# Patient Record
Sex: FEMALE | Race: WHITE | NOT HISPANIC OR LATINO | Employment: OTHER | URBAN - METROPOLITAN AREA
[De-identification: names, ages, dates, MRNs, and addresses within clinical notes are randomized per-mention and may not be internally consistent; named-entity substitution may affect disease eponyms.]

---

## 2019-03-15 ENCOUNTER — OFFICE VISIT (OUTPATIENT)
Dept: INTERNAL MEDICINE CLINIC | Facility: CLINIC | Age: 84
End: 2019-03-15
Payer: COMMERCIAL

## 2019-03-15 VITALS
OXYGEN SATURATION: 98 % | HEART RATE: 73 BPM | RESPIRATION RATE: 18 BRPM | HEIGHT: 66 IN | WEIGHT: 150.8 LBS | BODY MASS INDEX: 24.23 KG/M2 | DIASTOLIC BLOOD PRESSURE: 80 MMHG | SYSTOLIC BLOOD PRESSURE: 130 MMHG | TEMPERATURE: 98.1 F

## 2019-03-15 DIAGNOSIS — Z79.899 ENCOUNTER FOR LONG-TERM CURRENT USE OF MEDICATION: ICD-10-CM

## 2019-03-15 DIAGNOSIS — M81.0 AGE-RELATED OSTEOPOROSIS WITHOUT CURRENT PATHOLOGICAL FRACTURE: ICD-10-CM

## 2019-03-15 DIAGNOSIS — F33.41 RECURRENT MAJOR DEPRESSIVE DISORDER, IN PARTIAL REMISSION (HCC): ICD-10-CM

## 2019-03-15 DIAGNOSIS — C44.90 SKIN CANCER: ICD-10-CM

## 2019-03-15 DIAGNOSIS — Z95.2 S/P AVR (AORTIC VALVE REPLACEMENT): ICD-10-CM

## 2019-03-15 DIAGNOSIS — H61.91 SKIN LESION OF RIGHT EAR: ICD-10-CM

## 2019-03-15 DIAGNOSIS — G30.1 LATE ONSET ALZHEIMER'S DISEASE WITHOUT BEHAVIORAL DISTURBANCE (HCC): Primary | ICD-10-CM

## 2019-03-15 DIAGNOSIS — E78.49 OTHER HYPERLIPIDEMIA: ICD-10-CM

## 2019-03-15 DIAGNOSIS — F02.80 LATE ONSET ALZHEIMER'S DISEASE WITHOUT BEHAVIORAL DISTURBANCE (HCC): Primary | ICD-10-CM

## 2019-03-15 DIAGNOSIS — M15.9 PRIMARY OSTEOARTHRITIS INVOLVING MULTIPLE JOINTS: ICD-10-CM

## 2019-03-15 PROBLEM — I25.10 CORONARY ARTERY DISEASE INVOLVING NATIVE CORONARY ARTERY OF NATIVE HEART: Status: ACTIVE | Noted: 2019-03-15

## 2019-03-15 PROBLEM — M15.0 PRIMARY OSTEOARTHRITIS INVOLVING MULTIPLE JOINTS: Status: ACTIVE | Noted: 2019-03-15

## 2019-03-15 PROBLEM — J30.9 ALLERGIC RHINITIS: Status: ACTIVE | Noted: 2019-03-15

## 2019-03-15 PROBLEM — F32.A DEPRESSION: Status: ACTIVE | Noted: 2018-04-27

## 2019-03-15 PROCEDURE — 1160F RVW MEDS BY RX/DR IN RCRD: CPT | Performed by: INTERNAL MEDICINE

## 2019-03-15 PROCEDURE — 99204 OFFICE O/P NEW MOD 45 MIN: CPT | Performed by: INTERNAL MEDICINE

## 2019-03-15 PROCEDURE — 1036F TOBACCO NON-USER: CPT | Performed by: INTERNAL MEDICINE

## 2019-03-15 RX ORDER — ATORVASTATIN CALCIUM 40 MG/1
40 TABLET, FILM COATED ORAL DAILY
Qty: 90 TABLET | Refills: 1 | Status: SHIPPED | OUTPATIENT
Start: 2019-03-15 | End: 2019-07-25

## 2019-03-15 RX ORDER — ATORVASTATIN CALCIUM 40 MG/1
TABLET, FILM COATED ORAL
COMMUNITY
Start: 2017-09-18 | End: 2019-03-15 | Stop reason: SDUPTHER

## 2019-03-15 NOTE — PROGRESS NOTES
Assessment/Plan:    Late onset Alzheimer's disease without behavioral disturbance  Not interested in treatment since with side effects  She is living with her daughter  Depression  Mild, did not try citalopram     Skin cancer  Skin lesion on ear suspicious, history of skin cancer  Agrees to see dermatology  S/P AVR (aortic valve replacement)  On ASA and statin  Echo due in the summer  Refer to local cardiology  Other hyperlipidemia  Lipids due, on statin  Diagnoses and all orders for this visit:    Late onset Alzheimer's disease without behavioral disturbance    Recurrent major depressive disorder, in partial remission ([facility] [address] )    Skin lesion of right ear  -     Ambulatory referral to Dermatology; Future    Other hyperlipidemia  -     Ambulatory referral to Cardiology; Future  -     Comprehensive metabolic panel  -     Lipid panel  -     TSH, 3rd generation with Free T4 reflex  -     atorvastatin (LIPITOR) 40 mg tablet; Take 1 tablet (40 mg total) by mouth daily    S/P AVR (aortic valve replacement)  -     Ambulatory referral to Cardiology; Future  -     CBC and differential  -     Comprehensive metabolic panel    Skin cancer    Encounter for long-term current use of medication  -     Vitamin B12    Primary osteoarthritis involving multiple joints    Age-related osteoporosis without current pathological fracture  -     Vitamin D 25 hydroxy    Other orders  -     Discontinue: atorvastatin (LIPITOR) 40 mg tablet  -     aspirin 81 MG tablet; Take 81 mg by mouth daily  -     Multiple Vitamins-Minerals (MULTIVITAMIN ADULT PO); Take 1 tablet by mouth daily      Follow up in 4 months or as needed  Subjective:      Patient ID: Gaby El is a 80 y o  female  Ms Reji Up is here with her daughter  She recently moved to the area due to memory issues  She previously lived independently in [place]  She had worked part-time until 3 years ago      She had tried Aricept and Namenda but developed side effects  They are not interested in trying any medication at this time  She was also thought to be depressed, was prescribed citalopram which she never tried  Currently, she denies feeling depressed, mostly board  She walks twice a day, reads frequently  Daughter reports that she had valve replacement and had stents placed about 4 years ago in [facility]     She complains of a skin lesion on the food of her right earlobe  She noticed this about 3 weeks ago  Denies any bleeding or discharge  She has a history of skin cancers  She also complains of frequent postnasal drainage and clear nasal discharge  She has not used any over-the-counter medication  She complains of stiff hands sometimes  The following portions of the patient's history were reviewed and updated as appropriate: allergies, current medications, past family history, past medical history, past social history, past surgical history and problem list     Past Medical History:   Diagnosis Date    [facility]     Coronary artery disease     Dementia     Depression     Ovarian cancer ([facility] [address] ) 2004    s/p surgery   no chemo/RT     Past Surgical History:   Procedure Laterality Date    APPENDECTOMY      CATARACT EXTRACTION, BILATERAL      HYSTERECTOMY  2005    ovarian CA    KNEE SURGERY Right     TONSILECTOMY AND ADNOIDECTOMY       Family History   Problem Relation Age of Onset    Depression Mother     Anxiety disorder Mother     Alcohol abuse Mother     Substance Abuse Mother     Asthma Mother     Diabetes Father 61    Brain cancer Son     Mental illness Neg Hx      Social History     Socioeconomic History    Marital status:      Spouse name: Not on file    Number of children: Not on file    Years of education: Not on file    Highest education level: Not on file   Occupational History    Not on file   Social Needs    Financial resource strain: Not on file    Food insecurity:     Worry: Not on file Inability: Not on file    Transportation needs:     Medical: Not on file     Non-medical: Not on file   Tobacco Use    Smoking status: Current Every Day Smoker     Types: Cigarettes    Smokeless tobacco: Never Used    Tobacco comment: Up to 30 per day    Substance and Sexual Activity    Alcohol use: Never     Frequency: Never    Drug use: Never    Sexual activity: Not on file   Lifestyle    Physical activity:     Days per week: Not on file     Minutes per session: Not on file    Stress: Not on file   Relationships    Social connections:     Talks on phone: Not on file     Gets together: Not on file     Attends [employer] service: Not on file     Active member of club or organization: Not on file     Attends meetings of clubs or organizations: Not on file     Relationship status: Not on file    Intimate partner violence:     Fear of current or ex partner: Not on file     Emotionally abused: Not on file     Physically abused: Not on file     Forced sexual activity: Not on file   Other Topics Concern    Not on file   Social History Narrative    Drinks coffee/tea- 2 daily half decaf     From [place], [place]    Now lives with daughter    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, 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       Current Outpatient Medications:     aspirin 81 MG tablet, Take 81 mg by mouth daily, Disp: , Rfl:     atorvastatin (LIPITOR) 40 mg tablet, Take 1 tablet (40 mg total) by mouth daily, Disp: 90 tablet, Rfl: 1    Multiple Vitamins-Minerals (MULTIVITAMIN ADULT PO), Take 1 tablet by mouth daily, Disp: , Rfl:   Allergies   Allergen Reactions    Morphine          Review of Systems   Constitutional: Negative for appetite change and fatigue  HENT: Positive for postnasal drip and rhinorrhea  Negative for congestion and ear pain  Eyes: Negative for visual disturbance  Respiratory: Negative for cough and shortness of breath  Cardiovascular: Negative for chest pain and leg swelling     Gastrointestinal: Negative for abdominal pain, constipation, diarrhea and nausea  Genitourinary: Negative for dysuria, frequency and urgency  Musculoskeletal: Positive for arthralgias  Negative for myalgias  Skin: Positive for wound  Negative for rash  Neurological: Negative for dizziness, numbness and headaches  Hematological: Bruises/bleeds easily  Psychiatric/Behavioral: Negative for agitation, confusion, dysphoric mood, hallucinations and sleep disturbance  The patient is not nervous/anxious  Objective:      /80   Pulse 73   Temp 98 1 °F (36 7 °C)   Resp 18   Ht 5' 5 5" (1 664 m)   Wt 68 4 kg (150 lb 12 8 oz)   SpO2 98%   BMI 24 71 kg/m²          Physical Exam   Constitutional: Vital signs are normal  She appears well-developed and well-nourished  HENT:   Head: Normocephalic and atraumatic  Right Ear: Tympanic membrane and external ear normal    Left Ear: Tympanic membrane and external ear normal    Nose: Nose normal    Mouth/Throat: Uvula is midline, oropharynx is clear and moist and mucous membranes are normal    Eyes: Pupils are equal, round, and reactive to light  Conjunctivae are normal    Neck: Neck supple  No thyroid mass present  Cardiovascular: Normal rate, regular rhythm and normal heart sounds  Pulmonary/Chest: Effort normal and breath sounds normal  She has no wheezes  She has no rhonchi  Abdominal: Soft  Bowel sounds are normal  There is no tenderness  No hernia  Musculoskeletal: Normal range of motion  She exhibits no edema  Right hand: She exhibits deformity  Left hand: She exhibits deformity  No swelling, small joints of both hands   Lymphadenopathy:     She has no cervical adenopathy  Right: No supraclavicular adenopathy present  Left: No supraclavicular adenopathy present  Neurological: She is alert  No cranial nerve deficit  Skin: Skin is warm  Lesion noted  Psychiatric: She has a normal mood and affect   Her behavior is normal    Nursing note and vitals reviewed  Reviewed available records

## 2019-04-03 ENCOUNTER — TELEPHONE (OUTPATIENT)
Dept: CARDIOLOGY CLINIC | Facility: CLINIC | Age: 84
End: 2019-04-03

## 2019-05-01 ENCOUNTER — OFFICE VISIT (OUTPATIENT)
Dept: DERMATOLOGY | Facility: CLINIC | Age: 84
End: 2019-05-01
Payer: COMMERCIAL

## 2019-05-01 VITALS — TEMPERATURE: 98.7 F | WEIGHT: 150 LBS | BODY MASS INDEX: 24.11 KG/M2 | HEIGHT: 66 IN

## 2019-05-01 DIAGNOSIS — Z85.828 HISTORY OF SKIN CANCER: ICD-10-CM

## 2019-05-01 DIAGNOSIS — B35.3 TINEA PEDIS OF BOTH FEET: Primary | ICD-10-CM

## 2019-05-01 DIAGNOSIS — L57.0 ACTINIC KERATOSIS: ICD-10-CM

## 2019-05-01 DIAGNOSIS — L82.1 SEBORRHEIC KERATOSES: ICD-10-CM

## 2019-05-01 PROCEDURE — 17110 DESTRUCTION B9 LES UP TO 14: CPT | Performed by: DERMATOLOGY

## 2019-05-01 PROCEDURE — 99214 OFFICE O/P EST MOD 30 MIN: CPT | Performed by: DERMATOLOGY

## 2019-05-01 RX ORDER — KETOCONAZOLE 20 MG/G
CREAM TOPICAL
Qty: 60 G | Refills: 2 | Status: SHIPPED | OUTPATIENT
Start: 2019-05-01

## 2019-05-03 ENCOUNTER — CONSULT (OUTPATIENT)
Dept: CARDIOLOGY CLINIC | Facility: CLINIC | Age: 84
End: 2019-05-03
Payer: COMMERCIAL

## 2019-05-03 VITALS
DIASTOLIC BLOOD PRESSURE: 60 MMHG | HEIGHT: 66 IN | WEIGHT: 152.3 LBS | OXYGEN SATURATION: 96 % | BODY MASS INDEX: 24.48 KG/M2 | SYSTOLIC BLOOD PRESSURE: 122 MMHG | HEART RATE: 70 BPM

## 2019-05-03 DIAGNOSIS — Z95.2 S/P AVR (AORTIC VALVE REPLACEMENT): ICD-10-CM

## 2019-05-03 DIAGNOSIS — I35.0 NON-RHEUMATIC AORTIC STENOSIS: Primary | ICD-10-CM

## 2019-05-03 DIAGNOSIS — E78.49 OTHER HYPERLIPIDEMIA: ICD-10-CM

## 2019-05-03 DIAGNOSIS — G30.1 LATE ONSET ALZHEIMER'S DISEASE WITHOUT BEHAVIORAL DISTURBANCE (HCC): ICD-10-CM

## 2019-05-03 DIAGNOSIS — F02.80 LATE ONSET ALZHEIMER'S DISEASE WITHOUT BEHAVIORAL DISTURBANCE (HCC): ICD-10-CM

## 2019-05-03 PROCEDURE — 93000 ELECTROCARDIOGRAM COMPLETE: CPT | Performed by: INTERNAL MEDICINE

## 2019-05-03 PROCEDURE — 99204 OFFICE O/P NEW MOD 45 MIN: CPT | Performed by: INTERNAL MEDICINE

## 2019-05-13 ENCOUNTER — TELEPHONE (OUTPATIENT)
Dept: CARDIOLOGY CLINIC | Facility: CLINIC | Age: 84
End: 2019-05-13

## 2019-06-05 PROBLEM — K21.9 GERD (GASTROESOPHAGEAL REFLUX DISEASE): Status: ACTIVE | Noted: 2019-06-05

## 2019-06-05 PROBLEM — Z79.890 POSTMENOPAUSAL HRT (HORMONE REPLACEMENT THERAPY): Status: ACTIVE | Noted: 2019-06-05

## 2019-06-05 PROBLEM — K86.2 PANCREATIC CYST: Status: ACTIVE | Noted: 2019-06-05

## 2019-06-05 PROBLEM — R73.03 PREDIABETES: Status: ACTIVE | Noted: 2019-06-05

## 2019-06-05 PROBLEM — J44.9 COPD (CHRONIC OBSTRUCTIVE PULMONARY DISEASE) (HCC): Status: ACTIVE | Noted: 2019-06-05

## 2019-06-05 PROBLEM — E53.8 VITAMIN B12 DEFICIENCY: Status: ACTIVE | Noted: 2019-06-05

## 2019-06-05 PROBLEM — I10 ESSENTIAL HYPERTENSION: Status: ACTIVE | Noted: 2019-06-05

## 2019-06-20 ENCOUNTER — HOSPITAL ENCOUNTER (OUTPATIENT)
Dept: NON INVASIVE DIAGNOSTICS | Facility: CLINIC | Age: 84
Discharge: HOME | End: 2019-06-20
Payer: COMMERCIAL

## 2019-06-20 DIAGNOSIS — I35.0 NON-RHEUMATIC AORTIC STENOSIS: ICD-10-CM

## 2019-06-20 DIAGNOSIS — Z95.2 S/P AVR (AORTIC VALVE REPLACEMENT): ICD-10-CM

## 2019-06-20 PROCEDURE — 93306 TTE W/DOPPLER COMPLETE: CPT | Performed by: INTERNAL MEDICINE

## 2019-06-20 PROCEDURE — 93306 TTE W/DOPPLER COMPLETE: CPT

## 2019-06-26 ENCOUNTER — OFFICE VISIT (OUTPATIENT)
Dept: INTERNAL MEDICINE CLINIC | Facility: CLINIC | Age: 84
End: 2019-06-26
Payer: COMMERCIAL

## 2019-06-26 VITALS
BODY MASS INDEX: 23.75 KG/M2 | HEIGHT: 66 IN | TEMPERATURE: 98.9 F | HEART RATE: 71 BPM | RESPIRATION RATE: 18 BRPM | OXYGEN SATURATION: 98 % | DIASTOLIC BLOOD PRESSURE: 68 MMHG | WEIGHT: 147.8 LBS | SYSTOLIC BLOOD PRESSURE: 112 MMHG

## 2019-06-26 DIAGNOSIS — G30.1 LATE ONSET ALZHEIMER'S DISEASE WITHOUT BEHAVIORAL DISTURBANCE (HCC): ICD-10-CM

## 2019-06-26 DIAGNOSIS — F02.80 LATE ONSET ALZHEIMER'S DISEASE WITHOUT BEHAVIORAL DISTURBANCE (HCC): ICD-10-CM

## 2019-06-26 DIAGNOSIS — R10.84 GENERALIZED ABDOMINAL PAIN: Primary | ICD-10-CM

## 2019-06-26 DIAGNOSIS — Z95.2 S/P AVR (AORTIC VALVE REPLACEMENT): ICD-10-CM

## 2019-06-26 PROCEDURE — 1125F AMNT PAIN NOTED PAIN PRSNT: CPT | Performed by: INTERNAL MEDICINE

## 2019-06-26 PROCEDURE — 1036F TOBACCO NON-USER: CPT | Performed by: INTERNAL MEDICINE

## 2019-06-26 PROCEDURE — 1170F FXNL STATUS ASSESSED: CPT | Performed by: INTERNAL MEDICINE

## 2019-06-26 PROCEDURE — 99214 OFFICE O/P EST MOD 30 MIN: CPT | Performed by: INTERNAL MEDICINE

## 2019-06-26 PROCEDURE — 1160F RVW MEDS BY RX/DR IN RCRD: CPT | Performed by: INTERNAL MEDICINE

## 2019-06-26 RX ORDER — RANITIDINE 150 MG/1
150 TABLET ORAL 2 TIMES DAILY
Qty: 60 TABLET | Refills: 1 | Status: SHIPPED | OUTPATIENT
Start: 2019-06-26 | End: 2019-07-25

## 2019-06-26 RX ORDER — CHLORHEXIDINE GLUCONATE 0.12 MG/ML
RINSE ORAL
Refills: 0 | COMMUNITY
Start: 2019-05-18 | End: 2019-11-08 | Stop reason: ALTCHOICE

## 2019-07-18 LAB
25(OH)D3 SERPL-MCNC: 33 NG/ML (ref 30–100)
ALBUMIN SERPL-MCNC: 4 G/DL (ref 3.6–5.1)
ALBUMIN/GLOB SERPL: 2 (CALC) (ref 1–2.5)
ALP SERPL-CCNC: 79 U/L (ref 33–130)
ALT SERPL-CCNC: 14 U/L (ref 6–29)
AST SERPL-CCNC: 14 U/L (ref 10–35)
BASOPHILS # BLD AUTO: 40 CELLS/UL (ref 0–200)
BASOPHILS NFR BLD AUTO: 0.8 %
BILIRUB SERPL-MCNC: 0.7 MG/DL (ref 0.2–1.2)
BUN SERPL-MCNC: 16 MG/DL (ref 7–25)
BUN/CREAT SERPL: 15 (CALC) (ref 6–22)
CALCIUM SERPL-MCNC: 9.1 MG/DL (ref 8.6–10.4)
CHLORIDE SERPL-SCNC: 108 MMOL/L (ref 98–110)
CHOLEST SERPL-MCNC: 144 MG/DL
CHOLEST/HDLC SERPL: 2.9 (CALC)
CO2 SERPL-SCNC: 28 MMOL/L (ref 20–32)
CREAT SERPL-MCNC: 1.06 MG/DL (ref 0.6–0.88)
EOSINOPHIL # BLD AUTO: 110 CELLS/UL (ref 15–500)
EOSINOPHIL NFR BLD AUTO: 2.2 %
ERYTHROCYTE [DISTWIDTH] IN BLOOD BY AUTOMATED COUNT: 13.1 % (ref 11–15)
GLOBULIN SER CALC-MCNC: 2 G/DL (CALC) (ref 1.9–3.7)
GLUCOSE SERPL-MCNC: 94 MG/DL (ref 65–99)
HCT VFR BLD AUTO: 42.2 % (ref 35–45)
HDLC SERPL-MCNC: 50 MG/DL
HGB BLD-MCNC: 14.3 G/DL (ref 11.7–15.5)
LDLC SERPL CALC-MCNC: 77 MG/DL (CALC)
LYMPHOCYTES # BLD AUTO: 1725 CELLS/UL (ref 850–3900)
LYMPHOCYTES NFR BLD AUTO: 34.5 %
MCH RBC QN AUTO: 32.1 PG (ref 27–33)
MCHC RBC AUTO-ENTMCNC: 33.9 G/DL (ref 32–36)
MCV RBC AUTO: 94.8 FL (ref 80–100)
MONOCYTES # BLD AUTO: 330 CELLS/UL (ref 200–950)
MONOCYTES NFR BLD AUTO: 6.6 %
NEUTROPHILS # BLD AUTO: 2795 CELLS/UL (ref 1500–7800)
NEUTROPHILS NFR BLD AUTO: 55.9 %
NONHDLC SERPL-MCNC: 94 MG/DL (CALC)
PLATELET # BLD AUTO: 120 THOUSAND/UL (ref 140–400)
PMV BLD REES-ECKER: 9.8 FL (ref 7.5–12.5)
POTASSIUM SERPL-SCNC: 4.1 MMOL/L (ref 3.5–5.3)
PROT SERPL-MCNC: 6 G/DL (ref 6.1–8.1)
RBC # BLD AUTO: 4.45 MILLION/UL (ref 3.8–5.1)
SERVICE CMNT-IMP: ABNORMAL
SL AMB EGFR AFRICAN AMERICAN: 56 ML/MIN/1.73M2
SL AMB EGFR NON AFRICAN AMERICAN: 48 ML/MIN/1.73M2
SODIUM SERPL-SCNC: 143 MMOL/L (ref 135–146)
TRIGL SERPL-MCNC: 86 MG/DL
TSH SERPL-ACNC: 2.71 MIU/L (ref 0.4–4.5)
VIT B12 SERPL-MCNC: 508 PG/ML (ref 200–1100)
WBC # BLD AUTO: 5 THOUSAND/UL (ref 3.8–10.8)

## 2019-07-25 ENCOUNTER — OFFICE VISIT (OUTPATIENT)
Dept: INTERNAL MEDICINE CLINIC | Facility: CLINIC | Age: 84
End: 2019-07-25
Payer: COMMERCIAL

## 2019-07-25 VITALS
RESPIRATION RATE: 18 BRPM | BODY MASS INDEX: 23.98 KG/M2 | TEMPERATURE: 97.8 F | HEIGHT: 66 IN | HEART RATE: 69 BPM | OXYGEN SATURATION: 97 % | WEIGHT: 149.2 LBS | DIASTOLIC BLOOD PRESSURE: 76 MMHG | SYSTOLIC BLOOD PRESSURE: 130 MMHG

## 2019-07-25 DIAGNOSIS — F02.80 LATE ONSET ALZHEIMER'S DISEASE WITHOUT BEHAVIORAL DISTURBANCE (HCC): Primary | ICD-10-CM

## 2019-07-25 DIAGNOSIS — E78.49 OTHER HYPERLIPIDEMIA: ICD-10-CM

## 2019-07-25 DIAGNOSIS — D69.6 THROMBOCYTOPENIA (HCC): ICD-10-CM

## 2019-07-25 DIAGNOSIS — R10.84 GENERALIZED ABDOMINAL PAIN: ICD-10-CM

## 2019-07-25 DIAGNOSIS — Z00.00 HEALTH MAINTENANCE EXAMINATION: ICD-10-CM

## 2019-07-25 DIAGNOSIS — N18.2 CHRONIC KIDNEY DISEASE (CKD), STAGE II (MILD): ICD-10-CM

## 2019-07-25 DIAGNOSIS — G30.1 LATE ONSET ALZHEIMER'S DISEASE WITHOUT BEHAVIORAL DISTURBANCE (HCC): Primary | ICD-10-CM

## 2019-07-25 PROCEDURE — 99214 OFFICE O/P EST MOD 30 MIN: CPT | Performed by: INTERNAL MEDICINE

## 2019-07-25 PROCEDURE — 1125F AMNT PAIN NOTED PAIN PRSNT: CPT | Performed by: INTERNAL MEDICINE

## 2019-07-25 PROCEDURE — 1170F FXNL STATUS ASSESSED: CPT | Performed by: INTERNAL MEDICINE

## 2019-07-25 PROCEDURE — G0438 PPPS, INITIAL VISIT: HCPCS | Performed by: INTERNAL MEDICINE

## 2019-07-25 RX ORDER — ATORVASTATIN CALCIUM 40 MG/1
20 TABLET, FILM COATED ORAL DAILY
Qty: 90 TABLET | Refills: 1
Start: 2019-07-25 | End: 2020-03-02 | Stop reason: SDUPTHER

## 2019-07-25 RX ORDER — RANITIDINE 150 MG/1
150 TABLET ORAL DAILY
Qty: 60 TABLET | Refills: 1
Start: 2019-07-25 | End: 2019-08-19 | Stop reason: SDUPTHER

## 2019-07-25 NOTE — PROGRESS NOTES
Assessment/Plan:    Late onset Alzheimer's disease without behavioral disturbance  She will join adult day care  S/P AVR (aortic valve replacement)  On daily ASA  Platelet count a bit low, will monitor  Other hyperlipidemia  LDL 77, total 144  Decrease atorvastatin to 20 mg, repeat lipids in a few months  GERD (gastroesophageal reflux disease)  May decrease ranitidine to once a day  Vitamin B12 deficiency  Continue daily MVI  Age-related osteoporosis without current pathological fracture  On daily MVI only  Chronic kidney disease (CKD), stage II (mild)  Maintain adequate fluid intake  Prediabetes  Fasting BS normal        Diagnoses and all orders for this visit:    Late onset Alzheimer's disease without behavioral disturbance    Other hyperlipidemia  -     atorvastatin (LIPITOR) 40 mg tablet; Take 0 5 tablets (20 mg total) by mouth daily    Generalized abdominal pain  Comments:  Diff Dx: diverticulitis, colitis, dyspepsia, GERD, constipation, cancer recurrence  Start ranitidine bid  Instructed to start liquid diet if with symptoms  Orders:  -     ranitidine (ZANTAC) 150 mg tablet; Take 1 tablet (150 mg total) by mouth daily    Chronic kidney disease (CKD), stage II (mild)    Thrombocytopenia ([facility] [address] )    Health maintenance examination  Comments:  No further screening  Follow up as scheduled or as needed  Subjective:      Patient ID: Tony Jay is a 80 y o  female  Ms Alvarez Noel is here with her daughter  She reports abdominal pain have subsided  She would develop symptoms depending on food intake, such as spicy foods  She moves her bowels regularly  Denies any nausea or vomiting  She has been taking ranitidine twice a day  She and her daughter decided not to do CT scan since symptoms have been improving  Daughter reports that she will be starting adult   She walks at least once a day around her home  She denies any falls, stumbles frequently    She noticed that if she misses her daily walks, her legs feel weaker  She does not watch a lot of television, reads frequently  She reports her older brother recently passed away, age 80  The following portions of the patient's history were reviewed and updated as appropriate: allergies, current medications, past medical history, past social history and problem list     Review of Systems   Constitutional: Negative for activity change, appetite change and fatigue  Eyes: Negative for visual disturbance  Respiratory: Negative for cough  Cardiovascular: Negative for chest pain  Gastrointestinal: Negative for abdominal distention, abdominal pain, constipation, diarrhea, nausea and vomiting  Genitourinary: Negative for dysuria  Musculoskeletal: Negative for arthralgias and gait problem  Psychiatric/Behavioral: Positive for confusion  Negative for agitation and sleep disturbance  Objective:      /76   Pulse 69   Temp 97 8 °F (36 6 °C) (Oral)   Resp 18   Ht 5' 6" (1 676 m)   Wt 67 7 kg (149 lb 3 2 oz)   SpO2 97%   BMI 24 08 kg/m²          Physical Exam   Constitutional: She appears well-developed and well-nourished  HENT:   Head: Normocephalic and atraumatic  Eyes: Pupils are equal, round, and reactive to light  Cardiovascular: Normal rate, regular rhythm and normal heart sounds  Pulmonary/Chest: Effort normal and breath sounds normal  She has no wheezes  She has no rales  Abdominal: Soft  Bowel sounds are normal    Musculoskeletal: She exhibits no edema  Neurological: She is alert  Skin: Skin is warm  No rash noted  Psychiatric: She has a normal mood and affect  Nursing note and vitals reviewed  Lab results reviewed with patient

## 2019-07-25 NOTE — PROGRESS NOTES
Assessment and Plan:     Problem List Items Addressed This Visit        Nervous and Auditory    Late onset Alzheimer's disease without behavioral disturbance - Primary     She will join adult day care  Genitourinary    Chronic kidney disease (CKD), stage II (mild)     Maintain adequate fluid intake  Other    Other hyperlipidemia     LDL 77, total 144  Decrease atorvastatin to 20 mg, repeat lipids in a few months  Relevant Medications    atorvastatin (LIPITOR) 40 mg tablet    Thrombocytopenia (HCC)      Other Visit Diagnoses     Generalized abdominal pain        Diff Dx: diverticulitis, colitis, dyspepsia, GERD, constipation, cancer recurrence  Start ranitidine bid  Instructed to start liquid diet if with symptoms        Relevant Medications    ranitidine (ZANTAC) 150 mg tablet         History of Present Illness:     Patient presents for Medicare Annual Wellness visit    Patient Care Team:  Edita Webster MD as PCP - General (Internal Medicine)     Problem List:     Patient Active Problem List   Diagnosis    Depression    Late onset Alzheimer's disease without behavioral disturbance    S/P AVR (aortic valve replacement)    Primary osteoarthritis involving multiple joints    Other hyperlipidemia    Allergic rhinitis    Age-related osteoporosis without current pathological fracture    Skin cancer    Coronary artery disease involving native coronary artery of native heart    Essential hypertension    GERD (gastroesophageal reflux disease)    Postmenopausal HRT (hormone replacement therapy)    Vitamin B12 deficiency    COPD (chronic obstructive pulmonary disease) (HCC)    Prediabetes    Pancreatic cyst    Chronic kidney disease (CKD), stage II (mild)    Thrombocytopenia (HCC)      Past Medical and Surgical History:     Past Medical History:   Diagnosis Date    Cancer ([facility] [address] )     Coronary artery disease     Dementia     Depression     Hyperlipidemia     Migraines  Ovarian cancer ([facility] [address] ) 2004    s/p surgery  no chemo/RT    Phlebitis     R leg     Past Surgical History:   Procedure Laterality Date    APPENDECTOMY      CATARACT EXTRACTION, BILATERAL      HYSTERECTOMY  2005    ovarian CA    KNEE SURGERY Right     TONSILECTOMY AND ADNOIDECTOMY        Family History:     Family History   Problem Relation Age of Onset    Depression Mother     Anxiety disorder Mother     Alcohol abuse Mother     Substance Abuse Mother     Asthma Mother     Diabetes Father 61    Brain cancer Son     Heart attack Sister     Coronary artery disease Sister     Mental illness Neg Hx       Social History:     Social History     Tobacco Use   Smoking Status Former Smoker    Packs/day: 0 25    Types: Cigarettes   Smokeless Tobacco Never Used   Tobacco Comment    until age 27     Social History     Substance and Sexual Activity   Alcohol Use Yes    Frequency: Never     Social History     Substance and Sexual Activity   Drug Use Never      Medications and Allergies:     Current Outpatient Medications   Medication Sig Dispense Refill    aspirin 81 MG tablet Take 81 mg by mouth daily      atorvastatin (LIPITOR) 40 mg tablet Take 0 5 tablets (20 mg total) by mouth daily 90 tablet 1    chlorhexidine (PERIDEX) 0 12 % solution RINSE 2 TIMES A DAY  0    ketoconazole (NIZORAL) 2 % cream Apply topically to both feet in their entirety (tops, bottoms and between toes) 3 times a day for 4 weeks straight  60 g 2    Multiple Vitamins-Minerals (MULTIVITAMIN ADULT PO) Take 1 tablet by mouth daily      ranitidine (ZANTAC) 150 mg tablet Take 1 tablet (150 mg total) by mouth daily 60 tablet 1     No current facility-administered medications for this visit        Allergies   Allergen Reactions    Codeine Hives    Morphine Other (See Comments)     Redness in face, swelling    Propoxyphene       Immunizations:     Immunization History   Administered Date(s) Administered     Influenza (IM) Preservative Free 10/17/2017    Hep B, Adolescent or Pediatric 10/15/2001    Hepatitis B 10/15/2001    INFLUENZA 10/04/2013, 11/03/2014, 09/27/2015, 10/13/2016, 10/17/2017, 10/01/2018    Influenza, high dose seasonal 0 5 mL 10/01/2018    Pneumococcal Polysaccharide PPV23 11/22/2016    Tdap 11/16/2010      Medicare Screening Tests and Risk Assessments:     Cecile Huber is here for her Initial Wellness visit  Health Risk Assessment:  Patient rates overall health as very good  Patient feels that their physical health rating is Same  Eyesight was rated as Same  Hearing was rated as Slightly worse  Patient feels that their emotional and mental health rating is Slightly better  Pain experienced by patient in the last 7 days has been Some  Patient's pain rating has been 3/10  Emotional/Mental Health:  Patient has been feeling nervous/anxious  PHQ-9 Depression Screening:    Frequency of the following problems over the past two weeks:      1  Little interest or pleasure in doing things: 0 - not at all      2  Feeling down, depressed, or hopeless: 1 - several days  PHQ-2 Score: 1          Broken Bones/Falls: Fall Risk Assessment:    In the past year, patient has experienced: No history of falling in past year          Bladder/Bowel:  Patient has not leaked urine accidently in the last six months  Patient reports no loss of bowel control  Immunizations:  Patient has had a flu vaccination within the last year  Patient has received a pneumonia shot  Patient has not received a shingles shot  Home Safety:  Patient does not have trouble with stairs inside or outside of their home  Patient currently reports that there are no safety hazards present in home, working smoke alarms, no working carbon monoxide detectors        Preventative Screenings:   Breast cancer screening performed, colon cancer screen completed, cholesterol screen completed, glaucoma eye exam completed,     Medications:  Patient is currently taking over-the-counter supplements  Patient is able to manage medications  Lifestyle Choices:  Patient reports no tobacco use  Patient has smoked or used tobacco in the past   Patient has stopped her tobacco use  Patient reports no alcohol use  Patient drives a vehicle  Patient wears seat belt  Activities of Daily Living:  Can get out of bed by his or her self, able to dress self, unable to make own meals, able to do own shopping, able to bathe self, unable to do laundry/housekeeping, unable to manage own money and other related tasks    Previous Hospitalizations:  No hospitalization or ED visit in past 12 months        Advanced Directives:  Patient has decided on a power of   Patient has spoken to designated power of   Patient has completed advanced directive  Preventative Screening/Counseling:      Cardiovascular:      General: Screening Current          Diabetes:      General: Screening Current          Colorectal Cancer:      General: Screening Not Indicated          Breast Cancer:      General: Patient Declines          Cervical Cancer:      General: Screening Not Indicated          Osteoporosis:      General: Patient Declines      Counseling: Regular Weightbearing Exercise          AAA:      General: Screening Not Indicated          Glaucoma:      General: Screening Current          HIV:      General: Screening Not Indicated          Hepatitis C:      General: Screening Not Indicated        Advanced Directives:   Patient has living will for healthcare, has durable POA for healthcare, End of life assessment reviewed with patient  Immunizations:      Influenza: Influenza UTD This Year      Pneumococcal: Lifetime Vaccine Completed      Other Preventative Counseling (Non-Medicare):   Increase physical activity and Nutrition Counseling      Referrals:  Referral(s) to: Cardiologist and Dermatology

## 2019-07-29 ENCOUNTER — TELEPHONE (OUTPATIENT)
Dept: INTERNAL MEDICINE CLINIC | Facility: CLINIC | Age: 84
End: 2019-07-29

## 2019-07-29 NOTE — TELEPHONE ENCOUNTER
Pt  Has appt 's for TB test and ppd Read this week  Daughter wants to know if she can have ear was removed on one of those days  The Audiologist said she has wax in her rt  ear

## 2019-07-30 ENCOUNTER — CLINICAL SUPPORT (OUTPATIENT)
Dept: INTERNAL MEDICINE CLINIC | Facility: CLINIC | Age: 84
End: 2019-07-30
Payer: COMMERCIAL

## 2019-07-30 DIAGNOSIS — Z11.1 VISIT FOR TB SKIN TEST: Primary | ICD-10-CM

## 2019-07-30 PROCEDURE — 69210 REMOVE IMPACTED EAR WAX UNI: CPT

## 2019-07-30 PROCEDURE — 86580 TB INTRADERMAL TEST: CPT

## 2019-08-01 ENCOUNTER — CLINICAL SUPPORT (OUTPATIENT)
Dept: INTERNAL MEDICINE CLINIC | Facility: CLINIC | Age: 84
End: 2019-08-01

## 2019-08-01 DIAGNOSIS — Z11.1 ENCOUNTER FOR PPD SKIN TEST READING: Primary | ICD-10-CM

## 2019-08-01 LAB
INDURATION: 0 MM
TB SKIN TEST: NEGATIVE

## 2019-08-19 ENCOUNTER — OFFICE VISIT (OUTPATIENT)
Dept: DERMATOLOGY | Facility: CLINIC | Age: 84
End: 2019-08-19
Payer: COMMERCIAL

## 2019-08-19 VITALS — BODY MASS INDEX: 22.91 KG/M2 | HEIGHT: 67 IN | WEIGHT: 146 LBS | TEMPERATURE: 97.5 F

## 2019-08-19 DIAGNOSIS — R10.84 GENERALIZED ABDOMINAL PAIN: ICD-10-CM

## 2019-08-19 DIAGNOSIS — L82.1 SEBORRHEIC KERATOSIS: ICD-10-CM

## 2019-08-19 DIAGNOSIS — L57.0 KERATOSIS, ACTINIC: Primary | ICD-10-CM

## 2019-08-19 PROCEDURE — 99213 OFFICE O/P EST LOW 20 MIN: CPT | Performed by: DERMATOLOGY

## 2019-08-19 RX ORDER — RANITIDINE 150 MG/1
TABLET ORAL
Qty: 60 TABLET | Refills: 5 | Status: SHIPPED | OUTPATIENT
Start: 2019-08-19 | End: 2020-05-26

## 2019-08-19 NOTE — PROGRESS NOTES
[address] [facility] Note     Patient Name: Alia Wyatt  Encounter Date: 08/19/2019    Today's Chief Concerns:  Lam Steele Concern #1:  F/U AK's x 4    Concern #2:  Skin lesion left thigh       Past Medical History:  Have you ever had or currently have any of the following medical conditions or treatments? · HIV/AIDS: No  · Hepatitis B: No  · Hepatitis C: No   · Diabetes: No  · Tuberculosis: No  · Biologic Therapy/Chemotherapy: No  · Organ or Bone Marrow Transplantation: No  · Radiation Treatment: No  · Cancer (If Yes, which types)- No      Have you ever had any of the following skin conditions? · Melanoma? (If Yes, please provide more detail)- YES, Unsure  · Basal Cell Carcinoma: No  · Squamous Cell Carcinoma: No  · Sebaceous Cell Carcinoma: No  · Merkel Cell Carcinoma: No  · Angiosarcoma: No  · Blistering Sunburns: No  · Eczema: No  · Psoriasis: No    Social History:    What is your current Smoking Status? Never smoker     What is/was your primary occupation? Retired    What are your hobbies/past-times? n/a    Family history:  Do any of your "first degree relatives" (parent, brother, sister, or child) have any of the following conditions? · Melanoma? (If Yes, which relatives?) No  · Eczema: No  · Asthma: No  · Hay Fever/Seasonal Allergies: No  · Psoriasis: No  · Arthritis: YES  · Thyroid Problems: YES  · Lupus/Connective Tissue Disease: No  · Diabetes: No  · Stroke: No  · Blood Clots: No  · IBD/Crohn's/Ulcerative Colitis: No  · Vitiligo: No  · Scarring/Keloids: No  · Severe Acne: No  · Pancreatic Cancer: No  · Other known Skin Condition? If Yes, what condition and which relatives?   YES, sister unknown     Current Medications:    Current Outpatient Medications:     aspirin 81 MG tablet, Take 81 mg by mouth daily, Disp: , Rfl:     atorvastatin (LIPITOR) 40 mg tablet, Take 0 5 tablets (20 mg total) by mouth daily, Disp: 90 tablet, Rfl: 1    ketoconazole (NIZORAL) 2 % cream, Apply topically to both feet in their entirety (tops, bottoms and between toes) 3 times a day for 4 weeks straight , Disp: 60 g, Rfl: 2    Multiple Vitamins-Minerals (MULTIVITAMIN ADULT PO), Take 1 tablet by mouth daily, Disp: , Rfl:     ranitidine (ZANTAC) 150 mg tablet, TAKE 1 TABLET BY MOUTH TWICE A DAY, Disp: 60 tablet, Rfl: 5    chlorhexidine (PERIDEX) 0 12 % solution, RINSE 2 TIMES A DAY, Disp: , Rfl: 0    Specific Alerts:    Are you pregnant or planning to become pregnant? N/A    Are you currently or planning to be nursing or breast feeding? N/A    Allergies   Allergen Reactions    Codeine Hives    Morphine Other (See Comments)     Redness in face, swelling    Propoxyphene        May we call your Preferred Phone number to discuss your specific medical information? YES    May we leave a detailed message that includes your specific medical information? YES    Have you traveled outside of the [facility] in the past 3 months? No    Do you currently have a pacemaker or defibrillator? No    Do you have any artificial heart valves, joints, plates, screws, rods, stents, pins, etc? YES   - If Yes, were any placed within the last 2 years? Do you require any medications prior to a surgical procedure? YES   - If Yes, for which procedure? dentist   - If Yes, what medications to you require? unknown    Are you taking any medications that cause you to bleed more easily ("blood thinners") YES    Have you ever experienced a rapid heartbeat with epinephrine? No    Have you ever been treated with "gold" (gold sodium thiomalate) therapy? No    [facility] can help with wrinkles, "laugh lines," facial volume loss, "double chin," "love handles," age spots, and more  Are you interested in learning today about some of the skin enhancement procedures that we offer? (If Yes, please provide more detail) No    Review of Systems:  Have you recently had or currently have any of the following?     · Fever or chills: No  · Night Sweats: No  · Headaches: No  · Weight Gain: {No  · Weight Loss: No  · Blurry Vision: No  · Nausea: No  · Vomiting: No  · Diarrhea: No  · Blood in Stool: No  · Abdominal Pain: No  · Itchy Skin: YES  · Painful Joints: No  · Swollen Joints: No  · Muscle Pain: No  · Irregular Mole: No  · Sun Burn: No  · Dry Skin: No  · Skin Color Changes: No  · Scar or Keloid: No  · Cold Sores/Fever Blisters: No  · Bacterial Infections/MRSA: No  · Anxiety: No  · Depression: No  · Suicidal or Homicidal Thoughts: No      PHYSICAL EXAM:      Was a chaperone (Derm Clinical Assistant) present for the entirety of the Physical Exam? YES    Did the Dermatology Team specifically ask and  the patient on the importance of a Full Skin Exam to be sure that nothing is missed clinically?  YES    Did the patient request or accept a Full Skin Exam?  No    Did the patient specifically refuse to have the areas "under-the-bra" examined by the Dermatologist? No    Did the patient specifically refuse to have the areas "under-the-underwear" examined by the Dermatologist? No      CONSTITUTIONAL:   Vitals:    08/19/19 1443   Temp: 97 5 °F (36 4 °C)   TempSrc: Tympanic   Weight: 66 2 kg (146 lb)   Height: 5' 7 2" (1 707 m)       PSYCH: Normal mood and affect  EYES: Normal conjunctiva  ENT: Normal lips and oral mucosa  CARDIOVASCULAR: No edema  RESPIRATORY: Normal respirations  HEME/LYMPH/IMMUNO:  No regional lymphadenopathy except as noted below in ASSESSMENT AND PLAN BY DIAGNOSIS    FULL ORGAN SYSTEM SKIN EXAM (SKIN)  Hair, Scalp, Ears, Face Normal except as noted below in Assessment   Neck, Cervical Chain Nodes Normal except as noted below in Assessment   Right Arm/Hand/Fingers Normal except as noted below in Assessment   Left Arm/Hand/Fingers Normal except as noted below in Assessment   Chest/Breasts/Axillae    Abdomen, Umbilicus    Back/Spine    Groin/Genitalia/Buttocks    Right Leg, Foot, Toes Normal except as noted below in Assessment   Left Leg, Foot, Toes Normal except as noted below in Assessment        ASSESSMENT AND PLAN BY DIAGNOSIS:    1  ACTINIC KERATOSIS    Physical Exam:   Anatomic Location Affected:  Face, right arm and left ear   Morphological Description:  Scaly piank papules  (resolved)    Additional History of Present Condition:  S/P LN2 x 4  Resolved     Assessment and Plan:  Based on a thorough discussion of this condition and the management approach to it (including a comprehensive discussion of the known risks, side effects and potential benefits of treatment), the patient (family) agrees to implement the following specific plan:     No treatment needed  Resolved     Actinic keratoses are very common on sites repeatedly exposed to the sun, especially the backs of the hands and the face, most often affecting the ears, nose, cheeks, upper lip, vermilion of the lower lip, temples, forehead and balding scalp  In severely chronically sun-damaged individuals, they may also be found on the upper trunk, upper and lower limbs, and dorsum of feet  We discussed the theoretical premalignant (pre-cancerous) nature and etiology of these growths  We discussed the prevailing notion that actinic keratoses are a reflection of abnormal skin cell development due to DNA damage by short wavelength UVB  They are more likely to appear if the immune function is poor, due to aging, recent sun exposure, predisposing disease or certain drugs  We discussed that the main concern is that actinic keratoses may predispose to squamous cell carcinoma  It is rare for a solitary actinic keratosis to evolve to squamous cell carcinoma (SCC), but the risk of SCC occurring at some stage in a patient with more than 10 actinic keratoses is thought to be about 10 to 15%  A tender, thickened, ulcerated or enlarging actinic keratosis is suspicious of SCC  Actinic keratoses may be prevented by strict sun protection   If already present, keratoses may improve with a very high sun protection factor (50+) broad-spectrum sunscreen applied at least daily to affected areas, year-round  We recommend that UPF-rated clothing and hats and sunglasses be worn whenever possible and that a sunscreen-moisturizer combination product such as Neutrogena Daily Defense be applied at least three times a day  We performed a thorough discussion of treatment options and specific risk/benefits/alternatives including but not limited to medical field treatment with medications such as the following:                    - Lesions have resolved; No treatment needed     2  SEBORRHEIC KERATOSIS; NON-INFLAMED    Physical Exam:   Anatomic Location Affected:  Trunk including left thigh   Morphological Description:  Flat and raised, waxy, smooth to warty textured, yellow to brownish-grey to dark brown to blackish, discrete, "stuck-on" appearing papules  Additional History of Present Condition:  Patient reports new bumps on the skin  Denies itch, burn, pain, bleeding or ulceration  Present constantly; nothing seems to make it worse or better  No prior treatment  Seborrheic Keratosis  A seborrheic keratosis is a harmless warty spot that appears during adult life as a common sign of skin aging  Seborrheic keratoses can arise on any area of skin, covered or uncovered, with the usual exception of the palms and soles  They do not arise from mucous membranes  Seborrheic keratoses can have highly variable appearance  Seborrheic keratoses are extremely common  It has been estimated that over 90% of adults over the age of 61 years have one or more of them  They occur in males and females of all races, typically beginning to erupt in the 35s or 45s  They are uncommon under the age of 21 years  The precise cause of seborrhoeic keratoses is not known  Seborrhoeic keratoses are considered degenerative in nature  As time goes by, seborrheic keratoses tend to become more numerous   Some people inherit a tendency to develop a very large number of them; some people may have hundreds of them  The name "seborrheic keratosis" is misleading, because these lesions are not limited to a seborrhoeic distribution (scalp, mid-face, chest, upper back), nor are they formed from sebaceous glands, nor are they associated with sebum -- which is greasy  Seborrheic keratosis may also be called "SK," "Seb K," "basal cell papilloma," "senile wart," or "barnacle "      Researchers have noted:   Eruptive seborrhoeic keratoses can follow sunburn or dermatitis   Skin friction may be the reason they appear in body folds   Viral cause (e g , human papillomavirus) seems unlikely   Stable and clonal mutations or activation of FRFR3, PIK3CA, SHAJI, AKT1 and EGFR genes are found in seborrhoeic keratoses   Seborrhoeic keratosis can arise from solar lentigo   FRFR3 mutations also arise in solar lentigines  These mutations are associated with increased age and location on the head and neck, suggesting a role of ultraviolet radiation in these lesions   Seborrheic keratoses do not harbour tumour suppressor gene mutations   Epidermal growth factor receptor inhibitors, which are used to treat some cancers, often result in an increase in verrucal (warty) keratoses  There is no easy way to remove multiple lesions on a single occasion  Unless a specific lesion is "inflamed" and is causing pain or stinging/burning or is bleeding, most insurance companies do not authorize treatment      Scribe Attestation    I,:   Francheska Carmichael MA am acting as a scribe while in the presence of the attending physician :        I,:   Juan Ferris MD personally performed the services described in this documentation    as scribed in my presence :

## 2019-09-27 ENCOUNTER — OFFICE VISIT (OUTPATIENT)
Dept: INTERNAL MEDICINE CLINIC | Facility: CLINIC | Age: 84
End: 2019-09-27
Payer: COMMERCIAL

## 2019-09-27 VITALS
RESPIRATION RATE: 18 BRPM | TEMPERATURE: 98.6 F | OXYGEN SATURATION: 96 % | HEART RATE: 64 BPM | BODY MASS INDEX: 23.98 KG/M2 | DIASTOLIC BLOOD PRESSURE: 74 MMHG | HEIGHT: 66 IN | WEIGHT: 149.2 LBS | SYSTOLIC BLOOD PRESSURE: 110 MMHG

## 2019-09-27 DIAGNOSIS — Z23 NEED FOR INFLUENZA VACCINATION: ICD-10-CM

## 2019-09-27 DIAGNOSIS — G30.1 LATE ONSET ALZHEIMER'S DISEASE WITHOUT BEHAVIORAL DISTURBANCE (HCC): Primary | ICD-10-CM

## 2019-09-27 DIAGNOSIS — N18.2 CHRONIC KIDNEY DISEASE (CKD), STAGE II (MILD): ICD-10-CM

## 2019-09-27 DIAGNOSIS — K21.9 GASTROESOPHAGEAL REFLUX DISEASE, ESOPHAGITIS PRESENCE NOT SPECIFIED: ICD-10-CM

## 2019-09-27 DIAGNOSIS — F02.80 LATE ONSET ALZHEIMER'S DISEASE WITHOUT BEHAVIORAL DISTURBANCE (HCC): Primary | ICD-10-CM

## 2019-09-27 DIAGNOSIS — Z95.2 S/P AVR (AORTIC VALVE REPLACEMENT): ICD-10-CM

## 2019-09-27 DIAGNOSIS — F33.41 RECURRENT MAJOR DEPRESSIVE DISORDER, IN PARTIAL REMISSION (HCC): ICD-10-CM

## 2019-09-27 DIAGNOSIS — E78.49 OTHER HYPERLIPIDEMIA: ICD-10-CM

## 2019-09-27 DIAGNOSIS — Z71.9 HEALTH COUNSELING: ICD-10-CM

## 2019-09-27 PROBLEM — Z11.1 VISIT FOR TB SKIN TEST: Status: RESOLVED | Noted: 2019-07-30 | Resolved: 2019-09-27

## 2019-09-27 PROCEDURE — G0008 ADMIN INFLUENZA VIRUS VAC: HCPCS | Performed by: INTERNAL MEDICINE

## 2019-09-27 PROCEDURE — 99214 OFFICE O/P EST MOD 30 MIN: CPT | Performed by: INTERNAL MEDICINE

## 2019-09-27 PROCEDURE — 90662 IIV NO PRSV INCREASED AG IM: CPT | Performed by: INTERNAL MEDICINE

## 2019-09-27 NOTE — PROGRESS NOTES
Assessment/Plan:    Late onset Alzheimer's disease without behavioral disturbance  Stable, now going to adult day care 2 days a week, [employer] activities 2 days a week  Depression  Improved, since going to   Other hyperlipidemia  On statin, repeat lipids next visit  GERD (gastroesophageal reflux disease)  Stable, takes ranitidine in AM     COPD (chronic obstructive pulmonary disease) ([facility])  No symptoms  Chronic kidney disease (CKD), stage II (mild)  No NSAIDs  S/P AVR (aortic valve replacement)  Monitor platelets  On daily ASA  Diagnoses and all orders for this visit:    Late onset Alzheimer's disease without behavioral disturbance  -     CBC and differential; Future    S/P AVR (aortic valve replacement)    Recurrent major depressive disorder, in partial remission ([facility] [address] )    Need for influenza vaccination  -     influenza vaccine, [id number], high-dose, PF 0 5 mL (FLUZONE HIGH-DOSE)    Other hyperlipidemia  -     Comprehensive metabolic panel; Future  -     Lipid panel; Future    Gastroesophageal reflux disease, esophagitis presence not specified    Chronic kidney disease (CKD), stage II (mild)  -     Vitamin D 25 hydroxy; Future      Follow up in 6 months or as needed  Subjective:      Patient ID: Su Kerns is a 80 y o  female  Ms Sg Amado is here with her daughter  She has been feeling well, has no complaints  She started going to adult  twice a week  She enjoys it very much, enjoys the socialization and exposure to other people  She continues to walk at least twice a day  She continues to read at home as well  Daughter reports that she is also going to [employer] for daytime activities  She denies any abdominal pain, takes ranitidine every morning  She moves her bowels regularly  She has not fallen or trip      The following portions of the patient's history were reviewed and updated as appropriate: allergies, current medications, past medical history, past social history and problem list     Review of Systems   Constitutional: Negative for appetite change and fatigue  HENT: Negative for congestion, ear pain and postnasal drip  Eyes: Negative for visual disturbance  Respiratory: Negative for cough and shortness of breath  Cardiovascular: Negative for chest pain and leg swelling  Gastrointestinal: Negative for abdominal pain, constipation and diarrhea  Genitourinary: Negative for dysuria and frequency  Musculoskeletal: Negative for arthralgias  Skin: Negative for rash and wound  Neurological: Negative for dizziness and headaches  Psychiatric/Behavioral: Negative for confusion, decreased concentration and dysphoric mood  The patient is not nervous/anxious  Objective:      /74   Pulse 64   Temp 98 6 °F (37 °C) (Oral)   Resp 18   Ht 5' 6" (1 676 m)   Wt 67 7 kg (149 lb 3 2 oz)   SpO2 96%   BMI 24 08 kg/m²          Physical Exam   Constitutional: She appears well-developed and well-nourished  HENT:   Head: Normocephalic and atraumatic  Nose: Nose normal    Eyes: Pupils are equal, round, and reactive to light  Conjunctivae are normal    Neck: Neck supple  Cardiovascular: Normal rate, regular rhythm and normal heart sounds  Pulmonary/Chest: Effort normal and breath sounds normal  She has no wheezes  She has no rales  Abdominal: Soft  Bowel sounds are normal    Musculoskeletal: She exhibits no edema  Neurological: She is alert  Skin: Skin is warm  No rash noted  Psychiatric: She has a normal mood and affect  Her behavior is normal    Nursing note and vitals reviewed  Lab results reviewed with patient

## 2019-11-08 ENCOUNTER — OFFICE VISIT (OUTPATIENT)
Dept: CARDIOLOGY CLINIC | Facility: CLINIC | Age: 84
End: 2019-11-08
Payer: COMMERCIAL

## 2019-11-08 VITALS
BODY MASS INDEX: 23.98 KG/M2 | OXYGEN SATURATION: 98 % | WEIGHT: 149.2 LBS | HEART RATE: 80 BPM | SYSTOLIC BLOOD PRESSURE: 124 MMHG | DIASTOLIC BLOOD PRESSURE: 62 MMHG | HEIGHT: 66 IN

## 2019-11-08 DIAGNOSIS — I25.10 CORONARY ARTERY DISEASE INVOLVING NATIVE CORONARY ARTERY OF NATIVE HEART WITHOUT ANGINA PECTORIS: ICD-10-CM

## 2019-11-08 DIAGNOSIS — I35.0 NON-RHEUMATIC AORTIC STENOSIS: Primary | ICD-10-CM

## 2019-11-08 DIAGNOSIS — Z95.2 S/P AVR (AORTIC VALVE REPLACEMENT): ICD-10-CM

## 2019-11-08 DIAGNOSIS — I10 ESSENTIAL HYPERTENSION: ICD-10-CM

## 2019-11-08 PROCEDURE — 99214 OFFICE O/P EST MOD 30 MIN: CPT | Performed by: INTERNAL MEDICINE

## 2019-11-08 NOTE — PROGRESS NOTES
Cardiology Consultation     Ben Freeze  [id number]  1935  [facility] [place]  [facility] [place] [address]      1  Non-rheumatic aortic stenosis  Echo complete with contrast if indicated   2  Coronary artery disease involving native coronary artery of native heart without angina pectoris     3  Essential hypertension     4  S/P AVR (aortic valve replacement)         Discussion/Summary:    1  Aortic stenosis: s/p AVR  Bioprosthetic valve  Gradients across the valve are slightly elevated, but she is asymptomatic  Continue monitoring  Repeat echo prior to next visit in 6 months  2  CAD: Prior PCI  Currently without any angina  Continue her current medication regimen  3  Essential hypertension:  Blood pressure currently controlled, diet management not on any medications  None indicated currently  History of Present Illness:  She has a history of coronary artery disease with reports of prior stenting  Also has history of aortic stenosis and underwent a bioprosthetic aortic valve replacement in 2016  These were done [facility]  The patient lives with her daughter now  She is not very active overall, but denies any significant limitations  When she had severe aortic stenosis and underwent a valve replacement, she was having symptoms of shortness of breath on exertion  They have tried to use different medications for her dementia before  She had side effects with these  Also has depression, but did not tolerate medication for this either  Interval History:  Returns for regular follow-up  Overall, she is doing quite well  She is living with her daughter  Continues to have some memory issues with her dementia, but is walking around her neighborhood a few blocks  Denies any significant dyspnea  No PND orthopnea  No leg edema  She denies any chest pain    Tends to repeat that her daughter and her son and lost cooking is quite good, and she enjoys eating  However, she admits to being upset about having to sell her house  She is attending adult  2-3 days per week  Patient Active Problem List   Diagnosis    Depression    Late onset Alzheimer's disease without behavioral disturbance ([place] [address] )    S/P AVR (aortic valve replacement)    Primary osteoarthritis involving multiple joints    Other hyperlipidemia    Allergic rhinitis    Age-related osteoporosis without current pathological fracture    Skin cancer    Coronary artery disease involving native coronary artery of native heart    Essential hypertension    GERD (gastroesophageal reflux disease)    Postmenopausal HRT (hormone replacement therapy)    Vitamin B12 deficiency    COPD (chronic obstructive pulmonary disease) (HCC)    Prediabetes    Pancreatic cyst    Chronic kidney disease (CKD), stage II (mild)    Thrombocytopenia (HCC)     Past Medical History:   Diagnosis Date    Cancer ([place] [address] )     Coronary artery disease     Dementia ([place] [address] )     Depression     Hyperlipidemia     Migraines     Ovarian cancer ([place] [address] ) 2004    s/p surgery   no chemo/RT    Phlebitis     R leg     Social History     Tobacco Use    Smoking status: Former Smoker     Packs/day: 0 25     Types: Cigarettes    Smokeless tobacco: Never Used    Tobacco comment: until age 27   Substance Use Topics    Alcohol use: Yes     Frequency: Never    Drug use: Never      Family History   Problem Relation Age of Onset    Depression Mother     Anxiety disorder Mother     Alcohol abuse Mother     Substance Abuse Mother     Asthma Mother     Diabetes Father 61    Brain cancer Son     Heart attack Sister     Coronary artery disease Sister     Mental illness Neg Hx      Past Surgical History:   Procedure Laterality Date    APPENDECTOMY      CATARACT EXTRACTION, BILATERAL      HYSTERECTOMY  2005    ovarian CA    KNEE SURGERY Right     TONSILECTOMY AND ADNOIDECTOMY         Current Outpatient Medications:     aspirin 81 MG tablet, Take 81 mg by mouth daily, Disp: , Rfl:     atorvastatin (LIPITOR) 40 mg tablet, Take 0 5 tablets (20 mg total) by mouth daily, Disp: 90 tablet, Rfl: 1    Multiple Vitamins-Minerals (MULTIVITAMIN ADULT PO), Take 1 tablet by mouth daily, Disp: , Rfl:     ranitidine (ZANTAC) 150 mg tablet, TAKE 1 TABLET BY MOUTH TWICE A DAY, Disp: 60 tablet, Rfl: 5    ketoconazole (NIZORAL) 2 % cream, Apply topically to both feet in their entirety (tops, bottoms and between toes) 3 times a day for 4 weeks straight  (Patient not taking: Reported on 11/8/2019), Disp: 60 g, Rfl: 2  Allergies   Allergen Reactions    Codeine Hives    Morphine Other (See Comments)     Redness in face, swelling    Propoxyphene        Vitals:    11/08/19 1444   BP: 124/62   BP Location: Left arm   Patient Position: Sitting   Cuff Size: Adult   Pulse: 80   SpO2: 98%   Weight: 67 7 kg (149 lb 3 2 oz)   Height: 5' 6" (1 676 m)     Vitals:    11/08/19 1444   Weight: 67 7 kg (149 lb 3 2 oz)      Height: 5' 6" (167 6 cm)   Body mass index is 24 08 kg/m²  Physical Exam:  GEN: Lauren Casey appears well, alert and oriented x 3, pleasant and cooperative   HEENT: pupils equal, round, and reactive to light; extraocular muscles intact  NECK: supple, no carotid bruits   HEART: regular, 3/6 MARQUISE  LUNGS: clear to auscultation bilaterally; no wheezes, rales, or rhonchi   ABDOMEN: normal bowel sounds, soft, no tenderness, no distention  EXTREMITIES: peripheral pulses normal; no clubbing, cyanosis, or edema  NEURO: no focal findings   SKIN: normal without suspicious lesions on exposed skin    ROS:  Positive for memory loss, dementia, depression  Except as noted in HPI, is otherwise reviewed in detail and a 12 point review of systems is negative      Labs:  Lab Results   Component Value Date    K 4 1 07/17/2019     07/17/2019    CREATININE 1 06 (H) 07/17/2019    BUN 16 07/17/2019    CO2 28 07/17/2019    ALT 14 07/17/2019    AST 14 07/17/2019    HGBA1C 5 3 11/15/2011    WBC 5 0 07/17/2019    HGB 14 3 07/17/2019    HCT 42 2 07/17/2019     (L) 07/17/2019       No results found for: CHOL  Lab Results   Component Value Date    HDL 50 (L) 07/17/2019     No results found for: [facility]  Lab Results   Component Value Date    TRIG 86 07/17/2019

## 2019-12-19 ENCOUNTER — OFFICE VISIT (OUTPATIENT)
Dept: INTERNAL MEDICINE CLINIC | Facility: CLINIC | Age: 84
End: 2019-12-19
Payer: COMMERCIAL

## 2019-12-19 VITALS
WEIGHT: 146.8 LBS | HEART RATE: 88 BPM | SYSTOLIC BLOOD PRESSURE: 110 MMHG | BODY MASS INDEX: 23.59 KG/M2 | OXYGEN SATURATION: 98 % | HEIGHT: 66 IN | TEMPERATURE: 98 F | RESPIRATION RATE: 18 BRPM | DIASTOLIC BLOOD PRESSURE: 70 MMHG

## 2019-12-19 DIAGNOSIS — J06.9 ACUTE UPPER RESPIRATORY INFECTION: Primary | ICD-10-CM

## 2019-12-19 PROCEDURE — 99213 OFFICE O/P EST LOW 20 MIN: CPT | Performed by: NURSE PRACTITIONER

## 2019-12-19 PROCEDURE — 1036F TOBACCO NON-USER: CPT | Performed by: NURSE PRACTITIONER

## 2019-12-19 PROCEDURE — 1160F RVW MEDS BY RX/DR IN RCRD: CPT | Performed by: NURSE PRACTITIONER

## 2019-12-19 RX ORDER — AZITHROMYCIN 250 MG/1
TABLET, FILM COATED ORAL
Qty: 6 TABLET | Refills: 0 | Status: SHIPPED | OUTPATIENT
Start: 2019-12-19 | End: 2019-12-23

## 2019-12-19 RX ORDER — BENZONATATE 100 MG/1
100 CAPSULE ORAL 3 TIMES DAILY PRN
Qty: 20 CAPSULE | Refills: 0 | Status: SHIPPED | OUTPATIENT
Start: 2019-12-19 | End: 2020-06-29 | Stop reason: ALTCHOICE

## 2019-12-19 NOTE — PROGRESS NOTES
Assessment/Plan:    Increase water intake  Take antibiotics as prescribed with food  Tessalon perles as needed  Call if not improving over the next 3-5 days  Diagnoses and all orders for this visit:    Acute upper respiratory infection  -     azithromycin (ZITHROMAX) 250 mg tablet; Take 2 tablets daily on day 1 then 1 tablet daily for the next 4 days  -     benzonatate (TESSALON PERLES) 100 mg capsule; Take 1 capsule (100 mg total) by mouth 3 (three) times a day as needed for cough          Subjective:      Patient ID: Isabela Velasquez is a 80 y o  female  Here today with cough   Valerie Muller is here with her daughter today   Daughter reports symptoms started about 10 days ago   +PND, off and on sore throat, mild sob with exertion, moist non productive cough  Denies fevers or body aches   She has been eating and drinking ok   Tried sudafed without relief  Her daughter feels like cough is getting worse             The following portions of the patient's history were reviewed and updated as appropriate: allergies, current medications, past family history, past medical history, past social history, past surgical history and problem list     Review of Systems   Constitutional: Positive for fatigue  Negative for activity change, appetite change, chills and fever  HENT: Positive for postnasal drip, rhinorrhea and sore throat  Negative for congestion, ear pain and sinus pressure  Respiratory: Positive for cough and shortness of breath  Negative for chest tightness and wheezing  Gastrointestinal: Negative for abdominal pain, diarrhea, nausea and vomiting  Genitourinary: Negative for difficulty urinating  Musculoskeletal: Negative for myalgias  Neurological: Positive for weakness  Negative for dizziness, light-headedness and headaches           Objective:      /70   Pulse 88   Temp 98 °F (36 7 °C)   Resp 18   Ht 5' 6" (1 676 m)   Wt 66 6 kg (146 lb 12 8 oz)   SpO2 98%   BMI 23 69 kg/m² Physical Exam   Constitutional: She appears well-developed and well-nourished  HENT:   Head: Normocephalic and atraumatic  Right Ear: Tympanic membrane and ear canal normal    Left Ear: Tympanic membrane and ear canal normal    Mouth/Throat: Mucous membranes are dry  Posterior oropharyngeal erythema present  No oropharyngeal exudate or posterior oropharyngeal edema  Eyes: Pupils are equal, round, and reactive to light  Cardiovascular: Normal rate and normal heart sounds  Pulmonary/Chest: Effort normal and breath sounds normal  No respiratory distress  She has no wheezes  She has no rales  Lymphadenopathy:     She has no cervical adenopathy  Neurological: She is alert  Skin: Skin is warm and dry  Psychiatric: She has a normal mood and affect  Her behavior is normal    Vitals reviewed

## 2020-03-02 DIAGNOSIS — E78.49 OTHER HYPERLIPIDEMIA: ICD-10-CM

## 2020-03-02 RX ORDER — ATORVASTATIN CALCIUM 40 MG/1
20 TABLET, FILM COATED ORAL DAILY
Qty: 90 TABLET | Refills: 1
Start: 2020-03-02 | End: 2020-03-05 | Stop reason: SDUPTHER

## 2020-03-05 DIAGNOSIS — E78.49 OTHER HYPERLIPIDEMIA: ICD-10-CM

## 2020-03-05 RX ORDER — ATORVASTATIN CALCIUM 20 MG/1
20 TABLET, FILM COATED ORAL DAILY
Qty: 90 TABLET | Refills: 1 | Status: SHIPPED | OUTPATIENT
Start: 2020-03-05 | End: 2020-08-28

## 2020-05-07 ENCOUNTER — TELEPHONE (OUTPATIENT)
Dept: CARDIOLOGY CLINIC | Facility: CLINIC | Age: 85
End: 2020-05-07

## 2020-05-26 DIAGNOSIS — R10.84 GENERALIZED ABDOMINAL PAIN: ICD-10-CM

## 2020-05-26 RX ORDER — RANITIDINE 150 MG/1
TABLET ORAL
Qty: 180 TABLET | Refills: 0 | Status: SHIPPED | OUTPATIENT
Start: 2020-05-26 | End: 2020-09-25 | Stop reason: SDUPTHER

## 2020-06-03 ENCOUNTER — OFFICE VISIT (OUTPATIENT)
Dept: DERMATOLOGY | Facility: CLINIC | Age: 85
End: 2020-06-03
Payer: COMMERCIAL

## 2020-06-03 VITALS — WEIGHT: 150 LBS | HEIGHT: 65 IN | TEMPERATURE: 96.7 F | BODY MASS INDEX: 24.99 KG/M2

## 2020-06-03 DIAGNOSIS — L72.0 MILIA: ICD-10-CM

## 2020-06-03 DIAGNOSIS — D48.5 NEOPLASM OF UNCERTAIN BEHAVIOR OF SKIN: ICD-10-CM

## 2020-06-03 DIAGNOSIS — L82.1 SEBORRHEIC KERATOSIS: Primary | ICD-10-CM

## 2020-06-03 PROCEDURE — 99214 OFFICE O/P EST MOD 30 MIN: CPT | Performed by: DERMATOLOGY

## 2020-06-03 PROCEDURE — 1160F RVW MEDS BY RX/DR IN RCRD: CPT | Performed by: DERMATOLOGY

## 2020-06-03 PROCEDURE — 11102 TANGNTL BX SKIN SINGLE LES: CPT | Performed by: DERMATOLOGY

## 2020-06-03 PROCEDURE — 88305 TISSUE EXAM BY PATHOLOGIST: CPT | Performed by: STUDENT IN AN ORGANIZED HEALTH CARE EDUCATION/TRAINING PROGRAM

## 2020-06-03 PROCEDURE — 4040F PNEUMOC VAC/ADMIN/RCVD: CPT | Performed by: DERMATOLOGY

## 2020-06-11 ENCOUNTER — TELEPHONE (OUTPATIENT)
Dept: DERMATOLOGY | Facility: CLINIC | Age: 85
End: 2020-06-11

## 2020-06-11 NOTE — TELEPHONE ENCOUNTER
Spoke with daughter  Reviewed results and options, including watching, re-biopsy, fluorouracil, EDC, excision, and MOHs  She plans to speak with her mother and her PCP Dr Tyrone Bañuelos  Currently leaning towards conservative therapy  Risks and benefits discussed  She plans to call us back when she decides

## 2020-06-11 NOTE — TELEPHONE ENCOUNTER
Dr Geraldine Bryant can you please call patient daughter with path report, to scheduled MOHS     Thank You

## 2020-06-12 ENCOUNTER — TELEPHONE (OUTPATIENT)
Dept: INTERNAL MEDICINE CLINIC | Facility: CLINIC | Age: 85
End: 2020-06-12

## 2020-06-15 NOTE — TELEPHONE ENCOUNTER
Patients daughter called stating that they have decided in not doing any further procedures as of now  However if it returns they may considers Mohs' in the future

## 2020-06-16 ENCOUNTER — HOSPITAL ENCOUNTER (OUTPATIENT)
Dept: NON INVASIVE DIAGNOSTICS | Facility: CLINIC | Age: 85
Discharge: HOME | End: 2020-06-16
Payer: COMMERCIAL

## 2020-06-16 ENCOUNTER — TELEPHONE (OUTPATIENT)
Dept: DERMATOLOGY | Facility: CLINIC | Age: 85
End: 2020-06-16

## 2020-06-16 DIAGNOSIS — I35.0 NON-RHEUMATIC AORTIC STENOSIS: ICD-10-CM

## 2020-06-16 PROCEDURE — 93306 TTE W/DOPPLER COMPLETE: CPT

## 2020-06-16 PROCEDURE — 93306 TTE W/DOPPLER COMPLETE: CPT | Performed by: INTERNAL MEDICINE

## 2020-06-18 ENCOUNTER — TELEPHONE (OUTPATIENT)
Dept: CARDIOLOGY CLINIC | Facility: CLINIC | Age: 85
End: 2020-06-18

## 2020-06-29 ENCOUNTER — TRANSCRIBE ORDERS (OUTPATIENT)
Dept: LAB | Facility: CLINIC | Age: 85
End: 2020-06-29

## 2020-06-29 ENCOUNTER — APPOINTMENT (OUTPATIENT)
Dept: LAB | Facility: CLINIC | Age: 85
End: 2020-06-29
Payer: COMMERCIAL

## 2020-06-29 ENCOUNTER — OFFICE VISIT (OUTPATIENT)
Dept: INTERNAL MEDICINE CLINIC | Facility: CLINIC | Age: 85
End: 2020-06-29
Payer: COMMERCIAL

## 2020-06-29 VITALS
SYSTOLIC BLOOD PRESSURE: 112 MMHG | HEIGHT: 65 IN | HEART RATE: 80 BPM | OXYGEN SATURATION: 98 % | WEIGHT: 149 LBS | RESPIRATION RATE: 18 BRPM | BODY MASS INDEX: 24.83 KG/M2 | DIASTOLIC BLOOD PRESSURE: 80 MMHG | TEMPERATURE: 97.6 F

## 2020-06-29 DIAGNOSIS — I10 ESSENTIAL HYPERTENSION: ICD-10-CM

## 2020-06-29 DIAGNOSIS — N18.2 CHRONIC KIDNEY DISEASE (CKD), STAGE II (MILD): Primary | ICD-10-CM

## 2020-06-29 DIAGNOSIS — M81.0 AGE-RELATED OSTEOPOROSIS WITHOUT CURRENT PATHOLOGICAL FRACTURE: ICD-10-CM

## 2020-06-29 DIAGNOSIS — F02.80 LATE ONSET ALZHEIMER'S DISEASE WITHOUT BEHAVIORAL DISTURBANCE (HCC): ICD-10-CM

## 2020-06-29 DIAGNOSIS — Z23 NEED FOR PNEUMOCOCCAL VACCINATION: ICD-10-CM

## 2020-06-29 DIAGNOSIS — E78.49 OTHER HYPERLIPIDEMIA: ICD-10-CM

## 2020-06-29 DIAGNOSIS — E53.8 VITAMIN B12 DEFICIENCY: ICD-10-CM

## 2020-06-29 DIAGNOSIS — Z95.2 S/P AVR (AORTIC VALVE REPLACEMENT): ICD-10-CM

## 2020-06-29 DIAGNOSIS — G30.1 LATE ONSET ALZHEIMER'S DISEASE WITHOUT BEHAVIORAL DISTURBANCE (HCC): ICD-10-CM

## 2020-06-29 PROBLEM — R73.03 PREDIABETES: Status: RESOLVED | Noted: 2019-06-05 | Resolved: 2020-06-29

## 2020-06-29 PROBLEM — Z97.4 WEARS HEARING AID: Status: ACTIVE | Noted: 2020-06-29

## 2020-06-29 LAB
ALBUMIN SERPL BCP-MCNC: 3.9 G/DL (ref 3.5–5)
ALP SERPL-CCNC: 79 U/L (ref 46–116)
ALT SERPL W P-5'-P-CCNC: 17 U/L (ref 12–78)
ANION GAP SERPL CALCULATED.3IONS-SCNC: 6 MMOL/L (ref 4–13)
AST SERPL W P-5'-P-CCNC: 17 U/L (ref 5–45)
BASOPHILS # BLD AUTO: 0.05 THOUSANDS/ΜL (ref 0–0.1)
BASOPHILS NFR BLD AUTO: 1 % (ref 0–1)
BILIRUB SERPL-MCNC: 0.68 MG/DL (ref 0.2–1)
BUN SERPL-MCNC: 19 MG/DL (ref 5–25)
CALCIUM SERPL-MCNC: 8.9 MG/DL (ref 8.3–10.1)
CHLORIDE SERPL-SCNC: 104 MMOL/L (ref 100–108)
CO2 SERPL-SCNC: 31 MMOL/L (ref 21–32)
CREAT SERPL-MCNC: 1.28 MG/DL (ref 0.6–1.3)
EOSINOPHIL # BLD AUTO: 0.14 THOUSAND/ΜL (ref 0–0.61)
EOSINOPHIL NFR BLD AUTO: 2 % (ref 0–6)
ERYTHROCYTE [DISTWIDTH] IN BLOOD BY AUTOMATED COUNT: 13.2 % (ref 11.6–15.1)
GFR SERPL CREATININE-BSD FRML MDRD: 38 ML/MIN/1.73SQ M
GLUCOSE P FAST SERPL-MCNC: 99 MG/DL (ref 65–99)
HCT VFR BLD AUTO: 41.5 % (ref 34.8–46.1)
HGB BLD-MCNC: 13.9 G/DL (ref 11.5–15.4)
IMM GRANULOCYTES # BLD AUTO: 0.02 THOUSAND/UL (ref 0–0.2)
IMM GRANULOCYTES NFR BLD AUTO: 0 % (ref 0–2)
LYMPHOCYTES # BLD AUTO: 2.03 THOUSANDS/ΜL (ref 0.6–4.47)
LYMPHOCYTES NFR BLD AUTO: 31 % (ref 14–44)
MCH RBC QN AUTO: 32.3 PG (ref 26.8–34.3)
MCHC RBC AUTO-ENTMCNC: 33.5 G/DL (ref 31.4–37.4)
MCV RBC AUTO: 97 FL (ref 82–98)
MONOCYTES # BLD AUTO: 0.54 THOUSAND/ΜL (ref 0.17–1.22)
MONOCYTES NFR BLD AUTO: 8 % (ref 4–12)
NEUTROPHILS # BLD AUTO: 3.81 THOUSANDS/ΜL (ref 1.85–7.62)
NEUTS SEG NFR BLD AUTO: 58 % (ref 43–75)
NRBC BLD AUTO-RTO: 0 /100 WBCS
PLATELET # BLD AUTO: 145 THOUSANDS/UL (ref 149–390)
PMV BLD AUTO: 10 FL (ref 8.9–12.7)
POTASSIUM SERPL-SCNC: 4.4 MMOL/L (ref 3.5–5.3)
PROT SERPL-MCNC: 6.7 G/DL (ref 6.4–8.2)
RBC # BLD AUTO: 4.3 MILLION/UL (ref 3.81–5.12)
SODIUM SERPL-SCNC: 141 MMOL/L (ref 136–145)
TSH SERPL DL<=0.05 MIU/L-ACNC: 2.05 UIU/ML (ref 0.36–3.74)
WBC # BLD AUTO: 6.59 THOUSAND/UL (ref 4.31–10.16)

## 2020-06-29 PROCEDURE — 1036F TOBACCO NON-USER: CPT | Performed by: INTERNAL MEDICINE

## 2020-06-29 PROCEDURE — 3074F SYST BP LT 130 MM HG: CPT | Performed by: INTERNAL MEDICINE

## 2020-06-29 PROCEDURE — 3008F BODY MASS INDEX DOCD: CPT | Performed by: INTERNAL MEDICINE

## 2020-06-29 PROCEDURE — 80053 COMPREHEN METABOLIC PANEL: CPT | Performed by: INTERNAL MEDICINE

## 2020-06-29 PROCEDURE — 84443 ASSAY THYROID STIM HORMONE: CPT | Performed by: INTERNAL MEDICINE

## 2020-06-29 PROCEDURE — 82306 VITAMIN D 25 HYDROXY: CPT | Performed by: INTERNAL MEDICINE

## 2020-06-29 PROCEDURE — 99214 OFFICE O/P EST MOD 30 MIN: CPT | Performed by: INTERNAL MEDICINE

## 2020-06-29 PROCEDURE — 4040F PNEUMOC VAC/ADMIN/RCVD: CPT | Performed by: INTERNAL MEDICINE

## 2020-06-29 PROCEDURE — 82607 VITAMIN B-12: CPT | Performed by: INTERNAL MEDICINE

## 2020-06-29 PROCEDURE — 36415 COLL VENOUS BLD VENIPUNCTURE: CPT | Performed by: INTERNAL MEDICINE

## 2020-06-29 PROCEDURE — G0009 ADMIN PNEUMOCOCCAL VACCINE: HCPCS | Performed by: INTERNAL MEDICINE

## 2020-06-29 PROCEDURE — 3079F DIAST BP 80-89 MM HG: CPT | Performed by: INTERNAL MEDICINE

## 2020-06-29 PROCEDURE — 1160F RVW MEDS BY RX/DR IN RCRD: CPT | Performed by: INTERNAL MEDICINE

## 2020-06-29 PROCEDURE — 90670 PCV13 VACCINE IM: CPT | Performed by: INTERNAL MEDICINE

## 2020-06-29 PROCEDURE — 85025 COMPLETE CBC W/AUTO DIFF WBC: CPT | Performed by: INTERNAL MEDICINE

## 2020-06-29 RX ORDER — CHLORHEXIDINE GLUCONATE 0.12 MG/ML
RINSE ORAL
COMMUNITY
Start: 2020-04-29

## 2020-06-30 ENCOUNTER — APPOINTMENT (OUTPATIENT)
Dept: LAB | Facility: CLINIC | Age: 85
End: 2020-06-30
Payer: COMMERCIAL

## 2020-06-30 LAB
25(OH)D3 SERPL-MCNC: 34.3 NG/ML (ref 30–100)
BACTERIA UR QL AUTO: ABNORMAL /HPF
BILIRUB UR QL STRIP: NEGATIVE
CLARITY UR: CLEAR
COLOR UR: YELLOW
GLUCOSE UR STRIP-MCNC: NEGATIVE MG/DL
HGB UR QL STRIP.AUTO: NEGATIVE
KETONES UR STRIP-MCNC: NEGATIVE MG/DL
LEUKOCYTE ESTERASE UR QL STRIP: ABNORMAL
NITRITE UR QL STRIP: NEGATIVE
NON-SQ EPI CELLS URNS QL MICRO: ABNORMAL /HPF
PH UR STRIP.AUTO: 6.5 [PH]
PROT UR STRIP-MCNC: NEGATIVE MG/DL
RBC #/AREA URNS AUTO: ABNORMAL /HPF
SP GR UR STRIP.AUTO: 1.01 (ref 1–1.03)
UROBILINOGEN UR QL STRIP.AUTO: 0.2 E.U./DL
VIT B12 SERPL-MCNC: 798 PG/ML (ref 100–900)
WBC #/AREA URNS AUTO: ABNORMAL /HPF

## 2020-06-30 PROCEDURE — 81001 URINALYSIS AUTO W/SCOPE: CPT | Performed by: INTERNAL MEDICINE

## 2020-08-28 DIAGNOSIS — E78.49 OTHER HYPERLIPIDEMIA: ICD-10-CM

## 2020-08-28 RX ORDER — ATORVASTATIN CALCIUM 20 MG/1
TABLET, FILM COATED ORAL
Qty: 90 TABLET | Refills: 1 | Status: SHIPPED | OUTPATIENT
Start: 2020-08-28 | End: 2021-02-26

## 2020-09-25 DIAGNOSIS — R10.84 GENERALIZED ABDOMINAL PAIN: ICD-10-CM

## 2020-09-25 RX ORDER — RANITIDINE 150 MG/1
150 TABLET ORAL 2 TIMES DAILY
Qty: 180 TABLET | Refills: 0 | Status: SHIPPED | OUTPATIENT
Start: 2020-09-25 | End: 2020-12-21 | Stop reason: ALTCHOICE

## 2020-12-21 ENCOUNTER — TELEMEDICINE (OUTPATIENT)
Dept: INTERNAL MEDICINE CLINIC | Facility: CLINIC | Age: 85
End: 2020-12-21
Payer: COMMERCIAL

## 2020-12-21 DIAGNOSIS — E53.8 VITAMIN B12 DEFICIENCY: ICD-10-CM

## 2020-12-21 DIAGNOSIS — G30.1 LATE ONSET ALZHEIMER'S DISEASE WITHOUT BEHAVIORAL DISTURBANCE (HCC): ICD-10-CM
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
## 2020-12-21 DIAGNOSIS — M79.672 LEFT FOOT PAIN: Primary | ICD-10-CM

## 2020-12-21 DIAGNOSIS — Z95.2 S/P AVR (AORTIC VALVE REPLACEMENT): ICD-10-CM

## 2020-12-21 DIAGNOSIS — M81.0 AGE-RELATED OSTEOPOROSIS WITHOUT CURRENT PATHOLOGICAL FRACTURE: ICD-10-CM

## 2020-12-21 DIAGNOSIS — I25.10 CORONARY ARTERY DISEASE INVOLVING NATIVE CORONARY ARTERY OF NATIVE HEART WITHOUT ANGINA PECTORIS: ICD-10-CM

## 2020-12-21 DIAGNOSIS — F02.80 LATE ONSET ALZHEIMER'S DISEASE WITHOUT BEHAVIORAL DISTURBANCE (HCC): ICD-10-CM

## 2020-12-21 DIAGNOSIS — K21.9 GASTROESOPHAGEAL REFLUX DISEASE, UNSPECIFIED WHETHER ESOPHAGITIS PRESENT: ICD-10-CM

## 2020-12-21 DIAGNOSIS — E78.49 OTHER HYPERLIPIDEMIA: ICD-10-CM

## 2020-12-21 DIAGNOSIS — N18.2 CHRONIC KIDNEY DISEASE (CKD), STAGE II (MILD): ICD-10-CM

## 2020-12-21 PROCEDURE — 99442 PR PHYS/QHP TELEPHONE EVALUATION 11-20 MIN: CPT | Performed by: INTERNAL MEDICINE

## 2020-12-28 ENCOUNTER — TELEPHONE (OUTPATIENT)
Dept: INTERNAL MEDICINE CLINIC | Facility: CLINIC | Age: 85
End: 2020-12-28

## 2020-12-28 ENCOUNTER — HOSPITAL ENCOUNTER (OUTPATIENT)
Dept: RADIOLOGY | Facility: HOSPITAL | Age: 85
Discharge: HOME | End: 2020-12-28
Payer: COMMERCIAL
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
## 2020-12-28 DIAGNOSIS — M25.572 ACUTE LEFT ANKLE PAIN: ICD-10-CM
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
## 2020-12-28 PROCEDURE — 73630 X-RAY EXAM OF FOOT: CPT

## 2020-12-28 PROCEDURE — 73610 X-RAY EXAM OF ANKLE: CPT

## 2020-12-31 ENCOUNTER — TELEPHONE (OUTPATIENT)
Dept: INTERNAL MEDICINE CLINIC | Facility: CLINIC | Age: 85
End: 2020-12-31

## 2020-12-31 DIAGNOSIS — S92.902A CLOSED FRACTURE OF LEFT FOOT, INITIAL ENCOUNTER: Primary | ICD-10-CM

## 2021-01-02 ENCOUNTER — OFFICE VISIT (OUTPATIENT)
Dept: OBGYN CLINIC | Facility: HOSPITAL | Age: 86
End: 2021-01-02
Payer: COMMERCIAL

## 2021-01-02 VITALS
DIASTOLIC BLOOD PRESSURE: 81 MMHG | BODY MASS INDEX: 25.16 KG/M2 | HEART RATE: 80 BPM | HEIGHT: 65 IN | WEIGHT: 151 LBS | SYSTOLIC BLOOD PRESSURE: 122 MMHG

## 2021-01-02 DIAGNOSIS — S92.002A CLOSED NONDISPLACED FRACTURE OF LEFT CALCANEUS, UNSPECIFIED PORTION OF CALCANEUS, INITIAL ENCOUNTER: Primary | ICD-10-CM

## 2021-01-02 DIAGNOSIS — S92.902A CLOSED FRACTURE OF LEFT FOOT, INITIAL ENCOUNTER: ICD-10-CM

## 2021-01-02 DIAGNOSIS — M79.672 PAIN IN LEFT FOOT: ICD-10-CM

## 2021-01-02 DIAGNOSIS — R29.6 MULTIPLE FALLS: ICD-10-CM

## 2021-01-02 DIAGNOSIS — S92.345A CLOSED NONDISPLACED FRACTURE OF FOURTH METATARSAL BONE OF LEFT FOOT, INITIAL ENCOUNTER: ICD-10-CM

## 2021-01-02 PROCEDURE — 99203 OFFICE O/P NEW LOW 30 MIN: CPT | Performed by: PHYSICIAN ASSISTANT

## 2021-01-02 NOTE — PROGRESS NOTES
Assessment/Plan   Diagnoses and all orders for this visit:    Closed nondisplaced fracture of left calcaneus, unspecified portion of calcaneus, initial encounter  - CT lower extremity wo contrast left; Future  - CAM boot fitted and dispensed  - Ice, elevate  - Follow up with Dr Gayatri Currie after CT    Pain in left foot    Multiple falls    Closed nondisplaced fracture of fourth metatarsal bone of left foot, initial encounter          Subjective   Patient ID: Fly Cross is a 80 y o  female  Vitals:    01/02/21 0857   BP: 122/81   Pulse: [de-identified]     84yo female comes in for an evaluation of her left foot and ankle  She had two episodes where her foot was alseep and then she fell when she tried to get up and walk  This happened on Halloween and on the day after Thanksgiving  She denies midfoot pain  She continues with heel pain  She called her PCP on 12-28 and an xray demonstrated a 4th MT fracture and a calcaneus fracture  She has been walking on it for a month  The pain is dull in character, mild in severity, pain does not radiate and is not associated with current numbness  The following portions of the patient's history were reviewed and updated as appropriate: allergies, current medications, past family history, past medical history, past social history, past surgical history and problem list The following portions of the patient's history were reviewed and updated as appropriate: allergies, current medications, past family history, past medical history, past social history, past surgical history and problem list     Review of Systems  Ortho Exam  Past Medical History:   Diagnosis Date    Basal cell carcinoma     Back     [facility]     Coronary artery disease     [facility]     Depression     Ear problems     HL (hearing loss)     Hyperlipidemia     Migraines     [facility] ) 2004    s/p surgery   no chemo/RT    Phlebitis     R leg     Past Surgical History:   Procedure Laterality Date    ADENOIDECTOMY      APPENDECTOMY      CATARACT EXTRACTION, BILATERAL      HYSTERECTOMY  2005    ovarian CA    KNEE SURGERY Right     SKIN BIOPSY      TONSILECTOMY AND ADNOIDECTOMY      TONSILLECTOMY       Family History   Problem Relation Age of Onset    Depression Mother     Anxiety disorder Mother     Alcohol abuse Mother     Substance Abuse Mother     Asthma Mother     Diabetes Father 61    Brain cancer Son     Heart attack Sister     Coronary artery disease Sister     Mental illness Neg Hx      Social History     Occupational History    Not on file   Tobacco Use    Smoking status: Former Smoker     Packs/day: 0 25     Types: Cigarettes    Smokeless tobacco: Never Used    Tobacco comment: until age 27   Substance and Sexual Activity    Alcohol use: Not Currently    Drug use: Never    Sexual activity: Not on file       Review of Systems   Constitutional: Negative  HENT: Negative  Eyes: Negative  Respiratory: Negative  Cardiovascular: Negative  Gastrointestinal: Negative  Endocrine: Negative  Genitourinary: Negative  Musculoskeletal: As below      Allergic/Immunologic: Negative  Neurological: Negative  Hematological: Negative  Psychiatric/Behavioral: Negative  Objective   Physical Exam        I have personally reviewed pertinent films in PACS and my interpretation is transverse fracture of the base of the 4th metatarsal  Possible fx of the base of the 3rd MT  Fracture of the calcaneus         · Constitutional: Awake, Alert, Oriented  · Eyes: EOMI  · Psych: Mood and affect appropriate  · Heart: regular rate and rhythm  · Lungs: No audible wheezing  · Abdomen: soft  · Lymph: no lymphedema             left ankle, foot:  - Appearance   No swelling, discoloration, deformity, or ecchymosis  - Palpation   + calcaneus tenderness  Non-tender ankle, midfoot, forefoot, and toes    Non-tender over the base of the 3rd & 4th MT   - ROM   not examined due to fracture status  - Motor   normal 5/5 in all planes  - Denies current numbness to light touch distally

## 2021-01-02 NOTE — LETTER
January 4, 2021     Martha Andrade MD  [address]  [id number] [place] [address]    Patient: Yu Raya   YOB: 1935   Date of Visit: 1/2/2021       Dear Dr Pari Rutherford: Thank you for referring Annamaria Martin to me for evaluation  Below are my notes for this consultation  If you have questions, please do not hesitate to call me  I look forward to following your patient along with you  Sincerely,        Adriana Soria PA-C        CC: No Recipients  Jessie Zuniga  1/2/2021  9:21 AM  Signed  Assessment/Plan   Diagnoses and all orders for this visit:    Closed nondisplaced fracture of left calcaneus, unspecified portion of calcaneus, initial encounter  - CT lower extremity wo contrast left; Future  - CAM boot fitted and dispensed  - Ice, elevate  - Follow up with Dr Pooja Zhang after CT    Pain in left foot    Multiple falls    Closed nondisplaced fracture of fourth metatarsal bone of left foot, initial encounter          Subjective   Patient ID: Yu Raya is a 80 y o  female  Vitals:    01/02/21 0857   BP: 122/81   Pulse: [de-identified]     86yo female comes in for an evaluation of her left foot and ankle  She had two episodes where her foot was alseep and then she fell when she tried to get up and walk  This happened on Halloween and on the day after Thanksgiving  She denies midfoot pain  She continues with heel pain  She called her PCP on 12-28 and an xray demonstrated a 4th MT fracture and a calcaneus fracture  She has been walking on it for a month  The pain is dull in character, mild in severity, pain does not radiate and is not associated with current numbness          The following portions of the patient's history were reviewed and updated as appropriate: allergies, current medications, past family history, past medical history, past social history, past surgical history and problem list The following portions of the patient's history were reviewed and updated as appropriate: allergies, current medications, past family history, past medical history, past social history, past surgical history and problem list     Review of Systems  Ortho Exam  Past Medical History:   Diagnosis Date    Basal cell carcinoma     Back     [facility] )     Coronary artery disease     Dementia ([facility] )     Depression     Ear problems     HL (hearing loss)     Hyperlipidemia     Migraines     Ovarian cancer ([facility] ) 2004    s/p surgery  no chemo/RT    Phlebitis     R leg     Past Surgical History:   Procedure Laterality Date    ADENOIDECTOMY      APPENDECTOMY      CATARACT EXTRACTION, BILATERAL      HYSTERECTOMY  2005    ovarian CA    KNEE SURGERY Right     SKIN BIOPSY      TONSILECTOMY AND ADNOIDECTOMY      TONSILLECTOMY       Family History   Problem Relation Age of Onset    Depression Mother     Anxiety disorder Mother     Alcohol abuse Mother     Substance Abuse Mother     Asthma Mother     Diabetes Father 61    Brain cancer Son     Heart attack Sister     Coronary artery disease Sister     Mental illness Neg Hx      Social History     Occupational History    Not on file   Tobacco Use    Smoking status: Former Smoker     Packs/day: 0 25     Types: Cigarettes    Smokeless tobacco: Never Used    Tobacco comment: until age 27   Substance and Sexual Activity    Alcohol use: Not Currently    Drug use: Never    Sexual activity: Not on file       Review of Systems   Constitutional: Negative  HENT: Negative  Eyes: Negative  Respiratory: Negative  Cardiovascular: Negative  Gastrointestinal: Negative  Endocrine: Negative  Genitourinary: Negative  Musculoskeletal: As below      Allergic/Immunologic: Negative  Neurological: Negative  Hematological: Negative  Psychiatric/Behavioral: Negative            Objective   Physical Exam        I have personally reviewed pertinent films in PACS and my interpretation is transverse fracture of the base of the 4th metatarsal  Possible fx of the base of the 3rd MT  Fracture of the calcaneus         · Constitutional: Awake, Alert, Oriented  · Eyes: EOMI  · Psych: Mood and affect appropriate  · Heart: regular rate and rhythm  · Lungs: No audible wheezing  · Abdomen: soft  · Lymph: no lymphedema             left ankle, foot:  - Appearance   No swelling, discoloration, deformity, or ecchymosis  - Palpation   + calcaneus tenderness  Non-tender ankle, midfoot, forefoot, and toes    Non-tender over the base of the 3rd & 4th MT   - ROM   not examined due to fracture status  - Motor   normal 5/5 in all planes  - Denies current numbness to light touch distally

## 2021-01-07 ENCOUNTER — HOSPITAL ENCOUNTER (OUTPATIENT)
Dept: CT IMAGING | Facility: HOSPITAL | Age: 86
Discharge: HOME | End: 2021-01-07
Payer: COMMERCIAL

## 2021-01-07 DIAGNOSIS — S92.002A CLOSED NONDISPLACED FRACTURE OF LEFT CALCANEUS, UNSPECIFIED PORTION OF CALCANEUS, INITIAL ENCOUNTER: ICD-10-CM

## 2021-01-07 PROCEDURE — 73700 CT LOWER EXTREMITY W/O DYE: CPT

## 2021-01-14 ENCOUNTER — OFFICE VISIT (OUTPATIENT)
Dept: OBGYN CLINIC | Facility: CLINIC | Age: 86
End: 2021-01-14
Payer: COMMERCIAL

## 2021-01-14 VITALS
BODY MASS INDEX: 25.16 KG/M2 | HEIGHT: 65 IN | WEIGHT: 151 LBS | DIASTOLIC BLOOD PRESSURE: 70 MMHG | SYSTOLIC BLOOD PRESSURE: 123 MMHG

## 2021-01-14 DIAGNOSIS — S92.302D MULTIPLE CLOSED FRACTURES OF METATARSAL BONE OF LEFT FOOT WITH ROUTINE HEALING, SUBSEQUENT ENCOUNTER: ICD-10-CM

## 2021-01-14 DIAGNOSIS — S92.002A CLOSED NONDISPLACED FRACTURE OF LEFT CALCANEUS, UNSPECIFIED PORTION OF CALCANEUS, INITIAL ENCOUNTER: Primary | ICD-10-CM

## 2021-01-14 PROCEDURE — 1036F TOBACCO NON-USER: CPT | Performed by: ORTHOPAEDIC SURGERY

## 2021-01-14 PROCEDURE — 1160F RVW MEDS BY RX/DR IN RCRD: CPT | Performed by: ORTHOPAEDIC SURGERY

## 2021-01-14 PROCEDURE — 3078F DIAST BP <80 MM HG: CPT | Performed by: ORTHOPAEDIC SURGERY

## 2021-01-14 PROCEDURE — 3074F SYST BP LT 130 MM HG: CPT | Performed by: ORTHOPAEDIC SURGERY

## 2021-01-14 PROCEDURE — 99213 OFFICE O/P EST LOW 20 MIN: CPT | Performed by: ORTHOPAEDIC SURGERY

## 2021-01-14 NOTE — PATIENT INSTRUCTIONS
You have fractures of your calcaneus and 3rd and 4th metatarsals of the right foot  You may continue weight bearing as tolerated in the boot    Please continue taking your aspirin daily and we recommend that you supplement your diet with calcium and vitamin D to assist with bone  healing

## 2021-01-14 NOTE — PROGRESS NOTES
IRINA Roman  Attending, Orthopaedic Surgery  [facility] [address] [facility] [address] [facility]      ORTHOPAEDIC FOOT AND ANKLE CLINIC VISIT     Assessment:     Encounter Diagnoses   Name Primary?  Closed nondisplaced fracture of left calcaneus, unspecified portion of calcaneus, initial encounter Yes    Multiple closed fractures of metatarsal bone of left foot with routine healing, subsequent encounter             Plan:   · The patient verbalized understanding of exam findings and treatment plan  We engaged in the shared decision-making process and treatment options were discussed at length with the patient  Surgical and conservative management discussed today along with risks and benefits  · Patient has a left tongue type calcaneus fracture and left 3rd and 4th metatarsal fractures  This injuries are now 2 5 months old and healing on Xray and CT  She has been weightbearing as tolerated on them since the injury  · She may continue to be weight bearing as tolerated in a walking cam boot for 2 more weeks and then proceed to a supportive shoe  · Recommend vitamin supplementation with calcium and vitamin D for bone health  · Counseling for fall prevention given  · Recommend to continue aspirin  · Return to clinic in 6 weeks with weight bearing x-rays of the foot 3 views  Return in about 6 weeks (around 2/25/2021)  History of Present Illness:   Chief Complaint: right foot pain    Rocky Escalante is a 80 y o  female who is being seen for left foot pain  The patient has a history of 2 mechanical falls at [address] and 11/28 after which she noted worsening left heel and midfoot pain with minimal radiation and initially described as sharp and severe  She has been walking on her foot and presented to the clinic of [facility] PA-C on 1/2 where she was found to have a tongue type calcaneus fracture and 3rd and 4th metatarsal base fractures   She was provided a cam walking boot at that time and a CT was ordered with follow up in our clinic for further evaluation  Patient denies numbness, tingling or radicular pain  Denies history of neuropathy  Patient does not smoke, does not have diabetes and does not take blood thinners  Patient denies family history of anesthesia complications and has not had any complications with anesthesia  The patient states that she has been ambulating with a walker/cane     Pain/symptom timing:  Worse during the day when active  Pain/symptom context:  Worse with activites and work  Pain/symptom modifying factors:  Rest makes better, activities make worse  Pain/symptom associated signs/symptoms: none    Prior treatment   · NSAIDsYes   · Injections No   · Bracing/Orthotics Yes    · Physical Therapy No     Orthopedic Surgical History:   None    Past Medical, Surgical and Social History:  Past Medical History:  has a past medical history of Basal cell carcinoma, [facility] ), Coronary artery disease, Dementia ([facility] ), Depression, Ear problems, HL (hearing loss), Hyperlipidemia, Migraines, Ovarian cancer ([facility] ) (2004), and Phlebitis  Problem List: does not have any pertinent problems on file  Past Surgical History:  has a past surgical history that includes TONSILECTOMY AND ADNOIDECTOMY; Appendectomy; Hysterectomy (2005); Knee surgery (Right); Cataract extraction, bilateral; ADENOIDECTOMY; Tonsillectomy; and Skin biopsy  Family History: family history includes Alcohol abuse in her mother; Anxiety disorder in her mother; Asthma in her mother; Brain cancer in her son; Coronary artery disease in her sister; Depression in her mother; Diabetes (age of onset: 61) in her father; Heart attack in her sister; Substance Abuse in her mother  Social History:  reports that she has quit smoking  Her smoking use included cigarettes  She smoked 0 25 packs per day  She has never used smokeless tobacco  She reports previous alcohol use  She reports that she does not use drugs    Current Medications: has a current medication list which includes the following prescription(s): aspirin, atorvastatin, chlorhexidine, ketoconazole, and multiple vitamins-minerals  Allergies: is allergic to codeine; morphine; other; and propoxyphene  Review of Systems:  General- denies fever/chills  HEENT- denies hearing loss or sore throat  Eyes- denies eye pain or visual disturbances, denies red eyes  Respiratory- denies cough or SOB  Cardio- denies chest pain or palpitations  GI- denies abdominal pain  Endocrine- denies urinary frequency  Urinary- denies pain with urination  Musculoskeletal- Negative except noted above  Skin- denies rashes or wounds  Neurological- denies dizziness or headache  Psychiatric- denies anxiety or difficulty concentrating    Physical Exam:   /70   Ht 5' 5" (1 651 m)   Wt 68 5 kg (151 lb)   BMI 25 13 kg/m²   General/Constitutional: No apparent distress: well-nourished and well developed  Eyes: normal ocular motion  Cardio: RRR, Normal S1S2, No m/r/g  Lymphatic: No appreciable lymphadenopathy  Respiratory: Non-labored breathing, CTA b/l no w/c/r  Vascular: No edema, swelling or tenderness, except as noted in detailed exam   Integumentary: No impressive skin lesions present, except as noted in detailed exam   Neuro: No ataxia or tremors noted  Psych: Normal mood and affect, oriented to person, place and time  Appropriate affect  Musculoskeletal: Normal, except as noted in detailed exam and in HPI      Examination    Left    Gait Normal   Musculoskeletal Tender to palpation over posterior tuberosity of the calcaneus and metatarsal 3 and 4 base    Skin Normal without breakdown over the heel    Nails Normal    Range of Motion  Normal tibiotalar and subtalar motion    Stability Stable    Muscle Strength 5/5 tibialis anterior  5/5 gastrocnemius-soleus  5/5 posterior tibialis  5/5 peroneal/eversion strength  5/5 EHL  5/5 FHL    Neurologic Normal    Sensation Intact to light touch throughout sural, saphenous, superficial peroneal, deep peroneal and medial/lateral plantar nerve distributions  Hankamer-Anjel 5 07 filament (10g) testing deferred  Cardiovascular Brisk capillary refill < 2 seconds,intact DP and PT pulses    Special Tests None      Imaging Studies:   X-rays and CT of the foot were reviewed and interpreted independently that demonstrate a minimally displaced tongue type calcaneus fracture and base of metatarsal 3 and 4 with evidence of bony healing  Reviewed by me personally  Dossie Chars Lachman, MD  Foot & Ankle Surgery   Department of [address] [place] [address]      I personally performed the service  Dossie Chars Lachman, MD

## 2021-01-18 ENCOUNTER — IMMUNIZATIONS (OUTPATIENT)
Dept: FAMILY MEDICINE CLINIC | Facility: HOSPITAL | Age: 86
End: 2021-01-18

## 2021-01-18 DIAGNOSIS — Z23 ENCOUNTER FOR IMMUNIZATION: Primary | ICD-10-CM

## 2021-01-18 PROCEDURE — 0001A SARS-COV-2 / COVID-19 MRNA VACCINE (PFIZER-BIONTECH) 30 MCG: CPT

## 2021-01-18 PROCEDURE — 91300 SARS-COV-2 / COVID-19 MRNA VACCINE (PFIZER-BIONTECH) 30 MCG: CPT

## 2021-02-09 ENCOUNTER — IMMUNIZATIONS (OUTPATIENT)
Dept: FAMILY MEDICINE CLINIC | Facility: HOSPITAL | Age: 86
End: 2021-02-09

## 2021-02-09 DIAGNOSIS — Z23 ENCOUNTER FOR IMMUNIZATION: Primary | ICD-10-CM

## 2021-02-09 PROCEDURE — 0002A SARS-COV-2 / COVID-19 MRNA VACCINE (PFIZER-BIONTECH) 30 MCG: CPT

## 2021-02-09 PROCEDURE — 91300 SARS-COV-2 / COVID-19 MRNA VACCINE (PFIZER-BIONTECH) 30 MCG: CPT

## 2021-02-23 ENCOUNTER — APPOINTMENT (OUTPATIENT)
Dept: RADIOLOGY | Facility: AMBULARY SURGERY CENTER | Age: 86
End: 2021-02-23
Attending: ORTHOPAEDIC SURGERY
Payer: COMMERCIAL

## 2021-02-23 ENCOUNTER — OFFICE VISIT (OUTPATIENT)
Dept: OBGYN CLINIC | Facility: CLINIC | Age: 86
End: 2021-02-23
Payer: COMMERCIAL

## 2021-02-23 DIAGNOSIS — S92.002A CLOSED NONDISPLACED FRACTURE OF LEFT CALCANEUS, UNSPECIFIED PORTION OF CALCANEUS, INITIAL ENCOUNTER: ICD-10-CM

## 2021-02-23 DIAGNOSIS — S92.002A CLOSED NONDISPLACED FRACTURE OF LEFT CALCANEUS, UNSPECIFIED PORTION OF CALCANEUS, INITIAL ENCOUNTER: Primary | ICD-10-CM

## 2021-02-23 PROCEDURE — 99213 OFFICE O/P EST LOW 20 MIN: CPT | Performed by: ORTHOPAEDIC SURGERY

## 2021-02-23 PROCEDURE — 73630 X-RAY EXAM OF FOOT: CPT

## 2021-02-23 NOTE — PROGRESS NOTES
IRINA Phillip  Attending, Orthopaedic Surgery  [facility] [address] [facility]      ORTHOPAEDIC FOOT AND ANKLE CLINIC VISIT     Assessment:     Encounter Diagnosis   Name Primary?  Closed nondisplaced fracture of left calcaneus, unspecified portion of calcaneus, initial encounter Yes            Plan:   · The patient verbalized understanding of exam findings and treatment plan  We engaged in the shared decision-making process and treatment options were discussed at length with the patient  Surgical and conservative management discussed today along with risks and benefits  · Yelena Lazo has healed her avulsion type calcaneus tuberosity fracture and 3rd and 4th fractures  She is now 4 months from her initial injury and is progress well without any complications  · Continue compression stocking as needed for swelling  · OTC medications as needed for pain control  See back PRN      History of Present Illness:   Chief Complaint: Left foot follow up  Talya Day is a 80 y o  female who is being seen in follow-up for left tongue type calcaneus fracture and left 3rd and 4th metatarsal fractures  When we last saw she we recommended continue weightbearing as tolerated in the cam boot for 2  Additional weeks and then proceed to a supportive shoe, which the patient has successfully transitioned  Continued vitamin d and calcium supplementation  Pain has has improved  Residual pain is localized at calcaneus and proximal foot with minimal radiating and described as sharp and severe        Pain/symptom timing:  Worse during the day when active  Pain/symptom context:  Worse with activites and work  Pain/symptom modifying factors:  Rest makes better, activities make worse  Pain/symptom associated signs/symptoms: none    Prior treatment   · NSAIDsNo   · Injections No   · Bracing/Orthotics Yes    · Physical Therapy Yes     Orthopedic Surgical History:   See Below    Past Medical, Surgical and Social History:  Past Medical History:  has a past medical history of Basal cell carcinoma, Cancer ([facility] [address] ), Coronary artery disease, Dementia ([facility] [address] ), Depression, Ear problems, HL (hearing loss), Hyperlipidemia, Migraines, Ovarian cancer ([facility] [address] ) (2004), and Phlebitis  Problem List: does not have any pertinent problems on file  Past Surgical History:  has a past surgical history that includes TONSILECTOMY AND ADNOIDECTOMY; Appendectomy; Hysterectomy (2005); Knee surgery (Right); Cataract extraction, bilateral; ADENOIDECTOMY; Tonsillectomy; and Skin biopsy  Family History: family history includes Alcohol abuse in her mother; Anxiety disorder in her mother; Asthma in her mother; Brain cancer in her son; Coronary artery disease in her sister; Depression in her mother; Diabetes (age of onset: 61) in her father; Heart attack in her sister; Substance Abuse in her mother  Social History:  reports that she has quit smoking  Her smoking use included cigarettes  She smoked 0 25 packs per day  She has never used smokeless tobacco  She reports previous alcohol use  She reports that she does not use drugs  Current Medications: has a current medication list which includes the following prescription(s): aspirin, atorvastatin, chlorhexidine, ketoconazole, and multiple vitamin  Allergies: is allergic to codeine; morphine; other; and propoxyphene  Review of Systems:  General- denies fever/chills  HEENT- denies hearing loss or sore throat  Eyes- denies eye pain or visual disturbances, denies red eyes  Respiratory- denies cough or SOB  Cardio- denies chest pain or palpitations  GI- denies abdominal pain  Endocrine- denies urinary frequency  Urinary- denies pain with urination  Musculoskeletal- Negative except noted above  Skin- denies rashes or wounds  Neurological- denies dizziness or headache  Psychiatric- denies anxiety or difficulty concentrating    Physical Exam:   There were no vitals taken for this visit    General/Constitutional: No apparent distress: well-nourished and well developed  Eyes: normal ocular motion  Lymphatic: No appreciable lymphadenopathy  Respiratory: Non-labored breathing  Vascular: No edema, swelling or tenderness, except as noted in detailed exam   Integumentary: No impressive skin lesions present, except as noted in detailed exam   Neuro: No ataxia or tremors noted  Psych: Normal mood and affect, oriented to person, place and time  Appropriate affect  Musculoskeletal: Normal, except as noted in detailed exam and in HPI  Examination    Left    Gait Normal   Musculoskeletal Tender to palpation at calcaneus and proximal foot    Skin Normal      Nails Normal    Range of Motion  20 degrees dorsiflexion, 45 degrees plantarflexion  Subtalar motion: Normal    Stability Stable    Muscle Strength 5/5 tibialis anterior  5/5 gastrocnemius-soleus  5/5 posterior tibialis  5/5 peroneal/eversion strength  5/5 EHL  5/5 FHL    Neurologic Normal    Sensation  Intact to light touch throughout sural, saphenous, superficial peroneal, deep peroneal and medial/lateral plantar nerve distributions  [place]-Anjel 5 07 filament (10g) testing  deferred  Cardiovascular Brisk capillary refill < 2 seconds,intact DP and PT pulses    Special Tests None      Imaging Studies:     3 views of the Left foot were obtained, reviewed and interpreted independently which demonstrate stable and healed calcaneus and 3rd and 4th metatarsal fractures without evidence of displacement  Reviewed by me personally  Rudell Fortune Lachman, MD  Foot & Ankle Surgery   Department [address]      I personally performed the service  Rudell Fortune Lachman, MD    Scribe Attestation    I,:  Jeff Kingsley MA am acting as a scribe while in the presence of the attending physician :       I,:  Mahogany Browne MD personally performed the services described in this documentation    as scribed in my presence :

## 2021-02-26 DIAGNOSIS — E78.49 OTHER HYPERLIPIDEMIA: ICD-10-CM

## 2021-02-26 RX ORDER — ATORVASTATIN CALCIUM 20 MG/1
TABLET, FILM COATED ORAL
Qty: 90 TABLET | Refills: 1 | Status: SHIPPED | OUTPATIENT
Start: 2021-02-26 | End: 2021-08-19

## 2021-03-04 ENCOUNTER — TRANSCRIBE ORDERS (OUTPATIENT)
Dept: LAB | Facility: CLINIC | Age: 86
End: 2021-03-04

## 2021-03-04 ENCOUNTER — LAB (OUTPATIENT)
Dept: LAB | Facility: CLINIC | Age: 86
End: 2021-03-04
Payer: COMMERCIAL

## 2021-03-04 DIAGNOSIS — N18.2 CHRONIC KIDNEY DISEASE (CKD), STAGE II (MILD): ICD-10-CM

## 2021-03-04 DIAGNOSIS — E78.49 OTHER HYPERLIPIDEMIA: ICD-10-CM

## 2021-03-04 DIAGNOSIS — E53.8 VITAMIN B12 DEFICIENCY: ICD-10-CM

## 2021-03-04 LAB
25(OH)D3 SERPL-MCNC: 28.1 NG/ML (ref 30–100)
ALBUMIN SERPL BCP-MCNC: 3.8 G/DL (ref 3.5–5)
ALP SERPL-CCNC: 94 U/L (ref 46–116)
ALT SERPL W P-5'-P-CCNC: 28 U/L (ref 12–78)
ANION GAP SERPL CALCULATED.3IONS-SCNC: 7 MMOL/L (ref 4–13)
AST SERPL W P-5'-P-CCNC: 16 U/L (ref 5–45)
BASOPHILS # BLD AUTO: 0.06 THOUSANDS/ΜL (ref 0–0.1)
BASOPHILS NFR BLD AUTO: 1 % (ref 0–1)
BILIRUB SERPL-MCNC: 0.64 MG/DL (ref 0.2–1)
BUN SERPL-MCNC: 20 MG/DL (ref 5–25)
CALCIUM SERPL-MCNC: 8.6 MG/DL (ref 8.3–10.1)
CHLORIDE SERPL-SCNC: 107 MMOL/L (ref 100–108)
CHOLEST SERPL-MCNC: 164 MG/DL (ref 50–200)
CO2 SERPL-SCNC: 29 MMOL/L (ref 21–32)
CREAT SERPL-MCNC: 1.08 MG/DL (ref 0.6–1.3)
EOSINOPHIL # BLD AUTO: 0.23 THOUSAND/ΜL (ref 0–0.61)
EOSINOPHIL NFR BLD AUTO: 4 % (ref 0–6)
ERYTHROCYTE [DISTWIDTH] IN BLOOD BY AUTOMATED COUNT: 13.3 % (ref 11.6–15.1)
GFR SERPL CREATININE-BSD FRML MDRD: 47 ML/MIN/1.73SQ M
GLUCOSE P FAST SERPL-MCNC: 102 MG/DL (ref 65–99)
HCT VFR BLD AUTO: 42.6 % (ref 34.8–46.1)
HDLC SERPL-MCNC: 68 MG/DL
HGB BLD-MCNC: 14.3 G/DL (ref 11.5–15.4)
IMM GRANULOCYTES # BLD AUTO: 0.01 THOUSAND/UL (ref 0–0.2)
IMM GRANULOCYTES NFR BLD AUTO: 0 % (ref 0–2)
LDLC SERPL CALC-MCNC: 81 MG/DL (ref 0–100)
LYMPHOCYTES # BLD AUTO: 1.86 THOUSANDS/ΜL (ref 0.6–4.47)
LYMPHOCYTES NFR BLD AUTO: 31 % (ref 14–44)
MCH RBC QN AUTO: 32.1 PG (ref 26.8–34.3)
MCHC RBC AUTO-ENTMCNC: 33.6 G/DL (ref 31.4–37.4)
MCV RBC AUTO: 96 FL (ref 82–98)
MONOCYTES # BLD AUTO: 0.4 THOUSAND/ΜL (ref 0.17–1.22)
MONOCYTES NFR BLD AUTO: 7 % (ref 4–12)
NEUTROPHILS # BLD AUTO: 3.4 THOUSANDS/ΜL (ref 1.85–7.62)
NEUTS SEG NFR BLD AUTO: 57 % (ref 43–75)
NONHDLC SERPL-MCNC: 96 MG/DL
NRBC BLD AUTO-RTO: 0 /100 WBCS
PLATELET # BLD AUTO: 148 THOUSANDS/UL (ref 149–390)
PMV BLD AUTO: 9.1 FL (ref 8.9–12.7)
POTASSIUM SERPL-SCNC: 4.5 MMOL/L (ref 3.5–5.3)
PROT SERPL-MCNC: 6.2 G/DL (ref 6.4–8.2)
RBC # BLD AUTO: 4.45 MILLION/UL (ref 3.81–5.12)
SODIUM SERPL-SCNC: 143 MMOL/L (ref 136–145)
TRIGL SERPL-MCNC: 73 MG/DL
TSH SERPL DL<=0.05 MIU/L-ACNC: 2.34 UIU/ML (ref 0.36–3.74)
VIT B12 SERPL-MCNC: 739 PG/ML (ref 100–900)
WBC # BLD AUTO: 5.96 THOUSAND/UL (ref 4.31–10.16)

## 2021-03-04 PROCEDURE — 85025 COMPLETE CBC W/AUTO DIFF WBC: CPT

## 2021-03-04 PROCEDURE — 82607 VITAMIN B-12: CPT

## 2021-03-04 PROCEDURE — 80061 LIPID PANEL: CPT

## 2021-03-04 PROCEDURE — 84443 ASSAY THYROID STIM HORMONE: CPT

## 2021-03-04 PROCEDURE — 36415 COLL VENOUS BLD VENIPUNCTURE: CPT

## 2021-03-04 PROCEDURE — 80053 COMPREHEN METABOLIC PANEL: CPT

## 2021-03-04 PROCEDURE — 82306 VITAMIN D 25 HYDROXY: CPT

## 2021-03-17 ENCOUNTER — RA CDI HCC (OUTPATIENT)
Dept: OTHER | Facility: HOSPITAL | Age: 86
End: 2021-03-17

## 2021-03-17 ENCOUNTER — OFFICE VISIT (OUTPATIENT)
Dept: DERMATOLOGY | Facility: CLINIC | Age: 86
End: 2021-03-17
Payer: COMMERCIAL

## 2021-03-17 VITALS — TEMPERATURE: 97.5 F | BODY MASS INDEX: 25.76 KG/M2 | HEIGHT: 65 IN | WEIGHT: 154.6 LBS

## 2021-03-17 DIAGNOSIS — L82.0 SEBORRHEIC KERATOSES, INFLAMED: Primary | ICD-10-CM

## 2021-03-17 PROCEDURE — 17110 DESTRUCTION B9 LES UP TO 14: CPT | Performed by: DERMATOLOGY

## 2021-03-17 NOTE — PROGRESS NOTES
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

## 2021-03-17 NOTE — PATIENT INSTRUCTIONS
SEBORRHEIC KERATOSIS; NON-INFLAMED    Assessment and Plan:  Based on a thorough discussion of this condition and the management approach to it (including a comprehensive discussion of the known risks, side effects and potential benefits of treatment), the patient (family) agrees to implement the following specific plan:     Recommends cryotherapy to areas in office, obtained signed consent    Apply Vaseline to areas treated when crusted     Seborrheic Keratosis  A seborrheic keratosis is a harmless warty spot that appears during adult life as a common sign of skin aging  Seborrheic keratoses can arise on any area of skin, covered or uncovered, with the usual exception of the palms and soles  They do not arise from mucous membranes  Seborrheic keratoses can have highly variable appearance  Seborrheic keratoses are extremely common  It has been estimated that over 90% of adults over the age of 61 years have one or more of them  They occur in males and females of all races, typically beginning to erupt in the 35s or 45s  They are uncommon under the age of 21 years  The precise cause of seborrhoeic keratoses is not known  Seborrhoeic keratoses are considered degenerative in nature  As time goes by, seborrheic keratoses tend to become more numerous  Some people inherit a tendency to develop a very large number of them; some people may have hundreds of them  The name "seborrheic keratosis" is misleading, because these lesions are not limited to a seborrhoeic distribution (scalp, mid-face, chest, upper back), nor are they formed from sebaceous glands, nor are they associated with sebum -- which is greasy    Seborrheic keratosis may also be called "SK," "Seb K," "basal cell papilloma," "senile wart," or "barnacle "      Researchers have noted:   Eruptive seborrhoeic keratoses can follow sunburn or dermatitis   Skin friction may be the reason they appear in body folds   Viral cause (e g , human papillomavirus) seems unlikely   Stable and clonal mutations or activation of FRFR3, PIK3CA, SHAJI, AKT1 and EGFR genes are found in seborrhoeic keratoses   Seborrhoeic keratosis can arise from solar lentigo   FRFR3 mutations also arise in solar lentigines  These mutations are associated with increased age and location on the head and neck, suggesting a role of ultraviolet radiation in these lesions   Seborrheic keratoses do not harbour tumour suppressor gene mutations   Epidermal growth factor receptor inhibitors, which are used to treat some cancers, often result in an increase in verrucal (warty) keratoses  There is no easy way to remove multiple lesions on a single occasion  Unless a specific lesion is "inflamed" and is causing pain or stinging/burning or is bleeding, most insurance companies do not authorize treatment

## 2021-03-17 NOTE — PROGRESS NOTES
[facility] Follow Up Note    Patient Name: Galileo Abraham  Encounter Date: 03/17/2021    Today's Chief Concerns:  Chiquis Mandujanocelestino Concern #1:  Seborrheic keratosis right check and left leg    Current Medications:    Current Outpatient Medications:     aspirin 81 MG tablet, Take 81 mg by mouth daily, Disp: , Rfl:     atorvastatin (LIPITOR) 20 mg tablet, TAKE 1 TABLET BY MOUTH EVERY DAY, Disp: 90 tablet, Rfl: 1    chlorhexidine (PERIDEX) 0 12 % solution, RINSE 2 TIMES A DAY, Disp: , Rfl:     ketoconazole (NIZORAL) 2 % cream, Apply topically to both feet in their entirety (tops, bottoms and between toes) 3 times a day for 4 weeks straight , Disp: 60 g, Rfl: 2    Multiple Vitamins-Minerals (MULTIVITAMIN ADULT PO), Take 1 tablet by mouth daily, Disp: , Rfl:     CONSTITUTIONAL:   Vitals:    03/17/21 1528   Temp: 97 5 °F (36 4 °C)   TempSrc: Temporal   Weight: 70 1 kg (154 lb 9 6 oz)   Height: 5' 5" (1 651 m)           Specific Alerts:    Have you been seen by a [facility] Dermatologist in the last 3 years? YES    Are you pregnant or planning to become pregnant? No    Are you currently or planning to be nursing or breast feeding? No    Allergies   Allergen Reactions    Codeine Hives    Morphine Other (See Comments)     Redness in face, swelling    Other      Seasonal    Propoxyphene        May we call your Preferred Phone number to discuss your specific medical information? YES    May we leave a detailed message that includes your specific medical information? YES    Have you traveled outside of the [facility] in the past 3 months? No    Do you currently have a pacemaker or defibrillator? No    Do you have any artificial heart valves, joints, plates, screws, rods, stents, pins, etc? YES   - If Yes, were any placed within the last 2 years? Valve replacement 2016     Do you require any medications prior to a surgical procedure?  No   - If Yes, for which procedure? n/a   - If Yes, what medications to you require? n/a    Are you taking any medications that cause you to bleed more easily ("blood thinners") YES  Baby aspirin  Have you ever experienced a rapid heartbeat with epinephrine? No    Have you ever been treated with "gold" (gold sodium thiomalate) therapy? No    [facility] can help with wrinkles, "laugh lines," facial volume loss, "double chin," "love handles," age spots, and more  Are you interested in learning today about some of the skin enhancement procedures that we offer? (If Yes, please provide more detail) No    Review of Systems:  Have you recently had or currently have any of the following? · Fever or chills: No  · Night Sweats: YES  · Headaches: No  · Weight Gain: No  · Weight Loss: No  · Blurry Vision: No  · Nausea: No  · Vomiting: No  · Diarrhea: No  · Blood in Stool: No  · Abdominal Pain: No  · Itchy Skin: No  · Painful Joints: YES  · Swollen Joints: YES  · Muscle Pain: No  · Irregular Mole: YES  · Sun Burn: No  · Dry Skin: No  · Skin Color Changes: No  · Scar or Keloid: No  · Cold Sores/Fever Blisters: No  · Bacterial Infections/MRSA: No  · Anxiety: No  · Depression: No  · Suicidal or Homicidal Thoughts: No      PSYCH: Normal mood and affect  EYES: Normal conjunctiva  ENT: Normal lips and oral mucosa  CARDIOVASCULAR: No edema  RESPIRATORY: Normal respirations  HEME/LYMPH/IMMUNO:  No regional lymphadenopathy except as noted below in ASSESSMENT AND PLAN BY DIAGNOSIS       Face Normal except as noted below in Assessment                                   Left Leg Normal except as noted below in Assessment        SEBORRHEIC KERATOSIS; INFLAMED    Physical Exam:   Anatomic Location Affected: Inflamed right cheek left thigh   Morphological Description:  Flat and raised, waxy, smooth to warty textured, yellow to brownish-grey to dark brown to blackish, discrete, "stuck-on" appearing papules     Pertinent Positives:   Pertinent Negatives:    Assessment and Plan:  Based on a thorough discussion of this condition and the management approach to it (including a comprehensive discussion of the known risks, side effects and potential benefits of treatment), the patient (family) agrees to implement the following specific plan:     Recommends cryotherapy to areas in office, obtained signed consent    Apply Vaseline to areas treated when crusted    Follow up as needed     Seborrheic Keratosis  A seborrheic keratosis is a harmless warty spot that appears during adult life as a common sign of skin aging  Seborrheic keratoses can arise on any area of skin, covered or uncovered, with the usual exception of the palms and soles  They do not arise from mucous membranes  Seborrheic keratoses can have highly variable appearance  Seborrheic keratoses are extremely common  It has been estimated that over 90% of adults over the age of 61 years have one or more of them  They occur in males and females of all races, typically beginning to erupt in the 35s or 45s  They are uncommon under the age of 21 years  The precise cause of seborrhoeic keratoses is not known  Seborrhoeic keratoses are considered degenerative in nature  As time goes by, seborrheic keratoses tend to become more numerous  Some people inherit a tendency to develop a very large number of them; some people may have hundreds of them  The name "seborrheic keratosis" is misleading, because these lesions are not limited to a seborrhoeic distribution (scalp, mid-face, chest, upper back), nor are they formed from sebaceous glands, nor are they associated with sebum -- which is greasy    Seborrheic keratosis may also be called "SK," "Seb K," "basal cell papilloma," "senile wart," or "barnacle "      Researchers have noted:   Eruptive seborrhoeic keratoses can follow sunburn or dermatitis   Skin friction may be the reason they appear in body folds   Viral cause (e g , human papillomavirus) seems unlikely   Stable and clonal mutations or activation of FRFR3, PIK3CA, SHAJI, AKT1 and EGFR genes are found in seborrhoeic keratoses   Seborrhoeic keratosis can arise from solar lentigo   FRFR3 mutations also arise in solar lentigines  These mutations are associated with increased age and location on the head and neck, suggesting a role of ultraviolet radiation in these lesions   Seborrheic keratoses do not harbour tumour suppressor gene mutations   Epidermal growth factor receptor inhibitors, which are used to treat some cancers, often result in an increase in verrucal (warty) keratoses  There is no easy way to remove multiple lesions on a single occasion  Unless a specific lesion is "inflamed" and is causing pain or stinging/burning or is bleeding, most insurance companies do not authorize treatment  PROCEDURE:  DESTRUCTION OF BENIGN LESIONS  After a thorough discussion of treatment options and risk/benefits/alternatives (including but not limited to local pain, scarring, dyspigmentation, blistering, and possible superinfection), verbal and written consent were obtained and the aforementioned lesions were treated on with cryotherapy using liquid nitrogen x 2 cycle for 5-10 seconds   TOTAL NUMBER of 2 lesions were treated today on the ANATOMIC LOCATION: right cheek and left thigh  The patient tolerated the procedure well, and after-care instructions were provided      Scribe Attestation    I,:  David Ch am acting as a scribe while in the presence of the attending physician :       I,:  Yuan Sin MD personally performed the services described in this documentation    as scribed in my presence :

## 2021-03-23 ENCOUNTER — OFFICE VISIT (OUTPATIENT)
Dept: INTERNAL MEDICINE CLINIC | Facility: CLINIC | Age: 86
End: 2021-03-23
Payer: COMMERCIAL

## 2021-03-23 VITALS
DIASTOLIC BLOOD PRESSURE: 72 MMHG | HEIGHT: 65 IN | HEART RATE: 72 BPM | BODY MASS INDEX: 25.16 KG/M2 | SYSTOLIC BLOOD PRESSURE: 114 MMHG | TEMPERATURE: 97.1 F | WEIGHT: 151 LBS | OXYGEN SATURATION: 97 %

## 2021-03-23 DIAGNOSIS — G30.1 LATE ONSET ALZHEIMER'S DISEASE WITHOUT BEHAVIORAL DISTURBANCE (HCC): ICD-10-CM

## 2021-03-23 DIAGNOSIS — E78.49 OTHER HYPERLIPIDEMIA: ICD-10-CM

## 2021-03-23 DIAGNOSIS — D69.6 THROMBOCYTOPENIA (HCC): ICD-10-CM

## 2021-03-23 DIAGNOSIS — S92.902S CLOSED FRACTURE OF LEFT FOOT, SEQUELA: ICD-10-CM

## 2021-03-23 DIAGNOSIS — Z00.00 HEALTH MAINTENANCE EXAMINATION: ICD-10-CM

## 2021-03-23 DIAGNOSIS — N18.2 CHRONIC KIDNEY DISEASE (CKD), STAGE II (MILD): Primary | ICD-10-CM

## 2021-03-23 DIAGNOSIS — C44.90 SKIN CANCER: ICD-10-CM

## 2021-03-23 DIAGNOSIS — F02.80 LATE ONSET ALZHEIMER'S DISEASE WITHOUT BEHAVIORAL DISTURBANCE (HCC): ICD-10-CM

## 2021-03-23 DIAGNOSIS — J30.1 SEASONAL ALLERGIC RHINITIS DUE TO POLLEN: ICD-10-CM

## 2021-03-23 PROBLEM — R26.81 GAIT INSTABILITY: Status: ACTIVE | Noted: 2021-03-23

## 2021-03-23 PROBLEM — E55.9 VITAMIN D DEFICIENCY: Status: ACTIVE | Noted: 2021-03-23

## 2021-03-23 PROCEDURE — 1160F RVW MEDS BY RX/DR IN RCRD: CPT | Performed by: INTERNAL MEDICINE

## 2021-03-23 PROCEDURE — 1036F TOBACCO NON-USER: CPT | Performed by: INTERNAL MEDICINE

## 2021-03-23 PROCEDURE — 1125F AMNT PAIN NOTED PAIN PRSNT: CPT | Performed by: INTERNAL MEDICINE

## 2021-03-23 PROCEDURE — 1170F FXNL STATUS ASSESSED: CPT | Performed by: INTERNAL MEDICINE

## 2021-03-23 PROCEDURE — 99214 OFFICE O/P EST MOD 30 MIN: CPT | Performed by: INTERNAL MEDICINE

## 2021-03-23 PROCEDURE — G0439 PPPS, SUBSEQ VISIT: HCPCS | Performed by: INTERNAL MEDICINE

## 2021-03-23 PROCEDURE — 3725F SCREEN DEPRESSION PERFORMED: CPT | Performed by: INTERNAL MEDICINE

## 2021-03-23 NOTE — ASSESSMENT & PLAN NOTE
Lab Results   Component Value Date    EGFR 47 03/04/2021    EGFR 38 06/29/2020    CREATININE 1 08 03/04/2021    CREATININE 1 28 06/29/2020    CREATININE 1 06 (H) 07/17/2019     Stable  No NSAIDs

## 2021-03-23 NOTE — PROGRESS NOTES
Assessment and Plan:     Problem List Items Addressed This Visit        Respiratory    Allergic rhinitis     Stable, does not take any medication or use nasal sprays  Nervous and Auditory    Late onset Alzheimer's disease without behavioral disturbance (HCC)     No change  Has resumed adult day care 3 days a week  Musculoskeletal and Integument    Closed fracture of left foot     Resolved  Doing well, no pain  Skin cancer     Followed by dermatology  Genitourinary    Chronic kidney disease (CKD), stage II (mild) - Primary     Lab Results   Component Value Date    EGFR 47 03/04/2021    EGFR 38 06/29/2020    CREATININE 1 08 03/04/2021    CREATININE 1 28 06/29/2020    CREATININE 1 06 (H) 07/17/2019     Stable  No NSAIDs  Other    Other hyperlipidemia     Lipids at goal, on statin  Thrombocytopenia (HCC)     Stable  Other Visit Diagnoses     Health maintenance examination        Received COVID booster  Preventive health issues were discussed with patient, and age appropriate screening tests were ordered as noted in patient's After Visit Summary  Personalized health advice and appropriate referrals for health education or preventive services given if needed, as noted in patient's After Visit Summary       History of Present Illness:     Patient presents for Medicare Annual Wellness visit    Patient Care Team:  Michael Dos Santos MD as PCP - General (Internal Medicine)     Problem List:     Patient Active Problem List   Diagnosis    Depression    Late onset Alzheimer's disease without behavioral disturbance ([facility] [address] )    S/P AVR (aortic valve replacement)    Primary osteoarthritis involving multiple joints    Other hyperlipidemia    Allergic rhinitis    Age-related osteoporosis without current pathological fracture    Skin cancer    Coronary artery disease involving native coronary artery of native heart    Essential hypertension    GERD (gastroesophageal reflux disease)    Postmenopausal HRT (hormone replacement therapy)    Vitamin B12 deficiency    COPD (chronic obstructive pulmonary disease) (HCC)    Pancreatic cyst    Chronic kidney disease (CKD), stage II (mild)    Thrombocytopenia (HCC)    Wears hearing aid    Closed fracture of left foot    Gait instability    Vitamin D deficiency      Past Medical and Surgical History:     Past Medical History:   Diagnosis Date    Basal cell carcinoma     Back     Cancer [facility]     Coronary artery disease     Dementia ([facility] )     Depression     Ear problems     HL (hearing loss)     Hyperlipidemia     Migraines     Ovarian cancer ([facility] ) 2004    s/p surgery   no chemo/RT    Phlebitis     R leg     Past Surgical History:   Procedure Laterality Date    ADENOIDECTOMY      APPENDECTOMY      CATARACT EXTRACTION, BILATERAL      HYSTERECTOMY  2005    ovarian CA    KNEE SURGERY Right     SKIN BIOPSY      TONSILECTOMY AND ADNOIDECTOMY      TONSILLECTOMY        Family History:     Family History   Problem Relation Age of Onset    Depression Mother     Anxiety disorder Mother     Alcohol abuse Mother     Substance Abuse Mother     Asthma Mother     Diabetes Father 61    Brain cancer Son     Heart attack Sister     Coronary artery disease Sister     Mental illness Neg Hx       Social History:     E-Cigarette/Vaping    E-Cigarette Use Never User      E-Cigarette/Vaping Substances    Nicotine No     THC No     CBD No     Flavoring No     Other No     Unknown No      Social History     Socioeconomic History    Marital status:      Spouse name: None    Number of children: None    Years of education: None    Highest education level: None   Occupational History    None   Social Needs    Financial resource strain: None    Food insecurity     Worry: None     Inability: None    Transportation needs     Medical: None     Non-medical: None   Tobacco Use    Smoking status: Former Smoker     Packs/day: 0 25     Types: Cigarettes    Smokeless tobacco: Never Used    Tobacco comment: until age 27   Substance and Sexual Activity    Alcohol use: Not Currently     Frequency: Never     Drinks per session: 1 or 2     Binge frequency: Never    Drug use: Never    Sexual activity: Not Currently   Lifestyle    Physical activity     Days per week: None     Minutes per session: None    Stress: None   Relationships    Social connections     Talks on phone: None     Gets together: None     Attends Jew service: None     Active member of club or organization: None     Attends meetings of clubs or organizations: None     Relationship status: None    Intimate partner violence     Fear of current or ex partner: None     Emotionally abused: None     Physically abused: None     Forced sexual activity: None   Other Topics Concern    None   Social History Narrative    Drinks coffee/tea- 2 daily half decaf     From [place], [place]    Now lives with daughter    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, 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      Medications and Allergies:     Current Outpatient Medications   Medication Sig Dispense Refill    aspirin 81 MG tablet Take 81 mg by mouth daily      atorvastatin (LIPITOR) 20 mg tablet TAKE 1 TABLET BY MOUTH EVERY DAY 90 tablet 1    Multiple Vitamins-Minerals (MULTIVITAMIN ADULT PO) Take 1 tablet by mouth daily      chlorhexidine (PERIDEX) 0 12 % solution RINSE 2 TIMES A DAY      ketoconazole (NIZORAL) 2 % cream Apply topically to both feet in their entirety (tops, bottoms and between toes) 3 times a day for 4 weeks straight  (Patient not taking: Reported on 3/17/2021) 60 g 2     No current facility-administered medications for this visit        Allergies   Allergen Reactions    Codeine Hives    Morphine Other (See Comments)     Redness in face, swelling    Other      Seasonal    Propoxyphene       Immunizations:     Immunization History Administered Date(s) Administered    Hep B, Adolescent or Pediatric 10/15/2001    Hepatitis B 10/15/2001    INFLUENZA 10/04/2013, 11/03/2014, 09/27/2015, 10/13/2016, 10/17/2017, 10/01/2018    Influenza, high dose seasonal 0 7 mL 10/01/2018, 09/27/2019, 10/19/2020    Influenza, seasonal, injectable, preservative free 10/17/2017    Pneumococcal Conjugate 13-Valent 06/29/2020    Pneumococcal Polysaccharide PPV23 11/22/2016    SARS-CoV-2 / COVID-19 mRNA IM (Pfizer-BioNTech) 01/18/2021, 02/09/2021    Tdap 11/16/2010    Tuberculin Skin Test-PPD Intradermal 07/30/2019      Health Maintenance: There are no preventive care reminders to display for this patient  There are no preventive care reminders to display for this patient  Medicare Health Risk Assessment:     /72   Pulse 72   Temp (!) 97 1 °F (36 2 °C)   Ht 5' 5" (1 651 m)   Wt 68 5 kg (151 lb)   SpO2 97%   BMI 25 13 kg/m²      Angel Forte is here for her Subsequent Wellness visit  Last Medicare Wellness visit information reviewed, patient interviewed and updates made to the record today  Health Risk Assessment:   Patient rates overall health as good  Patient feels that their physical health rating is same  Patient is satisfied with their life  Eyesight was rated as same  Hearing was rated as same  Patient feels that their emotional and mental health rating is same  Patients states they are never, rarely angry  Patient states they are never, rarely unusually tired/fatigued  Pain experienced in the last 7 days has been none  Patient states that she has experienced no weight loss or gain in last 6 months  Depression Screening:   PHQ-2 Score: 2  PHQ-9 Score: 2      Fall Risk Screening:    In the past year, patient has experienced: history of falling in past year    Number of falls: 2 or more  Injured during fall?: Yes    Feels unsteady when standing or walking?: No    Worried about falling?: No      Urinary Incontinence Screening:   Patient has not leaked urine accidently in the last six months  Home Safety:  Patient has trouble with stairs inside or outside of their home  Patient has working smoke alarms and has working carbon monoxide detector  Home safety hazards include: none  Nutrition:   Current diet is Regular  Medications:   Patient is currently taking over-the-counter supplements  OTC medications include: see medication list  Patient is able to manage medications  Activities of Daily Living (ADLs)/Instrumental Activities of Daily Living (IADLs):   Walk and transfer into and out of bed and chair?: Yes  Dress and groom yourself?: Yes    Bathe or shower yourself?: Yes    Feed yourself? Yes  Do your laundry/housekeeping?: No  Manage your money, pay your bills and track your expenses?: No  Make your own meals?: No    Do your own shopping?: No    Previous Hospitalizations:   Any hospitalizations or ED visits within the last 12 months?: No      Advance Care Planning:   Living will: Yes    Durable POA for healthcare:  Yes    Advanced directive: Yes    End of Life Decisions reviewed with patient: No      Cognitive Screening:   Provider or family/friend/caregiver concerned regarding cognition?: Yes    Cognition Comments: Known dementia    PREVENTIVE SCREENINGS      Cardiovascular Screening:    General: History Lipid Disorder and Screening Current      Diabetes Screening:     General: Screening Current      Colorectal Cancer Screening:     General: Screening Not Indicated      Breast Cancer Screening:     General: Patient Declines      Cervical Cancer Screening:    General: Screening Not Indicated      Osteoporosis Screening:    General: History Osteoporosis and Patient Declines      Abdominal Aortic Aneurysm (AAA) Screening:        General: Screening Not Indicated      Lung Cancer Screening:     General: Screening Not Indicated      Hepatitis C Screening:    General: Screening Not Indicated    Screening, Brief Intervention, and Referral to Treatment (SBIRT)    Screening  Typical number of drinks in a day: 0  Typical number of drinks in a week: 0  Interpretation: Low risk drinking behavior  AUDIT-C Screenin) How often did you have a drink containing alcohol in the past year? never  2) How many drinks did you have on a typical day when you were drinking in the past year? never  3) How often did you have 6 or more drinks on one occasion in the past year? never    AUDIT-C Score: 0  Interpretation: Score 0-2 (female): Negative screen for alcohol misuse    Single Item Drug Screening:  How often have you used an illegal drug (including marijuana) or a prescription medication for non-medical reasons in the past year? never    Single Item Drug Screen Score: 0  Interpretation: Negative screen for possible drug use disorder    Other Counseling Topics:   Calcium and vitamin D intake and regular weightbearing exercise         Rajan Chavez MD

## 2021-03-23 NOTE — PROGRESS NOTES
Assessment/Plan:    Closed fracture of left foot  Resolved  Doing well, no pain  Gait instability  Agrees to start home PT  Chronic kidney disease (CKD), stage II (mild)  Lab Results   Component Value Date    EGFR 47 03/04/2021    EGFR 38 06/29/2020    CREATININE 1 08 03/04/2021    CREATININE 1 28 06/29/2020    CREATININE 1 06 (H) 07/17/2019     Stable  No NSAIDs  Late onset Alzheimer's disease without behavioral disturbance  No change  Has resumed adult day care 3 days a week  Other hyperlipidemia  Lipids at goal, on statin  Vitamin B12 deficiency  Continue daily B12  Vitamin D deficiency  Takes MVI daily  Allergic rhinitis  Stable, does not take any medication or use nasal sprays  Skin cancer  Followed by dermatology  Thrombocytopenia (HCC)  Stable  GERD (gastroesophageal reflux disease)  Minimal symptoms  Coronary artery disease involving native coronary artery of native heart  No symptoms  On ASA, statin  Age-related osteoporosis without current pathological fracture  On daily MVI only  Diagnoses and all orders for this visit:    Chronic kidney disease (CKD), stage II (mild)    Closed fracture of left foot, sequela    Seasonal allergic rhinitis due to pollen    Late onset [facility]'s disease without behavioral disturbance ([facility])    Skin cancer    Other hyperlipidemia    Thrombocytopenia ([facility] [address] )    Health maintenance examination  Comments:  Received COVID booster  Follow up in 6 months or as needed  Subjective:      Patient ID: Evan Bermudez is a 80 y o  female here with her daughter, for a follow up  She reports left foot fracture has healed completely  She has had no issues  She does notice some weakness when she tries to get up from chair  No falls since she hurt her foot  She denies any headache or dizziness  No chest pain or shortness of breath  She went to Dermatology recently, had 2 skin lesions frozen    She reports the one on her cheek had scabbed over but fell off  She had another 1 on her left thigh, it did not fall off  Denies any bleeding or discharge  She denies any reflux symptoms, no abdominal pain or constipation  Good appetite  She is back at her adult  3 days a week  The following portions of the patient's history were reviewed and updated as appropriate: allergies, current medications, past medical history, past social history and problem list     Review of Systems   Constitutional: Positive for activity change  Negative for appetite change and fatigue  HENT: Positive for hearing loss  Negative for congestion, ear pain and postnasal drip  Eyes: Negative for visual disturbance  Respiratory: Negative for cough and shortness of breath  Cardiovascular: Negative for chest pain and leg swelling  Gastrointestinal: Negative for abdominal pain, constipation and diarrhea  Genitourinary: Negative for dysuria and frequency  Musculoskeletal: Positive for gait problem  Negative for arthralgias, joint swelling and myalgias  Skin: Positive for wound  Negative for rash  Neurological: Negative for dizziness and headaches  Psychiatric/Behavioral: Negative for agitation and sleep disturbance  The patient is not nervous/anxious  Objective:      /72   Pulse 72   Temp (!) 97 1 °F (36 2 °C)   Ht 5' 5" (1 651 m)   Wt 68 5 kg (151 lb)   SpO2 97%   BMI 25 13 kg/m²          Physical Exam  Vitals signs and nursing note reviewed  Constitutional:       General: She is not in acute distress  Appearance: She is well-developed  HENT:      Head: Normocephalic and atraumatic  Right Ear: Tympanic membrane, ear canal and external ear normal       Left Ear: Tympanic membrane, ear canal and external ear normal    Eyes:      Pupils: Pupils are equal, round, and reactive to light  Cardiovascular:      Rate and Rhythm: Normal rate and regular rhythm  Heart sounds: Normal heart sounds  Pulmonary:      Effort: Pulmonary effort is normal       Breath sounds: Normal breath sounds  No wheezing  Abdominal:      General: Bowel sounds are normal       Palpations: Abdomen is soft  Musculoskeletal:         General: No swelling  Right lower leg: No edema  Left lower leg: No edema  Skin:     General: Skin is warm  Findings: No rash  Neurological:      Mental Status: She is alert  Psychiatric:         Mood and Affect: Mood normal          Behavior: Behavior normal            Labs & imaging results reviewed with patient

## 2021-04-05 ENCOUNTER — TELEPHONE (OUTPATIENT)
Dept: INTERNAL MEDICINE CLINIC | Facility: CLINIC | Age: 86
End: 2021-04-05

## 2021-04-05 NOTE — TELEPHONE ENCOUNTER
Max Armstrong received script for outpt  therapy  They have been calling her to get ins  Info for out of pocket expenses and she hasn't returned their phone calls, so they are not admitting her for services

## 2021-04-12 NOTE — TELEPHONE ENCOUNTER
Daughter calling because she received our letter  She states they decided not to do PT because unhappy about the way [facility] was going to do things

## 2021-04-13 NOTE — TELEPHONE ENCOUNTER
Would you like me to send a referral for [facility] home [facility]? See if they can accommodate her schedule?

## 2021-04-13 NOTE — TELEPHONE ENCOUNTER
Daughter called  She said [facility] wouldn't give her a price  They told her she would have to pay 30-40%  Daughter said they will hold off for now and discuss it when she sees Dr Nikhil Bee again

## 2021-04-30 ENCOUNTER — TELEPHONE (OUTPATIENT)
Dept: INTERNAL MEDICINE CLINIC | Facility: CLINIC | Age: 86
End: 2021-04-30

## 2021-04-30 DIAGNOSIS — K21.9 GASTROESOPHAGEAL REFLUX DISEASE, UNSPECIFIED WHETHER ESOPHAGITIS PRESENT: Primary | ICD-10-CM

## 2021-04-30 RX ORDER — FAMOTIDINE 20 MG/1
20 TABLET, FILM COATED ORAL 2 TIMES DAILY
Qty: 60 TABLET | Refills: 0 | Status: SHIPPED | OUTPATIENT
Start: 2021-04-30 | End: 2021-05-25

## 2021-04-30 NOTE — TELEPHONE ENCOUNTER
Can start pepcid twice daily (prescription sent in)  Eat small meals, avoid tomato, onions, citrus, caffeine, spicy foods, and chocolate  Avoid eating within 2-3 hours of bedtime  If not improving, would recommend office visit

## 2021-05-25 DIAGNOSIS — K21.9 GASTROESOPHAGEAL REFLUX DISEASE, UNSPECIFIED WHETHER ESOPHAGITIS PRESENT: ICD-10-CM

## 2021-05-25 RX ORDER — FAMOTIDINE 20 MG/1
TABLET, FILM COATED ORAL
Qty: 60 TABLET | Refills: 0 | Status: SHIPPED | OUTPATIENT
Start: 2021-05-25 | End: 2021-06-23

## 2021-06-16 ENCOUNTER — RA CDI HCC (OUTPATIENT)
Dept: OTHER | Facility: HOSPITAL | Age: 86
End: 2021-06-16

## 2021-06-16 NOTE — PROGRESS NOTES
[place] [id number]  coding opportunities             Chart reviewed, (number of) suggestions sent to provider: 1                  Patients insurance company: [employer] (Medicare Advantage only)        DX: [id number] [phone] Chronic kidney disease, stage 3a     Provider never responded to [place] [id number]  coding request, and DX: [id number] [phone] was used instead of [id number] [phone]

## 2021-06-22 ENCOUNTER — APPOINTMENT (OUTPATIENT)
Dept: LAB | Facility: CLINIC | Age: 86
End: 2021-06-22
Payer: COMMERCIAL

## 2021-06-22 ENCOUNTER — OFFICE VISIT (OUTPATIENT)
Dept: INTERNAL MEDICINE CLINIC | Facility: CLINIC | Age: 86
End: 2021-06-22
Payer: COMMERCIAL

## 2021-06-22 VITALS
TEMPERATURE: 97.6 F | OXYGEN SATURATION: 98 % | HEIGHT: 65 IN | DIASTOLIC BLOOD PRESSURE: 62 MMHG | BODY MASS INDEX: 25.33 KG/M2 | HEART RATE: 62 BPM | WEIGHT: 152 LBS | SYSTOLIC BLOOD PRESSURE: 94 MMHG

## 2021-06-22 DIAGNOSIS — D69.6 THROMBOCYTOPENIA (HCC): ICD-10-CM

## 2021-06-22 DIAGNOSIS — N18.2 CHRONIC KIDNEY DISEASE (CKD), STAGE II (MILD): Primary | ICD-10-CM

## 2021-06-22 DIAGNOSIS — M15.9 PRIMARY OSTEOARTHRITIS INVOLVING MULTIPLE JOINTS: ICD-10-CM

## 2021-06-22 DIAGNOSIS — Z11.1 PPD SCREENING TEST: ICD-10-CM

## 2021-06-22 DIAGNOSIS — K21.9 GASTROESOPHAGEAL REFLUX DISEASE, UNSPECIFIED WHETHER ESOPHAGITIS PRESENT: ICD-10-CM

## 2021-06-22 LAB
ALBUMIN SERPL BCP-MCNC: 4 G/DL (ref 3.5–5)
ALP SERPL-CCNC: 91 U/L (ref 46–116)
ALT SERPL W P-5'-P-CCNC: 20 U/L (ref 12–78)
ANION GAP SERPL CALCULATED.3IONS-SCNC: 8 MMOL/L (ref 4–13)
AST SERPL W P-5'-P-CCNC: 14 U/L (ref 5–45)
BASOPHILS # BLD AUTO: 0.04 THOUSANDS/ΜL (ref 0–0.1)
BASOPHILS NFR BLD AUTO: 1 % (ref 0–1)
BILIRUB SERPL-MCNC: 0.67 MG/DL (ref 0.2–1)
BUN SERPL-MCNC: 21 MG/DL (ref 5–25)
CALCIUM SERPL-MCNC: 9.4 MG/DL (ref 8.3–10.1)
CHLORIDE SERPL-SCNC: 107 MMOL/L (ref 100–108)
CO2 SERPL-SCNC: 30 MMOL/L (ref 21–32)
CREAT SERPL-MCNC: 1.17 MG/DL (ref 0.6–1.3)
EOSINOPHIL # BLD AUTO: 0.16 THOUSAND/ΜL (ref 0–0.61)
EOSINOPHIL NFR BLD AUTO: 2 % (ref 0–6)
ERYTHROCYTE [DISTWIDTH] IN BLOOD BY AUTOMATED COUNT: 13.1 % (ref 11.6–15.1)
GFR SERPL CREATININE-BSD FRML MDRD: 42 ML/MIN/1.73SQ M
GLUCOSE SERPL-MCNC: 127 MG/DL (ref 65–140)
HCT VFR BLD AUTO: 41.5 % (ref 34.8–46.1)
HGB BLD-MCNC: 14.2 G/DL (ref 11.5–15.4)
IMM GRANULOCYTES # BLD AUTO: 0.03 THOUSAND/UL (ref 0–0.2)
IMM GRANULOCYTES NFR BLD AUTO: 0 % (ref 0–2)
LYMPHOCYTES # BLD AUTO: 2.25 THOUSANDS/ΜL (ref 0.6–4.47)
LYMPHOCYTES NFR BLD AUTO: 30 % (ref 14–44)
MCH RBC QN AUTO: 32.9 PG (ref 26.8–34.3)
MCHC RBC AUTO-ENTMCNC: 34.2 G/DL (ref 31.4–37.4)
MCV RBC AUTO: 96 FL (ref 82–98)
MONOCYTES # BLD AUTO: 0.53 THOUSAND/ΜL (ref 0.17–1.22)
MONOCYTES NFR BLD AUTO: 7 % (ref 4–12)
NEUTROPHILS # BLD AUTO: 4.43 THOUSANDS/ΜL (ref 1.85–7.62)
NEUTS SEG NFR BLD AUTO: 60 % (ref 43–75)
NRBC BLD AUTO-RTO: 0 /100 WBCS
PLATELET # BLD AUTO: 141 THOUSANDS/UL (ref 149–390)
PMV BLD AUTO: 9.7 FL (ref 8.9–12.7)
POTASSIUM SERPL-SCNC: 4.1 MMOL/L (ref 3.5–5.3)
PROT SERPL-MCNC: 6.8 G/DL (ref 6.4–8.2)
RBC # BLD AUTO: 4.32 MILLION/UL (ref 3.81–5.12)
SODIUM SERPL-SCNC: 145 MMOL/L (ref 136–145)
WBC # BLD AUTO: 7.44 THOUSAND/UL (ref 4.31–10.16)

## 2021-06-22 PROCEDURE — 1036F TOBACCO NON-USER: CPT | Performed by: INTERNAL MEDICINE

## 2021-06-22 PROCEDURE — 86480 TB TEST CELL IMMUN MEASURE: CPT

## 2021-06-22 PROCEDURE — 3288F FALL RISK ASSESSMENT DOCD: CPT | Performed by: INTERNAL MEDICINE

## 2021-06-22 PROCEDURE — 80053 COMPREHEN METABOLIC PANEL: CPT | Performed by: INTERNAL MEDICINE

## 2021-06-22 PROCEDURE — 1100F PTFALLS ASSESS-DOCD GE2>/YR: CPT | Performed by: INTERNAL MEDICINE

## 2021-06-22 PROCEDURE — 85025 COMPLETE CBC W/AUTO DIFF WBC: CPT | Performed by: INTERNAL MEDICINE

## 2021-06-22 PROCEDURE — 1160F RVW MEDS BY RX/DR IN RCRD: CPT | Performed by: INTERNAL MEDICINE

## 2021-06-22 PROCEDURE — 36415 COLL VENOUS BLD VENIPUNCTURE: CPT | Performed by: INTERNAL MEDICINE

## 2021-06-22 PROCEDURE — 99214 OFFICE O/P EST MOD 30 MIN: CPT | Performed by: INTERNAL MEDICINE

## 2021-06-22 NOTE — ASSESSMENT & PLAN NOTE
Lab Results   Component Value Date    EGFR 47 03/04/2021    EGFR 38 06/29/2020    CREATININE 1 08 03/04/2021    CREATININE 1 28 06/29/2020    CREATININE 1 06 (H) 07/17/2019     Stable

## 2021-06-22 NOTE — PROGRESS NOTES
Assessment/Plan:    Late onset Alzheimer's disease without behavioral disturbance ([facility] [address] )  Stable, goes to adult day care MWF  GERD (gastroesophageal reflux disease)  Stable, taking famotidine every morning  Primary osteoarthritis involving multiple joints  More frequent symptoms recently  Recommend to take Tylenol every AM     Other hyperlipidemia  On statin  Gait instability  No recent falls  Did not start home PT since cost not determined  COPD (chronic obstructive pulmonary disease) ([facility])  No symptoms  Skin cancer  Follow up with dermatology  Essential hypertension  BP slightly low today, keep hydrated  Age-related osteoporosis without current pathological fracture  Recent fracture, declined medication  Walks daily, on daily MVI  Chronic kidney disease (CKD), stage II (mild)  Lab Results   Component Value Date    EGFR 47 03/04/2021    EGFR 38 06/29/2020    CREATININE 1 08 03/04/2021    CREATININE 1 28 06/29/2020    CREATININE 1 06 (H) 07/17/2019     Stable  Thrombocytopenia (HCC)  Stable  S/P AVR (aortic valve replacement)  On daily ASA  Stable echo 6/20  Diagnoses and all orders for this visit:    Chronic kidney disease (CKD), stage II (mild)  -     Comprehensive metabolic panel    Gastroesophageal reflux disease, unspecified whether esophagitis present    Thrombocytopenia (HCC)  -     CBC and differential    Primary osteoarthritis involving multiple joints    PPD screening test  -     Quantiferon TB Gold Plus; Future      Forms completed  Follow up in 6 months or as needed  Subjective:      Patient ID: Katarina Gallagher is a 80 y o  female  She feels well, no new complaints  She goes to adult  3 days a week  She enjoys does very much with the socialization and activities  No recent falls  She complains of more frequent bilateral knee pain  She does not take her apply medication for this  She denies any chest pain or shortness of breath      she has had no issues with reflux, no cough or abdominal pain  She does take Pepcid every morning  Family had wanted to start home physical therapy  This was not scheduled since unable to determine cost of each session  She walks daily with her daughter, feels her balance has improved  The following portions of the patient's history were reviewed and updated as appropriate: allergies, current medications, past medical history, past social history and problem list     Review of Systems   Constitutional: Negative for activity change  HENT: Positive for hearing loss  Respiratory: Negative for cough and shortness of breath  Cardiovascular: Negative for chest pain and palpitations  Gastrointestinal: Negative for abdominal pain and constipation  Genitourinary: Negative for dysuria  Musculoskeletal: Positive for arthralgias  Negative for gait problem  Neurological: Negative for dizziness and headaches  Psychiatric/Behavioral: Positive for confusion  Negative for agitation and behavioral problems  Objective:      BP 94/62   Pulse 62   Temp 97 6 °F (36 4 °C)   Ht 5' 5" (1 651 m)   Wt 68 9 kg (152 lb)   SpO2 98%   BMI 25 29 kg/m²          Physical Exam  Vitals and nursing note reviewed  Constitutional:       General: She is not in acute distress  Appearance: She is well-developed  HENT:      Head: Normocephalic and atraumatic  Eyes:      Pupils: Pupils are equal, round, and reactive to light  Cardiovascular:      Rate and Rhythm: Normal rate and regular rhythm  Heart sounds: Normal heart sounds  Pulmonary:      Effort: Pulmonary effort is normal       Breath sounds: Normal breath sounds  No wheezing  Abdominal:      General: Bowel sounds are normal       Palpations: Abdomen is soft  Musculoskeletal:         General: No swelling  Right knee: No swelling or deformity  No tenderness  Left knee: No swelling or deformity  No tenderness     Skin:     General: Skin is warm  Findings: No rash  Neurological:      General: No focal deficit present  Mental Status: She is alert  Psychiatric:         Mood and Affect: Mood normal          Behavior: Behavior normal            Lab results reviewed with patient

## 2021-06-25 LAB
GAMMA INTERFERON BACKGROUND BLD IA-ACNC: 0.04 IU/ML
M TB IFN-G BLD-IMP: NEGATIVE
M TB IFN-G CD4+ BCKGRND COR BLD-ACNC: -0.01 IU/ML
M TB IFN-G CD4+ BCKGRND COR BLD-ACNC: 0.01 IU/ML
MITOGEN IGNF BCKGRD COR BLD-ACNC: >10 IU/ML

## 2021-06-25 NOTE — RESULT ENCOUNTER NOTE
Lab results: TB test normal  We will fax the form  Platelets and kidney function stable    The rest of your labs were normal

## 2021-08-18 ENCOUNTER — OFFICE VISIT (OUTPATIENT)
Dept: DERMATOLOGY | Facility: CLINIC | Age: 86
End: 2021-08-18
Payer: COMMERCIAL

## 2021-08-18 VITALS — HEIGHT: 65 IN | TEMPERATURE: 96.9 F | WEIGHT: 153 LBS | BODY MASS INDEX: 25.49 KG/M2

## 2021-08-18 DIAGNOSIS — D48.5 NEOPLASM OF UNCERTAIN BEHAVIOR OF SKIN: ICD-10-CM

## 2021-08-18 DIAGNOSIS — Z85.89 HISTORY OF SQUAMOUS CELL CARCINOMA: ICD-10-CM

## 2021-08-18 DIAGNOSIS — L57.0 ACTINIC KERATOSIS: ICD-10-CM

## 2021-08-18 DIAGNOSIS — D18.01 CHERRY ANGIOMA: ICD-10-CM

## 2021-08-18 DIAGNOSIS — L81.4 LENTIGO: ICD-10-CM

## 2021-08-18 DIAGNOSIS — D22.9 MULTIPLE MELANOCYTIC NEVI: Primary | ICD-10-CM

## 2021-08-18 DIAGNOSIS — L82.1 SEBORRHEIC KERATOSIS: ICD-10-CM

## 2021-08-18 DIAGNOSIS — L85.3 XEROSIS OF SKIN: ICD-10-CM

## 2021-08-18 PROCEDURE — 88305 TISSUE EXAM BY PATHOLOGIST: CPT | Performed by: PATHOLOGY

## 2021-08-18 PROCEDURE — 17000 DESTRUCT PREMALG LESION: CPT | Performed by: DERMATOLOGY

## 2021-08-18 PROCEDURE — 1036F TOBACCO NON-USER: CPT | Performed by: DERMATOLOGY

## 2021-08-18 PROCEDURE — 11102 TANGNTL BX SKIN SINGLE LES: CPT | Performed by: DERMATOLOGY

## 2021-08-18 PROCEDURE — 99214 OFFICE O/P EST MOD 30 MIN: CPT | Performed by: DERMATOLOGY

## 2021-08-18 PROCEDURE — 11103 TANGNTL BX SKIN EA SEP/ADDL: CPT | Performed by: DERMATOLOGY

## 2021-08-18 PROCEDURE — 1160F RVW MEDS BY RX/DR IN RCRD: CPT | Performed by: DERMATOLOGY

## 2021-08-18 NOTE — PROGRESS NOTES
[facility] Follow Up Note    Patient Name: Amandeep Gillis  Encounter Date: 8/18/2021    Today's Chief Concerns:  Alvarez Damian Concern #1:  F/U Seborrheic keratosis   Concern #2:    Alvarez Damian Concern #3:      Current Medications:    Current Outpatient Medications:     aspirin 81 MG tablet, Take 81 mg by mouth daily, Disp: , Rfl:     atorvastatin (LIPITOR) 20 mg tablet, TAKE 1 TABLET BY MOUTH EVERY DAY, Disp: 90 tablet, Rfl: 1    chlorhexidine (PERIDEX) 0 12 % solution, RINSE 2 TIMES A DAY, Disp: , Rfl:     famotidine (PEPCID) 20 mg tablet, Take 1 tablet (20 mg total) by mouth daily, Disp: 90 tablet, Rfl: 1    ketoconazole (NIZORAL) 2 % cream, Apply topically to both feet in their entirety (tops, bottoms and between toes) 3 times a day for 4 weeks straight  (Patient not taking: Reported on 3/17/2021), Disp: 60 g, Rfl: 2    Multiple Vitamins-Minerals (MULTIVITAMIN ADULT PO), Take 1 tablet by mouth daily, Disp: , Rfl:     CONSTITUTIONAL:   There were no vitals filed for this visit  Specific Alerts:    Have you been seen by a [facility] Dermatologist in the last 3 years? YES    Are you pregnant or planning to become pregnant? No    Are you currently or planning to be nursing or breast feeding? No    Allergies   Allergen Reactions    Codeine Hives    Morphine Other (See Comments)     Redness in face, swelling    Other      Seasonal    Propoxyphene        May we call your Preferred Phone number to discuss your specific medical information? YES    May we leave a detailed message that includes your specific medical information? YES    Have you traveled outside of the [facility] in the past 3 months? No    Do you currently have a pacemaker or defibrillator? No    Do you have any artificial heart valves, joints, plates, screws, rods, stents, pins, etc? YES   - If Yes, were any placed within the last 2 years? NO, 2016  Do you require any medications prior to a surgical procedure?  No   - If Yes, for which procedure? - If Yes, what medications to you require? Are you taking any medications that cause you to bleed more easily ("blood thinners") YES    Have you ever experienced a rapid heartbeat with epinephrine? No    Have you ever been treated with "gold" (gold sodium thiomalate) therapy? No    [facility] can help with wrinkles, "laugh lines," facial volume loss, "double chin," "love handles," age spots, and more  Are you interested in learning today about some of the skin enhancement procedures that we offer? (If Yes, please provide more detail) No    Review of Systems:  Have you recently had or currently have any of the following?     · Fever or chills: No  · Night Sweats: No  · Headaches: No  · Weight Gain: No  · Weight Loss: No  · Blurry Vision: No  · Nausea: No  · Vomiting: No  · Diarrhea: No  · Blood in Stool: No  · Abdominal Pain: No  · Itchy Skin: No  · Painful Joints: YES  · Swollen Joints: YES  · Muscle Pain: No  · Irregular Mole: No  · Sun Burn: No  · Dry Skin: YES  · Skin Color Changes: No  · Scar or Keloid: No  · Cold Sores/Fever Blisters: No  · Bacterial Infections/MRSA: No  · Anxiety: No  · Depression: No  · Suicidal or Homicidal Thoughts: No      PSYCH: Normal mood and affect  EYES: Normal conjunctiva  ENT: Normal lips and oral mucosa  CARDIOVASCULAR: No edema  RESPIRATORY: Normal respirations  HEME/LYMPH/IMMUNO:  No regional lymphadenopathy except as noted below in ASSESSMENT AND PLAN BY DIAGNOSIS    FULL ORGAN SYSTEM SKIN EXAM (SKIN)   Hair, Scalp, Ears, Face Normal except as noted below in Assessment   Neck, Cervical Chain Nodes Normal except as noted below in Assessment   Right Arm/Hand/Fingers Normal except as noted below in Assessment   Left Arm/Hand/Fingers Normal except as noted below in Assessment   Chest/Breasts/Axillae Viewed areas Normal except as noted below in Assessment   Abdomen, Umbilicus Normal except as noted below in Assessment   Back/Spine Normal except as noted below in Assessment   Groin/Genitalia/Buttocks Viewed areas Normal except as noted below in Assessment   Right Leg, Foot, Toes Normal except as noted below in Assessment   Left Leg, Foot, Toes Normal except as noted below in Assessment       MELANOCYTIC NEVI ("Moles")    Physical Exam:   Anatomic Location Affected:   Mostly on sun-exposed areas of the trunk and extremities   Morphological Description:  Scattered, 1-4mm round to ovoid, symmetrical-appearing, even bordered, skin colored to dark brown macules/papules, mostly in sun-exposed areas   Pertinent Positives:   Pertinent Negatives: Additional History of Present Condition:      Assessment and Plan:  Based on a thorough discussion of this condition and the management approach to it (including a comprehensive discussion of the known risks, side effects and potential benefits of treatment), the patient (family) agrees to implement the following specific plan:   When outside we recommend using a wide brim hat, sunglasses, long sleeve and pants, sunscreen with SPF 09+ with reapplication every 2 hours, or SPF specific clothing    Benign, reassured   Annual skin check     Melanocytic Nevi  Melanocytic nevi ("moles") are tan or brown, raised or flat areas of the skin which have an increased number of melanocytes  Melanocytes are the cells in our body which make pigment and account for skin color  Some moles are present at birth (I e , "congenital nevi"), while others come up later in life (i e , "acquired nevi")  The sun can stimulate the body to make more moles  Sunburns are not the only thing that triggers more moles  Chronic sun exposure can do it too  Clinically distinguishing a healthy mole from melanoma may be difficult, even for experienced dermatologists  The "ABCDE's" of moles have been suggested as a means of helping to alert a person to a suspicious mole and the possible increased risk of melanoma    The suggestions for raising alert are as follows:    Asymmetry: Healthy moles tend to be symmetric, while melanomas are often asymmetric  Asymmetry means if you draw a line through the mole, the two halves do not match in color, size, shape, or surface texture  Asymmetry can be a result of rapid enlargement of a mole, the development of a raised area on a previously flat lesion, scaling, ulceration, bleeding or scabbing within the mole  Any mole that starts to demonstrate "asymmetry" should be examined promptly by a board certified dermatologist      Border: Healthy moles tend to have discrete, even borders  The border of a melanoma often blends into the normal skin and does not sharply delineate the mole from normal skin  Any mole that starts to demonstrate "uneven borders" should be examined promptly by a board certified dermatologist      Color: Healthy moles tend to be one color throughout  Melanomas tend to be made up of different colors ranging from dark black, blue, white, or red  Any mole that demonstrates a color change should be examined promptly by a board certified dermatologist      Diameter: Healthy moles tend to be smaller than 0 6 cm in size; an exception are "congenital nevi" that can be larger  Melanomas tend to grow and can often be greater than 0 6 cm (1/4 of an inch, or the size of a pencil eraser)  This is only a guideline, and many normal moles may be larger than 0 6 cm without being unhealthy  Any mole that starts to change in size (small to bigger or bigger to smaller) should be examined promptly by a board certified dermatologist      Evolving: Healthy moles tend to "stay the same "  Melanomas may often show signs of change or evolution such as a change in size, shape, color, or elevation    Any mole that starts to itch, bleed, crust, burn, hurt, or ulcerate or demonstrate a change or evolution should be examined promptly by a board certified dermatologist         LENTIGO    Physical Exam:   Anatomic Location Affected:  Trunk, extremities   Morphological Description:  Light brown macules   Pertinent Positives:   Pertinent Negatives: Additional History of Present Condition:      Assessment and Plan:  Based on a thorough discussion of this condition and the management approach to it (including a comprehensive discussion of the known risks, side effects and potential benefits of treatment), the patient (family) agrees to implement the following specific plan:   When outside we recommend using a wide brim hat, sunglasses, long sleeve and pants, sunscreen with SPF 35+ with reapplication every 2 hours, or SPF specific clothing       What is a lentigo? A lentigo is a pigmented flat or slightly raised lesion with a clearly defined edge  Unlike an ephelis (freckle), it does not fade in the winter months  There are several kinds of lentigo  The name lentigo originally referred to its appearance resembling a small lentil  The plural of lentigo is lentigines, although lentigos is also in common use  Who gets lentigines? Lentigines can affect males and females of all ages and races  Solar lentigines are especially prevalent in fair skinned adults  Lentigines associated with syndromes are present at birth or arise during childhood  What causes lentigines? Common forms of lentigo are due to exposure to ultraviolet radiation:   Sun damage including sunburn    Indoor tanning    Phototherapy, especially photochemotherapy (PUVA)    Ionizing radiation, eg radiation therapy, can also cause lentigines  Several familial syndromes associated with widespread lentigines originate from mutations in Rommel-MAP kinase, mTOR signaling and PTEN pathways  What is the treatment for lentigines? Most lentigines are left alone  Attempts to lighten them may not be successful   The following approaches are used:   SPF 50+ broad-spectrum sunscreen    Hydroquinone bleaching cream    Alpha hydroxy acids    Vitamin C    Retinoids  Azelaic acid    Chemical peels  Individual lesions can be permanently removed using:   Cryotherapy    Intense pulsed light    Pigment lasers    How can lentigines be prevented? Lentigines associated with exposure ultraviolet radiation can be prevented by very careful sun protection  Clothing is more successful at preventing new lentigines than are sunscreens  What is the outlook for lentigines? Lentigines usually persist  They may increase in number with age and sun exposure  Some in sun-protected sites may fade and disappear  DOUGLAS ANGIOMAS    Physical Exam:   Anatomic Location Affected:  trunk   Morphological Description:  Scattered cherry red, 1-4 mm papules   Pertinent Positives:   Pertinent Negatives: Additional History of Present Condition:      Assessment and Plan:  Based on a thorough discussion of this condition and the management approach to it (including a comprehensive discussion of the known risks, side effects and potential benefits of treatment), the patient (family) agrees to implement the following specific plan:   Monitor for changes   Benign, reassured       Assessment and Plan:    Cherry angioma, also known as [employer] spots, are benign vascular skin lesions  A "cherry angioma" is a firm red, blue or purple papule, 0 1-1 cm in diameter  When thrombosed, they can appear black in colour until evaluated with a dermatoscope when the red or purple colour is more easily seen  Cherry angioma may develop on any part of the body but most often appear on the scalp, face, lips and trunk  An angioma is due to proliferating endothelial cells; these are the cells that line the inside of a blood vessel  Angiomas can arise in early life or later in life; the most common type of angioma is a cherry angioma  Cherry angiomas are very common in males and females of any age or race  They are more noticeable in white skin than in skin of colour   They markedly increase in number from about the age of 36  There may be a family history of similar lesions  Eruptive cherry angiomas have been rarely reported to be associated with internal malignancy  The cause of angiomas is unknown  Genetic analysis of cherry angiomas has shown that they frequently carry specific somatic missense mutations in the GNAQ and GNA11 (Q209H) genes, which are involved in other vascular and melanocytic proliferations  SEBORRHEIC KERATOSIS; NON-INFLAMED    Physical Exam:   Anatomic Location Affected:  trunk   Morphological Description:  Flat and raised, waxy, smooth to warty textured, yellow to brownish-grey to dark brown to blackish, discrete, "stuck-on" appearing papules   Pertinent Positives:   Pertinent Negatives: Additional History of Present Condition:      Assessment and Plan:  Based on a thorough discussion of this condition and the management approach to it (including a comprehensive discussion of the known risks, side effects and potential benefits of treatment), the patient (family) agrees to implement the following specific plan:   Monitor for changes   Benign, reassured       Seborrheic Keratosis  A seborrheic keratosis is a harmless warty spot that appears during adult life as a common sign of skin aging  Seborrheic keratoses can arise on any area of skin, covered or uncovered, with the usual exception of the palms and soles  They do not arise from mucous membranes  Seborrheic keratoses can have highly variable appearance  Seborrheic keratoses are extremely common  It has been estimated that over 90% of adults over the age of 61 years have one or more of them  They occur in males and females of all races, typically beginning to erupt in the 35s or 45s  They are uncommon under the age of 21 years  The precise cause of seborrhoeic keratoses is not known  Seborrhoeic keratoses are considered degenerative in nature   As time goes by, seborrheic keratoses tend to become more numerous  Some people inherit a tendency to develop a very large number of them; some people may have hundreds of them  There is no easy way to remove multiple lesions on a single occasion  Unless a specific lesion is "inflamed" and is causing pain or stinging/burning or is bleeding, most insurance companies do not authorize treatment  XEROSIS ("DRY SKIN")    Physical Exam:   Anatomic Location Affected:  diffuse   Morphological Description:  xerosis   Pertinent Positives:   Pertinent Negatives: Additional History of Present Condition:      Assessment and Plan:  Based on a thorough discussion of this condition and the management approach to it (including a comprehensive discussion of the known risks, side effects and potential benefits of treatment), the patient (family) agrees to implement the following specific plan:   Use moisturizer like Eucerin,Cerave or Aveeno Cream 3 times a day for the dry skin               Dry skin refers to skin that feels dry to touch  Dry skin has a dull surface with a rough, scaly quality  The skin is less pliable and cracked  When dryness is severe, the skin may become inflamed and fissured  Although any body site can be dry, dry skin tends to affect the shins more than any other site  Dry skin is lacking moisture in the outer horny cell layer (stratum corneum) and this results in cracks in the skin surface  Dry skin is also called xerosis, xeroderma or asteatosis (lack of fat)  It can affect males and females of all ages  There is some racial variability in water and lipid content of the skin   Dry skin that starts in early childhood may be one of about 20 types of ichthyosis (fish-scale skin)  There is often a family history of dry skin   Dry skin is commonly seen in people with atopic dermatitis   Nearly everyone > 60 years has dry skin  Dry skin that begins later may be seen in people with certain diseases and conditions     Postmenopausal to monitor for worsening symptoms  How can SCC be prevented? The most important way to prevent SCC is to avoid sunburn  This is especially important in childhood and early life  Fair skinned individuals and those with a personal or family history of BCC should protect their skin from sun exposure daily, year-round and lifelong   Stay indoors or under the shade in the middle of the day    Wear covering clothing    Apply high protection factor SPF50+ broad-spectrum sunscreens generously to exposed skin if outdoors    Avoid indoor tanning (sun beds, solaria)      What is the outlook for SCC? Most SCC are cured by treatment  Cure is most likely if treatment is undertaken when the lesion is small  A small percent of SCC's can spread to lymph  nodes and long term monitoring is indicated  They are also at increased risk of other skin cancers, especially melanoma  Regular self-skin examinations and long-term annual skin checks by an experienced health professional are recommended  ACTINIC KERATOSIS    Physical Exam:   Anatomic Location Affected:  Bridge of nose   Morphological Description:  Scaly pink papules    Additional History of Present Condition:      Assessment and Plan:  Based on a thorough discussion of this condition and the management approach to it (including a comprehensive discussion of the known risks, side effects and potential benefits of treatment), the patient (family) agrees to implement the following specific plan:     Cryotherapy done in the office today, consent signed  Actinic keratoses are very common on sites repeatedly exposed to the sun, especially the backs of the hands and the face, most often affecting the ears, nose, cheeks, upper lip, vermilion of the lower lip, temples, forehead and balding scalp  In severely chronically sun-damaged individuals, they may also be found on the upper trunk, upper and lower limbs, and dorsum of feet      We discussed the theoretical premalignant (pre-cancerous) nature and etiology of these growths  We discussed the prevailing notion that actinic keratoses are a reflection of abnormal skin cell development due to DNA damage by short wavelength UVB  They are more likely to appear if the immune function is poor, due to aging, recent sun exposure, predisposing disease or certain drugs  We discussed that the main concern is that actinic keratoses may predispose to squamous cell carcinoma  It is rare for a solitary actinic keratosis to evolve to squamous cell carcinoma (SCC), but the risk of SCC occurring at some stage in a patient with more than 10 actinic keratoses is thought to be about 10 to 15%  A tender, thickened, ulcerated or enlarging actinic keratosis is suspicious of SCC  Actinic keratoses may be prevented by strict sun protection  If already present, keratoses may improve with a very high sun protection factor (50+) broad-spectrum sunscreen applied at least daily to affected areas, year-round  We recommend that UPF-rated clothing and hats and sunglasses be worn whenever possible and that a sunscreen-moisturizer combination product such as Neutrogena Daily Defense be applied at least three times a day  We performed a thorough discussion of treatment options and specific risk/benefits/alternatives including but not limited to medical field treatment with medications such as the following:     Topical field area medications such as 5-fluorouracil or Aldara (specifically, the trouble with long-term compliance, blistering and local skin reaction versus the convenience of at-home therapy and that field therapy gets what is not yet seen)   Cryotherapy (specifically, local pain, scarring, dyspigmentation, blistering, possible superinfection, and treats only what we see versus directed treatment today)       Photodynamic therapy (specifically, local pain, scarring, dyspigmentation, blistering, possible superinfection, need to schedule for a later date, and time spent in the office versus field therapy that gets what is not yet seen)  PROCEDURE:  DESTRUCTION OF PRE-MALIGNANT LESIONS  After a thorough discussion of treatment options and risk/benefits/alternatives (including but not limited to local pain, scarring, dyspigmentation, blistering, and possible superinfection), verbal and written consent were obtained and the aforementioned lesions were treated on with cryotherapy using liquid nitrogen x 1 cycle for 5-10 seconds   TOTAL NUMBER of 1 pre-malignant lesions were treated today on the ANATOMIC LOCATION: bridge of nose  The patient tolerated the procedure well, and after-care instructions were provided  NEOPLASM OF UNCERTAIN BEHAVIOR OF SKIN    Physical Exam:   (Anatomic Location); (Size and Morphological Description); (Differential Diagnosis):  o A; Right forearm; skin; shave biopsy; 80year old female with 0 7 X 1 cm pink macule; Diff DX: BCC, evaluate margins  o B; Right thigh; skin; shave biopsy, 80year old female with 0 9 X 0 5 cm pink papule; Diff Dx: BCC, evaluate margins   Pertinent Positives:   Pertinent Negatives: Additional History of Present Condition:      Assessment and Plan:   I have discussed with the patient that a sample of skin via a "skin biopsy would be potentially helpful to further make a specific diagnosis under the microscope   Based on a thorough discussion of this condition and the management approach to it (including a comprehensive discussion of the known risks, side effects and potential benefits of treatment), the patient (family) agrees to implement the following specific plan:    o Procedure:  Skin Biopsy    After a thorough discussion of treatment options and risk/benefits/alternatives (including but not limited to local pain, scarring, dyspigmentation, blistering, possible superinfection, and inability to confirm a diagnosis via histopathology), verbal and written consent were obtained and portion of the rash was biopsied for tissue sample  See below for consent that was obtained from patient and subsequent Procedure Note  NEOPLASM OF UNCERTAIN BEHAVIOR OF SKIN    Physical Exam:   (Anatomic Location); (Size and Morphological Description); (Differential Diagnosis):  o A; Right forearm; skin; shave biopsy; 80year old female with 0 7 X 1 cm pink macule; Diff DX: BCC, evaluate margins  o B; Right thigh; skin; shave biopsy, 80year old female with 0 9 X 0 5 cm pink papule; Diff Dx: BCC, evaluate margins   Pertinent Positives:   Pertinent Negatives: Additional History of Present Condition:      Assessment and Plan:   I have discussed with the patient that a sample of skin via a "skin biopsy would be potentially helpful to further make a specific diagnosis under the microscope   Based on a thorough discussion of this condition and the management approach to it (including a comprehensive discussion of the known risks, side effects and potential benefits of treatment), the patient (family) agrees to implement the following specific plan:  o A; Right forearm; skin; shave biopsy; 80year old female with 0 7 X 1 cm pink macule; Diff DX: BCC, evaluate margins  o B; Right thigh; skin; shave biopsy, 80year old female with 0 9 X 0 5 cm pink papule; Diff Dx: BCC, evaluate margins    o    Post-op diagnosis: Same      Local anesthesia: 1% xylocaine with epi       Topical anesthesia: None     Hemostasis: Aluminum chloride       After obtaining informed consent  at which time there was a discussion about the purpose of biopsy  and low risks of infection and bleeding  The area was prepped and draped in the usual fashion  Anesthesia was obtained with 1% lidocaine with epinephrine  A shave biopsy to an appropriate sampling depth was obtained with a sterile blade (such as a 15-blade or DermaBlade)  The resulting wound was covered with surgical ointment and bandaged appropriately  The patient tolerated the procedure well without complications and was without signs of functional compromise  Specimen has been sent for review by Dermatopathology  Standard post-procedure care has been explained and has been included in written form within the patient's copy of Informed Consent  INFORMED CONSENT DISCUSSION AND POST-OPERATIVE INSTRUCTIONS FOR PATIENT    I   RATIONALE FOR PROCEDURE  I understand that a skin biopsy allows the Dermatologist to test a lesion or rash under the microscope to obtain a diagnosis  It usually involves numbing the area with numbing medication and removing a small piece of skin; sometimes the area will be closed with sutures  In this specific procedure, sutures are not usually needed  If any sutures are placed, then they are usually need to be removed in 2 weeks or less  I understand that my Dermatologist recommends that a skin "shave" biopsy be performed today  A local anesthetic, similar to the kind that a dentist uses when filling a cavity, will be injected with a very small needle into the skin area to be sampled  The injected skin and tissue underneath "will go to sleep and become numb so no pain should be felt afterwards  An instrument shaped like a tiny "razor blade" (shave biopsy instrument) will be used to cut a small piece of tissue and skin from the area so that a sample of tissue can be taken and examined more closely under the microscope  A slight amount of bleeding will occur, but it will be stopped with direct pressure and a pressure bandage and any other appropriate methods  I understands that a scar will form where the wound was created  Surgical ointment will be applied to help protect the wound  Sutures are not usually needed      II   RISKS AND POTENTIAL COMPLICATIONS   I understand the risks and potential complications of a skin biopsy include but are not limited to the following:   Bleeding   Infection   Pain   Scar/keloid   Skin discoloration   Incomplete Removal   Recurrence   Nerve Damage/Numbness/Loss of Function   Allergic Reaction to Anesthesia   Biopsies are diagnostic procedures and based on findings additional treatment or evaluation may be required   Loss or destruction of specimen resulting in no additional findings    My Dermatologist has explained to me the nature of the condition, the nature of the procedure, and the benefits to be reasonably expected compared with alternative approaches  My Dermatologist has discussed the likelihood of major risks or complications of this procedure including the specific risks listed above, such as bleeding, infection, and scarring/keloid  I understand that a scar is expected after this procedure  I understand that my physician cannot predict if the scar will form a "keloid," which extends beyond the borders of the wound that is created  A keloid is a thick, painful, and bumpy scar  A keloid can be difficult to treat, as it does not always respond well to therapy, which includes injecting cortisone directly into the keloid every few weeks  While this usually reduces the pain and size of the scar, it does not eliminate it  I understand that photographs may be taken before and after the procedure  These will be maintained as part of the medical providers confidential records and may not be made available to me  I further authorize the medical provider to use the photographs for teaching purposes or to illustrate scientific papers, books, or lectures if in his/her judgment, medical research, education, or science may benefit from its use  I have had an opportunity to fully inquire about the risks and benefits of this procedure and its alternatives  I have been given ample time and opportunity to ask questions and to seek a second opinion if I wished to do so    I acknowledge that there have specifically been no guarantees as to the cosmetic results from the procedure  I am aware that with any procedure there is always the possibility of an unexpected complication  III  POST-PROCEDURAL CARE (WHAT YOU WILL NEED TO DO "AFTER THE BIOPSY" TO OPTIMIZE HEALING)     Keep the area clean and dry  Try NOT to remove the bandage or get it wet for the first 24 hours   Gently clean the area and apply surgical ointment (such as Vaseline petrolatum ointment, which is available "over the counter" and not a prescription) to the biopsy site for up to 2 weeks straight  This acts to protect the wound from the outside world   Sutures are not usually placed in this procedure  If any sutures were placed, return for suture removal as instructed (generally 1 week for the face, 2 weeks for the body)   Take Acetaminophen (Tylenol) for discomfort, if no contraindications  Ibuprofen or aspirin could make bleeding worse   Call our office immediately for signs of infection: fever, chills, increased redness, warmth, tenderness, discomfort/pain, or pus or foul smell coming from the wound  WHAT TO DO IF THERE IS ANY BLEEDING? If a small amount of bleeding is noticed, place a clean cloth over the area and apply firm pressure for ten minutes  Check the wound after 10 minutes of direct pressure  If bleeding persists, try one more time for an additional 10 minutes of direct pressure on the area  If the bleeding becomes heavier or does not stop after the second attempt, or if you have any other questions about this procedure, then please call your [facility] Dermatologist by calling [phone] ([phone])  I hereby acknowledge that I have reviewed and verified the site with my Dermatologist and have requested and authorized my Dermatologist to proceed with the procedure          Scribe Attestation    I,:  Brigid Marlow am acting as a scribe while in the presence of the attending physician :       I,:  Margy Amor MD personally performed the services described in this documentation    as scribed in my presence :

## 2021-08-18 NOTE — PATIENT INSTRUCTIONS
MELANOCYTIC NEVI ("Moles")    Assessment and Plan:  Based on a thorough discussion of this condition and the management approach to it (including a comprehensive discussion of the known risks, side effects and potential benefits of treatment), the patient (family) agrees to implement the following specific plan:   When outside we recommend using a wide brim hat, sunglasses, long sleeve and pants, sunscreen with SPF 38+ with reapplication every 2 hours, or SPF specific clothing    Benign, reassured   Annual skin check     Melanocytic Nevi  Melanocytic nevi ("moles") are tan or brown, raised or flat areas of the skin which have an increased number of melanocytes  Melanocytes are the cells in our body which make pigment and account for skin color  Some moles are present at birth (I e , "congenital nevi"), while others come up later in life (i e , "acquired nevi")  The sun can stimulate the body to make more moles  Sunburns are not the only thing that triggers more moles  Chronic sun exposure can do it too  Clinically distinguishing a healthy mole from melanoma may be difficult, even for experienced dermatologists  The "ABCDE's" of moles have been suggested as a means of helping to alert a person to a suspicious mole and the possible increased risk of melanoma  The suggestions for raising alert are as follows:    Asymmetry: Healthy moles tend to be symmetric, while melanomas are often asymmetric  Asymmetry means if you draw a line through the mole, the two halves do not match in color, size, shape, or surface texture  Asymmetry can be a result of rapid enlargement of a mole, the development of a raised area on a previously flat lesion, scaling, ulceration, bleeding or scabbing within the mole  Any mole that starts to demonstrate "asymmetry" should be examined promptly by a board certified dermatologist      Border: Healthy moles tend to have discrete, even borders    The border of a melanoma often blends into the normal skin and does not sharply delineate the mole from normal skin  Any mole that starts to demonstrate "uneven borders" should be examined promptly by a board certified dermatologist      Color: Healthy moles tend to be one color throughout  Melanomas tend to be made up of different colors ranging from dark black, blue, white, or red  Any mole that demonstrates a color change should be examined promptly by a board certified dermatologist      Diameter: Healthy moles tend to be smaller than 0 6 cm in size; an exception are "congenital nevi" that can be larger  Melanomas tend to grow and can often be greater than 0 6 cm (1/4 of an inch, or the size of a pencil eraser)  This is only a guideline, and many normal moles may be larger than 0 6 cm without being unhealthy  Any mole that starts to change in size (small to bigger or bigger to smaller) should be examined promptly by a board certified dermatologist      Evolving: Healthy moles tend to "stay the same "  Melanomas may often show signs of change or evolution such as a change in size, shape, color, or elevation  Any mole that starts to itch, bleed, crust, burn, hurt, or ulcerate or demonstrate a change or evolution should be examined promptly by a board certified dermatologist         Jacinta Manner and Plan:  Based on a thorough discussion of this condition and the management approach to it (including a comprehensive discussion of the known risks, side effects and potential benefits of treatment), the patient (family) agrees to implement the following specific plan:   When outside we recommend using a wide brim hat, sunglasses, long sleeve and pants, sunscreen with SPF 74+ with reapplication every 2 hours, or SPF specific clothing       What is a lentigo? A lentigo is a pigmented flat or slightly raised lesion with a clearly defined edge  Unlike an ephelis (freckle), it does not fade in the winter months   There are several kinds of lentigo  The name lentigo originally referred to its appearance resembling a small lentil  The plural of lentigo is lentigines, although lentigos is also in common use  Who gets lentigines? Lentigines can affect males and females of all ages and races  Solar lentigines are especially prevalent in fair skinned adults  Lentigines associated with syndromes are present at birth or arise during childhood  What causes lentigines? Common forms of lentigo are due to exposure to ultraviolet radiation:   Sun damage including sunburn    Indoor tanning    Phototherapy, especially photochemotherapy (PUVA)    Ionizing radiation, eg radiation therapy, can also cause lentigines  Several familial syndromes associated with widespread lentigines originate from mutations in Rommel-MAP kinase, mTOR signaling and PTEN pathways  What is the treatment for lentigines? Most lentigines are left alone  Attempts to lighten them may not be successful  The following approaches are used:   SPF 50+ broad-spectrum sunscreen    Hydroquinone bleaching cream    Alpha hydroxy acids    Vitamin C    Retinoids    Azelaic acid    Chemical peels  Individual lesions can be permanently removed using:   Cryotherapy    Intense pulsed light    Pigment lasers    How can lentigines be prevented? Lentigines associated with exposure ultraviolet radiation can be prevented by very careful sun protection  Clothing is more successful at preventing new lentigines than are sunscreens  What is the outlook for lentigines? Lentigines usually persist  They may increase in number with age and sun exposure  Some in sun-protected sites may fade and disappear      DOUGLAS ANGIOMAS  Assessment and Plan:  Based on a thorough discussion of this condition and the management approach to it (including a comprehensive discussion of the known risks, side effects and potential benefits of treatment), the patient (family) agrees to implement the following specific plan:   Monitor for changes   Benign, reassured       Assessment and Plan:    Cherry angioma, also known as [employer] spots, are benign vascular skin lesions  A "cherry angioma" is a firm red, blue or purple papule, 0 1-1 cm in diameter  When thrombosed, they can appear black in colour until evaluated with a dermatoscope when the red or purple colour is more easily seen  Cherry angioma may develop on any part of the body but most often appear on the scalp, face, lips and trunk  An angioma is due to proliferating endothelial cells; these are the cells that line the inside of a blood vessel  Angiomas can arise in early life or later in life; the most common type of angioma is a cherry angioma  Cherry angiomas are very common in males and females of any age or race  They are more noticeable in white skin than in skin of colour  They markedly increase in number from about the age of 36  There may be a family history of similar lesions  Eruptive cherry angiomas have been rarely reported to be associated with internal malignancy  The cause of angiomas is unknown  Genetic analysis of cherry angiomas has shown that they frequently carry specific somatic missense mutations in the GNAQ and GNA11 (Q209H) genes, which are involved in other vascular and melanocytic proliferations  SEBORRHEIC KERATOSIS; NON-INFLAMED  Assessment and Plan:  Based on a thorough discussion of this condition and the management approach to it (including a comprehensive discussion of the known risks, side effects and potential benefits of treatment), the patient (family) agrees to implement the following specific plan:   Monitor for changes   Benign, reassured       Seborrheic Keratosis  A seborrheic keratosis is a harmless warty spot that appears during adult life as a common sign of skin aging  Seborrheic keratoses can arise on any area of skin, covered or uncovered, with the usual exception of the palms and soles  They do not arise from mucous membranes  Seborrheic keratoses can have highly variable appearance  Seborrheic keratoses are extremely common  It has been estimated that over 90% of adults over the age of 61 years have one or more of them  They occur in males and females of all races, typically beginning to erupt in the 35s or 45s  They are uncommon under the age of 21 years  The precise cause of seborrhoeic keratoses is not known  Seborrhoeic keratoses are considered degenerative in nature  As time goes by, seborrheic keratoses tend to become more numerous  Some people inherit a tendency to develop a very large number of them; some people may have hundreds of them  There is no easy way to remove multiple lesions on a single occasion  Unless a specific lesion is "inflamed" and is causing pain or stinging/burning or is bleeding, most insurance companies do not authorize treatment  XEROSIS ("DRY SKIN")    Assessment and Plan:  Based on a thorough discussion of this condition and the management approach to it (including a comprehensive discussion of the known risks, side effects and potential benefits of treatment), the patient (family) agrees to implement the following specific plan:   Use moisturizer like Eucerin,Cerave or Aveeno Cream 3 times a day for the dry skin               Dry skin refers to skin that feels dry to touch  Dry skin has a dull surface with a rough, scaly quality  The skin is less pliable and cracked  When dryness is severe, the skin may become inflamed and fissured  Although any body site can be dry, dry skin tends to affect the shins more than any other site  Dry skin is lacking moisture in the outer horny cell layer (stratum corneum) and this results in cracks in the skin surface  Dry skin is also called xerosis, xeroderma or asteatosis (lack of fat)  It can affect males and females of all ages   There is some racial variability in water and lipid content of the skin    Dry skin that starts in early childhood may be one of about 20 types of ichthyosis (fish-scale skin)  There is often a family history of dry skin   Dry skin is commonly seen in people with atopic dermatitis   Nearly everyone > 60 years has dry skin  Dry skin that begins later may be seen in people with certain diseases and conditions   Postmenopausal women   Hypothyroidism   Chronic renal disease    Malnutrition and weight loss    Subclinical dermatitis    Treatment with certain drugs such as oral retinoids, diuretics and epidermal growth factor receptor inhibitors      What is the treatment for dry skin? The mainstay of treatment of dry skin and ichthyosis is moisturisers/emollients  They should be applied liberally and often enough to:   Reduce itch    Improve the barrier function    Prevent entry of irritants, bacteria    Reduce transepidermal water loss  How can dry skin be prevented? Eliminate aggravating factors:   Reduce the frequency of bathing   A humidifier in winter and air conditioner in summer    Compare having a short shower with a prolonged soak in a bath   Use lukewarm, not hot, water   Replace standard soap with a substitute such as a synthetic detergent cleanser, water-miscible emollient, bath oil, anti-pruritic tar oil, colloidal oatmeal etc     Apply an emollient liberally and often, particularly shortly after bathing, and when itchy  The drier the skin, the thicker this should be, especially on the hands  What is the outlook for dry skin? A tendency to dry skin may persist life-long, or it may improve once contributing factors are controlled      HISTORY OF SQUAMOUS CELL CARCINOMA     Assessment and Plan:  Based on a thorough discussion of this condition and the management approach to it (including a comprehensive discussion of the known risks, side effects and potential benefits of treatment), the patient (family) agrees to implement the following specific plan:   Continue to monitor for worsening symptoms  How can SCC be prevented? The most important way to prevent SCC is to avoid sunburn  This is especially important in childhood and early life  Fair skinned individuals and those with a personal or family history of BCC should protect their skin from sun exposure daily, year-round and lifelong   Stay indoors or under the shade in the middle of the day    Wear covering clothing    Apply high protection factor SPF50+ broad-spectrum sunscreens generously to exposed skin if outdoors    Avoid indoor tanning (sun beds, solaria)      What is the outlook for SCC? Most SCC are cured by treatment  Cure is most likely if treatment is undertaken when the lesion is small  A small percent of SCC's can spread to lymph  nodes and long term monitoring is indicated  They are also at increased risk of other skin cancers, especially melanoma  Regular self-skin examinations and long-term annual skin checks by an experienced health professional are recommended  ACTINIC KERATOSIS    Assessment and Plan:  Based on a thorough discussion of this condition and the management approach to it (including a comprehensive discussion of the known risks, side effects and potential benefits of treatment), the patient (family) agrees to implement the following specific plan:     Cryotherapy done in the office today, consent signed  Actinic keratoses are very common on sites repeatedly exposed to the sun, especially the backs of the hands and the face, most often affecting the ears, nose, cheeks, upper lip, vermilion of the lower lip, temples, forehead and balding scalp  In severely chronically sun-damaged individuals, they may also be found on the upper trunk, upper and lower limbs, and dorsum of feet  We discussed the theoretical premalignant (pre-cancerous) nature and etiology of these growths    We discussed the prevailing notion that actinic keratoses are a reflection of abnormal skin cell development due to DNA damage by short wavelength UVB  They are more likely to appear if the immune function is poor, due to aging, recent sun exposure, predisposing disease or certain drugs  We discussed that the main concern is that actinic keratoses may predispose to squamous cell carcinoma  It is rare for a solitary actinic keratosis to evolve to squamous cell carcinoma (SCC), but the risk of SCC occurring at some stage in a patient with more than 10 actinic keratoses is thought to be about 10 to 15%  A tender, thickened, ulcerated or enlarging actinic keratosis is suspicious of SCC  Actinic keratoses may be prevented by strict sun protection  If already present, keratoses may improve with a very high sun protection factor (50+) broad-spectrum sunscreen applied at least daily to affected areas, year-round  We recommend that UPF-rated clothing and hats and sunglasses be worn whenever possible and that a sunscreen-moisturizer combination product such as Neutrogena Daily Defense be applied at least three times a day  We performed a thorough discussion of treatment options and specific risk/benefits/alternatives including but not limited to medical field treatment with medications such as the following:     Topical field area medications such as 5-fluorouracil or Aldara (specifically, the trouble with long-term compliance, blistering and local skin reaction versus the convenience of at-home therapy and that field therapy gets what is not yet seen)   Cryotherapy (specifically, local pain, scarring, dyspigmentation, blistering, possible superinfection, and treats only what we see versus directed treatment today)       Photodynamic therapy (specifically, local pain, scarring, dyspigmentation, blistering, possible superinfection, need to schedule for a later date, and time spent in the office versus field therapy that gets what is not yet seen)  PROCEDURE:  DESTRUCTION OF PRE-MALIGNANT LESIONS  After a thorough discussion of treatment options and risk/benefits/alternatives (including but not limited to local pain, scarring, dyspigmentation, blistering, and possible superinfection), verbal and written consent were obtained and the aforementioned lesions were treated on with cryotherapy using liquid nitrogen x 1 cycle for 5-10 seconds  The patient tolerated the procedure well, and after-care instructions were provided  NEOPLASM OF UNCERTAIN BEHAVIOR OF SKIN    Additional History of Present Condition:      Assessment and Plan:   I have discussed with the patient that a sample of skin via a "skin biopsy would be potentially helpful to further make a specific diagnosis under the microscope   Based on a thorough discussion of this condition and the management approach to it (including a comprehensive discussion of the known risks, side effects and potential benefits of treatment), the patient (family) agrees to implement the following specific plan:    o Procedure:  Skin Biopsy  After a thorough discussion of treatment options and risk/benefits/alternatives (including but not limited to local pain, scarring, dyspigmentation, blistering, possible superinfection, and inability to confirm a diagnosis via histopathology), verbal and written consent were obtained and portion of the rash was biopsied for tissue sample  See below for consent that was obtained from patient and subsequent Procedure Note    PROCEDURE SHAVE BIOPSY NOTE:     Performing Physician: Carla Orourke    Anatomic Location; Clinical Description with size (cm); Pre-Op Diagnosis:   MELANOCYTIC NEVI ("Moles")    Physical Exam:   Anatomic Location Affected:   Mostly on sun-exposed areas of the trunk and extremities   Morphological Description:  Scattered, 1-4mm round to ovoid, symmetrical-appearing, even bordered, skin colored to dark brown macules/papules, mostly in sun-exposed areas   Pertinent Positives:   Pertinent Negatives: Additional History of Present Condition:      Assessment and Plan:  Based on a thorough discussion of this condition and the management approach to it (including a comprehensive discussion of the known risks, side effects and potential benefits of treatment), the patient (family) agrees to implement the following specific plan:   When outside we recommend using a wide brim hat, sunglasses, long sleeve and pants, sunscreen with SPF 85+ with reapplication every 2 hours, or SPF specific clothing    Benign, reassured   Annual skin check     Melanocytic Nevi  Melanocytic nevi ("moles") are tan or brown, raised or flat areas of the skin which have an increased number of melanocytes  Melanocytes are the cells in our body which make pigment and account for skin color  Some moles are present at birth (I e , "congenital nevi"), while others come up later in life (i e , "acquired nevi")  The sun can stimulate the body to make more moles  Sunburns are not the only thing that triggers more moles  Chronic sun exposure can do it too  Clinically distinguishing a healthy mole from melanoma may be difficult, even for experienced dermatologists  The "ABCDE's" of moles have been suggested as a means of helping to alert a person to a suspicious mole and the possible increased risk of melanoma  The suggestions for raising alert are as follows:    Asymmetry: Healthy moles tend to be symmetric, while melanomas are often asymmetric  Asymmetry means if you draw a line through the mole, the two halves do not match in color, size, shape, or surface texture  Asymmetry can be a result of rapid enlargement of a mole, the development of a raised area on a previously flat lesion, scaling, ulceration, bleeding or scabbing within the mole    Any mole that starts to demonstrate "asymmetry" should be examined promptly by a board certified dermatologist      Border: Healthy moles tend to have discrete, even borders  The border of a melanoma often blends into the normal skin and does not sharply delineate the mole from normal skin  Any mole that starts to demonstrate "uneven borders" should be examined promptly by a board certified dermatologist      Color: Healthy moles tend to be one color throughout  Melanomas tend to be made up of different colors ranging from dark black, blue, white, or red  Any mole that demonstrates a color change should be examined promptly by a board certified dermatologist      Diameter: Healthy moles tend to be smaller than 0 6 cm in size; an exception are "congenital nevi" that can be larger  Melanomas tend to grow and can often be greater than 0 6 cm (1/4 of an inch, or the size of a pencil eraser)  This is only a guideline, and many normal moles may be larger than 0 6 cm without being unhealthy  Any mole that starts to change in size (small to bigger or bigger to smaller) should be examined promptly by a board certified dermatologist      Evolving: Healthy moles tend to "stay the same "  Melanomas may often show signs of change or evolution such as a change in size, shape, color, or elevation  Any mole that starts to itch, bleed, crust, burn, hurt, or ulcerate or demonstrate a change or evolution should be examined promptly by a board certified dermatologist         LENTIGO    Physical Exam:   Anatomic Location Affected:  Trunk, extremities   Morphological Description:  Light brown macules   Pertinent Positives:   Pertinent Negatives:     Additional History of Present Condition:      Assessment and Plan:  Based on a thorough discussion of this condition and the management approach to it (including a comprehensive discussion of the known risks, side effects and potential benefits of treatment), the patient (family) agrees to implement the following specific plan:   When outside we recommend using a wide brim hat, sunglasses, long sleeve and pants, sunscreen with SPF 54+ with reapplication every 2 hours, or SPF specific clothing       What is a lentigo? A lentigo is a pigmented flat or slightly raised lesion with a clearly defined edge  Unlike an ephelis (freckle), it does not fade in the winter months  There are several kinds of lentigo  The name lentigo originally referred to its appearance resembling a small lentil  The plural of lentigo is lentigines, although lentigos is also in common use  Who gets lentigines? Lentigines can affect males and females of all ages and races  Solar lentigines are especially prevalent in fair skinned adults  Lentigines associated with syndromes are present at birth or arise during childhood  What causes lentigines? Common forms of lentigo are due to exposure to ultraviolet radiation:   Sun damage including sunburn    Indoor tanning    Phototherapy, especially photochemotherapy (PUVA)    Ionizing radiation, eg radiation therapy, can also cause lentigines  Several familial syndromes associated with widespread lentigines originate from mutations in Rommel-MAP kinase, mTOR signaling and PTEN pathways  What is the treatment for lentigines? Most lentigines are left alone  Attempts to lighten them may not be successful  The following approaches are used:   SPF 50+ broad-spectrum sunscreen    Hydroquinone bleaching cream    Alpha hydroxy acids    Vitamin C    Retinoids    Azelaic acid    Chemical peels  Individual lesions can be permanently removed using:   Cryotherapy    Intense pulsed light    Pigment lasers    How can lentigines be prevented? Lentigines associated with exposure ultraviolet radiation can be prevented by very careful sun protection  Clothing is more successful at preventing new lentigines than are sunscreens  What is the outlook for lentigines? Lentigines usually persist  They may increase in number with age and sun exposure   Some in sun-protected sites may fade and disappear  DOUGLAS ANGIOMAS    Physical Exam:   Anatomic Location Affected:  trunk   Morphological Description:  Scattered cherry red, 1-4 mm papules   Pertinent Positives:   Pertinent Negatives: Additional History of Present Condition:      Assessment and Plan:  Based on a thorough discussion of this condition and the management approach to it (including a comprehensive discussion of the known risks, side effects and potential benefits of treatment), the patient (family) agrees to implement the following specific plan:   Monitor for changes   Benign, reassured       Assessment and Plan:    Cherry angioma, also known as [employer] spots, are benign vascular skin lesions  A "cherry angioma" is a firm red, blue or purple papule, 0 1-1 cm in diameter  When thrombosed, they can appear black in colour until evaluated with a dermatoscope when the red or purple colour is more easily seen  Cherry angioma may develop on any part of the body but most often appear on the scalp, face, lips and trunk  An angioma is due to proliferating endothelial cells; these are the cells that line the inside of a blood vessel  Angiomas can arise in early life or later in life; the most common type of angioma is a cherry angioma  Cherry angiomas are very common in males and females of any age or race  They are more noticeable in white skin than in skin of colour  They markedly increase in number from about the age of 36  There may be a family history of similar lesions  Eruptive cherry angiomas have been rarely reported to be associated with internal malignancy  The cause of angiomas is unknown  Genetic analysis of cherry angiomas has shown that they frequently carry specific somatic missense mutations in the GNAQ and GNA11 (Q209H) genes, which are involved in other vascular and melanocytic proliferations        SEBORRHEIC KERATOSIS; NON-INFLAMED    Physical Exam:   Anatomic Location Affected:  trunk   Morphological Description:  Flat and raised, waxy, smooth to warty textured, yellow to brownish-grey to dark brown to blackish, discrete, "stuck-on" appearing papules   Pertinent Positives:   Pertinent Negatives: Additional History of Present Condition:      Assessment and Plan:  Based on a thorough discussion of this condition and the management approach to it (including a comprehensive discussion of the known risks, side effects and potential benefits of treatment), the patient (family) agrees to implement the following specific plan:   Monitor for changes   Benign, reassured       Seborrheic Keratosis  A seborrheic keratosis is a harmless warty spot that appears during adult life as a common sign of skin aging  Seborrheic keratoses can arise on any area of skin, covered or uncovered, with the usual exception of the palms and soles  They do not arise from mucous membranes  Seborrheic keratoses can have highly variable appearance  Seborrheic keratoses are extremely common  It has been estimated that over 90% of adults over the age of 61 years have one or more of them  They occur in males and females of all races, typically beginning to erupt in the 35s or 45s  They are uncommon under the age of 21 years  The precise cause of seborrhoeic keratoses is not known  Seborrhoeic keratoses are considered degenerative in nature  As time goes by, seborrheic keratoses tend to become more numerous  Some people inherit a tendency to develop a very large number of them; some people may have hundreds of them  There is no easy way to remove multiple lesions on a single occasion  Unless a specific lesion is "inflamed" and is causing pain or stinging/burning or is bleeding, most insurance companies do not authorize treatment  XEROSIS ("DRY SKIN")    Physical Exam:   Anatomic Location Affected:  diffuse   Morphological Description:  xerosis   Pertinent Positives:   Pertinent Negatives:     Additional History of Present Condition:      Assessment and Plan:  Based on a thorough discussion of this condition and the management approach to it (including a comprehensive discussion of the known risks, side effects and potential benefits of treatment), the patient (family) agrees to implement the following specific plan:   Use moisturizer like Eucerin,Cerave or Aveeno Cream 3 times a day for the dry skin               Dry skin refers to skin that feels dry to touch  Dry skin has a dull surface with a rough, scaly quality  The skin is less pliable and cracked  When dryness is severe, the skin may become inflamed and fissured  Although any body site can be dry, dry skin tends to affect the shins more than any other site  Dry skin is lacking moisture in the outer horny cell layer (stratum corneum) and this results in cracks in the skin surface  Dry skin is also called xerosis, xeroderma or asteatosis (lack of fat)  It can affect males and females of all ages  There is some racial variability in water and lipid content of the skin   Dry skin that starts in early childhood may be one of about 20 types of ichthyosis (fish-scale skin)  There is often a family history of dry skin   Dry skin is commonly seen in people with atopic dermatitis   Nearly everyone > 60 years has dry skin  Dry skin that begins later may be seen in people with certain diseases and conditions   Postmenopausal women   Hypothyroidism   Chronic renal disease    Malnutrition and weight loss    Subclinical dermatitis    Treatment with certain drugs such as oral retinoids, diuretics and epidermal growth factor receptor inhibitors      What is the treatment for dry skin? The mainstay of treatment of dry skin and ichthyosis is moisturisers/emollients  They should be applied liberally and often enough to:   Reduce itch    Improve the barrier function    Prevent entry of irritants, bacteria    Reduce transepidermal water loss        How can dry skin be prevented? Eliminate aggravating factors:   Reduce the frequency of bathing   A humidifier in winter and air conditioner in summer    Compare having a short shower with a prolonged soak in a bath   Use lukewarm, not hot, water   Replace standard soap with a substitute such as a synthetic detergent cleanser, water-miscible emollient, bath oil, anti-pruritic tar oil, colloidal oatmeal etc     Apply an emollient liberally and often, particularly shortly after bathing, and when itchy  The drier the skin, the thicker this should be, especially on the hands  What is the outlook for dry skin? A tendency to dry skin may persist life-long, or it may improve once contributing factors are controlled  HISTORY OF SQUAMOUS CELL CARCINOMA     Physical Exam:   Anatomic Location Affected:  Right ear crease   Morphological Description of Scar:  Continue to monitor, hasn't changed since last picture    Suspected Recurrence: no   Regional adenopathy: no    Additional History of Present Condition:  Pathology form biopsy showed SCC  Patient and daughter decided to just monitor  Assessment and Plan:  Based on a thorough discussion of this condition and the management approach to it (including a comprehensive discussion of the known risks, side effects and potential benefits of treatment), the patient (family) agrees to implement the following specific plan:   Continue to monitor for worsening symptoms  How can SCC be prevented? The most important way to prevent SCC is to avoid sunburn  This is especially important in childhood and early life  Fair skinned individuals and those with a personal or family history of BCC should protect their skin from sun exposure daily, year-round and lifelong     Stay indoors or under the shade in the middle of the day    Wear covering clothing    Apply high protection factor SPF50+ broad-spectrum sunscreens generously to exposed skin if outdoors    Avoid indoor tanning (sun beds, solaria)      What is the outlook for SCC? Most SCC are cured by treatment  Cure is most likely if treatment is undertaken when the lesion is small  A small percent of SCC's can spread to lymph  nodes and long term monitoring is indicated  They are also at increased risk of other skin cancers, especially melanoma  Regular self-skin examinations and long-term annual skin checks by an experienced health professional are recommended  ACTINIC KERATOSIS    Physical Exam:   Anatomic Location Affected:  Bridge of nose   Morphological Description:  Scaly pink papules    Additional History of Present Condition:      Assessment and Plan:  Based on a thorough discussion of this condition and the management approach to it (including a comprehensive discussion of the known risks, side effects and potential benefits of treatment), the patient (family) agrees to implement the following specific plan:     Cryotherapy done in the office today, consent signed  Actinic keratoses are very common on sites repeatedly exposed to the sun, especially the backs of the hands and the face, most often affecting the ears, nose, cheeks, upper lip, vermilion of the lower lip, temples, forehead and balding scalp  In severely chronically sun-damaged individuals, they may also be found on the upper trunk, upper and lower limbs, and dorsum of feet  We discussed the theoretical premalignant (pre-cancerous) nature and etiology of these growths  We discussed the prevailing notion that actinic keratoses are a reflection of abnormal skin cell development due to DNA damage by short wavelength UVB  They are more likely to appear if the immune function is poor, due to aging, recent sun exposure, predisposing disease or certain drugs  We discussed that the main concern is that actinic keratoses may predispose to squamous cell carcinoma   It is rare for a solitary actinic keratosis to evolve to squamous cell carcinoma (SCC), but the risk of SCC occurring at some stage in a patient with more than 10 actinic keratoses is thought to be about 10 to 15%  A tender, thickened, ulcerated or enlarging actinic keratosis is suspicious of SCC  Actinic keratoses may be prevented by strict sun protection  If already present, keratoses may improve with a very high sun protection factor (50+) broad-spectrum sunscreen applied at least daily to affected areas, year-round  We recommend that UPF-rated clothing and hats and sunglasses be worn whenever possible and that a sunscreen-moisturizer combination product such as Neutrogena Daily Defense be applied at least three times a day  We performed a thorough discussion of treatment options and specific risk/benefits/alternatives including but not limited to medical field treatment with medications such as the following:     Topical field area medications such as 5-fluorouracil or Aldara (specifically, the trouble with long-term compliance, blistering and local skin reaction versus the convenience of at-home therapy and that field therapy gets what is not yet seen)   Cryotherapy (specifically, local pain, scarring, dyspigmentation, blistering, possible superinfection, and treats only what we see versus directed treatment today)   Photodynamic therapy (specifically, local pain, scarring, dyspigmentation, blistering, possible superinfection, need to schedule for a later date, and time spent in the office versus field therapy that gets what is not yet seen)  PROCEDURE:  DESTRUCTION OF PRE-MALIGNANT LESIONS  After a thorough discussion of treatment options and risk/benefits/alternatives (including but not limited to local pain, scarring, dyspigmentation, blistering, and possible superinfection), verbal and written consent were obtained and the aforementioned lesions were treated on with cryotherapy using liquid nitrogen x 1 cycle for 5-10 seconds       TOTAL NUMBER of 1 pre-malignant lesions were treated today on the ANATOMIC LOCATION: bridge of nose  The patient tolerated the procedure well, and after-care instructions were provided  NEOPLASM OF UNCERTAIN BEHAVIOR OF SKIN    Physical Exam:   (Anatomic Location); (Size and Morphological Description); (Differential Diagnosis):  o A; Right forearm; skin; shave biopsy; 80year old female with 0 7 X 1 cm pink macule; Diff DX: BCC, evaluate margins  o B; Right thigh; skin; shave biopsy, 80year old female with 0 9 X 0 5 cm pink papule; Diff Dx: BCC, evaluate margins   Pertinent Positives:   Pertinent Negatives: Additional History of Present Condition:      After obtaining informed consent  at which time there was a discussion about the purpose of biopsy  and low risks of infection and bleeding  The area was prepped and draped in the usual fashion  Anesthesia was obtained with 1% lidocaine with epinephrine  A shave biopsy to an appropriate sampling depth was obtained with a sterile blade (such as a 15-blade or DermaBlade)  The resulting wound was covered with surgical ointment and bandaged appropriately  The patient tolerated the procedure well without complications and was without signs of functional compromise  Specimen has been sent for review by Dermatopathology  Standard post-procedure care has been explained and has been included in written form within the patient's copy of Informed Consent  INFORMED CONSENT DISCUSSION AND POST-OPERATIVE INSTRUCTIONS FOR PATIENT    I   RATIONALE FOR PROCEDURE  I understand that a skin biopsy allows the Dermatologist to test a lesion or rash under the microscope to obtain a diagnosis  It usually involves numbing the area with numbing medication and removing a small piece of skin; sometimes the area will be closed with sutures  In this specific procedure, sutures are not usually needed  If any sutures are placed, then they are usually need to be removed in 2 weeks or less      I understand that my Dermatologist recommends that a skin "shave" biopsy be performed today  A local anesthetic, similar to the kind that a dentist uses when filling a cavity, will be injected with a very small needle into the skin area to be sampled  The injected skin and tissue underneath "will go to sleep and become numb so no pain should be felt afterwards  An instrument shaped like a tiny "razor blade" (shave biopsy instrument) will be used to cut a small piece of tissue and skin from the area so that a sample of tissue can be taken and examined more closely under the microscope  A slight amount of bleeding will occur, but it will be stopped with direct pressure and a pressure bandage and any other appropriate methods  I understands that a scar will form where the wound was created  Surgical ointment will be applied to help protect the wound  Sutures are not usually needed  II   RISKS AND POTENTIAL COMPLICATIONS   I understand the risks and potential complications of a skin biopsy include but are not limited to the following:   Bleeding   Infection   Pain   Scar/keloid   Skin discoloration   Incomplete Removal   Recurrence   Nerve Damage/Numbness/Loss of Function   Allergic Reaction to Anesthesia   Biopsies are diagnostic procedures and based on findings additional treatment or evaluation may be required   Loss or destruction of specimen resulting in no additional findings    My Dermatologist has explained to me the nature of the condition, the nature of the procedure, and the benefits to be reasonably expected compared with alternative approaches  My Dermatologist has discussed the likelihood of major risks or complications of this procedure including the specific risks listed above, such as bleeding, infection, and scarring/keloid  I understand that a scar is expected after this procedure    I understand that my physician cannot predict if the scar will form a "keloid," which extends beyond the borders of the wound that is created  A keloid is a thick, painful, and bumpy scar  A keloid can be difficult to treat, as it does not always respond well to therapy, which includes injecting cortisone directly into the keloid every few weeks  While this usually reduces the pain and size of the scar, it does not eliminate it  I understand that photographs may be taken before and after the procedure  These will be maintained as part of the medical providers confidential records and may not be made available to me  I further authorize the medical provider to use the photographs for teaching purposes or to illustrate scientific papers, books, or lectures if in his/her judgment, medical research, education, or science may benefit from its use  I have had an opportunity to fully inquire about the risks and benefits of this procedure and its alternatives  I have been given ample time and opportunity to ask questions and to seek a second opinion if I wished to do so  I acknowledge that there have specifically been no guarantees as to the cosmetic results from the procedure  I am aware that with any procedure there is always the possibility of an unexpected complication  III  POST-PROCEDURAL CARE (WHAT YOU WILL NEED TO DO "AFTER THE BIOPSY" TO OPTIMIZE HEALING)     Keep the area clean and dry  Try NOT to remove the bandage or get it wet for the first 24 hours   Gently clean the area and apply surgical ointment (such as Vaseline petrolatum ointment, which is available "over the counter" and not a prescription) to the biopsy site for up to 2 weeks straight  This acts to protect the wound from the outside world   Sutures are not usually placed in this procedure  If any sutures were placed, return for suture removal as instructed (generally 1 week for the face, 2 weeks for the body)   Take Acetaminophen (Tylenol) for discomfort, if no contraindications    Ibuprofen or aspirin could make bleeding worse   Call our office immediately for signs of infection: fever, chills, increased redness, warmth, tenderness, discomfort/pain, or pus or foul smell coming from the wound  WHAT TO DO IF THERE IS ANY BLEEDING? If a small amount of bleeding is noticed, place a clean cloth over the area and apply firm pressure for ten minutes  Check the wound after 10 minutes of direct pressure  If bleeding persists, try one more time for an additional 10 minutes of direct pressure on the area  If the bleeding becomes heavier or does not stop after the second attempt, or if you have any other questions about this procedure, then please call your [facility] by calling [phone] (SKIN)  I hereby acknowledge that I have reviewed and verified the site with my Dermatologist and have requested and authorized my Dermatologist to proceed with the procedure

## 2021-08-19 DIAGNOSIS — E78.49 OTHER HYPERLIPIDEMIA: ICD-10-CM

## 2021-08-19 RX ORDER — ATORVASTATIN CALCIUM 20 MG/1
TABLET, FILM COATED ORAL
Qty: 90 TABLET | Refills: 1 | Status: SHIPPED | OUTPATIENT
Start: 2021-08-19 | End: 2022-02-28

## 2021-08-30 NOTE — RESULT ENCOUNTER NOTE
DERMATOPATHOLOGY RESULT NOTE    Results reviewed by ordering physician  Called patient's daughter Vineet Bejarano to personally discuss results  No answer, left voicemail with result        Instructions for Clinical Derm Team:   (remember to route Result Note to appropriate staff):    None    Result & Plan by Specimen:    Specimen A: malignant  Plan: to be determined      Specimen B: malignant  Plan: monitor and margins clear

## 2021-10-19 ENCOUNTER — IMMUNIZATIONS (OUTPATIENT)
Dept: FAMILY MEDICINE CLINIC | Facility: HOSPITAL | Age: 86
End: 2021-10-19

## 2021-10-19 DIAGNOSIS — Z23 ENCOUNTER FOR IMMUNIZATION: Primary | ICD-10-CM

## 2021-10-19 PROCEDURE — 0001A SARS-COV-2 / COVID-19 MRNA VACCINE (PFIZER-BIONTECH) 30 MCG: CPT

## 2021-10-19 PROCEDURE — 91300 SARS-COV-2 / COVID-19 MRNA VACCINE (PFIZER-BIONTECH) 30 MCG: CPT

## 2021-12-06 ENCOUNTER — RA CDI HCC (OUTPATIENT)
Dept: OTHER | Facility: HOSPITAL | Age: 86
End: 2021-12-06

## 2021-12-21 ENCOUNTER — OFFICE VISIT (OUTPATIENT)
Dept: INTERNAL MEDICINE CLINIC | Facility: CLINIC | Age: 86
End: 2021-12-21
Payer: COMMERCIAL

## 2021-12-21 VITALS
DIASTOLIC BLOOD PRESSURE: 80 MMHG | HEIGHT: 65 IN | SYSTOLIC BLOOD PRESSURE: 138 MMHG | WEIGHT: 157.8 LBS | TEMPERATURE: 97.9 F | OXYGEN SATURATION: 98 % | HEART RATE: 65 BPM | BODY MASS INDEX: 26.29 KG/M2

## 2021-12-21 DIAGNOSIS — I25.10 CORONARY ARTERY DISEASE INVOLVING NATIVE CORONARY ARTERY OF NATIVE HEART WITHOUT ANGINA PECTORIS: ICD-10-CM

## 2021-12-21 DIAGNOSIS — Z95.2 S/P AVR (AORTIC VALVE REPLACEMENT): ICD-10-CM

## 2021-12-21 DIAGNOSIS — M81.0 AGE-RELATED OSTEOPOROSIS WITHOUT CURRENT PATHOLOGICAL FRACTURE: ICD-10-CM

## 2021-12-21 DIAGNOSIS — Z71.9 HEALTH COUNSELING: ICD-10-CM

## 2021-12-21 DIAGNOSIS — D69.6 THROMBOCYTOPENIA (HCC): ICD-10-CM

## 2021-12-21 DIAGNOSIS — E53.8 VITAMIN B12 DEFICIENCY: ICD-10-CM

## 2021-12-21 DIAGNOSIS — C44.90 SKIN CANCER: ICD-10-CM

## 2021-12-21 DIAGNOSIS — E78.49 OTHER HYPERLIPIDEMIA: ICD-10-CM

## 2021-12-21 DIAGNOSIS — K64.8 OTHER HEMORRHOIDS: Primary | ICD-10-CM

## 2021-12-21 DIAGNOSIS — E55.9 VITAMIN D DEFICIENCY: ICD-10-CM

## 2021-12-21 DIAGNOSIS — F02.80 LATE ONSET ALZHEIMER'S DISEASE WITHOUT BEHAVIORAL DISTURBANCE (HCC): ICD-10-CM

## 2021-12-21 DIAGNOSIS — G30.1 LATE ONSET ALZHEIMER'S DISEASE WITHOUT BEHAVIORAL DISTURBANCE (HCC): ICD-10-CM

## 2021-12-21 DIAGNOSIS — S00.83XA FACIAL BRUISING, INITIAL ENCOUNTER: ICD-10-CM

## 2021-12-21 PROCEDURE — 1036F TOBACCO NON-USER: CPT | Performed by: INTERNAL MEDICINE

## 2021-12-21 PROCEDURE — 3288F FALL RISK ASSESSMENT DOCD: CPT | Performed by: INTERNAL MEDICINE

## 2021-12-21 PROCEDURE — 1160F RVW MEDS BY RX/DR IN RCRD: CPT | Performed by: INTERNAL MEDICINE

## 2021-12-21 PROCEDURE — 1101F PT FALLS ASSESS-DOCD LE1/YR: CPT | Performed by: INTERNAL MEDICINE

## 2021-12-21 PROCEDURE — 99214 OFFICE O/P EST MOD 30 MIN: CPT | Performed by: INTERNAL MEDICINE

## 2022-02-02 DIAGNOSIS — K21.9 GASTROESOPHAGEAL REFLUX DISEASE, UNSPECIFIED WHETHER ESOPHAGITIS PRESENT: ICD-10-CM

## 2022-02-02 RX ORDER — FAMOTIDINE 20 MG/1
TABLET, FILM COATED ORAL
Qty: 90 TABLET | Refills: 1 | Status: SHIPPED | OUTPATIENT
Start: 2022-02-02

## 2022-02-28 DIAGNOSIS — E78.49 OTHER HYPERLIPIDEMIA: ICD-10-CM

## 2022-02-28 RX ORDER — ATORVASTATIN CALCIUM 20 MG/1
TABLET, FILM COATED ORAL
Qty: 90 TABLET | Refills: 1 | Status: SHIPPED | OUTPATIENT
Start: 2022-02-28

## 2022-05-04 ENCOUNTER — TELEPHONE (OUTPATIENT)
Dept: INTERNAL MEDICINE CLINIC | Facility: CLINIC | Age: 87
End: 2022-05-04

## 2022-05-04 DIAGNOSIS — I25.10 CORONARY ARTERY DISEASE INVOLVING NATIVE CORONARY ARTERY OF NATIVE HEART WITHOUT ANGINA PECTORIS: Primary | ICD-10-CM

## 2022-05-04 DIAGNOSIS — E53.8 VITAMIN B12 DEFICIENCY: ICD-10-CM

## 2022-05-04 DIAGNOSIS — I10 ESSENTIAL HYPERTENSION: ICD-10-CM

## 2022-05-04 DIAGNOSIS — Z00.00 HEALTH MAINTENANCE EXAMINATION: ICD-10-CM

## 2022-05-04 DIAGNOSIS — E55.9 VITAMIN D DEFICIENCY: ICD-10-CM

## 2022-05-04 DIAGNOSIS — D69.6 THROMBOCYTOPENIA (HCC): ICD-10-CM

## 2022-05-04 DIAGNOSIS — E78.49 OTHER HYPERLIPIDEMIA: ICD-10-CM

## 2022-05-04 NOTE — TELEPHONE ENCOUNTER
[facility] in [address] needs pt  To have a TB test done  Said pt had this done last year also  They want to test her for it

## 2022-05-04 NOTE — TELEPHONE ENCOUNTER
She has blood work due   We can do that earlier and I can add the TB blood test instead of the skin test   Ok to add?

## 2022-06-01 ENCOUNTER — APPOINTMENT (OUTPATIENT)
Dept: LAB | Facility: CLINIC | Age: 87
End: 2022-06-01
Payer: COMMERCIAL

## 2022-06-01 ENCOUNTER — TELEPHONE (OUTPATIENT)
Dept: INTERNAL MEDICINE CLINIC | Facility: CLINIC | Age: 87
End: 2022-06-01

## 2022-06-01 ENCOUNTER — HOSPITAL ENCOUNTER (OUTPATIENT)
Dept: RADIOLOGY | Facility: HOSPITAL | Age: 87
Discharge: HOME | End: 2022-06-01
Payer: COMMERCIAL

## 2022-06-01 ENCOUNTER — OFFICE VISIT (OUTPATIENT)
Dept: INTERNAL MEDICINE CLINIC | Facility: CLINIC | Age: 87
End: 2022-06-01
Payer: COMMERCIAL

## 2022-06-01 VITALS
TEMPERATURE: 96.6 F | OXYGEN SATURATION: 98 % | WEIGHT: 151 LBS | HEART RATE: 109 BPM | SYSTOLIC BLOOD PRESSURE: 100 MMHG | DIASTOLIC BLOOD PRESSURE: 72 MMHG | HEIGHT: 65 IN | BODY MASS INDEX: 25.16 KG/M2

## 2022-06-01 DIAGNOSIS — M54.50 CHRONIC BILATERAL LOW BACK PAIN WITHOUT SCIATICA: ICD-10-CM

## 2022-06-01 DIAGNOSIS — G89.29 CHRONIC BILATERAL LOW BACK PAIN WITHOUT SCIATICA: ICD-10-CM

## 2022-06-01 DIAGNOSIS — G30.1 LATE ONSET ALZHEIMER'S DISEASE WITHOUT BEHAVIORAL DISTURBANCE (HCC): ICD-10-CM

## 2022-06-01 DIAGNOSIS — F02.80 LATE ONSET ALZHEIMER'S DISEASE WITHOUT BEHAVIORAL DISTURBANCE (HCC): ICD-10-CM

## 2022-06-01 DIAGNOSIS — G89.29 CHRONIC BILATERAL THORACIC BACK PAIN: ICD-10-CM

## 2022-06-01 DIAGNOSIS — I10 ESSENTIAL HYPERTENSION: ICD-10-CM

## 2022-06-01 DIAGNOSIS — R09.81 NASAL CONGESTION: ICD-10-CM

## 2022-06-01 DIAGNOSIS — Z00.00 HEALTH MAINTENANCE EXAMINATION: ICD-10-CM

## 2022-06-01 DIAGNOSIS — R39.9 URINARY SYMPTOM OR SIGN: Primary | ICD-10-CM

## 2022-06-01 DIAGNOSIS — R42 DIZZINESS: Primary | ICD-10-CM

## 2022-06-01 DIAGNOSIS — M54.6 CHRONIC BILATERAL THORACIC BACK PAIN: ICD-10-CM

## 2022-06-01 DIAGNOSIS — R20.0 NUMBNESS OF LEFT HAND: Primary | ICD-10-CM

## 2022-06-01 DIAGNOSIS — R53.83 OTHER FATIGUE: ICD-10-CM

## 2022-06-01 LAB
BACTERIA UR QL AUTO: ABNORMAL /HPF
BILIRUB UR QL STRIP: NEGATIVE
CLARITY UR: CLEAR
COLOR UR: YELLOW
GLUCOSE UR STRIP-MCNC: NEGATIVE MG/DL
HGB UR QL STRIP.AUTO: NEGATIVE
KETONES UR STRIP-MCNC: ABNORMAL MG/DL
LEUKOCYTE ESTERASE UR QL STRIP: ABNORMAL
MUCOUS THREADS UR QL AUTO: ABNORMAL
NITRITE UR QL STRIP: NEGATIVE
NON-SQ EPI CELLS URNS QL MICRO: ABNORMAL /HPF
PH UR STRIP.AUTO: 5.5 [PH]
PROT UR STRIP-MCNC: ABNORMAL MG/DL
RBC #/AREA URNS AUTO: ABNORMAL /HPF
SARS-COV-2 AG UPPER RESP QL IA: NEGATIVE
SL AMB  POCT GLUCOSE, UA: ABNORMAL
SL AMB LEUKOCYTE ESTERASE,UA: 70
SL AMB POCT BILIRUBIN,UA: 1
SL AMB POCT BLOOD,UA: ABNORMAL
SL AMB POCT CLARITY,UA: CLEAR
SL AMB POCT COLOR,UA: YELLOW
SL AMB POCT KETONES,UA: 5
SL AMB POCT NITRITE,UA: ABNORMAL
SL AMB POCT PH,UA: 6
SL AMB POCT SPECIFIC GRAVITY,UA: 1.03
SL AMB POCT URINE PROTEIN: 30
SL AMB POCT UROBILINOGEN: ABNORMAL
SP GR UR STRIP.AUTO: >=1.03 (ref 1–1.03)
UROBILINOGEN UR QL STRIP.AUTO: 0.2 E.U./DL
VALID CONTROL: NORMAL
WBC #/AREA URNS AUTO: ABNORMAL /HPF

## 2022-06-01 PROCEDURE — 81001 URINALYSIS AUTO W/SCOPE: CPT | Performed by: INTERNAL MEDICINE

## 2022-06-01 PROCEDURE — 72072 X-RAY EXAM THORAC SPINE 3VWS: CPT

## 2022-06-01 PROCEDURE — 72110 X-RAY EXAM L-2 SPINE 4/>VWS: CPT

## 2022-06-01 PROCEDURE — G0439 PPPS, SUBSEQ VISIT: HCPCS | Performed by: INTERNAL MEDICINE

## 2022-06-01 PROCEDURE — 1170F FXNL STATUS ASSESSED: CPT | Performed by: INTERNAL MEDICINE

## 2022-06-01 PROCEDURE — 87811 SARS-COV-2 COVID19 W/OPTIC: CPT | Performed by: INTERNAL MEDICINE

## 2022-06-01 PROCEDURE — 81002 URINALYSIS NONAUTO W/O SCOPE: CPT | Performed by: INTERNAL MEDICINE

## 2022-06-01 PROCEDURE — 1125F AMNT PAIN NOTED PAIN PRSNT: CPT | Performed by: INTERNAL MEDICINE

## 2022-06-01 PROCEDURE — 99214 OFFICE O/P EST MOD 30 MIN: CPT | Performed by: INTERNAL MEDICINE

## 2022-06-01 RX ORDER — CEPHALEXIN 500 MG/1
500 CAPSULE ORAL EVERY 12 HOURS SCHEDULED
Qty: 10 CAPSULE | Refills: 0 | Status: SHIPPED | OUTPATIENT
Start: 2022-06-01 | End: 2022-06-06

## 2022-06-01 NOTE — TELEPHONE ENCOUNTER
Recommend to be evaluated first to determine need for MRI  She has an appt scheduled later this month  Any chest pain or shortness of breath? Is she drinking enough fluids? She can hold off exercising for the next 1-2 days until she feels better

## 2022-06-01 NOTE — TELEPHONE ENCOUNTER
Pt  told daughter that since last Friday she has had numbness in her lt  Arm and lt  Leg and has intermittent dizziness  She is walking with the walker but that is also difficult  She did go to exercise the day before and they did spine twists and she thinks maybe she overdid it  No other symptoms  Pt  did see a chiropractor yesterday and he did recommend an MRI-he didn't do any manipulation on her  He wants her to have an MRI first  Daughter wants to know if your recommend an MRI or Xray or ov

## 2022-06-01 NOTE — TELEPHONE ENCOUNTER
Debbie Harper aware, states pt is suffering, drinking fluids, no chest pain, wants to be seen today cant wait until 6/21  Scheduled

## 2022-06-01 NOTE — PROGRESS NOTES
Assessment/Plan:    No problem-specific Assessment & Plan notes found for this encounter  {Assess/PlanSmartLinks:79031}      Subjective:      Patient ID: Chrisandra Duane is a 80 y o  female      HPI    {Common ambulatory SmartLinks:25966}    Review of Systems      Objective:      /72 (Patient Position: Standing)   Pulse (!) 109   Temp (!) 96 6 °F (35 9 °C)   Ht 5' 5" (1 651 m)   Wt 68 5 kg (151 lb)   SpO2 98%   BMI 25 13 kg/m²          Physical Exam

## 2022-06-01 NOTE — PROGRESS NOTES
Assessment and Plan:     Problem List Items Addressed This Visit        Cardiovascular and Mediastinum    Essential hypertension     Not on medication, low BP today  Nervous and Auditory    Late onset Alzheimer's disease without behavioral disturbance (HCC)     Minimal confusion  Other Visit Diagnoses     Dizziness    -  Primary    (+) orthostatics  Increase daily fluid intake  Use walker at all times  Relevant Orders    POCT Rapid Covid Ag (Completed)    POCT urine dip (Completed)    UA (URINE) with reflex to Scope    Chronic bilateral low back pain without sciatica        Sees chiropractor regularly  Check x-rays today  May take Tyelnol prn, limit NSAID use  Relevant Orders    XR spine lumbar minimum 4 views non injury    Chronic bilateral thoracic back pain        Relevant Orders    XR spine thoracic 3 vw    Nasal congestion        Rapid COVID negative  Relevant Orders    POCT Rapid Covid Ag (Completed)    Other fatigue        Repeat U/A, may have UTI  Daughter declined antibiotics  Relevant Orders    POCT urine dip (Completed)    UA (URINE) with reflex to Scope    Health maintenance examination        Updated  Preventive health issues were discussed with patient, and age appropriate screening tests were ordered as noted in patient's After Visit Summary  Personalized health advice and appropriate referrals for health education or preventive services given if needed, as noted in patient's After Visit Summary       History of Present Illness:     Patient presents for Medicare Annual Wellness visit    Patient Care Team:  Renay El MD as PCP - General (Internal Medicine)     Problem List:     Patient Active Problem List   Diagnosis    Depression    [facility]    S/P AVR (aortic valve replacement)    Primary osteoarthritis involving multiple joints    Other hyperlipidemia    Allergic rhinitis    Age-related osteoporosis without current pathological fracture    Skin cancer    Coronary artery disease involving native coronary artery of native heart    Essential hypertension    GERD (gastroesophageal reflux disease)    Postmenopausal HRT (hormone replacement therapy)    Vitamin B12 deficiency    COPD (chronic obstructive pulmonary disease) (HCC)    Pancreatic cyst    Chronic kidney disease (CKD), stage II (mild)    Thrombocytopenia (HCC)    Wears hearing aid    Closed fracture of left foot    Gait instability    Vitamin D deficiency    Other hemorrhoids      Past Medical and Surgical History:     Past Medical History:   Diagnosis Date    Actinic keratosis     Basal cell carcinoma     Back     Cancer ([facility] )     Coronary artery disease     Dementia ([facility] )     Depression     Ear problems     HL (hearing loss)     Hyperlipidemia     Migraines     Ovarian cancer ([facility] ) 2004    s/p surgery   no chemo/RT    Phlebitis     R leg     Past Surgical History:   Procedure Laterality Date    ADENOIDECTOMY      APPENDECTOMY      CATARACT EXTRACTION, BILATERAL      HYSTERECTOMY  2005    ovarian CA    KNEE SURGERY Right     SKIN BIOPSY      TONSILECTOMY AND ADNOIDECTOMY      TONSILLECTOMY        Family History:     Family History   Problem Relation Age of Onset    Depression Mother     Anxiety disorder Mother     Alcohol abuse Mother     Substance Abuse Mother     Asthma Mother     Diabetes Father 61    Brain cancer Son     Heart attack Sister     Coronary artery disease Sister     Mental illness Neg Hx       Social History:     Social History     Socioeconomic History    Marital status:      Spouse name: None    Number of children: None    Years of education: None    Highest education level: None   Occupational History    None   Tobacco Use    Smoking status: Former Smoker     Packs/day: 0 25     Types: Cigarettes    Smokeless tobacco: Never Used    Tobacco comment: until age 27   Vaping Use    Vaping Use: Never used   Substance and Sexual Activity    Alcohol use: Not Currently    Drug use: Never    Sexual activity: Not Currently   Other Topics Concern    None   Social History Narrative    Drinks coffee/tea- 2 daily half decaf     From [place], [place]    Now lives with daughter    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, 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     Social Determinants of Health     Financial Resource Strain: Not on file   Food Insecurity: Not on file   Transportation Needs: Not on file   Physical Activity: Not on file   Stress: Not on file   Social Connections: Not on file   Intimate Partner Violence: Not on file   Housing Stability: Not on file      Medications and Allergies:     Current Outpatient Medications   Medication Sig Dispense Refill    aspirin 81 MG tablet Take 81 mg by mouth daily      atorvastatin (LIPITOR) 20 mg tablet TAKE 1 TABLET BY MOUTH EVERY DAY 90 tablet 1    chlorhexidine (PERIDEX) 0 12 % solution RINSE 2 TIMES A DAY      famotidine (PEPCID) 20 mg tablet TAKE 1 TABLET BY MOUTH EVERY DAY 90 tablet 1    ketoconazole (NIZORAL) 2 % cream Apply topically to both feet in their entirety (tops, bottoms and between toes) 3 times a day for 4 weeks straight  (Patient not taking: Reported on 3/17/2021) 60 g 2    Multiple Vitamins-Minerals (MULTIVITAMIN ADULT PO) Take 1 tablet by mouth daily       No current facility-administered medications for this visit       Allergies   Allergen Reactions    Codeine Hives    Morphine Other (See Comments)     Redness in face, swelling    Other      Seasonal    Propoxyphene       Immunizations:     Immunization History   Administered Date(s) Administered    COVID-19 PFIZER VACCINE 0 3 ML IM 01/18/2021, 02/09/2021, 10/19/2021    Hep B, Adolescent or Pediatric 10/15/2001    Hepatitis B 10/15/2001    INFLUENZA 10/04/2013, 11/03/2014, 09/27/2015, 10/13/2016, 10/17/2017, 10/01/2018    Influenza, high dose seasonal 0 7 mL 10/01/2018, 09/27/2019, 10/19/2020    Influenza, seasonal, injectable, preservative free 10/17/2017    Pneumococcal Conjugate 13-Valent 06/29/2020    Pneumococcal Polysaccharide PPV23 11/22/2016    Tdap 11/16/2010    Tuberculin Skin Test-PPD Intradermal 07/30/2019      Health Maintenance: There are no preventive care reminders to display for this patient  Topic Date Due    DTaP,Tdap,and Td Vaccines (2 - Td or Tdap) 11/16/2020    COVID-19 Vaccine (4 - Booster for Pfizer series) 02/19/2022    Influenza Vaccine (Season Ended) 09/01/2022      Medicare Health Risk Assessment:     /72 (Patient Position: Standing)   Pulse (!) 109   Temp (!) 96 6 °F (35 9 °C)   Ht 5' 5" (1 651 m)   Wt 68 5 kg (151 lb)   SpO2 98%   BMI 25 13 kg/m²      Jeanie Brantley is here for her Subsequent Wellness visit  Last Medicare Wellness visit information reviewed, patient interviewed and updates made to the record today  Health Risk Assessment:   Patient rates overall health as fair  Patient feels that their physical health rating is same  Patient is satisfied with their life  Eyesight was rated as same  Hearing was rated as slightly worse  Patient feels that their emotional and mental health rating is same  Patients states they are never, rarely angry  Patient states they are never, rarely unusually tired/fatigued  Pain experienced in the last 7 days has been none  Patient states that she has experienced no weight loss or gain in last 6 months  Fall Risk Screening: In the past year, patient has experienced: no history of falling in past year      Urinary Incontinence Screening:   Patient has leaked urine accidently in the last six months  Home Safety:  Patient has trouble with stairs inside or outside of their home  Patient has working smoke alarms Home safety hazards include: none  Nutrition:   Current diet is Regular  Medications:   Patient is currently taking over-the-counter supplements   OTC medications include: see medication list  Patient is able to manage medications  Activities of Daily Living (ADLs)/Instrumental Activities of Daily Living (IADLs):   Walk and transfer into and out of bed and chair?: Yes  Dress and groom yourself?: Yes    Bathe or shower yourself?: Yes    Feed yourself? Yes  Do your laundry/housekeeping?: Yes  Manage your money, pay your bills and track your expenses?: Yes  Make your own meals?: Yes    Do your own shopping?: Yes    Previous Hospitalizations:   Any hospitalizations or ED visits within the last 12 months?: No      Advance Care Planning:   Living will: Yes    Durable POA for healthcare: Yes    Advanced directive: Yes      PREVENTIVE SCREENINGS      Cardiovascular Screening:    General: Screening Not Indicated and History Lipid Disorder      Diabetes Screening:     General: Screening Current      Colorectal Cancer Screening:     General: Screening Not Indicated      Cervical Cancer Screening:    General: Screening Not Indicated      Osteoporosis Screening:    General: Screening Not Indicated and History Osteoporosis      Lung Cancer Screening:     General: Screening Not Indicated    Screening, Brief Intervention, and Referral to Treatment (SBIRT)    Screening      Single Item Drug Screening:  How often have you used an illegal drug (including marijuana) or a prescription medication for non-medical reasons in the past year? never    Single Item Drug Screen Score: 0  Interpretation: Negative screen for possible drug use disorder    Assessment/Plan:    Late onset Alzheimer's disease without behavioral disturbance (HCC)  Minimal confusion  Essential hypertension  Not on medication, low BP today  Diagnoses and all orders for this visit:    Dizziness  Comments:  (+) orthostatics  Increase daily fluid intake  Use walker at all times    Orders:  -     POCT Rapid Covid Ag  -     POCT urine dip  -     UA (URINE) with reflex to Scope    Chronic bilateral low back pain without sciatica  Comments:  Sees chiropractor regularly  Check x-rays today  May take Tyelnol prn, limit NSAID use  Orders:  -     XR spine lumbar minimum 4 views non injury; Future    Chronic bilateral thoracic back pain  -     XR spine thoracic 3 vw; Future    Nasal congestion  Comments:  Rapid COVID negative  Orders:  -     POCT Rapid Covid Ag    Other fatigue  Comments:  Repeat U/A, may have UTI  Daughter declined antibiotics  Orders:  -     POCT urine dip  -     UA (URINE) with reflex to Scope    Health maintenance examination  Comments:  Updated  Late onset Alzheimer's disease without behavioral disturbance ([facility] )    Essential hypertension    Follow up as scheduled or as needed  Subjective:      Patient ID: Yu Raya is a 80 y o  female  About 5 days ago, she woke up in the morning and did not feel well  She has been feeling weak and dizzy  She also reports increased mid and low back pain  She was doing some twisting exercises the day before at the center, thinks that had caused it  She feels dizzy when she stands up, feels unsteady on her feet  She is using the walker right now  Daughter reports she had a headaches, she gave her Advil to take earlier today  She has not been drinking too much fluids the last few days, decreased appetite  She went to the chiropractor yesterday and suggested an MRI  The following portions of the patient's history were reviewed and updated as appropriate: allergies, current medications, past medical history, past social history and problem list     Review of Systems   Constitutional: Positive for activity change, appetite change and fatigue  HENT: Positive for congestion, postnasal drip and rhinorrhea  Respiratory: Negative for cough and shortness of breath  Cardiovascular: Negative for chest pain and palpitations  Genitourinary: Negative for difficulty urinating and dysuria  Musculoskeletal: Positive for gait problem     Neurological: Positive for dizziness, light-headedness and headaches  Psychiatric/Behavioral: Negative for confusion  Objective:      /72 (Patient Position: Standing)   Pulse (!) 109   Temp (!) 96 6 °F (35 9 °C)   Ht 5' 5" (1 651 m)   Wt 68 5 kg (151 lb)   SpO2 98%   BMI 25 13 kg/m²          Physical Exam  Constitutional:       General: She is not in acute distress  Appearance: Normal appearance  She is not ill-appearing  HENT:      Head: Normocephalic and atraumatic  Mouth/Throat:      Mouth: Mucous membranes are dry  Cardiovascular:      Rate and Rhythm: Normal rate and regular rhythm  Pulmonary:      Effort: Pulmonary effort is normal  No respiratory distress  Breath sounds: Normal breath sounds  Musculoskeletal:      Thoracic back: No tenderness or bony tenderness  Lumbar back: No tenderness or bony tenderness  Neurological:      General: No focal deficit present  Mental Status: She is alert     Psychiatric:         Mood and Affect: Mood normal          Behavior: Behavior normal          Grady Lama MD

## 2022-06-01 NOTE — ASSESSMENT & PLAN NOTE
Benny Harp is a 67 year old male here for  Chief Complaint   Patient presents with   • Cancer     Denies latex allergy or sensitivity.    Medication verified and med list updated.  PCP and Pharmacy verified.    Social History     Tobacco Use   Smoking Status Never Smoker   Smokeless Tobacco Current User   • Types: Chew     Advance Directives Filed: Yes    ECOG:   ECOG [09/24/20 1529]   ECOG Performance Status 1       Height: No.  Ht Readings from Last 1 Encounters:   05/28/20 6' 3\" (1.905 m)     Weight:Yes, shoes on.  Wt Readings from Last 3 Encounters:   09/24/20 (!) 139.7 kg   06/24/20 131.4 kg   05/28/20 130.1 kg       BMI: Body mass index is 38.5 kg/m².    REVIEW OF SYSTEMS  GENERAL:  Patient denies headache, fevers, chills, night sweats, excessive fatigue, change in appetite, weight loss, dizziness  ALLERGIC/IMMUNOLOGIC: Verified allergies: Yes  EYES:  Patient denies significant visual difficulties, double vision, blurred vision  ENT/MOUTH: Patient denies problems with hearing, sore throat, sinus drainage, mouth sores  ENDOCRINE:  Patient denies diabetes, thyroid disease, hormone replacement, hot flashes  HEMATOLOGIC/LYMPHATIC: Patient denies bleeding, tender lymph nodes, swollen lymph nodes, but complains of: easy bruising  RESPIRATORY:  Patient denies lung pain with breathing, cough, coughing up blood, shortness of breath  CARDIOVASCULAR:  Patient denies anginal chest pain, palpitations, shortness of breath when lying flat, peripheral edema  GASTROINTESTINAL: Patient denies abdominal pain , nausea, vomiting, diarrhea, GI bleeding, constipation, change in bowel habits, heartburn, sensation of feeling full, difficulty swallowing  : Patient denies blood in the urine, burning with urination, frequency, urgency, hesitancy, incontinence  MUSCULOSKELETAL:  Patient denies joint pain, bone pain, joint swelling, redness, decreased range of motion  SKIN:  Patient denies chronic rashes, inflammation,  Minimal confusion  ulcerations, skin changes, itching  NEUROLOGIC:  Patient denies loss of balance, areas of focal weakness, abnormal gait, sensory problems, numbness, tingling  PSYCHIATRIC: Patient denies insomnia, depression, anxiety

## 2022-06-02 ENCOUNTER — HOSPITAL ENCOUNTER (OUTPATIENT)
Dept: RADIOLOGY | Facility: HOSPITAL | Age: 87
Discharge: HOME | End: 2022-06-02
Payer: COMMERCIAL

## 2022-06-02 ENCOUNTER — TELEPHONE (OUTPATIENT)
Dept: INTERNAL MEDICINE CLINIC | Facility: CLINIC | Age: 87
End: 2022-06-02

## 2022-06-02 DIAGNOSIS — R20.0 NUMBNESS OF LEFT HAND: ICD-10-CM

## 2022-06-02 PROCEDURE — 72050 X-RAY EXAM NECK SPINE 4/5VWS: CPT

## 2022-06-02 NOTE — TELEPHONE ENCOUNTER
You can do the blood that I had ordered from last month  Is she drinking enough fluids? I do not think dramamine will help, need to push fluids and small frequent meals

## 2022-06-02 NOTE — TELEPHONE ENCOUNTER
Patient's daughter called  Had lumbar and thoracic spine x-rays done, but states now she needs an order for cervical x-ray

## 2022-06-02 NOTE — TELEPHONE ENCOUNTER
Daughter states pt has left arm numbness and it may be connected to her neck, pt will send a picture of the script the chiropractor gave her

## 2022-06-02 NOTE — TELEPHONE ENCOUNTER
Perry Connelly aware, states she is worried, pt is very dizzy but still taking abx  Requesting dramamine, pt has not been on this medication before

## 2022-06-03 ENCOUNTER — APPOINTMENT (OUTPATIENT)
Dept: LAB | Facility: CLINIC | Age: 87
End: 2022-06-03
Payer: COMMERCIAL

## 2022-06-03 DIAGNOSIS — E55.9 VITAMIN D DEFICIENCY: ICD-10-CM

## 2022-06-03 DIAGNOSIS — E78.49 OTHER HYPERLIPIDEMIA: ICD-10-CM

## 2022-06-03 DIAGNOSIS — D69.6 THROMBOPENIA (HCC): ICD-10-CM

## 2022-06-03 DIAGNOSIS — E53.8 VITAMIN B 12 DEFICIENCY: ICD-10-CM

## 2022-06-03 DIAGNOSIS — I10 ESSENTIAL HYPERTENSION, MALIGNANT: ICD-10-CM

## 2022-06-03 DIAGNOSIS — Z00.00 HEALTH MAINTENANCE EXAMINATION: ICD-10-CM

## 2022-06-03 LAB
25(OH)D3 SERPL-MCNC: 33.7 NG/ML (ref 30–100)
ALBUMIN SERPL BCP-MCNC: 4.3 G/DL (ref 3.5–5)
ALP SERPL-CCNC: 77 U/L (ref 34–104)
ALT SERPL W P-5'-P-CCNC: 13 U/L (ref 7–52)
ANION GAP SERPL CALCULATED.3IONS-SCNC: 6 MMOL/L (ref 4–13)
AST SERPL W P-5'-P-CCNC: 16 U/L (ref 13–39)
BASOPHILS # BLD AUTO: 0.05 THOUSANDS/ΜL (ref 0–0.1)
BASOPHILS NFR BLD AUTO: 1 % (ref 0–1)
BILIRUB SERPL-MCNC: 0.98 MG/DL (ref 0.2–1)
BUN SERPL-MCNC: 17 MG/DL (ref 5–25)
CALCIUM SERPL-MCNC: 9.1 MG/DL (ref 8.4–10.2)
CHLORIDE SERPL-SCNC: 104 MMOL/L (ref 96–108)
CHOLEST SERPL-MCNC: 141 MG/DL
CO2 SERPL-SCNC: 29 MMOL/L (ref 21–32)
CREAT SERPL-MCNC: 1.07 MG/DL (ref 0.6–1.3)
EOSINOPHIL # BLD AUTO: 0.15 THOUSAND/ΜL (ref 0–0.61)
EOSINOPHIL NFR BLD AUTO: 2 % (ref 0–6)
ERYTHROCYTE [DISTWIDTH] IN BLOOD BY AUTOMATED COUNT: 13.4 % (ref 11.6–15.1)
GFR SERPL CREATININE-BSD FRML MDRD: 46 ML/MIN/1.73SQ M
GLUCOSE P FAST SERPL-MCNC: 108 MG/DL (ref 65–99)
HCT VFR BLD AUTO: 45 % (ref 34.8–46.1)
HDLC SERPL-MCNC: 55 MG/DL
HGB BLD-MCNC: 15.5 G/DL (ref 11.5–15.4)
IMM GRANULOCYTES # BLD AUTO: 0.02 THOUSAND/UL (ref 0–0.2)
IMM GRANULOCYTES NFR BLD AUTO: 0 % (ref 0–2)
LDLC SERPL CALC-MCNC: 64 MG/DL (ref 0–100)
LYMPHOCYTES # BLD AUTO: 1.84 THOUSANDS/ΜL (ref 0.6–4.47)
LYMPHOCYTES NFR BLD AUTO: 23 % (ref 14–44)
MCH RBC QN AUTO: 32.6 PG (ref 26.8–34.3)
MCHC RBC AUTO-ENTMCNC: 34.4 G/DL (ref 31.4–37.4)
MCV RBC AUTO: 95 FL (ref 82–98)
MONOCYTES # BLD AUTO: 0.58 THOUSAND/ΜL (ref 0.17–1.22)
MONOCYTES NFR BLD AUTO: 7 % (ref 4–12)
NEUTROPHILS # BLD AUTO: 5.31 THOUSANDS/ΜL (ref 1.85–7.62)
NEUTS SEG NFR BLD AUTO: 67 % (ref 43–75)
NONHDLC SERPL-MCNC: 86 MG/DL
NRBC BLD AUTO-RTO: 0 /100 WBCS
PLATELET # BLD AUTO: 147 THOUSANDS/UL (ref 149–390)
PMV BLD AUTO: 9.2 FL (ref 8.9–12.7)
POTASSIUM SERPL-SCNC: 4 MMOL/L (ref 3.5–5.3)
PROT SERPL-MCNC: 6.7 G/DL (ref 6.4–8.4)
RBC # BLD AUTO: 4.76 MILLION/UL (ref 3.81–5.12)
SODIUM SERPL-SCNC: 139 MMOL/L (ref 135–147)
TRIGL SERPL-MCNC: 111 MG/DL
TSH SERPL DL<=0.05 MIU/L-ACNC: 3.05 UIU/ML (ref 0.45–4.5)
VIT B12 SERPL-MCNC: 687 PG/ML (ref 100–900)
WBC # BLD AUTO: 7.95 THOUSAND/UL (ref 4.31–10.16)

## 2022-06-03 PROCEDURE — 86480 TB TEST CELL IMMUN MEASURE: CPT

## 2022-06-03 PROCEDURE — 80053 COMPREHEN METABOLIC PANEL: CPT

## 2022-06-03 PROCEDURE — 80061 LIPID PANEL: CPT

## 2022-06-03 PROCEDURE — 36415 COLL VENOUS BLD VENIPUNCTURE: CPT

## 2022-06-03 PROCEDURE — 82306 VITAMIN D 25 HYDROXY: CPT

## 2022-06-03 PROCEDURE — 82607 VITAMIN B-12: CPT

## 2022-06-03 PROCEDURE — 85025 COMPLETE CBC W/AUTO DIFF WBC: CPT

## 2022-06-03 PROCEDURE — 84443 ASSAY THYROID STIM HORMONE: CPT

## 2022-06-06 ENCOUNTER — TELEPHONE (OUTPATIENT)
Dept: INTERNAL MEDICINE CLINIC | Facility: CLINIC | Age: 87
End: 2022-06-06

## 2022-06-06 LAB
GAMMA INTERFERON BACKGROUND BLD IA-ACNC: 0.02 IU/ML
M TB IFN-G BLD-IMP: NEGATIVE
M TB IFN-G CD4+ BCKGRND COR BLD-ACNC: 0 IU/ML
M TB IFN-G CD4+ BCKGRND COR BLD-ACNC: 0 IU/ML
MITOGEN IGNF BCKGRD COR BLD-ACNC: 6.72 IU/ML

## 2022-06-06 NOTE — TELEPHONE ENCOUNTER
May speak to daughter  TB test normal     Kidney function stable, platelets stable  The rest of her labs were normal     How is she doing? Is she drinking enough fluids daily?

## 2022-06-07 NOTE — TELEPHONE ENCOUNTER
Daughter called  Adv'd of results  Daughter said she is pushing fluids  Pt  Is back at the [facility] and she told them to push fluids  They have a meeting with chiropractor on Thursday

## 2022-06-09 NOTE — TELEPHONE ENCOUNTER
Daughter aware, states pt is getting MRI today at [address], will call to give us to give results

## 2022-06-09 NOTE — TELEPHONE ENCOUNTER
X-ray results:    Neck x-ray showed stenosis in C5 to C7, areas where she is having the arm / hand symptoms  Back showed arthritis, worse in the low back area (L5)  Recommend to try physical therapy for the neck and back pain

## 2022-06-14 ENCOUNTER — RA CDI HCC (OUTPATIENT)
Dept: OTHER | Facility: HOSPITAL | Age: 87
End: 2022-06-14

## 2022-06-14 NOTE — PROGRESS NOTES
[facility]  coding opportunities       Chart reviewed, no opportunity found:   [address]        Patients Insurance     Medicare Insurance: [facility] [employer]

## 2022-06-21 ENCOUNTER — OFFICE VISIT (OUTPATIENT)
Dept: INTERNAL MEDICINE CLINIC | Facility: CLINIC | Age: 87
End: 2022-06-21
Payer: COMMERCIAL

## 2022-06-21 VITALS
OXYGEN SATURATION: 97 % | DIASTOLIC BLOOD PRESSURE: 70 MMHG | HEART RATE: 78 BPM | HEIGHT: 65 IN | WEIGHT: 156.9 LBS | SYSTOLIC BLOOD PRESSURE: 118 MMHG | TEMPERATURE: 96.5 F | BODY MASS INDEX: 26.14 KG/M2

## 2022-06-21 DIAGNOSIS — M54.12 CERVICAL RADICULOPATHY: ICD-10-CM

## 2022-06-21 DIAGNOSIS — E78.49 OTHER HYPERLIPIDEMIA: ICD-10-CM

## 2022-06-21 DIAGNOSIS — M51.36 LUMBAR DEGENERATIVE DISC DISEASE: ICD-10-CM

## 2022-06-21 DIAGNOSIS — G30.1 LATE ONSET ALZHEIMER'S DISEASE WITHOUT BEHAVIORAL DISTURBANCE (HCC): ICD-10-CM

## 2022-06-21 DIAGNOSIS — F02.80 LATE ONSET ALZHEIMER'S DISEASE WITHOUT BEHAVIORAL DISTURBANCE (HCC): ICD-10-CM

## 2022-06-21 DIAGNOSIS — N18.31 STAGE 3A CHRONIC KIDNEY DISEASE (HCC): ICD-10-CM

## 2022-06-21 DIAGNOSIS — R42 DIZZINESS: Primary | ICD-10-CM

## 2022-06-21 DIAGNOSIS — Z95.2 S/P AVR (AORTIC VALVE REPLACEMENT): ICD-10-CM

## 2022-06-21 DIAGNOSIS — K21.9 GASTROESOPHAGEAL REFLUX DISEASE, UNSPECIFIED WHETHER ESOPHAGITIS PRESENT: ICD-10-CM

## 2022-06-21 PROBLEM — M51.369 LUMBAR DEGENERATIVE DISC DISEASE: Status: ACTIVE | Noted: 2022-06-21

## 2022-06-21 PROCEDURE — 1036F TOBACCO NON-USER: CPT | Performed by: INTERNAL MEDICINE

## 2022-06-21 PROCEDURE — 3288F FALL RISK ASSESSMENT DOCD: CPT | Performed by: INTERNAL MEDICINE

## 2022-06-21 PROCEDURE — 99214 OFFICE O/P EST MOD 30 MIN: CPT | Performed by: INTERNAL MEDICINE

## 2022-06-21 PROCEDURE — 1101F PT FALLS ASSESS-DOCD LE1/YR: CPT | Performed by: INTERNAL MEDICINE

## 2022-06-21 PROCEDURE — 1160F RVW MEDS BY RX/DR IN RCRD: CPT | Performed by: INTERNAL MEDICINE

## 2022-06-21 NOTE — PROGRESS NOTES
Assessment/Plan:    Late onset Alzheimer's disease without behavioral disturbance (HCC)  Intermittent confusion  Other hyperlipidemia  Lipids at goal, on statin  S/P AVR (aortic valve replacement)  On daily ASA  Stage 3a chronic kidney disease [facility])  Lab Results   Component Value Date    EGFR 46 06/03/2022    EGFR 42 06/22/2021    EGFR 47 03/04/2021    CREATININE 1 07 06/03/2022    CREATININE 1 17 06/22/2021    CREATININE 1 08 03/04/2021     Stable  Thrombocytopenia (HCC)  Stable  GERD (gastroesophageal reflux disease)  On daily famotidine  Skin cancer  Declined to see dermatology again  Cervical radiculopathy  Start physical therapy  Lumbar degenerative disc disease  Did not continue with chiropractor, recommend physical therapy  Gait instability  Using cane  As above  Diagnoses and all orders for this visit:    Dizziness  Comments:  Improved  Stage 3a chronic kidney disease ([facility] )    Late onset Alzheimer's disease without behavioral disturbance ([facility] )    Lumbar degenerative disc disease  -     Ambulatory Referral to Physical Therapy; Future    Cervical radiculopathy  -     Ambulatory Referral to Physical Therapy; Future    Gastroesophageal reflux disease, unspecified whether esophagitis present    S/P AVR (aortic valve replacement)    Other hyperlipidemia    Follow up in 5 months or as needed  Subjective:      Patient ID: Bj Coe is a 80 y o  female here for a follow up, accompanied by her daughter  She is feeling better, now less dizzy  She was seen by the chiropractor, did have the MRI of her neck and back  She was told she has discs protruding  Therapy was too expensive  They have opted to increase daily walks, she is now using a cane  Daughter is interested in physical therapy, never did it before due to unknown cost     She has a good appetite, drinking about 3 to 4 glasses of water a day  She thinks it might be too much      Daughter feels she is more weak with less energy since experiencing more back pain the past few weeks  Her memory has also worsened  She is asking if the neck issues are causing the dizziness  The following portions of the patient's history were reviewed and updated as appropriate: allergies, current medications, past medical history, past social history and problem list     Review of Systems   Constitutional: Negative for appetite change and fatigue  HENT: Negative for congestion, ear pain and postnasal drip  Eyes: Negative for visual disturbance  Respiratory: Negative for cough and shortness of breath  Cardiovascular: Negative for chest pain and leg swelling  Gastrointestinal: Negative for abdominal pain, constipation and diarrhea  Genitourinary: Negative for dysuria and frequency  Musculoskeletal: Positive for arthralgias, back pain, neck pain and neck stiffness  Negative for myalgias  Skin: Negative for rash and wound  Neurological: Negative for dizziness, light-headedness, numbness and headaches  Psychiatric/Behavioral: Positive for confusion  The patient is not nervous/anxious  Objective:      /70   Pulse 78   Temp (!) 96 5 °F (35 8 °C)   Ht 5' 5" (1 651 m)   Wt 71 2 kg (156 lb 14 4 oz)   SpO2 97%   BMI 26 11 kg/m²          Physical Exam  Vitals and nursing note reviewed  Constitutional:       General: She is not in acute distress  Appearance: Normal appearance  She is well-developed  HENT:      Head: Normocephalic and atraumatic  Mouth/Throat:      Mouth: Mucous membranes are moist    Eyes:      Pupils: Pupils are equal, round, and reactive to light  Cardiovascular:      Rate and Rhythm: Normal rate and regular rhythm  Heart sounds: Normal heart sounds  Pulmonary:      Effort: Pulmonary effort is normal  No respiratory distress  Breath sounds: Normal breath sounds  No wheezing  Abdominal:      Palpations: Abdomen is soft     Musculoskeletal:         General: No swelling  Right lower leg: No edema  Left lower leg: No edema  Comments: Using cane   Skin:     General: Skin is warm  Findings: No rash  Neurological:      General: No focal deficit present  Mental Status: She is alert  Psychiatric:         Mood and Affect: Mood and affect normal  Mood is not anxious or depressed  Lab results reviewed with patient

## 2022-06-21 NOTE — ASSESSMENT & PLAN NOTE
Lab Results   Component Value Date    EGFR 46 06/03/2022    EGFR 42 06/22/2021    EGFR 47 03/04/2021    CREATININE 1 07 06/03/2022    CREATININE 1 17 06/22/2021    CREATININE 1 08 03/04/2021     Stable

## 2022-06-24 ENCOUNTER — TELEPHONE (OUTPATIENT)
Dept: INTERNAL MEDICINE CLINIC | Facility: CLINIC | Age: 87
End: 2022-06-24

## 2022-06-24 DIAGNOSIS — R26.81 GAIT INSTABILITY: ICD-10-CM

## 2022-06-24 DIAGNOSIS — I25.10 CORONARY ARTERY DISEASE INVOLVING NATIVE CORONARY ARTERY OF NATIVE HEART WITHOUT ANGINA PECTORIS: Primary | ICD-10-CM

## 2022-06-24 DIAGNOSIS — R42 DIZZINESS: ICD-10-CM

## 2022-06-24 NOTE — TELEPHONE ENCOUNTER
MRI /MRA of brain and neck ordered  You can call to schedule  We will need to recheck labs to monitor kidney function since it has contrast     Make sure she has enough fluids daily, using walker all the time

## 2022-06-24 NOTE — TELEPHONE ENCOUNTER
Pt  feeling dizziness and nausea  she is very pale and tired  Feels much worse today than she did yesterday  Daughter is concerned it is related to her artery and possible blockage

## 2022-07-07 ENCOUNTER — TELEPHONE (OUTPATIENT)
Dept: INTERNAL MEDICINE CLINIC | Facility: CLINIC | Age: 87
End: 2022-07-07

## 2022-07-11 NOTE — TELEPHONE ENCOUNTER
Spoke w/ Robbin Escobar  Pt still having back pain  They are taking walks  Still has dizziness but its better  Had a headache the other day  Daughter thinks its spine related  Has a third MRI scheduled for tomorrow

## 2022-07-14 NOTE — TELEPHONE ENCOUNTER
Spoke w/ daughter and adv'd  She said she already has the referral for PT from Dr Bryan Ford and will start that

## 2022-07-14 NOTE — TELEPHONE ENCOUNTER
MRI/MRA of the neck did not show significant stenosis  Maintain adequate fluid intake daily  If you would like to start formal physical therapy for her neck and back, let me know

## 2022-07-20 ENCOUNTER — EVALUATION (OUTPATIENT)
Dept: PHYSICAL THERAPY | Facility: REHABILITATION | Age: 87
End: 2022-07-20
Payer: COMMERCIAL

## 2022-07-20 DIAGNOSIS — M54.12 CERVICAL RADICULOPATHY: ICD-10-CM

## 2022-07-20 DIAGNOSIS — M51.36 LUMBAR DEGENERATIVE DISC DISEASE: Primary | ICD-10-CM

## 2022-07-20 DIAGNOSIS — R26.89 BALANCE PROBLEM: ICD-10-CM

## 2022-07-20 PROCEDURE — 97530 THERAPEUTIC ACTIVITIES: CPT | Performed by: PHYSICAL THERAPIST

## 2022-07-20 PROCEDURE — 97162 PT EVAL MOD COMPLEX 30 MIN: CPT | Performed by: PHYSICAL THERAPIST

## 2022-07-20 NOTE — PROGRESS NOTES
PT Evaluation     Today's date: 2022  Patient name: Anahy Diaz  : 1935  MRN: [id number]  Referring provider: Karan Lee MD  Dx:   Encounter Diagnosis     ICD-10-CM    1  Lumbar degenerative disc disease  M51 36    2  Cervical radiculopathy  M54 12    3  Balance problem  R26 89        Start Time: 1841  Stop Time: 1748  Total time in clinic (min): 58 minutes    Assessment  Assessment details: Anahy Diaz is a 80y o  year old female who presents to [facility] with Lumbar degenerative disc disease  (primary encounter diagnosis)  Cervical radiculopathy  Patient presents with limitations in cervical and lumbar range of motion, BUE/BLE strength deficits, abnormal gait and impaired balance  Freya Nguyen presents with the impairments as listed above and would benefit from Physical Therapy to address these impairments, improved quality of life and to maximize function  Impairments: abnormal gait, abnormal muscle firing, abnormal muscle tone, abnormal or restricted ROM, activity intolerance, impaired balance, impaired physical strength, lacks appropriate home exercise program, pain with function, safety issue, poor posture  and poor body mechanics  Barriers to therapy: FALLS RISK    Goals  Short-Term Goals: 2-4 weeks  1  Patient will report no falls or loss of balance  2  Patient will report 50% improvement in low back pain  Long-Term Goals: 4-6 weeks  1  Patient will be able to walk at least 15 minutes without loss of balance or rest breaks for improved endurance/strength  2  Patient will improve BUE/BLE strength by at least 1-2 grades  3  Patient independent with HEP at time of discharge  Plan  Plan details: Thank you for opportunity to participate in the care of Anahy Diaz      Patient would benefit from: skilled physical therapy and PT eval  Planned therapy interventions: abdominal trunk stabilization, balance, gait training, home exercise program, transfer training, therapeutic exercise, therapeutic activities, strengthening, stretching, postural training, patient education and neuromuscular re-education  Frequency: 2x week  Duration in visits: 16  Plan of Care beginning date: 2022  Treatment plan discussed with: patient        Subjective Evaluation    History of Present Illness  Mechanism of injury: Patient is a 80 y o  presenting to physical therapy with complaints of back pain and neck pain  Patient is a very poor historian  She states "I'm here to find out if I have a fracture and what the doctor wants me to do " She reports she fell 2 weeks ago, however, following clarification from her daughter that is not true  N/T/Radicular pain: No but according to daughter can get numbness into the shoulders  Bowel/Bladder changes: None  Imaging: x-rays in  of neck and back - reveals degenerative disc disease  Previous Injury: thrown from horses in the past     Home set-up and functional level:  Steps to enter home: Yes into front door - will be with daughter and hold onto handrail  First floor set up: Yes  Adequate lighting, throw rugs: Yes/No  Previous falls:  According to daughter no  Assistive Device: Cane, Shower Chair   Assist at home: daughter Rema Marin - living with daughter currently  Current level of function: 3 block walk with daughter with rest breaks  Goes to [employer] 3x/week   Pain  Current pain ratin  At best pain ratin  At worst pain ratin  Location: middle lumbar spine  Quality: dull ache and discomfort (tender/sore)  Aggravating factors: sitting    Social Support  Lives with: adult children    Employment status: not working  Treatments  Previous treatment: chiropractic  Current treatment: physical therapy  Patient Goals  Patient goals for therapy: decreased pain, increased motion, improved balance, increased strength, return to sport/leisure activities and independence with ADLs/IADLs  Patient goal: keep moving, walk more, balance         Objective Strength/Myotome Testing     Left Shoulder     Planes of Motion   Flexion: 4-   Abduction: 3+     Right Shoulder     Planes of Motion   Flexion: 4-   Abduction: 3+     Left Elbow   Flexion: 4-  Extension: 4+    Right Elbow   Flexion: 4  Extension: 4+    Left Hip   Planes of Motion   Flexion: 3+    Right Hip   Planes of Motion   Flexion: 3+    Left Knee   Flexion: 4  Extension: 4    Right Knee   Flexion: 4  Extension: 4    Left Ankle/Foot   Dorsiflexion: 4-    Right Ankle/Foot   Dorsiflexion: 4+  Neuro Exam:     Functional outcomes   Functional outcome comment: 30"STS: 8 repetitions - no BUE use - poor eccentric control - SOB SPO2 99% HR 98 bpm   TUG: nv    Cervical ROM:   Extension 40*  Flexion 45*  SB 20*  Rotation 50% with thoracic rotation compensation     Lumbar ROM: BUE support   Flexion 90*  Extension 50%  SB WFL - pain to right     Tenderness to right SIJ     Ambulating with SPC in RUE with slowed speed and mild unsteadiness, some posterior lean with static standing                        Precautions: FALLS RISK, DEMENTIA, 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), history of AVR, history of ovarian cancer, COPD    Daily Treatment Diary  * Indicates part of HEP      7/20          Manual                                                       Neuro Re-Ed           Balance testing nv:  TUG  FGA  Romberg                                                                  Ther Ex           Bike nv          PPT nv          Lumbar flexion ball rolls nv          SKTC nv          Seated piriformis stretch           Bridging           Clamshells           Standing hip 3 way nv          Cervical AROM           Scapular retractions nv          Chin tucks nv                                Ther Activity           Step ups           Lateral step ups           Sit to stands           Leg press                                                       Gait Training                                             Modalities MHP                      * = on hep

## 2022-07-27 ENCOUNTER — OFFICE VISIT (OUTPATIENT)
Dept: PHYSICAL THERAPY | Facility: REHABILITATION | Age: 87
End: 2022-07-27
Payer: COMMERCIAL

## 2022-07-27 DIAGNOSIS — M51.36 LUMBAR DEGENERATIVE DISC DISEASE: Primary | ICD-10-CM

## 2022-07-27 DIAGNOSIS — M54.12 CERVICAL RADICULOPATHY: ICD-10-CM

## 2022-07-27 DIAGNOSIS — R26.89 BALANCE PROBLEM: ICD-10-CM

## 2022-07-27 PROCEDURE — 97110 THERAPEUTIC EXERCISES: CPT | Performed by: PHYSICAL THERAPIST

## 2022-07-27 PROCEDURE — 97530 THERAPEUTIC ACTIVITIES: CPT | Performed by: PHYSICAL THERAPIST

## 2022-07-27 NOTE — PROGRESS NOTES
Daily Note     Today's date: 2022  Patient name: Spencer Gordon  : 1935  MRN: [id number]  Referring provider: Sae Proctor MD  Dx:   Encounter Diagnosis     ICD-10-CM    1  Lumbar degenerative disc disease  M51 36    2  Cervical radiculopathy  M54 12    3  Balance problem  R26 89        Start Time: 1533  Stop Time: 1615  Total time in clinic (min): 42 minutes    Subjective: Patient reports her low back is sore  Objective: See treatment diary below      Assessment: Tolerated treatment well  Initiated plan of care this visit  Patient requires cues throughout session to stay on task and perform TE with proper form  Most difficulty with maintaining form for chin tucks  During all exercises patient does report relief of low back pain  Provided patient with updated HEP and explained HEP to daughter as well for improved carryover at home secondary to cognitive deficits  Patient demonstrated fatigue post treatment, exhibited good technique with therapeutic exercises and would benefit from continued PT  Plan: Continue per plan of care             Precautions: FALLS RISK, DEMENTIA, 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), history of AVR, history of ovarian cancer, COPD    Daily Treatment Diary  * Indicates part of HEP               Manual                                                       Neuro Re-Ed           Balance testing nv:  TUG  FGA  Romberg                                                                  Ther Ex           Bike nv 5'         PPT nv 10x5"         Lumbar flexion ball rolls nv 8x5"          SKTC nv 5x10" b/l         Seated piriformis stretch           Bridging           Clamshells           Standing hip 3 way nv nv         Cervical AROM           Scapular retractions nv 2x10         Chin tucks nv 10 cues                               Ther Activity           Step ups           Lateral step ups           Sit to stands           Leg press           Patient education  Done                                          Gait Training                                             Modalities           MHP                      * = on hep

## 2022-07-28 ENCOUNTER — APPOINTMENT (OUTPATIENT)
Dept: PHYSICAL THERAPY | Facility: REHABILITATION | Age: 87
End: 2022-07-28
Payer: COMMERCIAL

## 2022-08-03 ENCOUNTER — OFFICE VISIT (OUTPATIENT)
Dept: PHYSICAL THERAPY | Facility: REHABILITATION | Age: 87
End: 2022-08-03
Payer: COMMERCIAL

## 2022-08-03 DIAGNOSIS — R26.89 BALANCE PROBLEM: ICD-10-CM

## 2022-08-03 DIAGNOSIS — M54.12 CERVICAL RADICULOPATHY: ICD-10-CM

## 2022-08-03 DIAGNOSIS — M51.36 LUMBAR DEGENERATIVE DISC DISEASE: Primary | ICD-10-CM

## 2022-08-03 PROCEDURE — 97110 THERAPEUTIC EXERCISES: CPT | Performed by: PHYSICAL THERAPIST

## 2022-08-03 PROCEDURE — 97530 THERAPEUTIC ACTIVITIES: CPT | Performed by: PHYSICAL THERAPIST

## 2022-08-03 NOTE — PROGRESS NOTES
Daily Note     Today's date: 8/3/2022  Patient name: Janiya Carrillo  : 1935  MRN: [id number]  Referring provider: Pratima Greco MD  Dx:   Encounter Diagnosis     ICD-10-CM    1  Lumbar degenerative disc disease  M51 36    2  Cervical radiculopathy  M54 12    3  Balance problem  R26 89        Start Time: 1535  Stop Time: 1615  Total time in clinic (min): 40 minutes    Subjective: Patient states "I'm [de-identified]years old  I need to keep moving and walking to heal my back "      Objective: See treatment diary below      Assessment: Tolerated treatment well  Patient requires verbal, tactile and visual cues throughout session with therapist demonstrating and performing all exercises at the same time  Patient reports most relief with SKTC and PPT  Initiated standing hip abduction and extension with good tolerance but increased unsteadines noted during R stance phase  Patient reports relief of low back pain post-treatment  Patient demonstrated fatigue post treatment and would benefit from continued PT  Plan: Continue per plan of care             Precautions: FALLS RISK, DEMENTIA, 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), history of AVR, history of ovarian cancer, COPD    Daily Treatment Diary  * Indicates part of HEP      7/20 7/27 8/3        Manual                                                       Neuro Re-Ed           Balance testing nv:  TUG  FGA  Romberg                                                                  Ther Ex           Bike nv 5' 5'         PPT nv 10x5" 10x5"         Lumbar flexion ball rolls nv 8x5"  2x5x5"        SKTC nv 5x10" b/l 5x10" b/l        Seated piriformis stretch           Bridging           Clamshells           Standing hip extension   2x10        Standing hip abduction   2x10        Standing hip 3 way nv nv         Cervical AROM           Scapular retractions nv 2x10 2x10 (poor form 2nd set)        Chin tucks nv 10 cues                               Ther Activity Step ups           Lateral step ups           Sit to stands           Leg press           Patient education  Done Done                                         Gait Training                                             Modalities           MHP                      * = on hep

## 2022-08-04 ENCOUNTER — APPOINTMENT (OUTPATIENT)
Dept: PHYSICAL THERAPY | Facility: REHABILITATION | Age: 87
End: 2022-08-04
Payer: COMMERCIAL

## 2022-08-10 ENCOUNTER — OFFICE VISIT (OUTPATIENT)
Dept: PHYSICAL THERAPY | Facility: REHABILITATION | Age: 87
End: 2022-08-10
Payer: COMMERCIAL

## 2022-08-10 DIAGNOSIS — M51.36 LUMBAR DEGENERATIVE DISC DISEASE: Primary | ICD-10-CM

## 2022-08-10 DIAGNOSIS — R26.89 BALANCE PROBLEM: ICD-10-CM

## 2022-08-10 DIAGNOSIS — M54.12 CERVICAL RADICULOPATHY: ICD-10-CM

## 2022-08-10 PROCEDURE — 97110 THERAPEUTIC EXERCISES: CPT | Performed by: PHYSICAL THERAPIST

## 2022-08-10 NOTE — PROGRESS NOTES
Daily Note     Today's date: 8/10/2022  Patient name: Hortensia Olvera  : 1935  MRN: [id number]  Referring provider: Maria De Jesus Toro MD  Dx:   Encounter Diagnosis     ICD-10-CM    1  Lumbar degenerative disc disease  M51 36    2  Cervical radiculopathy  M54 12    3  Balance problem  R26 89        Start Time: 1533  Stop Time: 1619  Total time in clinic (min): 46 minutes    Subjective: Patient reports the back feels a little better  Objective: See treatment diary below      Assessment: Tolerated treatment well  Mild improvement in carryover of form between sessions  Requires visual demonstration with completion of all TE and to stay on task  Provided patient and her daughter with updated HEP to include standing hip strengthening with emphasis on holding onto stable surface for balance purposes  Patient demonstrated fatigue post treatment, exhibited good technique with therapeutic exercises and would benefit from continued PT  Plan: Continue per plan of care             Precautions: FALLS RISK, DEMENTIA, 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), history of AVR, history of ovarian cancer, COPD    Daily Treatment Diary  * Indicates part of HEP      7/20 7/27 8/3 8/10       Manual                                                       Neuro Re-Ed           Balance testing nv:  TUG  FGA  Romberg                                                                  Ther Ex           Bike nv 5' 5'  5'        PPT nv 10x5" 10x5"  10x5"       Lumbar flexion ball rolls nv 8x5"  2x5x5" 2x6x5"       SKTC nv 5x10" b/l 5x10" b/l 7x10" b/l       Seated piriformis stretch           Bridging           Clamshells           Standing hip extension   2x10 2x10       Standing hip abduction   2x10 2x10       Standing hip 3 way nv nv         Cervical AROM           Scapular retractions nv 2x10 2x10 (poor form 2nd set) 2x10       Chin tucks nv 10 cues                               Ther Activity           Step ups Lateral step ups           Sit to stands    nv       Leg press           Patient education  Done Done                                         Gait Training                                             Modalities           MHP                      * = on hep

## 2022-08-11 ENCOUNTER — APPOINTMENT (OUTPATIENT)
Dept: PHYSICAL THERAPY | Facility: REHABILITATION | Age: 87
End: 2022-08-11
Payer: COMMERCIAL

## 2022-08-17 ENCOUNTER — OFFICE VISIT (OUTPATIENT)
Dept: PHYSICAL THERAPY | Facility: REHABILITATION | Age: 87
End: 2022-08-17
Payer: COMMERCIAL

## 2022-08-17 DIAGNOSIS — M51.36 LUMBAR DEGENERATIVE DISC DISEASE: Primary | ICD-10-CM

## 2022-08-17 DIAGNOSIS — M54.12 CERVICAL RADICULOPATHY: ICD-10-CM

## 2022-08-17 DIAGNOSIS — R26.89 BALANCE PROBLEM: ICD-10-CM

## 2022-08-17 PROCEDURE — 97530 THERAPEUTIC ACTIVITIES: CPT | Performed by: PHYSICAL THERAPIST

## 2022-08-17 PROCEDURE — 97110 THERAPEUTIC EXERCISES: CPT | Performed by: PHYSICAL THERAPIST

## 2022-08-17 NOTE — PROGRESS NOTES
Daily Note     Today's date: 2022  Patient name: Nathanael James  : 1935  MRN: [id number]  Referring provider: Nu Villagran MD  Dx:   Encounter Diagnosis     ICD-10-CM    1  Lumbar degenerative disc disease  M51 36    2  Cervical radiculopathy  M54 12    3  Balance problem  R26 89        Start Time: 1531  Stop Time: 1615  Total time in clinic (min): 44 minutes    Subjective: Patient's daughter reports they have stopped doing posterior pelvic tilts at home due to back pain, dizziness and nausea  Patient has been compliant to rest of HEP  Objective: See treatment diary below      Assessment: Tolerated treatment well  Patient complains of neck pain in pattern of UT, initiated UT stretch with assistance/overpressure from PT with good tolerance  Held PPT secondary to reports of increased pain at home  Initiated leg press for additional BLE strengthening with good tolerance and patient reports the legs feel "looser " Patient demonstrated fatigue post treatment, exhibited good technique with therapeutic exercises and would benefit from continued PT  Plan: Continue per plan of care             Precautions: FALLS RISK, DEMENTIA, 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), history of AVR, history of ovarian cancer, COPD    Daily Treatment Diary  * Indicates part of HEP      7/20 7/27 8/3 8/10 8/17      Manual                                                       Neuro Re-Ed           Balance testing nv:  TUG  FGA  Romberg                                                                  Ther Ex           Bike nv 5' 5'  5'  5'      PPT nv 10x5" 10x5"  10x5" hold      Lumbar flexion ball rolls nv 8x5"  2x5x5" 2x6x5" 2x6x5"      SKTC nv 5x10" b/l 5x10" b/l 7x10" b/l 5x10" b/l      Seated piriformis stretch           Bridging           Clamshells           Standing hip extension   2x10 2x10       Standing hip abduction   2x10 2x10       Standing hip 3 way nv nv         Cervical AROM           Scapular retractions nv 2x10 2x10 (poor form 2nd set) 2x10 2x10      Chin tucks nv 10 cues         UT stretch     5x10" overpressure from PT                 Ther Activity           Step ups           Lateral step ups           Sit to stands    nv       Leg press     55# 2x10      Patient education  Done Done  Done                                       Gait Training                                             Modalities           MHP                      * = on hep

## 2022-08-18 ENCOUNTER — APPOINTMENT (OUTPATIENT)
Dept: PHYSICAL THERAPY | Facility: REHABILITATION | Age: 87
End: 2022-08-18
Payer: COMMERCIAL

## 2022-08-24 ENCOUNTER — OFFICE VISIT (OUTPATIENT)
Dept: PHYSICAL THERAPY | Facility: REHABILITATION | Age: 87
End: 2022-08-24
Payer: COMMERCIAL

## 2022-08-24 DIAGNOSIS — M54.12 CERVICAL RADICULOPATHY: ICD-10-CM

## 2022-08-24 DIAGNOSIS — M51.36 LUMBAR DEGENERATIVE DISC DISEASE: Primary | ICD-10-CM

## 2022-08-24 DIAGNOSIS — R26.89 BALANCE PROBLEM: ICD-10-CM

## 2022-08-24 PROCEDURE — 97530 THERAPEUTIC ACTIVITIES: CPT | Performed by: PHYSICAL THERAPIST

## 2022-08-24 PROCEDURE — 97110 THERAPEUTIC EXERCISES: CPT | Performed by: PHYSICAL THERAPIST

## 2022-08-24 NOTE — PROGRESS NOTES
Daily Note     Today's date: 2022  Patient name: Kasia Rodriguez  : 1935  MRN: [id number]  Referring provider: Amparo Hodgson MD  Dx:   Encounter Diagnosis     ICD-10-CM    1  Lumbar degenerative disc disease  M51 36    2  Cervical radiculopathy  M54 12    3  Balance problem  R26 89        Start Time: 1521  Stop Time: 1602  Total time in clinic (min): 41 minutes    Subjective: Patient reports the back feels ok today  Objective: See treatment diary below      Assessment: Tolerated treatment well  Some improved attention to task and carryover of form this visit  Patient became dizzy and nauseous following TB low rows at end of session  Monitored vitals: /70 mmHg HR 91 bpm SPO2 97%  After 5 minute seated rest /70 mmHg but dizziness subsided  Patient demonstrated fatigue post treatment, exhibited good technique with therapeutic exercises and would benefit from continued PT  Plan: Continue per plan of care             Precautions: FALLS RISK, DEMENTIA, 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), history of AVR, history of ovarian cancer, COPD    Daily Treatment Diary  * Indicates part of HEP      7/20 7/27 8/3 8/10 8/17 8/24     Manual                                                       Neuro Re-Ed           Balance testing nv:  TUG  FGA  Romberg                                                                  Ther Ex           Bike nv 5' 5'  5'  5' 5'     PPT nv 10x5" 10x5"  10x5" hold      Lumbar flexion ball rolls nv 8x5"  2x5x5" 2x6x5" 2x6x5" np     SKTC nv 5x10" b/l 5x10" b/l 7x10" b/l 5x10" b/l np     TB low rows      Pink TB x10     Seated piriformis stretch           Bridging           Clamshells           Standing hip extension   2x10 2x10  2x10 b/l     Standing hip abduction   2x10 2x10  2x10 b/l     Standing hip 3 way nv nv         Cervical AROM           Scapular retractions nv 2x10 2x10 (poor form 2nd set) 2x10 2x10 2x10     Chin tucks nv 10 cues         UT stretch 5x10" overpressure from PT 5x10" PT assist                Ther Activity           Step ups           Lateral step ups           Mini squats      2x10     Sit to stands    nv       Leg press     55# 2x10 np     Patient education  Done Done  Done                                       Gait Training                                             Modalities           MHP                      * = on hep

## 2022-08-25 ENCOUNTER — APPOINTMENT (OUTPATIENT)
Dept: PHYSICAL THERAPY | Facility: REHABILITATION | Age: 87
End: 2022-08-25
Payer: COMMERCIAL

## 2022-08-25 DIAGNOSIS — E78.49 OTHER HYPERLIPIDEMIA: ICD-10-CM

## 2022-08-25 RX ORDER — ATORVASTATIN CALCIUM 20 MG/1
TABLET, FILM COATED ORAL
Qty: 90 TABLET | Refills: 1 | Status: SHIPPED | OUTPATIENT
Start: 2022-08-25

## 2022-08-31 ENCOUNTER — OFFICE VISIT (OUTPATIENT)
Dept: PHYSICAL THERAPY | Facility: REHABILITATION | Age: 87
End: 2022-08-31
Payer: COMMERCIAL

## 2022-08-31 DIAGNOSIS — M54.12 CERVICAL RADICULOPATHY: ICD-10-CM

## 2022-08-31 DIAGNOSIS — M51.36 LUMBAR DEGENERATIVE DISC DISEASE: Primary | ICD-10-CM

## 2022-08-31 DIAGNOSIS — R26.89 BALANCE PROBLEM: ICD-10-CM

## 2022-08-31 PROCEDURE — 97530 THERAPEUTIC ACTIVITIES: CPT

## 2022-08-31 PROCEDURE — 97110 THERAPEUTIC EXERCISES: CPT

## 2022-08-31 NOTE — PROGRESS NOTES
Daily Note     Today's date: 2022  Patient name: Jason Webber  : 1935  MRN: [id number]  Referring provider: Alexys Ng MD  Dx:   Encounter Diagnosis     ICD-10-CM    1  Lumbar degenerative disc disease  M51 36    2  Cervical radiculopathy  M54 12    3  Balance problem  R26 89        Start Time: 1530  Stop Time: 1610  Total time in clinic (min): 40 minutes    Subjective: Patient reports feeling "alright, I feel better today than I did last time" at start of session  She reports she is sleeping better at night stating "I feel more relaxed "        Objective: See treatment diary below      Assessment: Tolerated treatment well  Patient demonstrated fatigue post treatment, exhibited good technique with therapeutic exercises and would benefit from continued PT  Improved tolerance to all TE performed today, patient reporting no discomfort with any TE performed  Occasional cues required for task at hand  Plan: Continue per plan of care  Progress treatment as tolerated              Precautions: FALLS RISK, DEMENTIA, 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), history of AVR, history of ovarian cancer, COPD    Daily Treatment Diary  * Indicates part of HEP      7/20 7/27 8/3 8/10 8/17 8/24 8/31    Manual                                                       Neuro Re-Ed           Balance testing nv:  TUG  FGA  Romberg                                                                  Ther Ex           Bike nv 5' 5'  5'  5' 5' 5'    PPT nv 10x5" 10x5"  10x5" hold      Lumbar flexion ball rolls nv 8x5"  2x5x5" 2x6x5" 2x6x5" np 2x6x5''    SKTC nv 5x10" b/l 5x10" b/l 7x10" b/l 5x10" b/l np     TB low rows      Pink TB x10 Pink TB 2x10    Seated piriformis stretch           Bridging           Clamshells           Standing hip extension   2x10 2x10  2x10 b/l 2x10 b/l    Standing hip abduction   2x10 2x10  2x10 b/l 2x10 b/l    Standing hip 3 way nv nv         Cervical AROM           Scapular retractions nv 2x10 2x10 (poor form 2nd set) 2x10 2x10 2x10 2x10     Chin tucks nv 10 cues         UT stretch     5x10" overpressure from PT 5x10" PT assist 5x10 PT assist               Ther Activity           Step ups           Lateral step ups           Mini squats      2x10 2x10    Sit to stands    nv       Leg press     55# 2x10 np     Patient education  Done Done  Done                                       Gait Training                                             Modalities           MHP                      * = on hep

## 2022-09-01 ENCOUNTER — APPOINTMENT (OUTPATIENT)
Dept: PHYSICAL THERAPY | Facility: REHABILITATION | Age: 87
End: 2022-09-01
Payer: COMMERCIAL

## 2022-09-08 ENCOUNTER — OFFICE VISIT (OUTPATIENT)
Dept: PHYSICAL THERAPY | Facility: REHABILITATION | Age: 87
End: 2022-09-08
Payer: COMMERCIAL

## 2022-09-08 DIAGNOSIS — R26.89 BALANCE PROBLEM: ICD-10-CM

## 2022-09-08 DIAGNOSIS — M51.36 LUMBAR DEGENERATIVE DISC DISEASE: Primary | ICD-10-CM

## 2022-09-08 DIAGNOSIS — M54.12 CERVICAL RADICULOPATHY: ICD-10-CM

## 2022-09-08 PROCEDURE — 97110 THERAPEUTIC EXERCISES: CPT

## 2022-09-08 PROCEDURE — 97530 THERAPEUTIC ACTIVITIES: CPT

## 2022-09-08 NOTE — PROGRESS NOTES
Daily Note     Today's date: 2022  Patient name: Denise Dorado  : 1935  MRN: [id number]  Referring provider: Michael Mays MD  Dx:   Encounter Diagnosis     ICD-10-CM    1  Lumbar degenerative disc disease  M51 36    2  Cervical radiculopathy  M54 12    3  Balance problem  R26 89        Start Time: 1530  Stop Time: 1612  Total time in clinic (min): 42 minutes    Subjective: Patient reports no complaints at start of session today  Objective: See treatment diary below      Assessment: Tolerated treatment well  Patient demonstrated fatigue post treatment, exhibited good technique with therapeutic exercises and would benefit from continued PT Cues continued to be required for proper hold time/ form with most TE, especially with UT stretch  No pain reported with any TE performed  Plan: Continue per plan of care  Progress treatment as tolerated              Precautions: FALLS RISK, DEMENTIA, 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), history of AVR, history of ovarian cancer, COPD    Daily Treatment Diary  * Indicates part of HEP      7/20 7/27 8/3 8/10 8/17 8/24 8/31 9/8   Manual                                                       Neuro Re-Ed           Balance testing nv:  TUG  FGA  Romberg                                                                  Ther Ex           Bike nv 5' 5'  5'  5' 5' 5' 5'   PPT nv 10x5" 10x5"  10x5" hold      Lumbar flexion ball rolls nv 8x5"  2x5x5" 2x6x5" 2x6x5" np 2x6x5'' 2x5x5''   SKTC nv 5x10" b/l 5x10" b/l 7x10" b/l 5x10" b/l np     TB low rows      Pink TB x10 Pink TB 2x10 Pink TB 2x10   Seated piriformis stretch           Bridging           Clamshells           Standing hip extension   2x10 2x10  2x10 b/l 2x10 b/l 2x10 b/l   Standing hip abduction   2x10 2x10  2x10 b/l 2x10 b/l 2x10 b/l   Standing hip 3 way nv nv         Cervical AROM           Scapular retractions nv 2x10 2x10 (poor form 2nd set) 2x10 2x10 2x10 2x10  2x10    Chin tucks nv 10 cues UT stretch     5x10" overpressure from PT 5x10" PT assist 5x10 PT assist 5x10'' PT assist              Ther Activity           Step ups           Lateral step ups           Mini squats      2x10 2x10 2x10   Sit to stands    nv       Leg press     55# 2x10 np     Patient education  Done Done  Done                                       Gait Training                                             Modalities           MHP                      * = on hep

## 2022-09-14 ENCOUNTER — APPOINTMENT (OUTPATIENT)
Dept: PHYSICAL THERAPY | Facility: REHABILITATION | Age: 87
End: 2022-09-14
Payer: COMMERCIAL

## 2022-09-14 DIAGNOSIS — K21.9 GASTROESOPHAGEAL REFLUX DISEASE, UNSPECIFIED WHETHER ESOPHAGITIS PRESENT: ICD-10-CM

## 2022-09-14 RX ORDER — FAMOTIDINE 20 MG/1
TABLET, FILM COATED ORAL
Qty: 90 TABLET | Refills: 1 | Status: SHIPPED | OUTPATIENT
Start: 2022-09-14

## 2022-09-15 ENCOUNTER — OFFICE VISIT (OUTPATIENT)
Dept: PHYSICAL THERAPY | Facility: REHABILITATION | Age: 87
End: 2022-09-15
Payer: COMMERCIAL

## 2022-09-15 DIAGNOSIS — M51.36 LUMBAR DEGENERATIVE DISC DISEASE: Primary | ICD-10-CM

## 2022-09-15 DIAGNOSIS — M54.12 CERVICAL RADICULOPATHY: ICD-10-CM

## 2022-09-15 DIAGNOSIS — R26.89 BALANCE PROBLEM: ICD-10-CM

## 2022-09-15 PROCEDURE — 97530 THERAPEUTIC ACTIVITIES: CPT

## 2022-09-15 PROCEDURE — 97110 THERAPEUTIC EXERCISES: CPT

## 2022-09-15 NOTE — PROGRESS NOTES
Daily Note     Today's date: 9/15/2022  Patient name: Dianne Dhillon  : 1935  MRN: [id number]  Referring provider: Akua Vargas MD  Dx:   Encounter Diagnosis     ICD-10-CM    1  Lumbar degenerative disc disease  M51 36    2  Cervical radiculopathy  M54 12    3  Balance problem  R26 89        Start Time: 1530  Stop Time: 1613  Total time in clinic (min): 43 minutes    Subjective:  Patient reporting some neck soreness at start of session  Objective: See treatment diary below      Assessment: Tolerated treatment well  Patient demonstrated fatigue post treatment, exhibited good technique with therapeutic exercises and would benefit from continued PT Trialed sit to stands with foam carmenza this session with patient tolerating well, overall good balance noted  Cues continued to be required for task at hand and proper form with most TE  No pain noted throughout session  Plan: Continue per plan of care  Progress treatment as tolerated              Precautions: FALLS RISK, DEMENTIA, 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), history of AVR, history of ovarian cancer, COPD    Daily Treatment Diary  * Indicates part of HEP      9/15 7/27 8/3 8/10 8/17 8/24 8/31 9/8   Manual                                                       Neuro Re-Ed           Balance testing nv:  TUG  FGA  Romberg                                                                  Ther Ex           Bike 5' 5' 5'  5'  5' 5' 5' 5'   PPT  10x5" 10x5"  10x5" hold      Lumbar flexion ball rolls 2x5x5'' 8x5"  2x5x5" 2x6x5" 2x6x5" np 2x6x5'' 2x5x5''   SKTC  5x10" b/l 5x10" b/l 7x10" b/l 5x10" b/l np     TB low rows Pink TB 3x10     Pink TB x10 Pink TB 2x10 Pink TB 2x10   Seated piriformis stretch           Bridging           Clamshells           Standing hip extension 3x10 b/l  2x10 2x10  2x10 b/l 2x10 b/l 2x10 b/l   Standing hip abduction 3x10 b/l  2x10 2x10  2x10 b/l 2x10 b/l 2x10 b/l   Standing hip 3 way  nv         Cervical AROM Scapular retractions 2x10 2x10 2x10 (poor form 2nd set) 2x10 2x10 2x10 2x10  2x10    Chin tucks  10 cues         UT stretch     5x10" overpressure from PT 5x10" PT assist 5x10 PT assist 5x10'' PT assist              Ther Activity           Step ups           Lateral step ups           Mini squats      2x10 2x10 2x10   Sit to stands x10 foam boost   nv       Leg press     55# 2x10 np     Patient education  Done Done  Done                                       Gait Training                                             Modalities           MHP                      * = on hep

## 2022-09-21 ENCOUNTER — EVALUATION (OUTPATIENT)
Dept: PHYSICAL THERAPY | Facility: REHABILITATION | Age: 87
End: 2022-09-21
Payer: COMMERCIAL

## 2022-09-21 DIAGNOSIS — R26.89 BALANCE PROBLEM: ICD-10-CM

## 2022-09-21 DIAGNOSIS — M54.12 CERVICAL RADICULOPATHY: ICD-10-CM

## 2022-09-21 DIAGNOSIS — M51.36 LUMBAR DEGENERATIVE DISC DISEASE: Primary | ICD-10-CM

## 2022-09-21 PROCEDURE — 97530 THERAPEUTIC ACTIVITIES: CPT | Performed by: PHYSICAL THERAPIST

## 2022-09-21 PROCEDURE — 97110 THERAPEUTIC EXERCISES: CPT | Performed by: PHYSICAL THERAPIST

## 2022-09-21 NOTE — PROGRESS NOTES
PT Re-Evaluation     Today's date: 2022  Patient name: Kedar Salcedo  : 1935  MRN: [id number]  Referring provider: Ruben Liang MD  Dx:   Encounter Diagnosis     ICD-10-CM    1  Lumbar degenerative disc disease  M51 36    2  Cervical radiculopathy  M54 12    3  Balance problem  R26 89        Start Time: 1535  Stop Time: 1620  Total time in clinic (min): 45 minutes    Assessment  Assessment details: RE performed on 22  Jj Ferrell has made the following improvements since beginning PT: decreased pain, increased ROM, increased strength, increased postural control and awareness and increased tolerance to activities  Jj Ferrell presents with improvements in cervical ROM and BUE/BLE strength  She presents with improved static and dynamic balance objectively  Jj Ferrell continues to present with the impairments as listed above  Patient would continue to benefit from skilled PT to address these deficits and to maximize function  Secondary to dementia, patient has minimal carryover between visits and requires skilled PT sessions for continued gains  Impairments: abnormal gait, abnormal muscle firing, abnormal muscle tone, abnormal or restricted ROM, activity intolerance, impaired balance, impaired physical strength, lacks appropriate home exercise program, pain with function, safety issue, poor posture  and poor body mechanics  Barriers to therapy: FALLS RISK    Goals  Short-Term Goals: 2-4 weeks  1  Patient will report no falls or loss of balance  met  2  Patient will report 50% improvement in low back pain  met      Long-Term Goals: 4-6 weeks  1  Patient will be able to walk at least 15 minutes without loss of balance or rest breaks for improved endurance/strength  progressing  2  Patient will improve BUE/BLE strength by at least 1-2 grades  met  3  Patient independent with HEP at time of discharge  progressing      Plan  Plan details:  Thank you for opportunity to participate in the care of Estelita More Andrea Vera  Patient would benefit from: skilled physical therapy and PT eval  Planned therapy interventions: abdominal trunk stabilization, balance, gait training, home exercise program, transfer training, therapeutic exercise, therapeutic activities, strengthening, stretching, postural training, patient education and neuromuscular re-education  Frequency: 2x week  Duration in visits: 16  Plan of Care beginning date: 9/21/2022  Treatment plan discussed with: patient        Subjective Evaluation    History of Present Illness  Mechanism of injury: RE performed on 09/21/22  Troy Tesfaye has been seen for a total of 10 visits for OP PT for chronic back and neck pain  Patient's global rating of change is " Quite a bit better (5) " Patient reports improvements with decreased neck and back pain  She feels PT has been helping to keep her mobile  Her daughter reports she has been doing better  She complaints of stiffness and neck pain in the mornings  IE 7/20/2022:  Patient is a 80 y o  presenting to physical therapy with complaints of back pain and neck pain  Patient is a very poor historian  She states "I'm here to find out if I have a fracture and what the doctor wants me to do " She reports she fell 2 weeks ago, however, following clarification from her daughter that is not true  N/T/Radicular pain: No but according to daughter can get numbness into the shoulders  Bowel/Bladder changes: None  Imaging: x-rays in June of neck and back - reveals degenerative disc disease  Previous Injury: thrown from horses in the past     Home set-up and functional level:  Steps to enter home: Yes into front door - will be with daughter and hold onto handrail  First floor set up: Yes  Adequate lighting, throw rugs: Yes/No  Previous falls:  According to daughter no  Assistive Device: [employer], Shower Chair   Assist at home: daughter Nella Gaucher - living with daughter currently  Current level of function: 3 block walk with daughter with rest breaks  Goes to [employer] 3x/week   Pain  Current pain ratin  At best pain ratin  At worst pain ratin  Location: middle lumbar spine  Quality: dull ache and discomfort (tender/sore)  Aggravating factors: sitting    Social Support  Lives with: adult children    Employment status: not working  Treatments  Previous treatment: chiropractic  Current treatment: physical therapy  Patient Goals  Patient goals for therapy: decreased pain, increased motion, improved balance, increased strength, return to sport/leisure activities and independence with ADLs/IADLs  Patient goal: keep moving, walk more, balance         Objective     Strength/Myotome Testing     Left Shoulder     Planes of Motion   Flexion: 4   Abduction: 4-     Right Shoulder     Planes of Motion   Flexion: 4   Abduction: 4-     Left Elbow   Flexion: 4+  Extension: 4+    Right Elbow   Flexion: 4+  Extension: 4+    Left Hip   Planes of Motion   Flexion: 4-    Right Hip   Planes of Motion   Flexion: 4-    Left Knee   Flexion: 4  Extension: 4    Right Knee   Flexion: 4  Extension: 4    Left Ankle/Foot   Dorsiflexion: 4-    Right Ankle/Foot   Dorsiflexion: 4+  Neuro Exam:     Functional outcomes   Functional outcome comment: RE performed on 22  30"STS: 8 reps - no BUE use - improved eccentric control compared to IE and no LOB    Cervical ROM:   Extension 40*  Flexion 50*  LSB 20*  RSB 35*  Seated cervical rotation 50% no compensation from thoracic rotation    Lumbar ROM: BUE support   Flexion 90*  Extension 50%  SB WFL - pain to right     IE 2022:  30"STS: 8 repetitions - no BUE use - poor eccentric control - SOB SPO2 99% HR 98 bpm   TUG: nv    Cervical ROM:   Extension 40*  Flexion 45*  SB 20*  Rotation 50% with thoracic rotation compensation     Lumbar ROM: BUE support   Flexion 90*  Extension 50%  SB WFL - pain to right     Tenderness to right SIJ     Ambulating with SPC in RUE with slowed speed and mild unsteadiness, some posterior lean with static standing                   Precautions: FALLS RISK, DEMENTIA, 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), history of AVR, history of ovarian cancer, COPD    Daily Treatment Diary  * Indicates part of HEP      9/15 9/21  8/10 8/17 8/24 8/31 9/8   Manual                                                       Neuro Re-Ed           Balance testing nv:  TUG  FGA  Romberg                                                                  Ther Ex           Bike 5' 5'   5'  5' 5' 5' 5'   PPT    10x5" hold      Lumbar flexion ball rolls 2x5x5'' x10  2x6x5" 2x6x5" np 2x6x5'' 2x5x5''   SKTC    7x10" b/l 5x10" b/l np     TB low rows Pink TB 3x10      Pink TB x10 Pink TB 2x10 Pink TB 2x10   Seated mid rows  PTB 3x10         Seated piriformis stretch           Clamshells           Standing hip extension 3x10 b/l nv  2x10  2x10 b/l 2x10 b/l 2x10 b/l   Standing hip abduction 3x10 b/l nv  2x10  2x10 b/l 2x10 b/l 2x10 b/l   Cervical AROM  Ext x10  Flex x10         Scapular retractions 2x10 2x10  2x10 2x10 2x10 2x10  2x10    Chin tucks           UT stretch     5x10" overpressure from PT 5x10" PT assist 5x10 PT assist 5x10'' PT assist              Ther Activity           Step ups           Lateral step ups           Mini squats      2x10 2x10 2x10   Sit to stands x10 foam boost x10 no foam  nv       Leg press     55# 2x10 np     Patient education     Done                                       Gait Training                                             Modalities           MHP                      * = on hep

## 2022-09-28 ENCOUNTER — APPOINTMENT (OUTPATIENT)
Dept: PHYSICAL THERAPY | Facility: REHABILITATION | Age: 87
End: 2022-09-28
Payer: COMMERCIAL

## 2022-09-29 ENCOUNTER — OFFICE VISIT (OUTPATIENT)
Dept: PHYSICAL THERAPY | Facility: REHABILITATION | Age: 87
End: 2022-09-29
Payer: COMMERCIAL

## 2022-09-29 DIAGNOSIS — R26.89 BALANCE PROBLEM: ICD-10-CM

## 2022-09-29 DIAGNOSIS — M54.12 CERVICAL RADICULOPATHY: ICD-10-CM

## 2022-09-29 DIAGNOSIS — M51.36 LUMBAR DEGENERATIVE DISC DISEASE: Primary | ICD-10-CM

## 2022-09-29 PROCEDURE — 97110 THERAPEUTIC EXERCISES: CPT

## 2022-09-29 PROCEDURE — 97530 THERAPEUTIC ACTIVITIES: CPT

## 2022-09-29 NOTE — PROGRESS NOTES
Daily Note     Today's date: 2022  Patient name: Alexander Crain  : 1935  MRN: [id number]  Referring provider: Lakeisha Cervantes MD  Dx:   Encounter Diagnosis     ICD-10-CM    1  Lumbar degenerative disc disease  M51 36    2  Cervical radiculopathy  M54 12    3  Balance problem  R26 89        Start Time: 1530  Stop Time: 1610  Total time in clinic (min): 40 minutes    Subjective: Patient reports feeling "alright" at start of session, reporting no complaints  Objective: See treatment diary below      Assessment: Tolerated treatment well  Patient demonstrated fatigue post treatment and would benefit from continued PT Difficulty with remembering task at hand this session with most TE, consistent cue required from therapist  No pain noted during session today with any TE performed  Plan: Continue per plan of care  Progress treatment as tolerated         Precautions: FALLS RISK, DEMENTIA, 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), history of AVR, history of ovarian cancer, COPD    Daily Treatment Diary  * Indicates part of HEP      9/15 9/21 9/29 8/10 8/17 8/24 8/31 9/8   Manual                                                       Neuro Re-Ed           Balance testing nv:  TUG  FGA  Romberg                                                                  Ther Ex           Bike 5' 5'  5' 5'  5' 5' 5' 5'   PPT    10x5" hold      Lumbar flexion ball rolls 2x5x5'' x10 2x5x5''  2x6x5" 2x6x5" np 2x6x5'' 2x5x5''   SKTC    7x10" b/l 5x10" b/l np     TB low rows Pink TB 3x10      Pink TB x10 Pink TB 2x10 Pink TB 2x10   Seated mid rows  PTB 3x10 PTB 3x10         Seated piriformis stretch           Clamshells           Standing hip extension 3x10 b/l nv 3x10  2x10  2x10 b/l 2x10 b/l 2x10 b/l   Standing hip abduction 3x10 b/l nv 3x10  2x10  2x10 b/l 2x10 b/l 2x10 b/l   Cervical AROM  Ext x10  Flex x10 Ext x10   Flex x10        Scapular retractions 2x10 2x10 2x10 2x10 2x10 2x10 2x10  2x10    Chin tucks UT stretch     5x10" overpressure from PT 5x10" PT assist 5x10 PT assist 5x10'' PT assist              Ther Activity           Step ups           Lateral step ups           Mini squats      2x10 2x10 2x10   Sit to stands x10 foam boost x10 no foam x10 no foam nv       Leg press     55# 2x10 np     Patient education     Done                                       Gait Training                                             Modalities           MHP                      * = on hep

## 2022-10-06 ENCOUNTER — OFFICE VISIT (OUTPATIENT)
Dept: PHYSICAL THERAPY | Facility: REHABILITATION | Age: 87
End: 2022-10-06
Payer: COMMERCIAL

## 2022-10-06 DIAGNOSIS — R26.89 BALANCE PROBLEM: ICD-10-CM

## 2022-10-06 DIAGNOSIS — M54.12 CERVICAL RADICULOPATHY: ICD-10-CM

## 2022-10-06 DIAGNOSIS — M51.36 LUMBAR DEGENERATIVE DISC DISEASE: Primary | ICD-10-CM

## 2022-10-06 PROCEDURE — 97110 THERAPEUTIC EXERCISES: CPT | Performed by: PHYSICAL THERAPIST

## 2022-10-06 PROCEDURE — 97530 THERAPEUTIC ACTIVITIES: CPT | Performed by: PHYSICAL THERAPIST

## 2022-10-06 NOTE — PROGRESS NOTES
Daily Note     Today's date: 10/6/2022  Patient name: Tootie Peterson  : 1935  MRN: [id number]  Referring provider: Ankita Trujillo MD  Dx:   Encounter Diagnosis     ICD-10-CM    1  Lumbar degenerative disc disease  M51 36    2  Cervical radiculopathy  M54 12    3  Balance problem  R26 89        Start Time: 1532  Stop Time: 1616  Total time in clinic (min): 44 minutes    Subjective: Patient states "I can feel it healing back there "      Objective: See treatment diary below      Assessment: Tolerated treatment well  Improvement in focus this visit and some carryover of form noted between sessions compared to previous sessions but continues to require maximal cues throughout session  Patient demonstrated fatigue post treatment, exhibited good technique with therapeutic exercises and would benefit from continued PT  Plan: Continue per plan of care        Precautions: FALLS RISK, DEMENTIA, 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), history of AVR, history of ovarian cancer, COPD    Daily Treatment Diary  * Indicates part of HEP      9/15 9/21 9/29 10/6   8/31 9/8   Manual                                                       Neuro Re-Ed           Balance testing nv:  TUG  FGA  Romberg                                                                  Ther Ex           Bike 5' 5'  5' 5'   5' 5'   PPT           Lumbar flexion ball rolls 2x5x5'' x10 2x5x5''  nv   2x6x5'' 2x5x5''   SKTC           TB low rows Pink TB 3x10       Pink TB 2x10 Pink TB 2x10   Seated mid rows  PTB 3x10 PTB 3x10  PTB 3x10       Seated piriformis stretch           Clamshells           Standing hip extension 3x10 b/l nv 3x10  3x10   2x10 b/l 2x10 b/l   Standing hip abduction 3x10 b/l nv 3x10  3x10   2x10 b/l 2x10 b/l   Cervical AROM  Ext x10  Flex x10 Ext x10   Flex x10 Ext/flex x20        Scapular retractions 2x10 2x10 2x10 3x10   2x10  2x10    Chin tucks           UT stretch       5x10 PT assist 5x10'' PT assist              Ther Activity           Step ups           Lateral step ups           Mini squats       2x10 2x10   Sit to stands x10 foam boost x10 no foam x10 no foam x10 no foam       Leg press           Patient education                                            Gait Training                                             Modalities           MHP                      * = on hep

## 2022-10-13 ENCOUNTER — OFFICE VISIT (OUTPATIENT)
Dept: PHYSICAL THERAPY | Facility: REHABILITATION | Age: 87
End: 2022-10-13
Payer: COMMERCIAL

## 2022-10-13 DIAGNOSIS — R26.89 BALANCE PROBLEM: ICD-10-CM

## 2022-10-13 DIAGNOSIS — M51.36 LUMBAR DEGENERATIVE DISC DISEASE: Primary | ICD-10-CM

## 2022-10-13 DIAGNOSIS — M54.12 CERVICAL RADICULOPATHY: ICD-10-CM

## 2022-10-13 PROCEDURE — 97530 THERAPEUTIC ACTIVITIES: CPT

## 2022-10-13 PROCEDURE — 97110 THERAPEUTIC EXERCISES: CPT

## 2022-10-13 NOTE — PROGRESS NOTES
Daily Note     Today's date: 10/13/2022  Patient name: Anahy Diaz  : 1935  MRN: [id number]  Referring provider: Karan Lee MD  Dx:   Encounter Diagnosis     ICD-10-CM    1  Lumbar degenerative disc disease  M51 36    2  Cervical radiculopathy  M54 12    3  Balance problem  R26 89        Start Time: 1535  Stop Time: 1615  Total time in clinic (min): 40 minutes    Subjective: Patient reports no complaints at start of session, reporting no falls  Objective: See treatment diary below      Assessment: Tolerated treatment well  Patient demonstrated fatigue post treatment, exhibited good technique with therapeutic exercises and would benefit from continued PT       Plan: Continue per plan of care  Progress treatment as tolerated         Precautions: FALLS RISK, DEMENTIA, 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), history of AVR, history of ovarian cancer, COPD    Daily Treatment Diary  * Indicates part of HEP      9/15 9/21 9/29 10/6 10/13  8/31 9/8   Manual                                                       Neuro Re-Ed           Balance testing nv:  TUG  FGA  Romberg                                                                  Ther Ex           Bike 5' 5'  5' 5' 5'  5' 5'   PPT           Lumbar flexion ball rolls 2x5x5'' x10 2x5x5''  nv 2x5x5"  2x6x5'' 2x5x5''   SKTC           TB low rows Pink TB 3x10       Pink TB 2x10 Pink TB 2x10   Seated mid rows  PTB 3x10 PTB 3x10  PTB 3x10 PTB 3x10      Seated piriformis stretch           Clamshells           Standing hip extension 3x10 b/l nv 3x10  3x10 3x10   2x10 b/l 2x10 b/l   Standing hip abduction 3x10 b/l nv 3x10  3x10 3x10  2x10 b/l 2x10 b/l   Cervical AROM  Ext x10  Flex x10 Ext x10   Flex x10 Ext/flex x20  Ext/flex x20       Scapular retractions 2x10 2x10 2x10 3x10 3x10   2x10  2x10    Chin tucks           UT stretch       5x10 PT assist 5x10'' PT assist              Ther Activity           Step ups           Lateral step ups Mini squats       2x10 2x10   Sit to stands x10 foam boost x10 no foam x10 no foam x10 no foam x10 no foam      Leg press           Patient education                                            Gait Training                                             Modalities           MHP                      * = on hep

## 2022-10-20 ENCOUNTER — OFFICE VISIT (OUTPATIENT)
Dept: PHYSICAL THERAPY | Facility: REHABILITATION | Age: 87
End: 2022-10-20
Payer: COMMERCIAL

## 2022-10-20 DIAGNOSIS — R26.89 BALANCE PROBLEM: ICD-10-CM

## 2022-10-20 DIAGNOSIS — M51.36 LUMBAR DEGENERATIVE DISC DISEASE: Primary | ICD-10-CM

## 2022-10-20 DIAGNOSIS — M54.12 CERVICAL RADICULOPATHY: ICD-10-CM

## 2022-10-20 PROCEDURE — 97110 THERAPEUTIC EXERCISES: CPT

## 2022-10-20 PROCEDURE — 97530 THERAPEUTIC ACTIVITIES: CPT

## 2022-10-20 NOTE — PROGRESS NOTES
Daily Note     Today's date: 10/20/2022  Patient name: Sherman Gunter  : 1935  MRN: [id number]  Referring provider: Rosette Jarvis MD  Dx:   Encounter Diagnosis     ICD-10-CM    1  Lumbar degenerative disc disease  M51 36    2  Cervical radiculopathy  M54 12    3  Balance problem  R26 89        Start Time: 1530  Stop Time: 1615  Total time in clinic (min): 45 minutes    Subjective: Patient reporting no complaints at start of session  Objective: See treatment diary below      Assessment: Tolerated treatment well  Patient demonstrated fatigue post treatment, exhibited good technique with therapeutic exercises and would benefit from continued PT Trialed step ups today, BUE support required, with cues required for proper step sequencing, no LOB noted  Patient required additional/continuous cues for task at hand/proper form with most TE today  Plan: Continue per plan of care  Progress treatment as tolerated         Precautions: FALLS RISK, DEMENTIA, 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), history of AVR, history of ovarian cancer, COPD    Daily Treatment Diary  * Indicates part of HEP      9/15 9/21 9/29 10/6 10/13 10/20  9/8   Manual                                                       Neuro Re-Ed           Balance testing nv:  TUG  FGA  Romberg                                                                  Ther Ex           Bike 5' 5'  5' 5' 5' 5'  5'   PPT           Lumbar flexion ball rolls 2x5x5'' x10 2x5x5''  nv 2x5x5"   2x5x5''   SKTC           TB low rows Pink TB 3x10        Pink TB 2x10   Seated mid rows  PTB 3x10 PTB 3x10  PTB 3x10 PTB 3x10 PTB 3x10     Seated piriformis stretch           Clamshells           Standing hip extension 3x10 b/l nv 3x10  3x10 3x10  3x10   2x10 b/l   Standing hip abduction 3x10 b/l nv 3x10  3x10 3x10 3x10   2x10 b/l   Cervical AROM  Ext x10  Flex x10 Ext x10   Flex x10 Ext/flex x20  Ext/flex x20  Ext/flex 20x     Scapular retractions 2x10 2x10 2x10 3x10 3x10  3x10   2x10    Chin tucks           UT stretch        5x10'' PT assist              Ther Activity           Step ups      4'' x10 ea     Lateral step ups           Mini squats        2x10   Sit to stands x10 foam boost x10 no foam x10 no foam x10 no foam x10 no foam x10 no foam      Leg press           Patient education                                            Gait Training                                             Modalities           MHP                      * = on hep

## 2022-10-27 ENCOUNTER — OFFICE VISIT (OUTPATIENT)
Dept: PHYSICAL THERAPY | Facility: REHABILITATION | Age: 87
End: 2022-10-27
Payer: COMMERCIAL

## 2022-10-27 DIAGNOSIS — M54.12 CERVICAL RADICULOPATHY: ICD-10-CM

## 2022-10-27 DIAGNOSIS — M51.36 LUMBAR DEGENERATIVE DISC DISEASE: Primary | ICD-10-CM

## 2022-10-27 DIAGNOSIS — R26.89 BALANCE PROBLEM: ICD-10-CM

## 2022-10-27 PROCEDURE — 97530 THERAPEUTIC ACTIVITIES: CPT

## 2022-10-27 PROCEDURE — 97110 THERAPEUTIC EXERCISES: CPT

## 2022-10-27 NOTE — PROGRESS NOTES
Daily Note     Today's date: 10/27/2022  Patient name: Tuan Charles  : 1935  MRN: [id number]  Referring provider: Farhana Dey MD  Dx:   Encounter Diagnosis     ICD-10-CM    1  Lumbar degenerative disc disease  M51 36    2  Cervical radiculopathy  M54 12    3  Balance problem  R26 89        Start Time: 1533  Stop Time: 1615  Total time in clinic (min): 42 minutes    Subjective: Patient reports feeling "alright" at start of session  Objective: See treatment diary below      Assessment: Tolerated treatment well  Patient demonstrated fatigue post treatment and would benefit from continued PT  Increased cues required this session for task at hand  Patient given updated HEP this session, educated patient's daughter, reporting understanding  Plan: Continue per plan of care  Progress treatment as tolerated         Precautions: FALLS RISK, DEMENTIA, 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), history of AVR, history of ovarian cancer, COPD    Daily Treatment Diary  * Indicates part of HEP      9/21 9/29 10/6 10/13 10/20 10/27    Manual                                                  Neuro Re-Ed          Balance testing nv:  TUG  FGA  Romberg                                                            Ther Ex          Bike 5'  5' 5' 5' 5' 5'    PPT          Lumbar flexion ball rolls x10 2x5x5''  nv 2x5x5"      SKTC          TB low rows           Seated mid rows PTB 3x10 PTB 3x10  PTB 3x10 PTB 3x10 PTB 3x10 OTB 3x10    Seated piriformis stretch          Clamshells          Standing hip extension nv 3x10  3x10 3x10  3x10  3x10    Standing hip abduction nv 3x10  3x10 3x10 3x10  3x10    Cervical AROM Ext x10  Flex x10 Ext x10   Flex x10 Ext/flex x20  Ext/flex x20  Ext/flex 20x Ext/flex 20x    Scapular retractions 2x10 2x10 3x10 3x10  3x10  2x10    Chin tucks          UT stretch                    Ther Activity          Step ups     4'' x10 ea 4'' x10 ea    Lateral step ups          Mini squats Sit to stands x10 no foam x10 no foam x10 no foam x10 no foam x10 no foam  x10   x5 no foam     Leg press          Patient education                                        Gait Training                                         Modalities          MHP                    * = on hep

## 2022-11-03 ENCOUNTER — OFFICE VISIT (OUTPATIENT)
Dept: PHYSICAL THERAPY | Facility: REHABILITATION | Age: 87
End: 2022-11-03

## 2022-11-03 DIAGNOSIS — M54.12 CERVICAL RADICULOPATHY: ICD-10-CM

## 2022-11-03 DIAGNOSIS — R26.89 BALANCE PROBLEM: ICD-10-CM

## 2022-11-03 DIAGNOSIS — M51.36 LUMBAR DEGENERATIVE DISC DISEASE: Primary | ICD-10-CM

## 2022-11-03 NOTE — PROGRESS NOTES
Daily Note     Today's date: 11/3/2022  Patient name: Brandyn Ge  : 1935  MRN: [id number]  Referring provider: Savi Hooks MD  Dx:   Encounter Diagnosis     ICD-10-CM    1  Lumbar degenerative disc disease  M51 36    2  Cervical radiculopathy  M54 12    3  Balance problem  R26 89        Start Time: 1531  Stop Time: 1615  Total time in clinic (min): 44 minutes    Subjective: Patient states "I just came from work  I'm tired "      Objective: See treatment diary below      Assessment: Tolerated treatment well  Evident fatigue noted at end of session indicating appropriate dosage of TE for therapeutic gains  Patient does report left sided posterior neck pain with cervical AROM therefore held additional repetitions  Some cues required intermittently throughout session to stay on task and maintain proper form  Patient demonstrated fatigue post treatment, exhibited good technique with therapeutic exercises and would benefit from continued PT  Plan: Continue per plan of care        Precautions: FALLS RISK, DEMENTIA, 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), history of AVR, history of ovarian cancer, COPD    Daily Treatment Diary  * Indicates part of HEP      9/21 9/29 10/6 10/13 10/20 10/27 11/3   Manual                                                  Neuro Re-Ed          Balance testing nv:  TUG  FGA  Romberg                                                            Ther Ex          Bike 5'  5' 5' 5' 5' 5' 5'   PPT          Lumbar flexion ball rolls x10 2x5x5''  nv 2x5x5"      SKTC          TB low rows           Seated mid rows PTB 3x10 PTB 3x10  PTB 3x10 PTB 3x10 PTB 3x10 OTB 3x10 Pink TB 3x10   Seated piriformis stretch          Clamshells          Standing hip extension nv 3x10  3x10 3x10  3x10  3x10 3x10   Standing hip abduction nv 3x10  3x10 3x10 3x10  3x10 3x10   Cervical AROM Ext x10  Flex x10 Ext x10   Flex x10 Ext/flex x20  Ext/flex x20  Ext/flex 20x Ext/flex 20x Ext/Flex  x10 Scapular retractions 2x10 2x10 3x10 3x10  3x10  2x10 2x10   Chin tucks          UT stretch                    Ther Activity          Step ups     4'' x10 ea 4'' x10 ea 4" x10 ea   Lateral step ups          Mini squats          Sit to stands x10 no foam x10 no foam x10 no foam x10 no foam x10 no foam  x10   x5 no foam  2x10 no foam   Leg press          Patient education                                        Gait Training                                         Modalities          MHP                    * = on hep

## 2022-11-10 ENCOUNTER — OFFICE VISIT (OUTPATIENT)
Dept: PHYSICAL THERAPY | Facility: REHABILITATION | Age: 87
End: 2022-11-10

## 2022-11-10 DIAGNOSIS — M51.36 LUMBAR DEGENERATIVE DISC DISEASE: Primary | ICD-10-CM

## 2022-11-10 DIAGNOSIS — M54.12 CERVICAL RADICULOPATHY: ICD-10-CM

## 2022-11-10 DIAGNOSIS — R26.89 BALANCE PROBLEM: ICD-10-CM

## 2022-11-10 NOTE — PROGRESS NOTES
Daily Note     Today's date: 11/10/2022  Patient name: Chrisandra Duane  : 1935  MRN: [id number]  Referring provider: Janece Mohs, MD  Dx:   Encounter Diagnosis     ICD-10-CM    1  Lumbar degenerative disc disease  M51 36    2  Cervical radiculopathy  M54 12    3  Balance problem  R26 89        Start Time: 1530  Stop Time: 1613  Total time in clinic (min): 43 minutes    Subjective: Patient's daughter reporting patient has been feeling dizzy today  BP taken at start of session 134/80  Objective: See treatment diary below      Assessment: Tolerated treatment fair  Patient demonstrated fatigue post treatment and would benefit from continued PT Did not complete certain TE secondary to patient reporting not feeling well, reporting nausea  Relief noted with seated rest break  Cues continued to be required for proper form with all TE  Pain noted with cervical AROM, did not complete  Plan: Continue per plan of care  Progress treatment as tolerated         Precautions: FALLS RISK, DEMENTIA, 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), history of AVR, history of ovarian cancer, COPD    Daily Treatment Diary  * Indicates part of HEP      11/10  10/13 10/20 10/27 11/3   Manual                                             Neuro Re-Ed         Balance testing nv:  TUG  FGA  Romberg                                                      Ther Ex         Bike 5'  5' 5' 5' 5'   PPT         Lumbar flexion ball rolls   2x5x5"      SKTC         TB low rows Pink TB 2x10        Seated mid rows Pink TB 3x10  PTB 3x10 PTB 3x10 OTB 3x10 Pink TB 3x10   Seated piriformis stretch         Clamshells         Standing hip extension 3x10   3x10  3x10  3x10 3x10   Standing hip abduction 3x10   3x10 3x10  3x10 3x10   Cervical AROM x10 pain  Ext/flex x20  Ext/flex 20x Ext/flex 20x Ext/Flex  x10   Scapular retractions 3x10   3x10  3x10  2x10 2x10   Chin tucks         UT stretch                  Ther Activity         Step ups 4'' x10 ea 4'' x10 ea 4" x10 ea   Lateral step ups         Mini squats         Sit to stands   x10 no foam x10 no foam  x10   x5 no foam  2x10 no foam   Leg press         Patient education                                    Gait Training                                     Modalities         MHP                  * = on hep

## 2022-11-11 ENCOUNTER — RA CDI HCC (OUTPATIENT)
Dept: OTHER | Facility: HOSPITAL | Age: 87
End: 2022-11-11

## 2022-11-11 NOTE — PROGRESS NOTES
[facility]  coding opportunities       Chart reviewed, no opportunity found:   [address]        Patients Insurance     Medicare Insurance: [facility]

## 2022-11-17 ENCOUNTER — APPOINTMENT (OUTPATIENT)
Dept: PHYSICAL THERAPY | Facility: REHABILITATION | Age: 87
End: 2022-11-17

## 2022-11-18 ENCOUNTER — OFFICE VISIT (OUTPATIENT)
Dept: INTERNAL MEDICINE CLINIC | Facility: CLINIC | Age: 87
End: 2022-11-18

## 2022-11-18 VITALS
HEIGHT: 65 IN | SYSTOLIC BLOOD PRESSURE: 114 MMHG | BODY MASS INDEX: 25.99 KG/M2 | DIASTOLIC BLOOD PRESSURE: 78 MMHG | OXYGEN SATURATION: 98 % | TEMPERATURE: 97.2 F | HEART RATE: 78 BPM | WEIGHT: 156 LBS

## 2022-11-18 DIAGNOSIS — E53.8 VITAMIN B12 DEFICIENCY: ICD-10-CM

## 2022-11-18 DIAGNOSIS — E55.9 VITAMIN D DEFICIENCY: ICD-10-CM

## 2022-11-18 DIAGNOSIS — E78.49 OTHER HYPERLIPIDEMIA: ICD-10-CM

## 2022-11-18 DIAGNOSIS — R26.81 GAIT INSTABILITY: ICD-10-CM

## 2022-11-18 DIAGNOSIS — N18.31 STAGE 3A CHRONIC KIDNEY DISEASE (HCC): ICD-10-CM

## 2022-11-18 DIAGNOSIS — J44.9 CHRONIC OBSTRUCTIVE PULMONARY DISEASE, UNSPECIFIED COPD TYPE (HCC): ICD-10-CM

## 2022-11-18 DIAGNOSIS — R42 DIZZINESS: ICD-10-CM

## 2022-11-18 DIAGNOSIS — D69.6 THROMBOCYTOPENIA (HCC): ICD-10-CM

## 2022-11-18 DIAGNOSIS — M54.12 CERVICAL RADICULOPATHY: Primary | ICD-10-CM

## 2022-11-18 DIAGNOSIS — K21.9 GASTROESOPHAGEAL REFLUX DISEASE, UNSPECIFIED WHETHER ESOPHAGITIS PRESENT: ICD-10-CM

## 2022-11-18 NOTE — ASSESSMENT & PLAN NOTE
Stable, no agitation  No interest in brain games  Good social interaction at day care, good appetite

## 2022-11-18 NOTE — PROGRESS NOTES
Assessment/Plan:    Late onset Alzheimer's disease without behavioral disturbance (HCC)  Stable, no agitation  No interest in brain games  Good social interaction at day care, good appetite  Other hyperlipidemia  Taking statin daily  Stage 3a chronic kidney disease [facility])  Lab Results   Component Value Date    EGFR 46 06/03/2022    EGFR 42 06/22/2021    EGFR 47 03/04/2021    CREATININE 1 07 06/03/2022    CREATININE 1 17 06/22/2021    CREATININE 1 08 03/04/2021     Stable  S/P AVR (aortic valve replacement)  Taking baby ASA daily  Cervical radiculopathy  Improved, went to physical therapy  Continue home exercises  Lumbar degenerative disc disease  Intermittent symptoms, completed physical therapy  Gait instability  No recent falls  Uses cane only  Thrombocytopenia ([facility])  Stable  GERD (gastroesophageal reflux disease)  Takes famotidine daily  COPD (chronic obstructive pulmonary disease) ([facility])  No symptoms  Diagnoses and all orders for this visit:    Cervical radiculopathy    Gait instability    Gastroesophageal reflux disease, unspecified whether esophagitis present    Other hyperlipidemia  -     Lipid panel; Future  -     TSH, 3rd generation with Free T4 reflex; Future    Stage 3a chronic kidney disease (HCC)  -     CBC and differential; Future  -     Comprehensive metabolic panel; Future    Vitamin B12 deficiency  -     Vitamin B12; Future    Vitamin D deficiency  -     Vitamin D 25 hydroxy; Future    Thrombocytopenia (HCC)    Chronic obstructive pulmonary disease, unspecified COPD type ([facility] )    Dizziness  Comments:  Resolved  Follow up in 6 months or as needed  Subjective:      Patient ID: Anahy Diaz is a 80 y o  female here for a follow up  She is here with her daughter today  She is doing well, no new complaints  They had stopped physical therapy, it had helped  She continues to do the exercises for her neck and back every morning   She reports pain is always there but not as bad  She has balance issues but no recent falls  She only uses a cane  Daughter reports that she has lost interest in any brain games or reading  She is unable to focus long enough for it  She does go to  5 days a week for 6 hours  She feels that this has helped especially with the social interaction with others  She has a good appetite  The following portions of the patient's history were reviewed and updated as appropriate: allergies, current medications, past medical history, past social history and problem list     Review of Systems   Constitutional: Negative for appetite change and fatigue  HENT: Negative for congestion, ear pain and postnasal drip  Eyes: Negative for visual disturbance  Respiratory: Negative for cough and shortness of breath  Cardiovascular: Negative for chest pain and leg swelling  Gastrointestinal: Negative for abdominal pain, constipation and diarrhea  Genitourinary: Negative for dysuria, frequency and urgency  Musculoskeletal: Positive for arthralgias and back pain  Negative for myalgias  Skin: Negative for rash and wound  Neurological: Negative for dizziness, light-headedness, numbness and headaches  Psychiatric/Behavioral: Positive for confusion  Negative for agitation and sleep disturbance  The patient is not nervous/anxious  Objective:      /78 (BP Location: Left arm, Patient Position: Sitting, Cuff Size: Adult)   Pulse 78   Temp (!) 97 2 °F (36 2 °C)   Ht 5' 5" (1 651 m)   Wt 70 8 kg (156 lb)   SpO2 98%   BMI 25 96 kg/m²          Physical Exam  Vitals and nursing note reviewed  Constitutional:       General: She is not in acute distress  Appearance: She is well-developed  HENT:      Head: Normocephalic and atraumatic  Eyes:      Pupils: Pupils are equal, round, and reactive to light  Cardiovascular:      Rate and Rhythm: Normal rate and regular rhythm  Heart sounds: Normal heart sounds  Pulmonary:      Effort: Pulmonary effort is normal       Breath sounds: Normal breath sounds  No wheezing  Abdominal:      General: Bowel sounds are normal       Palpations: Abdomen is soft  Musculoskeletal:         General: No swelling  Right lower leg: No edema  Left lower leg: No edema  Comments: Using cane   Skin:     General: Skin is warm  Findings: No rash  Neurological:      General: No focal deficit present  Mental Status: She is alert  Gait: Gait abnormal    Psychiatric:         Mood and Affect: Mood and affect normal  Mood is not anxious or depressed  Behavior: Behavior normal            Lab results reviewed with patient

## 2023-02-16 DIAGNOSIS — E78.49 OTHER HYPERLIPIDEMIA: ICD-10-CM

## 2023-02-16 RX ORDER — ATORVASTATIN CALCIUM 20 MG/1
TABLET, FILM COATED ORAL
Qty: 90 TABLET | Refills: 1 | Status: SHIPPED | OUTPATIENT
Start: 2023-02-16

## 2023-03-15 ENCOUNTER — TELEPHONE (OUTPATIENT)
Dept: INTERNAL MEDICINE CLINIC | Facility: CLINIC | Age: 88
End: 2023-03-15

## 2023-03-15 NOTE — TELEPHONE ENCOUNTER
Pt fell off the couch about a week ago and bruised her right shin about 4-5" above ankle  She thought it was getting better but now has pain and swelling  She says it feels hot, but daughter states she really doesn't feel that

## 2023-03-15 NOTE — TELEPHONE ENCOUNTER
No open wounds   She does have swelling and quarter sized raised hematoma, swelling is slightly better  Has been doing heat and ice   Berta's main complaint is the pain

## 2023-03-16 NOTE — TELEPHONE ENCOUNTER
Swelling went down over night  Patient is fine today  [facility] is [facility] the [facility]  If anything changes, they will call back

## 2023-03-16 NOTE — TELEPHONE ENCOUNTER
Left message to call back  About tylenol  As per phone conversation yesterday, it was established she was up and walking around on this leg and was able to put weight on it

## 2023-03-30 ENCOUNTER — OFFICE VISIT (OUTPATIENT)
Dept: INTERNAL MEDICINE CLINIC | Facility: CLINIC | Age: 88
End: 2023-03-30

## 2023-03-30 VITALS
SYSTOLIC BLOOD PRESSURE: 120 MMHG | BODY MASS INDEX: 25.99 KG/M2 | DIASTOLIC BLOOD PRESSURE: 72 MMHG | HEIGHT: 65 IN | WEIGHT: 156 LBS | TEMPERATURE: 97.9 F | HEART RATE: 81 BPM | OXYGEN SATURATION: 94 %

## 2023-03-30 DIAGNOSIS — S80.11XA HEMATOMA OF RIGHT LOWER LEG: Primary | ICD-10-CM

## 2023-03-30 NOTE — PROGRESS NOTES
"Name: Odails Subramanian      : 1935      MRN: [id number]  Encounter Provider: JOSH Wilson  Encounter Date: 3/30/2023   Encounter department: [address] [facility]     1  Hematoma of right lower leg    Provided reassurance- hematoma is healing well  Apply warm compresses 10-20 minutes 3 times daily  Ok to continue tylenol as needed  Polly Ruiz is here today with complaints of right shin pain  About 3 weeks ago she fell off the couch and hit her shin  She had pain and swelling  No open wounds  It feels warm to touch  She is able to walk without difficulty  She has been elevated it as much as possible  She is taking tylenol/ibuprofen combo however she hasn't needed it the last 2 days  It has been less swollen and painful  Review of Systems   Constitutional: Negative for activity change, appetite change and fever  Respiratory: Negative for shortness of breath  Cardiovascular: Negative for chest pain  Musculoskeletal: Positive for arthralgias  Skin: Positive for color change  Neurological: Negative for dizziness, light-headedness and headaches  Current Outpatient Medications on File Prior to Visit   Medication Sig   • aspirin 81 MG tablet Take 81 mg by mouth daily   • atorvastatin (LIPITOR) 20 mg tablet TAKE 1 TABLET BY MOUTH EVERY DAY   • chlorhexidine (PERIDEX) 0 12 % solution RINSE 2 TIMES A DAY   • famotidine (PEPCID) 20 mg tablet TAKE 1 TABLET BY MOUTH EVERY DAY   • ketoconazole (NIZORAL) 2 % cream Apply topically to both feet in their entirety (tops, bottoms and between toes) 3 times a day for 4 weeks straight   (Patient not taking: Reported on 3/17/2021)   • Multiple Vitamins-Minerals (MULTIVITAMIN ADULT PO) Take 1 tablet by mouth daily       Objective     /72   Pulse 81   Temp 97 9 °F (36 6 °C)   Ht 5' 5\" (1 651 m)   Wt 70 8 kg (156 lb)   SpO2 94%   BMI 25 96 kg/m²     Physical Exam  Vitals " reviewed  Constitutional:       Appearance: Normal appearance  HENT:      Head: Normocephalic and atraumatic  Eyes:      Conjunctiva/sclera: Conjunctivae normal    Cardiovascular:      Rate and Rhythm: Normal rate and regular rhythm  Pulses: Normal pulses  Heart sounds: Normal heart sounds  Pulmonary:      Effort: Pulmonary effort is normal       Breath sounds: Normal breath sounds  Musculoskeletal:      Right lower leg: No edema  Left lower leg: No edema  Skin:     General: Skin is warm and dry  Comments: Right shin hematoma  Neurological:      Mental Status: She is alert  Mental status is at baseline     Psychiatric:         Mood and Affect: Mood normal          Behavior: Behavior normal        JOSH Marino

## 2023-05-31 ENCOUNTER — LAB (OUTPATIENT)
Dept: LAB | Facility: CLINIC | Age: 88
End: 2023-05-31
Payer: COMMERCIAL

## 2023-05-31 DIAGNOSIS — N18.31 STAGE 3A CHRONIC KIDNEY DISEASE (HCC): ICD-10-CM

## 2023-05-31 DIAGNOSIS — E55.9 VITAMIN D DEFICIENCY: ICD-10-CM

## 2023-05-31 DIAGNOSIS — E53.8 VITAMIN B12 DEFICIENCY: ICD-10-CM

## 2023-05-31 DIAGNOSIS — E78.49 OTHER HYPERLIPIDEMIA: ICD-10-CM

## 2023-05-31 LAB
25(OH)D3 SERPL-MCNC: 30.5 NG/ML (ref 30–100)
ALBUMIN SERPL BCP-MCNC: 4.1 G/DL (ref 3.5–5)
ALP SERPL-CCNC: 73 U/L (ref 34–104)
ALT SERPL W P-5'-P-CCNC: 11 U/L (ref 7–52)
ANION GAP SERPL CALCULATED.3IONS-SCNC: 7 MMOL/L (ref 4–13)
AST SERPL W P-5'-P-CCNC: 14 U/L (ref 13–39)
BASOPHILS # BLD AUTO: 0.05 THOUSANDS/ÂΜL (ref 0–0.1)
BASOPHILS NFR BLD AUTO: 1 % (ref 0–1)
BILIRUB SERPL-MCNC: 0.71 MG/DL (ref 0.2–1)
BUN SERPL-MCNC: 20 MG/DL (ref 5–25)
CALCIUM SERPL-MCNC: 9 MG/DL (ref 8.4–10.2)
CHLORIDE SERPL-SCNC: 104 MMOL/L (ref 96–108)
CHOLEST SERPL-MCNC: 139 MG/DL
CO2 SERPL-SCNC: 30 MMOL/L (ref 21–32)
CREAT SERPL-MCNC: 1.12 MG/DL (ref 0.6–1.3)
EOSINOPHIL # BLD AUTO: 0.17 THOUSAND/ÂΜL (ref 0–0.61)
EOSINOPHIL NFR BLD AUTO: 2 % (ref 0–6)
ERYTHROCYTE [DISTWIDTH] IN BLOOD BY AUTOMATED COUNT: 13.5 % (ref 11.6–15.1)
GFR SERPL CREATININE-BSD FRML MDRD: 43 ML/MIN/1.73SQ M
GLUCOSE P FAST SERPL-MCNC: 100 MG/DL (ref 65–99)
HCT VFR BLD AUTO: 43.6 % (ref 34.8–46.1)
HDLC SERPL-MCNC: 56 MG/DL
HGB BLD-MCNC: 14.4 G/DL (ref 11.5–15.4)
IMM GRANULOCYTES # BLD AUTO: 0.02 THOUSAND/UL (ref 0–0.2)
IMM GRANULOCYTES NFR BLD AUTO: 0 % (ref 0–2)
LDLC SERPL CALC-MCNC: 64 MG/DL (ref 0–100)
LYMPHOCYTES # BLD AUTO: 2.03 THOUSANDS/ÂΜL (ref 0.6–4.47)
LYMPHOCYTES NFR BLD AUTO: 29 % (ref 14–44)
MCH RBC QN AUTO: 31.9 PG (ref 26.8–34.3)
MCHC RBC AUTO-ENTMCNC: 33 G/DL (ref 31.4–37.4)
MCV RBC AUTO: 97 FL (ref 82–98)
MONOCYTES # BLD AUTO: 0.55 THOUSAND/ÂΜL (ref 0.17–1.22)
MONOCYTES NFR BLD AUTO: 8 % (ref 4–12)
NEUTROPHILS # BLD AUTO: 4.27 THOUSANDS/ÂΜL (ref 1.85–7.62)
NEUTS SEG NFR BLD AUTO: 60 % (ref 43–75)
NONHDLC SERPL-MCNC: 83 MG/DL
NRBC BLD AUTO-RTO: 0 /100 WBCS
PLATELET # BLD AUTO: 148 THOUSANDS/UL (ref 149–390)
PMV BLD AUTO: 9.5 FL (ref 8.9–12.7)
POTASSIUM SERPL-SCNC: 4.1 MMOL/L (ref 3.5–5.3)
PROT SERPL-MCNC: 6.4 G/DL (ref 6.4–8.4)
RBC # BLD AUTO: 4.52 MILLION/UL (ref 3.81–5.12)
SODIUM SERPL-SCNC: 141 MMOL/L (ref 135–147)
TRIGL SERPL-MCNC: 93 MG/DL
TSH SERPL DL<=0.05 MIU/L-ACNC: 2.59 UIU/ML (ref 0.45–4.5)
VIT B12 SERPL-MCNC: 502 PG/ML (ref 180–914)
WBC # BLD AUTO: 7.09 THOUSAND/UL (ref 4.31–10.16)

## 2023-05-31 PROCEDURE — 85025 COMPLETE CBC W/AUTO DIFF WBC: CPT

## 2023-05-31 PROCEDURE — 82607 VITAMIN B-12: CPT

## 2023-05-31 PROCEDURE — 36415 COLL VENOUS BLD VENIPUNCTURE: CPT

## 2023-05-31 PROCEDURE — 82306 VITAMIN D 25 HYDROXY: CPT

## 2023-05-31 PROCEDURE — 80061 LIPID PANEL: CPT

## 2023-05-31 PROCEDURE — 84443 ASSAY THYROID STIM HORMONE: CPT

## 2023-05-31 PROCEDURE — 80053 COMPREHEN METABOLIC PANEL: CPT

## 2023-06-01 NOTE — ASSESSMENT & PLAN NOTE
Lab Results   Component Value Date    CREATININE 1 12 05/31/2023    CREATININE 1 07 06/03/2022    CREATININE 1 17 06/22/2021    EGFR 43 05/31/2023    EGFR 46 06/03/2022    EGFR 42 06/22/2021     Stable

## 2023-06-02 ENCOUNTER — OFFICE VISIT (OUTPATIENT)
Dept: INTERNAL MEDICINE CLINIC | Facility: CLINIC | Age: 88
End: 2023-06-02

## 2023-06-02 VITALS
TEMPERATURE: 96.5 F | SYSTOLIC BLOOD PRESSURE: 120 MMHG | WEIGHT: 153 LBS | HEIGHT: 65 IN | DIASTOLIC BLOOD PRESSURE: 76 MMHG | OXYGEN SATURATION: 96 % | HEART RATE: 88 BPM | BODY MASS INDEX: 25.49 KG/M2

## 2023-06-02 DIAGNOSIS — C44.90 SKIN CANCER: ICD-10-CM

## 2023-06-02 DIAGNOSIS — S80.11XA HEMATOMA OF RIGHT LOWER LEG: ICD-10-CM

## 2023-06-02 DIAGNOSIS — I10 ESSENTIAL HYPERTENSION: ICD-10-CM

## 2023-06-02 DIAGNOSIS — J44.9 CHRONIC OBSTRUCTIVE PULMONARY DISEASE, UNSPECIFIED COPD TYPE (HCC): ICD-10-CM

## 2023-06-02 DIAGNOSIS — D69.6 THROMBOCYTOPENIA (HCC): ICD-10-CM

## 2023-06-02 DIAGNOSIS — N18.31 STAGE 3A CHRONIC KIDNEY DISEASE (HCC): ICD-10-CM

## 2023-06-02 DIAGNOSIS — R26.81 GAIT INSTABILITY: ICD-10-CM

## 2023-06-02 DIAGNOSIS — E55.9 VITAMIN D DEFICIENCY: ICD-10-CM

## 2023-06-02 DIAGNOSIS — E78.49 OTHER HYPERLIPIDEMIA: Primary | ICD-10-CM

## 2023-06-02 DIAGNOSIS — Z00.00 HEALTH MAINTENANCE EXAMINATION: ICD-10-CM

## 2023-06-02 DIAGNOSIS — E53.8 VITAMIN B12 DEFICIENCY: ICD-10-CM

## 2023-06-02 DIAGNOSIS — Z95.2 S/P AVR (AORTIC VALVE REPLACEMENT): ICD-10-CM

## 2023-06-02 DIAGNOSIS — F02.80 LATE ONSET ALZHEIMER'S DISEASE WITHOUT BEHAVIORAL DISTURBANCE (HCC): ICD-10-CM

## 2023-06-02 DIAGNOSIS — G30.1 LATE ONSET ALZHEIMER'S DISEASE WITHOUT BEHAVIORAL DISTURBANCE (HCC): ICD-10-CM

## 2023-06-02 DIAGNOSIS — M54.12 CERVICAL RADICULOPATHY: ICD-10-CM

## 2023-06-02 DIAGNOSIS — M51.36 LUMBAR DEGENERATIVE DISC DISEASE: ICD-10-CM

## 2023-06-02 DIAGNOSIS — K21.9 GASTROESOPHAGEAL REFLUX DISEASE, UNSPECIFIED WHETHER ESOPHAGITIS PRESENT: ICD-10-CM

## 2023-06-02 NOTE — PROGRESS NOTES
Assessment and Plan:     Problem List Items Addressed This Visit        Digestive    GERD (gastroesophageal reflux disease)     Increase famotidine to bid  Reviewed low acid diet  Monitor nausea  Respiratory    COPD (chronic obstructive pulmonary disease) (HCC)     Non issue  Cardiovascular and Mediastinum    Essential hypertension     BP normal   Not on medication  Nervous and Auditory    Cervical radiculopathy     Stable, does home exercises  Late onset Alzheimer's disease without behavioral disturbance (HCC)     Stable  Goes to day care regularly  Musculoskeletal and Integument    Lumbar degenerative disc disease     No recent issues  Skin cancer     Suspect R leg lesion may be skin cancer  Declined to see dermatology for excision due to poor wound healing  Hematopoietic and Hemostatic    Thrombocytopenia ([facility])     Stable  Genitourinary    Stage 3a chronic kidney disease ([facility] )     Lab Results   Component Value Date    CREATININE 1 12 05/31/2023    CREATININE 1 07 06/03/2022    CREATININE 1 17 06/22/2021    EGFR 43 05/31/2023    EGFR 46 06/03/2022    EGFR 42 06/22/2021     Stable  Other    Gait instability     Fell once on the couch since last visit  Other hyperlipidemia - Primary     Lipids stable, on atorvastatin  S/P AVR (aortic valve replacement)     Taking ASA  Vitamin B12 deficiency     Taking daily MVI  Vitamin D deficiency   Other Visit Diagnoses     Health maintenance examination        No further screening  Hematoma of right lower leg        Resolved  Falls Plan of Care: balance, strength, and gait training instructions were provided  Preventive health issues were discussed with patient, and age appropriate screening tests were ordered as noted in patient's After Visit Summary    Personalized health advice and appropriate referrals for health education or preventive services given if needed, as noted in patient's After Visit Summary  History of Present Illness:     Patient presents for a Medicare Wellness Visit and follow up visit  She is here with her daughter  Daughter reports that she has been complaining of more frequent nausea usually first thing in the morning  No vomiting, abdominal pain  No constipation, cramping or bloating  She fell near her couch since her last visit  No injury  She did sustain a hematoma a few months ago which took several months to heal   Daughter is aware that she has a spot on her right leg  She thinks it is smaller than previous  Denies any bleeding or discharge from the skin lesion  She recalls dermatology removed a small one on her right thigh and took 3 months to heal since the wound expanded  She continues to go to  regularly  She remains active  Daughter reports her memory is slowly worsening  She asked the same questions frequently  She sleeps through the night, good appetite  Patient Care Team:  Jose Andersen MD as PCP - General (Internal Medicine)     Review of Systems:     Review of Systems   Constitutional: Negative for appetite change and fatigue  HENT: Positive for hearing loss  Negative for congestion, ear pain and postnasal drip  Eyes: Negative for visual disturbance  Respiratory: Negative for cough and shortness of breath  Cardiovascular: Negative for chest pain and leg swelling  Gastrointestinal: Positive for nausea  Negative for abdominal pain, constipation, diarrhea and vomiting  Genitourinary: Negative for dysuria and frequency  Musculoskeletal: Negative for arthralgias, back pain, myalgias and neck pain  Skin: Positive for wound  Negative for rash  Neurological: Negative for dizziness and headaches  Psychiatric/Behavioral: Positive for confusion  Negative for agitation and sleep disturbance  The patient is not nervous/anxious           Problem List: Patient Active Problem List   Diagnosis   • Depression   • Late onset Alzheimer's disease without behavioral disturbance ([facility])   • S/P AVR (aortic valve replacement)   • Primary osteoarthritis involving multiple joints   • Other hyperlipidemia   • Allergic rhinitis   • Age-related osteoporosis without current pathological fracture   • Skin cancer   • Coronary artery disease involving native coronary artery of native heart   • Essential hypertension   • GERD (gastroesophageal reflux disease)   • Postmenopausal HRT (hormone replacement therapy)   • Vitamin B12 deficiency   • COPD (chronic obstructive pulmonary disease) ([facility])   • Pancreatic cyst   • Stage 3a chronic kidney disease (HCC)   • Thrombocytopenia (HCC)   • Wears hearing aid   • Closed fracture of left foot   • Gait instability   • Vitamin D deficiency   • Other hemorrhoids   • Cervical radiculopathy   • Lumbar degenerative disc disease      Past Medical and Surgical History:     Past Medical History:   Diagnosis Date   • Actinic keratosis    • Basal cell carcinoma     Back    • Cancer ([facility] )    • Coronary artery disease    • Dementia ([facility] )    • Depression    • Ear problems    • HL (hearing loss)    • Hyperlipidemia    • Migraines    • Ovarian cancer ([facility] ) 2004    s/p surgery   no chemo/RT   • Phlebitis     R leg     Past Surgical History:   Procedure Laterality Date   • ADENOIDECTOMY     • APPENDECTOMY     • CATARACT EXTRACTION, BILATERAL     • HYSTERECTOMY  2005    ovarian CA   • KNEE SURGERY Right    • SKIN BIOPSY     • TONSILECTOMY AND ADNOIDECTOMY     • TONSILLECTOMY        Family History:     Family History   Problem Relation Age of Onset   • Depression Mother    • Anxiety disorder Mother    • Alcohol abuse Mother    • Substance Abuse Mother    • Asthma Mother    • Diabetes Father 61   • Brain cancer Son    • Heart attack Sister    • Coronary artery disease Sister    • Mental illness Neg Hx       Social History:     Social History     Socioeconomic History   • Marital status:      Spouse name: None   • Number of children: None   • Years of education: None   • Highest education level: None   Occupational History   • None   Tobacco Use   • Smoking status: Former     Packs/day: 0 25     Types: Cigarettes   • Smokeless tobacco: Never   • Tobacco comments:     until age 27   Vaping Use   • Vaping Use: Never used   Substance and Sexual Activity   • Alcohol use: Not Currently   • Drug use: Never   • Sexual activity: Not Currently   Other Topics Concern   • None   Social History Narrative    Drinks coffee/tea- 2 daily half decaf     From [place], [place]    Now lives with daughter    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, 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     Social Determinants of Health     Financial Resource Strain: Low Risk  (6/2/2023)    Overall Financial Resource Strain (CARDIA)    • Difficulty of Paying Living Expenses: Not very hard   Food Insecurity: Not on file   Transportation Needs: No Transportation Needs (6/2/2023)    PRAPARE - Transportation    • Lack of Transportation (Medical): No    • Lack of Transportation (Non-Medical): No   Physical Activity: Not on file   Stress: Not on file   Social Connections: Not on file   Intimate Partner Violence: Not on file   Housing Stability: Not on file      Medications and Allergies:     Current Outpatient Medications   Medication Sig Dispense Refill   • aspirin 81 MG tablet Take 81 mg by mouth daily     • atorvastatin (LIPITOR) 20 mg tablet TAKE 1 TABLET BY MOUTH EVERY DAY 90 tablet 1   • chlorhexidine (PERIDEX) 0 12 % solution RINSE 2 TIMES A DAY     • famotidine (PEPCID) 20 mg tablet TAKE 1 TABLET BY MOUTH EVERY DAY 90 tablet 1   • Multiple Vitamins-Minerals (MULTIVITAMIN ADULT PO) Take 1 tablet by mouth daily       No current facility-administered medications for this visit       Allergies   Allergen Reactions   • Codeine Hives   • Morphine Other (See Comments)     Redness in face, swelling   • Other Seasonal   • Propoxyphene       Immunizations:     Immunization History   Administered Date(s) Administered   • COVID-19 PFIZER VACCINE 0 3 ML IM 01/18/2021, 02/09/2021, 10/19/2021   • COVID-19 Pfizer Vac BIVALENT Deandre-sucrose 12 Yr+ IM (BOOSTER ONLY) 09/22/2022   • Hep B, Adolescent or Pediatric 10/15/2001   • Hepatitis B 10/15/2001   • INFLUENZA 10/04/2013, 11/03/2014, 09/27/2015, 10/13/2016, 10/17/2017, 10/01/2018   • Influenza, high dose seasonal 0 7 mL 10/01/2018, 09/27/2019, 10/19/2020, 09/22/2022   • Influenza, seasonal, injectable, preservative free 10/17/2017   • Pneumococcal Conjugate 13-Valent 06/29/2020   • Pneumococcal Polysaccharide PPV23 11/22/2016   • Tdap 11/16/2010   • Tuberculin Skin Test-PPD Intradermal 07/30/2019      Health Maintenance: There are no preventive care reminders to display for this patient  Topic Date Due   • COVID-19 Vaccine (5 - Pfizer series) 01/22/2023      Medicare Screening Tests and Risk Assessments:     Lacy Randhawa is here for her Subsequent Wellness visit  Last Medicare Wellness visit information reviewed, patient interviewed and updates made to the record today  Health Risk Assessment:   Patient rates overall health as fair  Patient feels that their physical health rating is same  Patient is satisfied with their life  Eyesight was rated as same  Hearing was rated as same  Patient feels that their emotional and mental health rating is same  Patients states they are never, rarely angry  Patient states they are never, rarely unusually tired/fatigued  Pain experienced in the last 7 days has been none  Patient states that she has experienced no weight loss or gain in last 6 months  Fall Risk Screening:    In the past year, patient has experienced: history of falling in past year    Number of falls: 2 or more  Injured during fall?: No    Feels unsteady when standing or walking?: Yes    Worried about falling?: Yes      Urinary Incontinence Screening:   Patient has leaked urine accidently in the last six months  Home Safety:  Patient has trouble with stairs inside or outside of their home  Patient has working smoke alarms and has working carbon monoxide detector  Home safety hazards include: none  Medications:   Patient is currently taking over-the-counter supplements  OTC medications include: see medication list  Patient is able to manage medications  Activities of Daily Living (ADLs)/Instrumental Activities of Daily Living (IADLs):   Walk and transfer into and out of bed and chair?: Yes  Dress and groom yourself?: Yes    Bathe or shower yourself?: Yes    Feed yourself? Yes  Do your laundry/housekeeping?: Yes  Manage your money, pay your bills and track your expenses?: Yes  Make your own meals?: Yes    Do your own shopping?: Yes    Previous Hospitalizations:   Any hospitalizations or ED visits within the last 12 months?: No      Advance Care Planning:   Living will: Yes    Durable POA for healthcare:  Yes    Advanced directive: Yes    End of Life Decisions reviewed with patient: Yes      Cognitive Screening:   Provider or family/friend/caregiver concerned regarding cognition?: Yes    PREVENTIVE SCREENINGS      Cardiovascular Screening:    General: Screening Not Indicated, History Lipid Disorder and Screening Current      Diabetes Screening:     General: Screening Current      Colorectal Cancer Screening:     General: Screening Not Indicated      Breast Cancer Screening:     General: Patient Declines      Cervical Cancer Screening:    General: Screening Not Indicated      Osteoporosis Screening:    General: Screening Not Indicated and History Osteoporosis      Abdominal Aortic Aneurysm (AAA) Screening:        General: Screening Not Indicated      Lung Cancer Screening:     General: Screening Not Indicated      Hepatitis C Screening:    General: Screening Not Indicated    Screening, Brief Intervention, and Referral to Treatment (SBIRT)    Screening  Typical number of "drinks in a day: 0  Typical number of drinks in a week: 0  Interpretation: Low risk drinking behavior  Single Item Drug Screening:  How often have you used an illegal drug (including marijuana) or a prescription medication for non-medical reasons in the past year? never    Single Item Drug Screen Score: 0  Interpretation: Negative screen for possible drug use disorder    Other Counseling Topics:   Skin self-exam and regular weightbearing exercise  No results found  Physical Exam:     /76   Pulse 88   Temp (!) 96 5 °F (35 8 °C)   Ht 5' 5\" (1 651 m)   Wt 69 4 kg (153 lb)   SpO2 96%   BMI 25 46 kg/m²     Physical Exam  Vitals and nursing note reviewed  Constitutional:       General: She is not in acute distress  Appearance: She is well-developed  HENT:      Head: Normocephalic and atraumatic  Eyes:      Conjunctiva/sclera: Conjunctivae normal    Cardiovascular:      Rate and Rhythm: Normal rate and regular rhythm  Heart sounds: No murmur heard  Pulmonary:      Effort: Pulmonary effort is normal  No respiratory distress  Breath sounds: Normal breath sounds  Abdominal:      Palpations: Abdomen is soft  Tenderness: There is no abdominal tenderness  Musculoskeletal:         General: No swelling  Cervical back: Neck supple  Skin:     General: Skin is warm and dry  Findings: Wound present  Neurological:      General: No focal deficit present  Mental Status: She is alert  Psychiatric:         Mood and Affect: Mood normal          Behavior: Behavior normal           Floyd Roberson MD     Lab results reviewed with patient      "

## 2023-06-02 NOTE — PATIENT INSTRUCTIONS
Diet for Stomach Ulcers and Gastritis   WHAT YOU NEED TO KNOW:   A diet for stomach ulcers and gastritis is a meal plan that limits foods that irritate your stomach  Certain foods may worsen symptoms such as stomach pain, bloating, heartburn, or indigestion  Foods to limit or avoid:  You may need to avoid acidic, spicy, or high-fat foods  Not all foods affect everyone the same way  You will need to learn which foods worsen your symptoms and limit those foods  The following are some foods that may worsen ulcer or gastritis symptoms:  Beverages:      Whole milk and chocolate milk    Hot cocoa and cola    Any beverage with caffeine    Regular and decaffeinated coffee    Peppermint and spearmint tea    Green and black tea, with or without caffeine    Orange and grapefruit juices    Drinks that contain alcohol    Spices and seasonings:      Black and red pepper    Chili powder    Mustard seed and nutmeg    Other foods:      Dairy foods made from whole milk or cream    Chocolate    Spicy or strongly flavored cheeses, such as jalapeno or black pepper    Highly seasoned, high-fat meats, such as sausage, salami, xiao, ham, and cold cuts    Hot chiles and peppers    Tomato products, such as tomato paste, tomato sauce, or tomato juice    Foods to include:  Eat a variety of healthy foods from all the food groups  Eat fruits, vegetables, whole grains, and fat-free or low-fat dairy foods  Whole grains include whole-wheat breads, cereals, pasta, and brown rice  Choose lean meats, poultry (chicken and turkey), fish, beans, eggs, and nuts  A healthy meal plan is low in unhealthy fats, salt, and added sugar  Healthy fats include olive oil and canola oil  Ask your dietitian for more information about a healthy diet  Other helpful guidelines:   Do not eat right before bedtime  Stop eating at least 2 hours before bedtime  Eat small, frequent meals    Your stomach may tolerate small, frequent meals better than large meals     © Copyright [employer] 2022 Information is for End User's use only and may not be sold, redistributed or otherwise used for commercial purposes  The above information is an  only  It is not intended as medical advice for individual conditions or treatments  Talk to your doctor, nurse or pharmacist before following any medical regimen to see if it is safe and effective for you

## 2023-06-02 NOTE — ASSESSMENT & PLAN NOTE
Suspect R leg lesion may be skin cancer  Declined to see dermatology for excision due to poor wound healing

## 2023-06-14 ENCOUNTER — TELEPHONE (OUTPATIENT)
Dept: DERMATOLOGY | Facility: CLINIC | Age: 88
End: 2023-06-14

## 2023-06-14 NOTE — TELEPHONE ENCOUNTER
Rec'd call from patients daughter requesting [facility] for yearly skin check  (last seen 8/2021)    Offered next available with Dr Harjeet Pedroza - 11/28 in [place]  Daughter refuses to come to [place]  She states that she will only see Dr Harjeet Pedroza at [place]  I explained that she no longer sees patients at that location  Offered [address] [facility]  Daughter refuses  She states that patient will follow with PCP and if she needs to see dermatology again, she'll deal with it at that time  Call was ended

## 2023-06-23 ENCOUNTER — TELEPHONE (OUTPATIENT)
Dept: INTERNAL MEDICINE CLINIC | Facility: CLINIC | Age: 88
End: 2023-06-23

## 2023-06-23 NOTE — TELEPHONE ENCOUNTER
Daughter stated she has had lesion for about a year  Is not bleeding, no discharge  It has little black dots

## 2023-06-23 NOTE — TELEPHONE ENCOUNTER
Daughter called stating lesion on collar bone on left side has changed in color, now black  She called Dermatology an next available appointment is November  Can you see patient to possibly prescribe something?

## 2023-07-10 ENCOUNTER — OFFICE VISIT (OUTPATIENT)
Dept: INTERNAL MEDICINE CLINIC | Facility: CLINIC | Age: 88
End: 2023-07-10
Payer: COMMERCIAL

## 2023-07-10 VITALS
DIASTOLIC BLOOD PRESSURE: 72 MMHG | HEART RATE: 72 BPM | HEIGHT: 65 IN | TEMPERATURE: 97.5 F | OXYGEN SATURATION: 97 % | BODY MASS INDEX: 25.33 KG/M2 | WEIGHT: 152 LBS | SYSTOLIC BLOOD PRESSURE: 120 MMHG

## 2023-07-10 DIAGNOSIS — L98.9 SKIN LESION OF NECK: Primary | ICD-10-CM

## 2023-07-10 PROCEDURE — 99213 OFFICE O/P EST LOW 20 MIN: CPT | Performed by: NURSE PRACTITIONER

## 2023-07-10 NOTE — PROGRESS NOTES
Name: Thuy Jones      : 1935      MRN: [id number]  Encounter Provider: JOSH Rowell  Encounter Date: 7/10/2023   Encounter department: [address] [facility]     1. Skin lesion of neck  -     Ambulatory Referral to Dermatology; Future           Subjective      Darci Armstrong is here today with complaints of a mole on her left collar bone area. She first noticed it over a year ago. Since then it has changed in color, now black. It does not bleed or itch. It has grown in size. Review of Systems   Constitutional: Negative for activity change, appetite change and unexpected weight change. Skin: Positive for color change. Current Outpatient Medications on File Prior to Visit   Medication Sig   • aspirin 81 MG tablet Take 81 mg by mouth daily   • atorvastatin (LIPITOR) 20 mg tablet TAKE 1 TABLET BY MOUTH EVERY DAY   • chlorhexidine (PERIDEX) 0.12 % solution RINSE 2 TIMES A DAY   • famotidine (PEPCID) 20 mg tablet TAKE 1 TABLET BY MOUTH EVERY DAY   • Multiple Vitamins-Minerals (MULTIVITAMIN ADULT PO) Take 1 tablet by mouth daily       Objective     /72   Pulse 72   Temp 97.5 °F (36.4 °C)   Ht 5' 5" (1.651 m)   Wt 68.9 kg (152 lb)   SpO2 97%   BMI 25.29 kg/m²     Physical Exam  Vitals reviewed. Constitutional:       Appearance: Normal appearance. HENT:      Head: Normocephalic and atraumatic. Eyes:      Conjunctiva/sclera: Conjunctivae normal.   Neck:     Skin:     General: Skin is warm and dry. Neurological:      Mental Status: She is alert and oriented to person, place, and time.    Psychiatric:         Mood and Affect: Mood normal.         Behavior: Behavior normal.       JOSH Rowell

## 2023-08-11 DIAGNOSIS — E78.49 OTHER HYPERLIPIDEMIA: ICD-10-CM

## 2023-08-11 RX ORDER — ATORVASTATIN CALCIUM 20 MG/1
TABLET, FILM COATED ORAL
Qty: 90 TABLET | Refills: 1 | Status: SHIPPED | OUTPATIENT
Start: 2023-08-11

## 2023-09-13 DIAGNOSIS — K21.9 GASTROESOPHAGEAL REFLUX DISEASE, UNSPECIFIED WHETHER ESOPHAGITIS PRESENT: ICD-10-CM

## 2023-09-13 RX ORDER — FAMOTIDINE 20 MG/1
TABLET, FILM COATED ORAL
Qty: 90 TABLET | Refills: 1 | Status: SHIPPED | OUTPATIENT
Start: 2023-09-13

## 2023-10-25 DIAGNOSIS — K21.9 GASTROESOPHAGEAL REFLUX DISEASE, UNSPECIFIED WHETHER ESOPHAGITIS PRESENT: ICD-10-CM

## 2023-10-25 RX ORDER — FAMOTIDINE 20 MG/1
20 TABLET, FILM COATED ORAL DAILY
Qty: 90 TABLET | Refills: 1 | Status: SHIPPED | OUTPATIENT
Start: 2023-10-25

## 2023-10-25 NOTE — TELEPHONE ENCOUNTER
Reason for call:   [x] Refill   [] Prior Auth  [] Other:     Office:   [x] PCP/Provider - Angel Greenberg,   [] Specialty/Provider -     Medication: PEPCID    Dose/Frequency: 20 MG    Quantity: 90    [facility]    Does the patient have enough for 3 days?    [x] Yes   [] No - Send as HP to POD

## 2023-11-28 ENCOUNTER — RA CDI HCC (OUTPATIENT)
Dept: OTHER | Facility: HOSPITAL | Age: 88
End: 2023-11-28

## 2023-11-28 NOTE — PROGRESS NOTES
[address] [facility] coding opportunities       Chart reviewed, no opportunity found: [address]        Patients Insurance     Medicare Insurance: [facility]

## 2023-12-06 ENCOUNTER — OFFICE VISIT (OUTPATIENT)
Dept: INTERNAL MEDICINE CLINIC | Facility: CLINIC | Age: 88
End: 2023-12-06
Payer: COMMERCIAL

## 2023-12-06 VITALS
HEART RATE: 70 BPM | BODY MASS INDEX: 24.32 KG/M2 | DIASTOLIC BLOOD PRESSURE: 66 MMHG | WEIGHT: 146 LBS | TEMPERATURE: 97.9 F | SYSTOLIC BLOOD PRESSURE: 102 MMHG | HEIGHT: 65 IN | OXYGEN SATURATION: 95 %

## 2023-12-06 DIAGNOSIS — J30.1 SEASONAL ALLERGIC RHINITIS DUE TO POLLEN: Primary | ICD-10-CM

## 2023-12-06 DIAGNOSIS — E55.9 VITAMIN D DEFICIENCY: ICD-10-CM

## 2023-12-06 DIAGNOSIS — C44.90 SKIN CANCER: ICD-10-CM

## 2023-12-06 DIAGNOSIS — G30.1 LATE ONSET ALZHEIMER'S DISEASE WITHOUT BEHAVIORAL DISTURBANCE (HCC): ICD-10-CM

## 2023-12-06 DIAGNOSIS — N18.31 STAGE 3A CHRONIC KIDNEY DISEASE (HCC): ICD-10-CM

## 2023-12-06 DIAGNOSIS — F02.80 LATE ONSET ALZHEIMER'S DISEASE WITHOUT BEHAVIORAL DISTURBANCE (HCC): ICD-10-CM

## 2023-12-06 DIAGNOSIS — M54.12 CERVICAL RADICULOPATHY: ICD-10-CM

## 2023-12-06 DIAGNOSIS — Z95.2 S/P AVR (AORTIC VALVE REPLACEMENT): ICD-10-CM

## 2023-12-06 DIAGNOSIS — E53.8 VITAMIN B12 DEFICIENCY: ICD-10-CM

## 2023-12-06 DIAGNOSIS — I25.10 CORONARY ARTERY DISEASE INVOLVING NATIVE CORONARY ARTERY OF NATIVE HEART WITHOUT ANGINA PECTORIS: ICD-10-CM

## 2023-12-06 DIAGNOSIS — E78.49 OTHER HYPERLIPIDEMIA: ICD-10-CM

## 2023-12-06 DIAGNOSIS — D69.6 THROMBOCYTOPENIA (HCC): ICD-10-CM

## 2023-12-06 DIAGNOSIS — K21.9 GASTROESOPHAGEAL REFLUX DISEASE, UNSPECIFIED WHETHER ESOPHAGITIS PRESENT: ICD-10-CM

## 2023-12-06 DIAGNOSIS — J44.9 CHRONIC OBSTRUCTIVE PULMONARY DISEASE, UNSPECIFIED COPD TYPE (HCC): ICD-10-CM

## 2023-12-06 DIAGNOSIS — R73.01 ABNORMAL FASTING GLUCOSE: ICD-10-CM

## 2023-12-06 PROCEDURE — 99214 OFFICE O/P EST MOD 30 MIN: CPT | Performed by: INTERNAL MEDICINE

## 2023-12-06 RX ORDER — FLUTICASONE PROPIONATE 50 MCG
1 SPRAY, SUSPENSION (ML) NASAL DAILY
Qty: 16 G | Refills: 2 | Status: SHIPPED | OUTPATIENT
Start: 2023-12-06

## 2023-12-06 NOTE — ASSESSMENT & PLAN NOTE
Slightly worse. Goes to day care a few days a week. Decreased appetite, weight loss of 7 lbs since last visit. Start daily protein supplement.

## 2023-12-06 NOTE — ASSESSMENT & PLAN NOTE
Lab Results   Component Value Date    EGFR 43 05/31/2023    EGFR 46 06/03/2022    EGFR 42 06/22/2021    CREATININE 1.12 05/31/2023    CREATININE 1.07 06/03/2022    CREATININE 1.17 06/22/2021     Avoid NSAID use.

## 2023-12-06 NOTE — PATIENT INSTRUCTIONS
Avoid NSAIDs such as ibuprofen, naproxen, Advil, Aleve or Motrin. Take Tylenol for pain. You may take acetaminophen  (Tylenol) 500 mg, 1 to 2 tablets 3 times a day, as needed for pain. No more than 6 tablets a day. If it is 650 mg tablets, no more than 4 a day.     Start daily protein shake/supplement such as Ensure, Boost.

## 2023-12-06 NOTE — PROGRESS NOTES
Assessment/Plan:    Late onset Alzheimer's disease without behavioral disturbance ([address])  Slightly worse. Goes to day care a few days a week. Decreased appetite, weight loss of 7 lbs since last visit. Start daily protein supplement. GERD (gastroesophageal reflux disease)  Taking famotidine daily. COPD (chronic obstructive pulmonary disease) ([facility])  No symptoms. Coronary artery disease involving native coronary artery of native heart  No symptoms. Skin cancer  Appointment with dermatology was rescheduled. Cervical radiculopathy  Stable, continue home stretching exercises. Lumbar degenerative disc disease  Stable. S/P AVR (aortic valve replacement)  On daily ASA. Other hyperlipidemia  On atorvastatin. Stage 3a chronic kidney disease [facility])  Lab Results   Component Value Date    EGFR 43 05/31/2023    EGFR 46 06/03/2022    EGFR 42 06/22/2021    CREATININE 1.12 05/31/2023    CREATININE 1.07 06/03/2022    CREATININE 1.17 06/22/2021     Avoid NSAID use. Allergic rhinitis  Start daily antihistamine, steroid nasal spray. Gait instability  No recent falls. Diagnoses and all orders for this visit:    Seasonal allergic rhinitis due to pollen  -     fluticasone (FLONASE) 50 mcg/act nasal spray; 1 spray into each nostril daily    Cervical radiculopathy    Chronic obstructive pulmonary disease, unspecified COPD type ([address])    Coronary artery disease involving native coronary artery of native heart without angina pectoris    Other hyperlipidemia  -     Lipid panel; Future  -     TSH, 3rd generation with Free T4 reflex; Future    Stage 3a chronic kidney disease (HCC)  -     Comprehensive metabolic panel; Future    Vitamin B12 deficiency  -     Vitamin B12; Future    Vitamin D deficiency  -     Vitamin D 25 hydroxy;  Future    Thrombocytopenia ([facility])  -     CBC and differential; Future    Gastroesophageal reflux disease, unspecified whether esophagitis present    Abnormal fasting glucose  - Hemoglobin A1C; Future    Skin cancer    S/P AVR (aortic valve replacement)    Late onset Alzheimer's disease without behavioral disturbance ([address])      Follow up in 6 months or as needed. Subjective:      Patient ID: Annie Hernandez is a 80 y.o. female here for a follow up visit. She is here today with her daughter. Most of the history is from the daughter. Her appointment for dermatology last October was moved to next month. They are concerned about 3 lesions, 1 on her right leg, left wrist and on her left upper chest area. She reports that the wrist lesion seems to be getting bigger and more red. She does pick on it frequently. She has not applied any medication on it. Daughter has been giving her an allergy pill and a decongestant alternating days the past few weeks because of increased nasal congestion and postnasal drip. She blows her nose frequently first thing in the morning. No headaches, fever, cough or shortness of breath. She gives her Advil as needed for low back pain, not on a daily basis. She does her neck and stretching exercises daily. She goes to  5 days a week. She noticed that her appetite has decreased, she did lose some weight. The following portions of the patient's history were reviewed and updated as appropriate: allergies, current medications, past medical history, past social history, and problem list.    Review of Systems   Constitutional:  Negative for appetite change and fatigue. HENT:  Positive for congestion, postnasal drip and rhinorrhea. Negative for ear pain. Eyes:  Negative for visual disturbance. Respiratory:  Negative for cough and shortness of breath. Cardiovascular:  Negative for chest pain and leg swelling. Gastrointestinal:  Negative for abdominal pain, constipation and diarrhea. Genitourinary:  Negative for dysuria, frequency and urgency. Musculoskeletal:  Negative for arthralgias and myalgias.    Skin:  Positive for rash and wound. Neurological:  Negative for dizziness, numbness and headaches. Psychiatric/Behavioral:  Positive for confusion. The patient is not nervous/anxious. Objective:      /66   Pulse 70   Temp 97.9 °F (36.6 °C)   Ht 5' 5" (1.651 m)   Wt 66.2 kg (146 lb)   SpO2 95%   BMI 24.30 kg/m²          Physical Exam  Vitals and nursing note reviewed. Constitutional:       General: She is not in acute distress. Appearance: She is well-developed. HENT:      Head: Normocephalic and atraumatic. Mouth/Throat:      Mouth: Mucous membranes are moist.   Eyes:      Pupils: Pupils are equal, round, and reactive to light. Cardiovascular:      Rate and Rhythm: Normal rate and regular rhythm. Heart sounds: Normal heart sounds. Pulmonary:      Effort: Pulmonary effort is normal.      Breath sounds: Normal breath sounds. No wheezing. Abdominal:      General: Bowel sounds are normal.      Palpations: Abdomen is soft. Musculoskeletal:         General: No swelling. Right lower leg: No edema. Left lower leg: No edema. Skin:     General: Skin is warm. Findings: Erythema and rash present. No wound. Neurological:      General: No focal deficit present. Mental Status: She is alert. Psychiatric:         Mood and Affect: Mood and affect normal. Mood is not anxious or depressed.          Behavior: Behavior normal.

## 2024-01-29 DIAGNOSIS — J30.1 SEASONAL ALLERGIC RHINITIS DUE TO POLLEN: ICD-10-CM

## 2024-01-29 RX ORDER — FLUTICASONE PROPIONATE 50 MCG
SPRAY, SUSPENSION (ML) NASAL
Qty: 24 ML | Refills: 2 | Status: SHIPPED | OUTPATIENT
Start: 2024-01-29

## 2024-02-08 DIAGNOSIS — E78.49 OTHER HYPERLIPIDEMIA: ICD-10-CM

## 2024-02-08 RX ORDER — ATORVASTATIN CALCIUM 20 MG/1
TABLET, FILM COATED ORAL
Qty: 90 TABLET | Refills: 1 | Status: SHIPPED | OUTPATIENT
Start: 2024-02-08

## 2024-03-22 DIAGNOSIS — J30.1 SEASONAL ALLERGIC RHINITIS DUE TO POLLEN: ICD-10-CM

## 2024-03-22 RX ORDER — FLUTICASONE PROPIONATE 50 MCG
SPRAY, SUSPENSION (ML) NASAL
Qty: 24 ML | Refills: 2 | Status: SHIPPED | OUTPATIENT
Start: 2024-03-22

## 2024-04-03 ENCOUNTER — TELEPHONE (OUTPATIENT)
Age: 89
End: 2024-04-03

## 2024-04-03 DIAGNOSIS — Z00.00 HEALTH MAINTENANCE EXAMINATION: Primary | ICD-10-CM

## 2024-04-03 NOTE — TELEPHONE ENCOUNTER
Pt daughter called asking if PCP Dr. Dewey can put in a lab order for TB testing (blood) for the pt. Pt goes to [facility] adult day services [place] and the facility is requesting a TB test to be done by June 2. If order is placed, please call daughter to notify. Also if/when test results are received ; please fax to [phone].

## 2024-04-23 ENCOUNTER — APPOINTMENT (OUTPATIENT)
Dept: LAB | Facility: CLINIC | Age: 89
End: 2024-04-23
Payer: COMMERCIAL

## 2024-04-23 DIAGNOSIS — Z00.00 HEALTH MAINTENANCE EXAMINATION: ICD-10-CM

## 2024-04-23 PROCEDURE — 36415 COLL VENOUS BLD VENIPUNCTURE: CPT

## 2024-04-23 PROCEDURE — 86480 TB TEST CELL IMMUN MEASURE: CPT

## 2024-04-24 LAB
GAMMA INTERFERON BACKGROUND BLD IA-ACNC: 0.04 IU/ML
M TB IFN-G BLD-IMP: NEGATIVE
M TB IFN-G CD4+ BCKGRND COR BLD-ACNC: 0.01 IU/ML
M TB IFN-G CD4+ BCKGRND COR BLD-ACNC: 0.01 IU/ML
MITOGEN IGNF BCKGRD COR BLD-ACNC: 9.96 IU/ML

## 2024-04-25 DIAGNOSIS — K21.9 GASTROESOPHAGEAL REFLUX DISEASE, UNSPECIFIED WHETHER ESOPHAGITIS PRESENT: ICD-10-CM

## 2024-04-26 RX ORDER — FAMOTIDINE 20 MG/1
20 TABLET, FILM COATED ORAL DAILY
Qty: 90 TABLET | Refills: 1 | Status: SHIPPED | OUTPATIENT
Start: 2024-04-26

## 2024-04-30 ENCOUNTER — OFFICE VISIT (OUTPATIENT)
Dept: INTERNAL MEDICINE CLINIC | Facility: CLINIC | Age: 89
End: 2024-04-30
Payer: COMMERCIAL

## 2024-04-30 VITALS
TEMPERATURE: 96.3 F | OXYGEN SATURATION: 98 % | DIASTOLIC BLOOD PRESSURE: 76 MMHG | WEIGHT: 147 LBS | BODY MASS INDEX: 24.49 KG/M2 | SYSTOLIC BLOOD PRESSURE: 130 MMHG | HEIGHT: 65 IN | HEART RATE: 81 BPM

## 2024-04-30 DIAGNOSIS — F02.80 LATE ONSET ALZHEIMER'S DISEASE WITHOUT BEHAVIORAL DISTURBANCE (HCC): ICD-10-CM

## 2024-04-30 DIAGNOSIS — I25.10 CORONARY ARTERY DISEASE INVOLVING NATIVE CORONARY ARTERY OF NATIVE HEART WITHOUT ANGINA PECTORIS: ICD-10-CM

## 2024-04-30 DIAGNOSIS — I10 ESSENTIAL HYPERTENSION: Primary | ICD-10-CM

## 2024-04-30 DIAGNOSIS — M81.0 AGE-RELATED OSTEOPOROSIS WITHOUT CURRENT PATHOLOGICAL FRACTURE: ICD-10-CM

## 2024-04-30 DIAGNOSIS — G30.1 LATE ONSET ALZHEIMER'S DISEASE WITHOUT BEHAVIORAL DISTURBANCE (HCC): ICD-10-CM

## 2024-04-30 DIAGNOSIS — N18.31 STAGE 3A CHRONIC KIDNEY DISEASE (HCC): ICD-10-CM

## 2024-04-30 DIAGNOSIS — E78.49 OTHER HYPERLIPIDEMIA: ICD-10-CM

## 2024-04-30 DIAGNOSIS — J44.9 CHRONIC OBSTRUCTIVE PULMONARY DISEASE, UNSPECIFIED COPD TYPE (HCC): ICD-10-CM

## 2024-04-30 DIAGNOSIS — K21.9 GASTROESOPHAGEAL REFLUX DISEASE, UNSPECIFIED WHETHER ESOPHAGITIS PRESENT: ICD-10-CM

## 2024-04-30 PROCEDURE — 1159F MED LIST DOCD IN RCRD: CPT | Performed by: NURSE PRACTITIONER

## 2024-04-30 PROCEDURE — 99214 OFFICE O/P EST MOD 30 MIN: CPT | Performed by: NURSE PRACTITIONER

## 2024-04-30 PROCEDURE — G2211 COMPLEX E/M VISIT ADD ON: HCPCS | Performed by: NURSE PRACTITIONER

## 2024-04-30 PROCEDURE — 3725F SCREEN DEPRESSION PERFORMED: CPT | Performed by: NURSE PRACTITIONER

## 2024-04-30 NOTE — ASSESSMENT & PLAN NOTE
Lab Results   Component Value Date    EGFR 43 05/31/2023    EGFR 46 06/03/2022    EGFR 42 06/22/2021    CREATININE 1.12 05/31/2023    CREATININE 1.07 06/03/2022    CREATININE 1.17 06/22/2021   stable

## 2024-04-30 NOTE — PROGRESS NOTES
Name: Berta Estrada      : 1935      MRN: [id number]  Encounter Provider: JOSH Gustafson  Encounter Date: 2024   Encounter department: [facility] INTERNAL MEDICINE    Assessment & Plan     1. Essential hypertension  Assessment & Plan:  Stable  Not on medication      2. Chronic obstructive pulmonary disease, unspecified COPD type (HCC)  Assessment & Plan:  Denies any symptoms.       3. Gastroesophageal reflux disease, unspecified whether esophagitis present  Assessment & Plan:  On pepcid daily.       4. Late onset Alzheimer's disease without behavioral disturbance (HCC)  Assessment & Plan:  Goes to adult day services.   Paperwork completed today.         5. Age-related osteoporosis without current pathological fracture  Assessment & Plan:  Continue daily walks.       6. Stage 3a chronic kidney disease (HCC)  Assessment & Plan:  Lab Results   Component Value Date    EGFR 43 2023    EGFR 46 2022    EGFR 42 2021    CREATININE 1.12 2023    CREATININE 1.07 2022    CREATININE 1.17 2021   stable      7. Other hyperlipidemia  Assessment & Plan:  On a statin       8. Coronary artery disease involving native coronary artery of native heart without angina pectoris  Assessment & Plan:  Denies recent symptoms  On asa and statin              Subjective      Berta is here today to complete paperwork for adult day services.      Her daughter has been giving her tylenol once a day but she's having more back pain throughout the day. She's wondering if she can give her more tylenol.             Review of Systems   Constitutional:  Negative for activity change, appetite change and fatigue.   Eyes:  Negative for visual disturbance.   Respiratory:  Negative for cough and shortness of breath.    Cardiovascular:  Negative for chest pain, palpitations and leg swelling.   Gastrointestinal:  Negative for abdominal pain, constipation and diarrhea.   Genitourinary:  Negative  "for difficulty urinating.   Musculoskeletal:  Positive for arthralgias and back pain.   Neurological:  Negative for dizziness, light-headedness and headaches.   Psychiatric/Behavioral:  Negative for sleep disturbance.        Current Outpatient Medications on File Prior to Visit   Medication Sig    aspirin 81 MG tablet Take 81 mg by mouth daily    atorvastatin (LIPITOR) 20 mg tablet TAKE 1 TABLET BY MOUTH EVERY DAY    chlorhexidine (PERIDEX) 0.12 % solution RINSE 2 TIMES A DAY    famotidine (PEPCID) 20 mg tablet TAKE 1 TABLET BY MOUTH EVERY DAY    fluticasone (FLONASE) 50 mcg/act nasal spray SPRAY 1 SPRAY INTO EACH NOSTRIL EVERY DAY    Multiple Vitamins-Minerals (MULTIVITAMIN ADULT PO) Take 1 tablet by mouth daily       Objective     /76   Pulse 81   Temp (!) 96.3 °F (35.7 °C)   Ht 5' 5\" (1.651 m)   Wt 66.7 kg (147 lb)   SpO2 98%   BMI 24.46 kg/m²     Physical Exam  Vitals reviewed.   Constitutional:       Appearance: Normal appearance.   HENT:      Head: Normocephalic and atraumatic.   Eyes:      Conjunctiva/sclera: Conjunctivae normal.   Cardiovascular:      Rate and Rhythm: Normal rate and regular rhythm.      Heart sounds: Normal heart sounds.   Pulmonary:      Effort: Pulmonary effort is normal.      Breath sounds: Normal breath sounds.   Musculoskeletal:      Right lower leg: No edema.      Left lower leg: No edema.   Skin:     General: Skin is warm and dry.   Neurological:      Mental Status: She is alert. Mental status is at baseline.   Psychiatric:         Mood and Affect: Mood normal.         Behavior: Behavior normal.       JOSH Gustafson    "

## 2024-05-12 ENCOUNTER — HOSPITAL ENCOUNTER (INPATIENT)
Facility: HOSPITAL | Age: 89
LOS: 4 days | Discharge: NURSING, REHAB OR LONG-TERM CARE | DRG: 983 | End: 2024-05-16
Attending: EMERGENCY MEDICINE | Admitting: SURGERY
Payer: COMMERCIAL

## 2024-05-12 ENCOUNTER — APPOINTMENT (EMERGENCY)
Dept: CT IMAGING | Facility: HOSPITAL | Age: 89
DRG: 983 | End: 2024-05-12
Payer: COMMERCIAL

## 2024-05-12 ENCOUNTER — APPOINTMENT (EMERGENCY)
Dept: RADIOLOGY | Facility: HOSPITAL | Age: 89
DRG: 983 | End: 2024-05-12
Payer: COMMERCIAL

## 2024-05-12 DIAGNOSIS — I44.1 MOBITZ I: ICD-10-CM

## 2024-05-12 DIAGNOSIS — S32.591A PUBIC RAMUS FRACTURE, RIGHT, CLOSED, INITIAL ENCOUNTER (HCC): Primary | ICD-10-CM

## 2024-05-12 DIAGNOSIS — R26.2 AMBULATORY DYSFUNCTION: ICD-10-CM

## 2024-05-12 DIAGNOSIS — R00.1 SYMPTOMATIC BRADYCARDIA: ICD-10-CM
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
## 2024-05-12 DIAGNOSIS — M25.551 RIGHT HIP PAIN: ICD-10-CM

## 2024-05-12 LAB
ABO GROUP BLD: NORMAL
ALBUMIN SERPL BCP-MCNC: 4.1 G/DL (ref 3.5–5)
ALP SERPL-CCNC: 68 U/L (ref 34–104)
ALT SERPL W P-5'-P-CCNC: 10 U/L (ref 7–52)
ANION GAP SERPL CALCULATED.3IONS-SCNC: 7 MMOL/L (ref 4–13)
AST SERPL W P-5'-P-CCNC: 14 U/L (ref 13–39)
BACTERIA UR QL AUTO: ABNORMAL /HPF
BASOPHILS # BLD AUTO: 0.04 THOUSANDS/ÂΜL (ref 0–0.1)
BASOPHILS NFR BLD AUTO: 0 % (ref 0–1)
BILIRUB SERPL-MCNC: 0.6 MG/DL (ref 0.2–1)
BILIRUB UR QL STRIP: NEGATIVE
BLD GP AB SCN SERPL QL: NEGATIVE
BUN SERPL-MCNC: 22 MG/DL (ref 5–25)
CALCIUM SERPL-MCNC: 8.9 MG/DL (ref 8.4–10.2)
CHLORIDE SERPL-SCNC: 104 MMOL/L (ref 96–108)
CLARITY UR: CLEAR
CO2 SERPL-SCNC: 28 MMOL/L (ref 21–32)
COLOR UR: ABNORMAL
CREAT SERPL-MCNC: 1.09 MG/DL (ref 0.6–1.3)
EOSINOPHIL # BLD AUTO: 0.11 THOUSAND/ÂΜL (ref 0–0.61)
EOSINOPHIL NFR BLD AUTO: 1 % (ref 0–6)
ERYTHROCYTE [DISTWIDTH] IN BLOOD BY AUTOMATED COUNT: 13.7 % (ref 11.6–15.1)
GFR SERPL CREATININE-BSD FRML MDRD: 45 ML/MIN/1.73SQ M
GLUCOSE SERPL-MCNC: 127 MG/DL (ref 65–140)
GLUCOSE UR STRIP-MCNC: NEGATIVE MG/DL
HCT VFR BLD AUTO: 38.9 % (ref 34.8–46.1)
HGB BLD-MCNC: 13.2 G/DL (ref 11.5–15.4)
HGB UR QL STRIP.AUTO: NEGATIVE
IMM GRANULOCYTES # BLD AUTO: 0.04 THOUSAND/UL (ref 0–0.2)
IMM GRANULOCYTES NFR BLD AUTO: 0 % (ref 0–2)
KETONES UR STRIP-MCNC: NEGATIVE MG/DL
LEUKOCYTE ESTERASE UR QL STRIP: ABNORMAL
LYMPHOCYTES # BLD AUTO: 1.78 THOUSANDS/ÂΜL (ref 0.6–4.47)
LYMPHOCYTES NFR BLD AUTO: 20 % (ref 14–44)
MAGNESIUM SERPL-MCNC: 2 MG/DL (ref 1.9–2.7)
MCH RBC QN AUTO: 32.4 PG (ref 26.8–34.3)
MCHC RBC AUTO-ENTMCNC: 33.9 G/DL (ref 31.4–37.4)
MCV RBC AUTO: 96 FL (ref 82–98)
MONOCYTES # BLD AUTO: 0.72 THOUSAND/ÂΜL (ref 0.17–1.22)
MONOCYTES NFR BLD AUTO: 8 % (ref 4–12)
NEUTROPHILS # BLD AUTO: 6.4 THOUSANDS/ÂΜL (ref 1.85–7.62)
NEUTS SEG NFR BLD AUTO: 71 % (ref 43–75)
NITRITE UR QL STRIP: NEGATIVE
NON-SQ EPI CELLS URNS QL MICRO: ABNORMAL /HPF
NRBC BLD AUTO-RTO: 0 /100 WBCS
PH UR STRIP.AUTO: 6 [PH]
PLATELET # BLD AUTO: 142 THOUSANDS/UL (ref 149–390)
PMV BLD AUTO: 9.3 FL (ref 8.9–12.7)
POTASSIUM SERPL-SCNC: 4 MMOL/L (ref 3.5–5.3)
PROT SERPL-MCNC: 6.1 G/DL (ref 6.4–8.4)
PROT UR STRIP-MCNC: NEGATIVE MG/DL
RBC # BLD AUTO: 4.07 MILLION/UL (ref 3.81–5.12)
RBC #/AREA URNS AUTO: ABNORMAL /HPF
RH BLD: POSITIVE
SODIUM SERPL-SCNC: 139 MMOL/L (ref 135–147)
SP GR UR STRIP.AUTO: 1.01 (ref 1–1.03)
SPECIMEN EXPIRATION DATE: NORMAL
UROBILINOGEN UR STRIP-ACNC: <2 MG/DL
WBC # BLD AUTO: 9.09 THOUSAND/UL (ref 4.31–10.16)
WBC #/AREA URNS AUTO: ABNORMAL /HPF

## 2024-05-12 PROCEDURE — 99285 EMERGENCY DEPT VISIT HI MDM: CPT

## 2024-05-12 PROCEDURE — 86901 BLOOD TYPING SEROLOGIC RH(D): CPT

## 2024-05-12 PROCEDURE — 93005 ELECTROCARDIOGRAM TRACING: CPT

## 2024-05-12 PROCEDURE — 96361 HYDRATE IV INFUSION ADD-ON: CPT

## 2024-05-12 PROCEDURE — 72131 CT LUMBAR SPINE W/O DYE: CPT

## 2024-05-12 PROCEDURE — 86850 RBC ANTIBODY SCREEN: CPT

## 2024-05-12 PROCEDURE — 81001 URINALYSIS AUTO W/SCOPE: CPT | Performed by: EMERGENCY MEDICINE

## 2024-05-12 PROCEDURE — 87086 URINE CULTURE/COLONY COUNT: CPT | Performed by: EMERGENCY MEDICINE

## 2024-05-12 PROCEDURE — 86900 BLOOD TYPING SEROLOGIC ABO: CPT

## 2024-05-12 PROCEDURE — 96360 HYDRATION IV INFUSION INIT: CPT

## 2024-05-12 PROCEDURE — 36415 COLL VENOUS BLD VENIPUNCTURE: CPT | Performed by: EMERGENCY MEDICINE

## 2024-05-12 PROCEDURE — 99285 EMERGENCY DEPT VISIT HI MDM: CPT | Performed by: EMERGENCY MEDICINE

## 2024-05-12 PROCEDURE — 80053 COMPREHEN METABOLIC PANEL: CPT | Performed by: EMERGENCY MEDICINE

## 2024-05-12 PROCEDURE — 83735 ASSAY OF MAGNESIUM: CPT | Performed by: EMERGENCY MEDICINE

## 2024-05-12 PROCEDURE — 73502 X-RAY EXAM HIP UNI 2-3 VIEWS: CPT

## 2024-05-12 PROCEDURE — 85025 COMPLETE CBC W/AUTO DIFF WBC: CPT | Performed by: EMERGENCY MEDICINE

## 2024-05-12 PROCEDURE — 72192 CT PELVIS W/O DYE: CPT

## 2024-05-12 PROCEDURE — 99223 1ST HOSP IP/OBS HIGH 75: CPT | Performed by: SURGERY

## 2024-05-12 RX ORDER — FLUTICASONE PROPIONATE 50 MCG
1 SPRAY, SUSPENSION (ML) NASAL DAILY
Status: DISCONTINUED | OUTPATIENT
Start: 2024-05-13 | End: 2024-05-16 | Stop reason: HOSPADM

## 2024-05-12 RX ORDER — FAMOTIDINE 20 MG/1
20 TABLET, FILM COATED ORAL DAILY
Status: DISCONTINUED | OUTPATIENT
Start: 2024-05-13 | End: 2024-05-16 | Stop reason: HOSPADM

## 2024-05-12 RX ORDER — KETOROLAC TROMETHAMINE 30 MG/ML
15 INJECTION, SOLUTION INTRAMUSCULAR; INTRAVENOUS ONCE
Status: DISCONTINUED | OUTPATIENT
Start: 2024-05-12 | End: 2024-05-12

## 2024-05-12 RX ORDER — CHLORHEXIDINE GLUCONATE ORAL RINSE 1.2 MG/ML
15 SOLUTION DENTAL EVERY 12 HOURS SCHEDULED
Status: DISCONTINUED | OUTPATIENT
Start: 2024-05-12 | End: 2024-05-16 | Stop reason: HOSPADM

## 2024-05-12 RX ORDER — ENOXAPARIN SODIUM 100 MG/ML
30 INJECTION SUBCUTANEOUS EVERY 24 HOURS
Status: DISCONTINUED | OUTPATIENT
Start: 2024-05-12 | End: 2024-05-16 | Stop reason: HOSPADM

## 2024-05-12 RX ORDER — KETOROLAC TROMETHAMINE 30 MG/ML
15 INJECTION, SOLUTION INTRAMUSCULAR; INTRAVENOUS ONCE
Status: COMPLETED | OUTPATIENT
Start: 2024-05-12 | End: 2024-05-12

## 2024-05-12 RX ORDER — ATORVASTATIN CALCIUM 20 MG/1
20 TABLET, FILM COATED ORAL
Status: DISCONTINUED | OUTPATIENT
Start: 2024-05-13 | End: 2024-05-16 | Stop reason: HOSPADM

## 2024-05-12 RX ADMIN — KETOROLAC TROMETHAMINE 15 MG: 30 INJECTION, SOLUTION INTRAMUSCULAR; INTRAVENOUS at 20:38

## 2024-05-12 RX ADMIN — ENOXAPARIN SODIUM 30 MG: 30 INJECTION SUBCUTANEOUS at 20:31

## 2024-05-12 RX ADMIN — SODIUM CHLORIDE 500 ML: 0.9 INJECTION, SOLUTION INTRAVENOUS at 18:44

## 2024-05-12 NOTE — Clinical Note
The ECG shows a sinus bradycardia and an A-V block. The A-V block is 2nd degree (Mobitz 1). ECG rate  = 36 bpm.

## 2024-05-12 NOTE — Clinical Note
The PACER GENERATOR CEASAR XT SR MRI SURESCAN - [id number] device was inserted. The leads were placed into the connector and visually verified to be in correct position. Injury current obtained.

## 2024-05-12 NOTE — ED PROVIDER NOTES
History  Chief Complaint   Patient presents with    Fall     Pt arrived via ems for a fall around noon today, no head strike, no LOC. Pt takes aspirin daily. Pt twisted and fell onto her knees landing on her right hip.     Patient is an 89-year-old female with a past medical history of dementia, lumbar degenerative disc disease, aortic valve replacement, hearing loss who comes to the ED today after sustaining a mechanical fall around noon today.  Patient is on aspirin.  Secondary to dementia, patient is a limited historian.  Patient's daughter and son-in-law are at bedside and provided history.  Around noon today patient was with her medical aid.  She attempted to get up from the couch but then fell falling on her knees and then her right hip.  No head strike.  No loss of consciousness.  Patient's daughter reports that patient has been having increased dizziness recently however she does not believe that this fall was from dizziness or syncopal event.  It is unclear whether or not patient's history of lumbar degenerative disc disease contributed to fall.  After fall patient struggled to ambulate to the bathroom secondary to right hip pain. Patient's daughter gave her pain medication at around 2 PM but reports that patient had no pain relief.  Patient's daughter reports that patient is at her mental status is currently at baseline.         Prior to Admission Medications   Prescriptions Last Dose Informant Patient Reported? Taking?   Multiple Vitamins-Minerals (MULTIVITAMIN ADULT PO)  Self Yes No   Sig: Take 1 tablet by mouth daily   aspirin 81 MG tablet  Self Yes No   Sig: Take 81 mg by mouth daily   atorvastatin (LIPITOR) 20 mg tablet   No No   Sig: TAKE 1 TABLET BY MOUTH EVERY DAY   chlorhexidine (PERIDEX) 0.12 % solution  Self Yes No   Sig: RINSE 2 TIMES A DAY   famotidine (PEPCID) 20 mg tablet   No No   Sig: TAKE 1 TABLET BY MOUTH EVERY DAY   fluticasone (FLONASE) 50 mcg/act nasal spray   No No   Sig: SPRAY 1  SPRAY INTO EACH NOSTRIL EVERY DAY      Facility-Administered Medications: None       Past Medical History:   Diagnosis Date    Actinic keratosis     Basal cell carcinoma     Back     Cancer (HCC)     Coronary artery disease     Dementia (HCC)     Depression     Ear problems     HL (hearing loss)     Hyperlipidemia     Migraines     Ovarian cancer (HCC) 2004    s/p surgery. no chemo/RT    Phlebitis     R leg       Past Surgical History:   Procedure Laterality Date    ADENOIDECTOMY      APPENDECTOMY      CATARACT EXTRACTION, BILATERAL      HYSTERECTOMY  2005    ovarian CA    KNEE SURGERY Right     SKIN BIOPSY      TONSILECTOMY AND ADNOIDECTOMY      TONSILLECTOMY         Family History   Problem Relation Age of Onset    Depression Mother     Anxiety disorder Mother     Alcohol abuse Mother     Substance Abuse Mother     Asthma Mother     Diabetes Father 60    Brain cancer Son     Heart attack Sister     Coronary artery disease Sister     Mental illness Neg Hx      I have reviewed and agree with the history as documented.    E-Cigarette/Vaping    E-Cigarette Use Never User      E-Cigarette/Vaping Substances    Nicotine No     THC No     CBD No     Flavoring No     Other No     Unknown No      Social History     Tobacco Use    Smoking status: Former     Current packs/day: 0.25     Types: Cigarettes    Smokeless tobacco: Never    Tobacco comments:     until age 30   Vaping Use    Vaping status: Never Used   Substance Use Topics    Alcohol use: Not Currently    Drug use: Never        Review of Systems   Respiratory:  Negative for shortness of breath.    Cardiovascular:  Negative for chest pain.   Musculoskeletal:         Right hip pain   Neurological:  Positive for dizziness and headaches.       Physical Exam  ED Triage Vitals   Temperature Pulse Respirations Blood Pressure SpO2   05/12/24 1604 05/12/24 1604 05/12/24 1604 05/12/24 1604 05/12/24 1604   98.6 °F (37 °C) 83 18 (!) 178/93 96 %      Temp Source Heart Rate Source  Patient Position - Orthostatic VS BP Location FiO2 (%)   05/12/24 1604 05/12/24 1730 05/12/24 1604 05/12/24 1604 --   Oral Monitor Sitting Right arm       Pain Score       --                    Orthostatic Vital Signs  Vitals:    05/12/24 1604 05/12/24 1730 05/12/24 1845 05/12/24 1930   BP: (!) 178/93 150/97 130/68 130/85   Pulse: 83 82 63 77   Patient Position - Orthostatic VS: Sitting          Physical Exam  Vitals and nursing note reviewed.   Constitutional:       General: She is not in acute distress.     Appearance: She is well-developed.   HENT:      Head: Normocephalic and atraumatic.   Eyes:      Conjunctiva/sclera: Conjunctivae normal.      Pupils: Pupils are equal, round, and reactive to light.   Cardiovascular:      Rate and Rhythm: Regular rhythm. Bradycardia present.      Pulses: Normal pulses.   Pulmonary:      Effort: Pulmonary effort is normal. No respiratory distress.      Breath sounds: Normal breath sounds.   Abdominal:      Palpations: Abdomen is soft.      Tenderness: There is no abdominal tenderness.   Musculoskeletal:         General: No swelling.      Cervical back: Neck supple.      Right lower leg: No edema.      Left lower leg: No edema.      Comments: Right hip pain, right hip range of motion decreased   Skin:     General: Skin is warm and dry.      Capillary Refill: Capillary refill takes less than 2 seconds.      Findings: No bruising.   Neurological:      Mental Status: She is alert. She is disoriented.   Psychiatric:         Mood and Affect: Mood normal.         ED Medications  Medications   ketorolac (TORADOL) injection 15 mg (has no administration in time range)   enoxaparin (LOVENOX) subcutaneous injection 30 mg (30 mg Subcutaneous Given 5/12/24 2031)   atorvastatin (LIPITOR) tablet 20 mg (has no administration in time range)   famotidine (PEPCID) tablet 20 mg (has no administration in time range)   fluticasone (FLONASE) 50 mcg/act nasal spray 1 spray (has no administration in time  range)   chlorhexidine (PERIDEX) 0.12 % oral rinse 15 mL (has no administration in time range)   sodium chloride 0.9 % bolus 500 mL (500 mL Intravenous New Bag 5/12/24 1844)       Diagnostic Studies  Results Reviewed       Procedure Component Value Units Date/Time    Comprehensive metabolic panel [[id number]]  (Abnormal) Collected: 05/12/24 1654    Lab Status: Final result Specimen: Blood from Arm, Right Updated: 05/12/24 1722     Sodium 139 mmol/L      Potassium 4.0 mmol/L      Chloride 104 mmol/L      CO2 28 mmol/L      ANION GAP 7 mmol/L      BUN 22 mg/dL      Creatinine 1.09 mg/dL      Glucose 127 mg/dL      Calcium 8.9 mg/dL      AST 14 U/L      ALT 10 U/L      Alkaline Phosphatase 68 U/L      Total Protein 6.1 g/dL      Albumin 4.1 g/dL      Total Bilirubin 0.60 mg/dL      eGFR 45 ml/min/1.73sq m     Narrative:      National Kidney Disease Foundation guidelines for Chronic Kidney Disease (CKD):     Stage 1 with normal or high GFR (GFR > 90 mL/min/1.73 square meters)    Stage 2 Mild CKD (GFR = 60-89 mL/min/1.73 square meters)    Stage 3A Moderate CKD (GFR = 45-59 mL/min/1.73 square meters)    Stage 3B Moderate CKD (GFR = 30-44 mL/min/1.73 square meters)    Stage 4 Severe CKD (GFR = 15-29 mL/min/1.73 square meters)    Stage 5 End Stage CKD (GFR <15 mL/min/1.73 square meters)  Note: GFR calculation is accurate only with a steady state creatinine    Magnesium [[id number]]  (Normal) Collected: 05/12/24 1654    Lab Status: Final result Specimen: Blood from Arm, Right Updated: 05/12/24 1722     Magnesium 2.0 mg/dL     Urine Microscopic [[id number]]  (Abnormal) Collected: 05/12/24 1651    Lab Status: Final result Specimen: Urine, Other Updated: 05/12/24 1710     RBC, UA 1-2 /hpf      WBC, UA 10-20 /hpf      Epithelial Cells Occasional /hpf      Bacteria, UA None Seen /hpf     Urine culture [[id number]] Collected: 05/12/24 1651    Lab Status: In process Specimen: Urine, Other Updated: 05/12/24 1710    CBC and differential  [[id number]]  (Abnormal) Collected: 05/12/24 1654    Lab Status: Final result Specimen: Blood from Arm, Right Updated: 05/12/24 1707     WBC 9.09 Thousand/uL      RBC 4.07 Million/uL      Hemoglobin 13.2 g/dL      Hematocrit 38.9 %      MCV 96 fL      MCH 32.4 pg      MCHC 33.9 g/dL      RDW 13.7 %      MPV 9.3 fL      Platelets 142 Thousands/uL      nRBC 0 /100 WBCs      Segmented % 71 %      Immature Grans % 0 %      Lymphocytes % 20 %      Monocytes % 8 %      Eosinophils Relative 1 %      Basophils Relative 0 %      Absolute Neutrophils 6.40 Thousands/µL      Absolute Immature Grans 0.04 Thousand/uL      Absolute Lymphocytes 1.78 Thousands/µL      Absolute Monocytes 0.72 Thousand/µL      Eosinophils Absolute 0.11 Thousand/µL      Basophils Absolute 0.04 Thousands/µL     UA w Reflex to Microscopic w Reflex to Culture [[id number]]  (Abnormal) Collected: 05/12/24 1651    Lab Status: Final result Specimen: Urine, Other Updated: 05/12/24 1706     Color, UA Light Yellow     Clarity, UA Clear     Specific Gravity, UA 1.011     pH, UA 6.0     Leukocytes, UA Moderate     Nitrite, UA Negative     Protein, UA Negative mg/dl      Glucose, UA Negative mg/dl      Ketones, UA Negative mg/dl      Urobilinogen, UA <2.0 mg/dl      Bilirubin, UA Negative     Occult Blood, UA Negative                   CT pelvis wo contrast   Final Result by Aj Espana MD (05/12 1941)      Comminuted mildly displaced fracture at the right inferior pubic ramus      Workstation performed: [id number]         CT spine lumbar without contrast   Final Result by Aj Espana MD (05/12 1938)      No evidence of acute fracture or traumatic subluxation.      Multilevel degenerative disc disease most severe at L3-L4 and L4-L5 with moderate right foraminal stenosis at the L4-L5 level.         Workstation performed: [id number]         XR hip/pelv 2-3 vws right if performed    (Results Pending)         Procedures  Procedures      ED Course                              SBIRT 20yo+      Flowsheet Row Most Recent Value   Initial Alcohol Screen: US AUDIT-C     1. How often do you have a drink containing alcohol? 0 Filed at: 05/12/2024 1802   2. How many drinks containing alcohol do you have on a typical day you are drinking?  0 Filed at: 05/12/2024 1802   3b. FEMALE Any Age, or MALE 65+: How often do you have 4 or more drinks on one occassion? 0 Filed at: 05/12/2024 1802   Audit-C Score 0 Filed at: 05/12/2024 1802   NARCISO: How many times in the past year have you...    Used an illegal drug or used a prescription medication for non-medical reasons? Never Filed at: 05/12/2024 1802                  Medical Decision Making  Patient is an 89-year-old female with a past medical history of dementia, lumbar degenerative disc disease, aortic valve replacement, hearing loss who comes to the ED today after sustaining a mechanical fall around noon today.  No head strike. No loss of consciousness. Limited historian secondary to dementia. On aspirin.    Differential diagnoses: Right hip fracture, right hip dislocation, hemorrhage secondary to fall, UTI    EKG : found new second-degree AV block Mobitz 1 -patient is asymptomatic at this time - will continue with telemetry in order to monitor  UA micro : WBCs present but no bacteria -no treatment at this time patient is asymptomatic and at cognitive baseline  CMP, magnesium, and CBC with differential: Unremarkable, no signs of anemia   Right hip x-ray: inconclusive -ordered noncon pelvic CT for further investigation  Noncon pelvic CT: Found evidence of right pubic ramus fracture, no evidence of dislocation - will admit to the trauma           Amount and/or Complexity of Data Reviewed  Labs: ordered.  Radiology: ordered.    Risk  Prescription drug management.  Decision regarding hospitalization.          Disposition  Final diagnoses:   Pubic ramus fracture, right, closed, initial encounter (HCC)   Fall, initial encounter    Right hip pain   Ambulatory dysfunction   Mobitz I     Time reflects when diagnosis was documented in both MDM as applicable and the Disposition within this note       Time User Action Codes Description Comment    5/12/2024  8:03 PM Dax Torres [S32.591A] Pubic ramus fracture, right, closed, initial encounter ([facility])     5/12/2024  8:03 PM Dax Torres [W19.XXXA] Fall, initial encounter     5/12/2024  8:03 PM Dax Torres [M25.551] Right hip pain     5/12/2024  8:03 PM Dax Torres [R26.2] Ambulatory dysfunction     5/12/2024  8:28 PM Dax Torres [I44.1] AV block, Mobitz II     5/12/2024  8:29 PM Dax Torres Remove [I44.1] AV block, Mobitz II     5/12/2024  8:29 PM Dax Torres Add [I44.1] Mobitz I           ED Disposition       ED Disposition   Admit    Condition   Stable    Date/Time   Sun May 12, 2024 2000    Comment   --             Follow-up Information    None         Patient's Medications   Discharge Prescriptions    No medications on file     No discharge procedures on file.    PDMP Review       None             ED Provider  Attending physically available and evaluated Berta Klein I managed the patient along with the ED Attending.    Electronically Signed by           Ava Rosen DO  05/12/24 2041       Ava Rosen DO  05/12/24 2051       Ava Rosen DO  05/12/24 2127

## 2024-05-13 ENCOUNTER — APPOINTMENT (INPATIENT)
Dept: NON INVASIVE DIAGNOSTICS | Facility: HOSPITAL | Age: 89
DRG: 983 | End: 2024-05-13
Payer: COMMERCIAL

## 2024-05-13 LAB
ANION GAP SERPL CALCULATED.3IONS-SCNC: 9 MMOL/L (ref 4–13)
ATRIAL RATE: 89 BPM
BUN SERPL-MCNC: 18 MG/DL (ref 5–25)
CALCIUM SERPL-MCNC: 9 MG/DL (ref 8.4–10.2)
CHLORIDE SERPL-SCNC: 109 MMOL/L (ref 96–108)
CO2 SERPL-SCNC: 26 MMOL/L (ref 21–32)
CREAT SERPL-MCNC: 0.98 MG/DL (ref 0.6–1.3)
ERYTHROCYTE [DISTWIDTH] IN BLOOD BY AUTOMATED COUNT: 13.7 % (ref 11.6–15.1)
GFR SERPL CREATININE-BSD FRML MDRD: 51 ML/MIN/1.73SQ M
GLUCOSE SERPL-MCNC: 116 MG/DL (ref 65–140)
HCT VFR BLD AUTO: 43.6 % (ref 34.8–46.1)
HGB BLD-MCNC: 14.5 G/DL (ref 11.5–15.4)
MAGNESIUM SERPL-MCNC: 2.1 MG/DL (ref 1.9–2.7)
MCH RBC QN AUTO: 31.9 PG (ref 26.8–34.3)
MCHC RBC AUTO-ENTMCNC: 33.3 G/DL (ref 31.4–37.4)
MCV RBC AUTO: 96 FL (ref 82–98)
PHOSPHATE SERPL-MCNC: 3.8 MG/DL (ref 2.3–4.1)
PLATELET # BLD AUTO: 140 THOUSANDS/UL (ref 149–390)
PMV BLD AUTO: 9.2 FL (ref 8.9–12.7)
POTASSIUM SERPL-SCNC: 3.8 MMOL/L (ref 3.5–5.3)
QRS AXIS: 55 DEGREES
QRSD INTERVAL: 82 MS
QT INTERVAL: 426 MS
QTC INTERVAL: 414 MS
RBC # BLD AUTO: 4.54 MILLION/UL (ref 3.81–5.12)
SODIUM SERPL-SCNC: 144 MMOL/L (ref 135–147)
T WAVE AXIS: 72 DEGREES
VENTRICULAR RATE: 57 BPM
WBC # BLD AUTO: 8.73 THOUSAND/UL (ref 4.31–10.16)

## 2024-05-13 PROCEDURE — 84100 ASSAY OF PHOSPHORUS: CPT

## 2024-05-13 PROCEDURE — 99222 1ST HOSP IP/OBS MODERATE 55: CPT | Performed by: ORTHOPAEDIC SURGERY

## 2024-05-13 PROCEDURE — 83735 ASSAY OF MAGNESIUM: CPT

## 2024-05-13 PROCEDURE — 93010 ELECTROCARDIOGRAM REPORT: CPT | Performed by: INTERNAL MEDICINE

## 2024-05-13 PROCEDURE — 99222 1ST HOSP IP/OBS MODERATE 55: CPT | Performed by: NURSE PRACTITIONER

## 2024-05-13 PROCEDURE — 99223 1ST HOSP IP/OBS HIGH 75: CPT | Performed by: INTERNAL MEDICINE

## 2024-05-13 PROCEDURE — 85027 COMPLETE CBC AUTOMATED: CPT

## 2024-05-13 PROCEDURE — 80048 BASIC METABOLIC PNL TOTAL CA: CPT

## 2024-05-13 PROCEDURE — 99232 SBSQ HOSP IP/OBS MODERATE 35: CPT | Performed by: SURGERY

## 2024-05-13 PROCEDURE — 36415 COLL VENOUS BLD VENIPUNCTURE: CPT

## 2024-05-13 RX ORDER — OLANZAPINE 10 MG/2ML
5 INJECTION, POWDER, FOR SOLUTION INTRAMUSCULAR ONCE
Status: DISCONTINUED | OUTPATIENT
Start: 2024-05-13 | End: 2024-05-16

## 2024-05-13 RX ORDER — AMOXICILLIN 250 MG
1 CAPSULE ORAL
Status: DISCONTINUED | OUTPATIENT
Start: 2024-05-13 | End: 2024-05-16

## 2024-05-13 RX ORDER — LIDOCAINE 50 MG/G
1 PATCH TOPICAL DAILY
Status: DISCONTINUED | OUTPATIENT
Start: 2024-05-13 | End: 2024-05-16 | Stop reason: HOSPADM

## 2024-05-13 RX ORDER — OXYCODONE HYDROCHLORIDE 5 MG/1
5 TABLET ORAL EVERY 4 HOURS PRN
Status: DISCONTINUED | OUTPATIENT
Start: 2024-05-13 | End: 2024-05-16 | Stop reason: HOSPADM

## 2024-05-13 RX ORDER — ACETAMINOPHEN 325 MG/1
975 TABLET ORAL EVERY 8 HOURS SCHEDULED
Status: DISCONTINUED | OUTPATIENT
Start: 2024-05-13 | End: 2024-05-16 | Stop reason: HOSPADM

## 2024-05-13 RX ORDER — HYDROMORPHONE HCL IN WATER/PF 6 MG/30 ML
0.2 PATIENT CONTROLLED ANALGESIA SYRINGE INTRAVENOUS EVERY 2 HOUR PRN
Status: DISCONTINUED | OUTPATIENT
Start: 2024-05-13 | End: 2024-05-14

## 2024-05-13 RX ORDER — WATER 10 ML/10ML
INJECTION INTRAMUSCULAR; INTRAVENOUS; SUBCUTANEOUS
Status: COMPLETED
Start: 2024-05-13 | End: 2024-05-13

## 2024-05-13 RX ORDER — GABAPENTIN 100 MG/1
100 CAPSULE ORAL
Status: DISCONTINUED | OUTPATIENT
Start: 2024-05-13 | End: 2024-05-16 | Stop reason: HOSPADM

## 2024-05-13 RX ORDER — OLANZAPINE 10 MG/2ML
5 INJECTION, POWDER, FOR SOLUTION INTRAMUSCULAR ONCE
Status: COMPLETED | OUTPATIENT
Start: 2024-05-13 | End: 2024-05-13

## 2024-05-13 RX ORDER — ACETAMINOPHEN 325 MG/1
975 TABLET ORAL EVERY 8 HOURS SCHEDULED
Status: DISCONTINUED | OUTPATIENT
Start: 2024-05-13 | End: 2024-05-13

## 2024-05-13 RX ORDER — LANOLIN ALCOHOL/MO/W.PET/CERES
3 CREAM (GRAM) TOPICAL
Status: DISCONTINUED | OUTPATIENT
Start: 2024-05-13 | End: 2024-05-16 | Stop reason: HOSPADM

## 2024-05-13 RX ADMIN — CHLORHEXIDINE GLUCONATE 15 ML: 1.2 SOLUTION ORAL at 22:14

## 2024-05-13 RX ADMIN — WATER 10 ML: 1 INJECTION INTRAMUSCULAR; INTRAVENOUS; SUBCUTANEOUS at 00:17

## 2024-05-13 RX ADMIN — ACETAMINOPHEN 975 MG: 325 TABLET, FILM COATED ORAL at 22:14

## 2024-05-13 RX ADMIN — HYDROMORPHONE HYDROCHLORIDE 0.2 MG: 0.2 INJECTION, SOLUTION INTRAMUSCULAR; INTRAVENOUS; SUBCUTANEOUS at 20:29

## 2024-05-13 RX ADMIN — LIDOCAINE 5% 1 PATCH: 700 PATCH TOPICAL at 11:12

## 2024-05-13 RX ADMIN — Medication 3 MG: at 22:14

## 2024-05-13 RX ADMIN — GABAPENTIN 100 MG: 100 CAPSULE ORAL at 22:14

## 2024-05-13 RX ADMIN — SENNOSIDES AND DOCUSATE SODIUM 1 TABLET: 8.6; 5 TABLET ORAL at 22:14

## 2024-05-13 RX ADMIN — FAMOTIDINE 20 MG: 20 TABLET, FILM COATED ORAL at 09:43

## 2024-05-13 RX ADMIN — ACETAMINOPHEN 975 MG: 325 TABLET, FILM COATED ORAL at 11:12

## 2024-05-13 RX ADMIN — OLANZAPINE 5 MG: 10 INJECTION, POWDER, FOR SOLUTION INTRAMUSCULAR at 00:17

## 2024-05-13 RX ADMIN — ATORVASTATIN CALCIUM 20 MG: 20 TABLET, FILM COATED ORAL at 17:03

## 2024-05-13 NOTE — PLAN OF CARE
Problem: Potential for Falls  Goal: Patient will remain free of falls  Description: INTERVENTIONS:  - Educate patient/family on patient safety including physical limitations  - Instruct patient to call for assistance with activity   - Consult OT/PT to assist with strengthening/mobility   - Keep Call bell within reach  - Keep bed low and locked with side rails adjusted as appropriate  - Keep care items and personal belongings within reach  - Initiate and maintain comfort rounds  - Make Fall Risk Sign visible to staff  - Offer Toileting every 2 Hours, in advance of need  - Initiate/Maintain bed alarm  - Obtain necessary fall risk management equipment: alarms  - Apply yellow socks and bracelet for high fall risk patients  - Consider moving patient to room near nurses station  Outcome: Progressing     Problem: Prexisting or High Potential for Compromised Skin Integrity  Goal: Skin integrity is maintained or improved  Description: INTERVENTIONS:  - Identify patients at risk for skin breakdown  - Assess and monitor skin integrity  - Assess and monitor nutrition and hydration status  - Monitor labs   - Assess for incontinence   - Turn and reposition patient  - Assist with mobility/ambulation  - Relieve pressure over bony prominences  - Avoid friction and shearing  - Provide appropriate hygiene as needed including keeping skin clean and dry  - Evaluate need for skin moisturizer/barrier cream  - Collaborate with interdisciplinary team   - Patient/family teaching  - Consider wound care consult   Outcome: Progressing     Problem: PAIN - ADULT  Goal: Verbalizes/displays adequate comfort level or baseline comfort level  Description: Interventions:  - Encourage patient to monitor pain and request assistance  - Assess pain using appropriate pain scale  - Administer analgesics based on type and severity of pain and evaluate response  - Implement non-pharmacological measures as appropriate and evaluate response  - Consider  cultural and social influences on pain and pain management  - Notify physician/advanced practitioner if interventions unsuccessful or patient reports new pain  Outcome: Progressing     Problem: INFECTION - ADULT  Goal: Absence or prevention of progression during hospitalization  Description: INTERVENTIONS:  - Assess and monitor for signs and symptoms of infection  - Monitor lab/diagnostic results  - Monitor all insertion sites, i.e. indwelling lines, tubes, and drains  - Monitor endotracheal if appropriate and nasal secretions for changes in amount and color  - [place] appropriate cooling/warming therapies per order  - Administer medications as ordered  - Instruct and encourage patient and family to use good hand hygiene technique  - Identify and instruct in appropriate isolation precautions for identified infection/condition  Outcome: Progressing     Problem: DISCHARGE PLANNING  Goal: Discharge to home or other facility with appropriate resources  Description: INTERVENTIONS:  - Identify barriers to discharge w/patient and caregiver  - Arrange for needed discharge resources and transportation as appropriate  - Identify discharge learning needs (meds, wound care, etc.)  - Arrange for interpretive services to assist at discharge as needed  - Refer to Case Management Department for coordinating discharge planning if the patient needs post-hospital services based on physician/advanced practitioner order or complex needs related to functional status, cognitive ability, or social support system  Outcome: Progressing     Problem: Knowledge Deficit  Goal: Patient/family/caregiver demonstrates understanding of disease process, treatment plan, medications, and discharge instructions  Description: Complete learning assessment and assess knowledge base.  Interventions:  - Provide teaching at level of understanding  - Provide teaching via preferred learning methods  Outcome: Progressing

## 2024-05-13 NOTE — PLAN OF CARE
Problem: PAIN - ADULT  Goal: Verbalizes/displays adequate comfort level or baseline comfort level  Description: Interventions:  - Encourage patient to monitor pain and request assistance  - Assess pain using appropriate pain scale  - Administer analgesics based on type and severity of pain and evaluate response  - Implement non-pharmacological measures as appropriate and evaluate response  - Consider cultural and social influences on pain and pain management  - Notify physician/advanced practitioner if interventions unsuccessful or patient reports new pain  Outcome: Progressing     Problem: INFECTION - ADULT  Goal: Absence or prevention of progression during hospitalization  Description: INTERVENTIONS:  - Assess and monitor for signs and symptoms of infection  - Monitor lab/diagnostic results  - Monitor all insertion sites, i.e. indwelling lines, tubes, and drains  - Monitor endotracheal if appropriate and nasal secretions for changes in amount and color  - [place] appropriate cooling/warming therapies per order  - Administer medications as ordered  - Instruct and encourage patient and family to use good hand hygiene technique  - Identify and instruct in appropriate isolation precautions for identified infection/condition  Outcome: Progressing

## 2024-05-13 NOTE — ASSESSMENT & PLAN NOTE
- Patient with chronic history of dementia  - Delirium precautions  - Frequent reorientation  - Geriatrics consult, appreciate recommendations  - Outpatient follow up with PCP

## 2024-05-13 NOTE — PROGRESS NOTES
[facility]  Progress Note  Name: Berta Estrada I  MRN: [id number]  Unit/Bed#: ED-22 I Date of Admission: 5/12/2024   Date of Service: 5/13/2024 I Hospital Day: 1    Assessment/Plan   Mobitz type 1 second degree AV block  Assessment & Plan  - EKG on admission demonstrates Mobitz type 1 second degree AV block  - Telemetry monitoring  - Monitor vital signs closely  - Cardiology consult, appreciate recommendations. Follow up ECHO results    Fall  Assessment & Plan  - Status post fall with the below noted injuries.  - Fall precautions.  - Geriatric Medicine consultation for evaluation, medication review and recommendations.  - PT and OT evaluation and treatment as indicated.  - Case Management consultation for disposition planning.       Stage 3a chronic kidney disease (HCC)  Assessment & Plan  Lab Results   Component Value Date    EGFR 51 05/13/2024    EGFR 45 05/12/2024    EGFR 43 05/31/2023    CREATININE 0.98 05/13/2024    CREATININE 1.09 05/12/2024    CREATININE 1.12 05/31/2023     - Patient with chronic history of stage 3a CKD  - Baseline Cr 1.0-1.2  - Avoid nephrotoxins  - Monitor renal indices  - Outpatient follow up with PCP     Other hyperlipidemia  Assessment & Plan  - Continue current medication regimen.  - Outpatient follow-up with PCP.       Late onset Alzheimer's disease without behavioral disturbance (HCC)  Assessment & Plan  - Patient with chronic history of dementia  - Delirium precautions  - Frequent reorientation  - Geriatrics consult, appreciate recommendations  - Outpatient follow up with PCP     * Closed fracture of ramus of right pubis (HCC)  Assessment & Plan  - Right inferior pubic ramus fracture, present on admission.  - Status post fall on 5/12.  - Appreciate Orthopedic surgery evaluation, recommendations and interventions as noted.  - WBAT to b/l LE  - Monitor right lower extremity neurovascular exam.  - Continue multimodal analgesic regimen.  - Continue DVT  prophylaxis.  - PT and OT evaluation and treatment as indicated.               Bowel Regimen: senokot  VTE Prophylaxis:Enoxaparin (Lovenox)     Disposition: pending continued medical interventions    Subjective   Chief Complaint: none    Subjective: patient has no complaints this morning. She denies any pain. She denies numbness/tingling. She is eating without issue.      Objective   Vitals:   Temp:  [98.6 °F (37 °C)] 98.6 °F (37 °C)  HR:  [41-92] 90  Resp:  [18-20] 20  BP: (116-178)/(64-97) 149/75    I/O         05/11 0701  05/12 0700 05/12 0701  05/13 0700 05/13 0701  05/14 0700    IV Piggyback  500     Total Intake(mL/kg)  500 (7.5)     Net  +500            Unmeasured Urine Occurrence  1 x              Physical Exam:   GENERAL APPEARANCE: NAD, sitting up in bed eating breakfast. alert  NEURO: GCS 14  HEENT: NCAT, EOMI. Sclera anicteric.  CV: RRR  LUNGS: CTA b/l, no wheezing  GI: bowel sounds active, NTND.  MSK: normal ROM to b/l LE. She has sensation intact to b/l. 5/5 strength in b/l dorsiflexion and plantar flexion. No pain to palpation over right hip.  SKIN: warm, dry, well perfused.     Invasive Devices       Peripheral Intravenous Line  Duration             Peripheral IV 05/13/24 Left;Proximal;Ventral (anterior) Forearm <1 day                          Lab Results: Results: I have personally reviewed all pertinent laboratory/tests results  Imaging: I have personally reviewed pertinent reports.     Other Studies: echo pending

## 2024-05-13 NOTE — ASSESSMENT & PLAN NOTE
Lab Results   Component Value Date    EGFR 45 05/12/2024    EGFR 43 05/31/2023    EGFR 46 06/03/2022    CREATININE 1.09 05/12/2024    CREATININE 1.12 05/31/2023    CREATININE 1.07 06/03/2022     - Patient with chronic history of stage 3a CKD  - Baseline Cr 1.0-1.2  - Avoid nephrotoxins  - Monitor renal indices  - Outpatient follow up with PCP

## 2024-05-13 NOTE — ED ATTENDING ATTESTATION
5/12/2024  IDax DO, saw and evaluated the patient. I have discussed the patient with the resident/non-physician practitioner and agree with the resident's/non-physician practitioner's findings, Plan of Care, and MDM as documented in the resident's/non-physician practitioner's note, except where noted. All available labs and Radiology studies were reviewed.  I was present for key portions of any procedure(s) performed by the resident/non-physician practitioner and I was immediately available to provide assistance.       At this point I agree with the current assessment done in the Emergency Department.  I have conducted an independent evaluation of this patient a history and physical is as follows: 89yoF presenting to ER after witnessed fall 2/2 RLE weakness to knees. Pt was getting off couch when this occurred. No head strike or additional trauma. Pt has been complaining of RLE pain. Known chronic lumbar pain. Pt otherwise at baseline.     VSS. Head atraumatic, normocephalic. AAOX4, CN intact, moving all extremities purposefully. B/L tympanic membranes clear. Nares without septal hematoma and clear. No intraoral trauma. Jaw full ROM. 2+ Radial BUE and 2+ PT BLE. C, T, L spine without step off, tenderness, deformity. Lungs CTA. Clavicles intact. Chest, abd, pelvis without tenderness to palpation. Joints full ROM with exception to RLE which is intermittently guarded with movement.      89yoF presenting with ambulatory dysfunction 2/2 rt pubic ramus fx. Given story, unclear if pt fell prior to event today but event today was 2/2 pain from pubic ramus. CBC, CMP at baseline. UA without bacteria and pt otherwise at baseline so will defer tx. CT identified fx. Call placed to trauma for admission, PT, OT, etc. Family agreeable to rehab vs placement as needed.     ED Course         Critical Care Time  Procedures

## 2024-05-13 NOTE — PLAN OF CARE
Problem: Potential for Falls  Goal: Patient will remain free of falls  Description: INTERVENTIONS:  - Educate patient/family on patient safety including physical limitations  - Instruct patient to call for assistance with activity   - Consult OT/PT to assist with strengthening/mobility   - Keep Call bell within reach  - Keep bed low and locked with side rails adjusted as appropriate  - Keep care items and personal belongings within reach  - Initiate and maintain comfort rounds  - Make Fall Risk Sign visible to staff  - Offer Toileting every 2 Hours, in advance of need  - Initiate/Maintain bed alarm  - Obtain necessary fall risk management equipment: alarms  - Apply yellow socks and bracelet for high fall risk patients  - Consider moving patient to room near nurses station  5/13/2024 0454 by Moni Phillips RN  Outcome: Progressing  5/13/2024 0416 by Moni Phillips RN  Outcome: Progressing     Problem: Prexisting or High Potential for Compromised Skin Integrity  Goal: Skin integrity is maintained or improved  Description: INTERVENTIONS:  - Identify patients at risk for skin breakdown  - Assess and monitor skin integrity  - Assess and monitor nutrition and hydration status  - Monitor labs   - Assess for incontinence   - Turn and reposition patient  - Assist with mobility/ambulation  - Relieve pressure over bony prominences  - Avoid friction and shearing  - Provide appropriate hygiene as needed including keeping skin clean and dry  - Evaluate need for skin moisturizer/barrier cream  - Collaborate with interdisciplinary team   - Patient/family teaching  - Consider wound care consult   5/13/2024 0454 by Moni Phillips RN  Outcome: Progressing  5/13/2024 0416 by Moni Phillips RN  Outcome: Progressing     Problem: PAIN - ADULT  Goal: Verbalizes/displays adequate comfort level or baseline comfort level  Description: Interventions:  - Encourage patient to monitor pain and request assistance  - Assess pain  using appropriate pain scale  - Administer analgesics based on type and severity of pain and evaluate response  - Implement non-pharmacological measures as appropriate and evaluate response  - Consider cultural and social influences on pain and pain management  - Notify physician/advanced practitioner if interventions unsuccessful or patient reports new pain  5/13/2024 0454 by Moni Phillips RN  Outcome: Progressing  5/13/2024 0416 by Moni Phillips RN  Outcome: Progressing     Problem: INFECTION - ADULT  Goal: Absence or prevention of progression during hospitalization  Description: INTERVENTIONS:  - Assess and monitor for signs and symptoms of infection  - Monitor lab/diagnostic results  - Monitor all insertion sites, i.e. indwelling lines, tubes, and drains  - Monitor endotracheal if appropriate and nasal secretions for changes in amount and color  - [place] appropriate cooling/warming therapies per order  - Administer medications as ordered  - Instruct and encourage patient and family to use good hand hygiene technique  - Identify and instruct in appropriate isolation precautions for identified infection/condition  5/13/2024 0454 by Moni Phillips RN  Outcome: Progressing  5/13/2024 0416 by Moni Phillips RN  Outcome: Progressing     Problem: DISCHARGE PLANNING  Goal: Discharge to home or other facility with appropriate resources  Description: INTERVENTIONS:  - Identify barriers to discharge w/patient and caregiver  - Arrange for needed discharge resources and transportation as appropriate  - Identify discharge learning needs (meds, wound care, etc.)  - Arrange for interpretive services to assist at discharge as needed  - Refer to Case Management Department for coordinating discharge planning if the patient needs post-hospital services based on physician/advanced practitioner order or complex needs related to functional status, cognitive ability, or social support system  5/13/2024 0454 by  Moni Phillips RN  Outcome: Progressing  5/13/2024 0416 by Moni Phillips RN  Outcome: Progressing     Problem: Knowledge Deficit  Goal: Patient/family/caregiver demonstrates understanding of disease process, treatment plan, medications, and discharge instructions  Description: Complete learning assessment and assess knowledge base.  Interventions:  - Provide teaching at level of understanding  - Provide teaching via preferred learning methods  5/13/2024 0454 by Moni Phillips RN  Outcome: Progressing  5/13/2024 0416 by Moni Phillips RN  Outcome: Progressing     Problem: SAFETY,RESTRAINT: NV/NON-SELF DESTRUCTIVE BEHAVIOR  Goal: Remains free of harm/injury (restraint for non violent/non self-detsructive behavior)  Description: INTERVENTIONS:  - Instruct patient/family regarding restraint use   - Assess and monitor physiologic and psychological status   - Provide interventions and comfort measures to meet assessed patient needs   - Identify and implement measures to help patient regain control  - Assess readiness for release of restraint   Outcome: Progressing  Goal: Returns to optimal restraint-free functioning  Description: INTERVENTIONS:  - Assess the patient's behavior and symptoms that indicate continued need for restraint  - Identify and implement measures to help patient regain control  - Assess readiness for release of restraint   Outcome: Progressing

## 2024-05-13 NOTE — H&P
[facility]  H&P  Name: Berta Estrada 89 y.o. female I MRN: [id number]  Unit/Bed#: ED-38 I Date of Admission: 5/12/2024   Date of Service: 5/12/2024 I Hospital Day: 0      Assessment/Plan   Mobitz type 1 second degree AV block  Assessment & Plan  - EKG on admission demonstrates Mobitz type 1 second degree AV block  - Telemetry monitoring  - Monitor vital signs closely  - Cardiology consult, appreciate recommendations    Fall  Assessment & Plan  - Status post fall with the below noted injuries.  - Fall precautions.  - Geriatric Medicine consultation for evaluation, medication review and recommendations.  - PT and OT evaluation and treatment as indicated.  - Case Management consultation for disposition planning.      Stage 3a chronic kidney disease (HCC)  Assessment & Plan  Lab Results   Component Value Date    EGFR 45 05/12/2024    EGFR 43 05/31/2023    EGFR 46 06/03/2022    CREATININE 1.09 05/12/2024    CREATININE 1.12 05/31/2023    CREATININE 1.07 06/03/2022     - Patient with chronic history of stage 3a CKD  - Baseline Cr 1.0-1.2  - Avoid nephrotoxins  - Monitor renal indices  - Outpatient follow up with PCP    Other hyperlipidemia  Assessment & Plan  - Continue current medication regimen.  - Outpatient follow-up with PCP.      Late onset Alzheimer's disease without behavioral disturbance (HCC)  Assessment & Plan  - Patient with chronic history of dementia  - Delirium precautions  - Frequent reorientation  - Geriatrics consult, appreciate recommendations  - Outpatient follow up with PCP    * Closed fracture of ramus of right pubis (HCC)  Assessment & Plan  - Right inferior pubic ramus fracture, present on admission.  - Status post fall on 5/12.  - Appreciate Orthopedic surgery evaluation, recommendations and interventions as noted.  - Maintain non weightbearing status on the right lower extremity until formally seen by orthopedic surgery.  - Monitor right lower extremity neurovascular  "exam.  - Continue multimodal analgesic regimen.  - Continue DVT prophylaxis.  - PT and OT evaluation and treatment as indicated.  - Outpatient follow up with Orthopedic surgery for re-evaluation.               Trauma Alert: Evaluation; trauma team notified at 1957 via in person, arrived at 2003    Model of Arrival: Ambulance    Trauma Team: Attending Eliud and DEVIN Purvis  Consultants:     Orthopedics: routine consult; Epic consult order placed and consultant notified (via phone/text @ time 2020);      Other: Cardiology: routine consult; Epic consult order placed;     History of Present Illness     Chief Complaint: \"I think I fell\"  Mechanism:Fall     HPI:    Berta Estrada is a 89 y.o. female who presents with right hip pain status post fall.  Per family, patient reportedly had a witnessed mechanical Fall earlier in the day. She reportedly was standing up from her chair when she lost her balance and fell backwards.  No head strike or loss of consciousness.  Patient takes aspirin, no other AC/AP.  Since the fall, she has had right hip pain and difficulty ambulating.  On evaluation, patient is alert but confused, GCS 14 which is baseline secondary to history of dementia.  She is not offering any complaints at this time but right hip is tender on palpation.    Review of Systems   Unable to perform ROS: Dementia     12-point, complete review of systems was reviewed and negative except as stated above.     Historical Information     Past Medical History:   Diagnosis Date    Actinic keratosis     Basal cell carcinoma     Back     Cancer (HCC)     Coronary artery disease     Dementia (HCC)     Depression     Ear problems     HL (hearing loss)     Hyperlipidemia     Migraines     Ovarian cancer (HCC) 2004    s/p surgery. no chemo/RT    Phlebitis     R leg     Past Surgical History:   Procedure Laterality Date    ADENOIDECTOMY      APPENDECTOMY      CATARACT EXTRACTION, BILATERAL      HYSTERECTOMY  2005    ovarian CA "    KNEE SURGERY Right     SKIN BIOPSY      TONSILECTOMY AND ADNOIDECTOMY      TONSILLECTOMY          Social History     Tobacco Use    Smoking status: Former     Current packs/day: 0.25     Types: Cigarettes    Smokeless tobacco: Never    Tobacco comments:     until age 30   Vaping Use    Vaping status: Never Used   Substance Use Topics    Alcohol use: Not Currently    Drug use: Never     Immunization History   Administered Date(s) Administered    COVID-19 Moderna mRNA Vaccine 12 Yr+ 50 mcg/0.5 mL (Spikevax) 10/19/2023    COVID-19 PFIZER VACCINE 0.3 ML IM 01/18/2021, 02/09/2021, 10/19/2021    COVID-19 Pfizer Vac BIVALENT Deandre-sucrose 12 Yr+ IM 09/22/2022    Hep B, Adolescent or Pediatric 10/15/2001    Hepatitis B 10/15/2001    INFLUENZA 10/04/2013, 11/03/2014, 09/27/2015, 10/13/2016, 10/17/2017, 10/01/2018    Influenza, Seasonal Vaccine, Quadrivalent, Adjuvanted, .5e 10/19/2023    Influenza, high dose seasonal 0.7 mL 10/01/2018, 09/27/2019, 10/19/2020, 09/22/2022    Influenza, seasonal, injectable, preservative free 10/17/2017    Pneumococcal Conjugate 13-Valent 06/29/2020    Pneumococcal Polysaccharide PPV23 11/22/2016    Tdap 11/16/2010    Tuberculin Skin Test-PPD Intradermal 07/30/2019     Last Tetanus: 2010  Family History: Non-contributory    1. Before the illness or injury that brought you to the Emergency, did you need someone to help you on a regular basis? 1=Yes   2. Since the illness or injury that brought you to the Emergency, have you needed more help than usual to take care of yourself? 1=Yes   3. Have you been hospitalized for one or more nights during the past 6 months (excluding a stay in the Emergency Department)? 0=No   4. In general, do you see well? 0=Yes   5. In general, do you have serious problems with your memory? 1=Yes   6. Do you take more than three different medications everyday? 1=Yes   TOTAL   4     Did you order a geriatric consult if the score was 2 or greater?: yes     Meds/Allergies    all current active meds have been reviewed     Allergies   Allergen Reactions    Codeine Hives    Morphine Other (See Comments)     Redness in face, swelling    Other      Seasonal    Propoxyphene        Objective   Initial Vitals:   Temperature: 98.6 °F (37 °C) (05/12/24 1604)  Pulse: 83 (05/12/24 1604)  Respirations: 18 (05/12/24 1604)  Blood Pressure: (!) 178/93 (05/12/24 1604)    Primary Survey:   Airway:        Status: patent;        Pre-hospital Interventions: none        Hospital Interventions: none  Breathing:        Pre-hospital Interventions: none       Effort: normal       Right breath sounds: normal       Left breath sounds: normal  Circulation:        Rhythm: regular       Rate: regular   Right Pulses Left Pulses    R radial: 2+  R femoral: 2+  R pedal: 2+     L radial: 2+  L femoral: 2+  L pedal: 2+       Disability:        GCS: Eye: 4; Verbal: 4 Motor: 6 Total: 14       Right Pupil: round;  reactive         Left Pupil:  round;  reactive      R Motor Strength L Motor Strength    R : 5/5  R dorsiflex: 5/5  R plantarflex: 5/5 L : 5/5  L dorsiflex: 5/5  L plantarflex: 5/5        Sensory:  No sensory deficit  Exposure:       Completed: Yes      Secondary Survey:  Physical Exam  Constitutional:       General: She is not in acute distress.  HENT:      Head: Normocephalic.      Right Ear: External ear normal.      Left Ear: External ear normal.      Nose: Nose normal.      Mouth/Throat:      Mouth: Mucous membranes are moist.   Eyes:      Extraocular Movements: Extraocular movements intact.      Pupils: Pupils are equal, round, and reactive to light.   Cardiovascular:      Rate and Rhythm: Normal rate and regular rhythm.      Pulses: Normal pulses.      Heart sounds: Normal heart sounds.   Pulmonary:      Effort: Pulmonary effort is normal. No respiratory distress.      Breath sounds: Normal breath sounds.   Chest:      Chest wall: No tenderness.   Abdominal:      General: Abdomen is flat. There is  no distension.      Palpations: Abdomen is soft.      Tenderness: There is no abdominal tenderness. There is no guarding.   Musculoskeletal:         General: Tenderness (right hip) present. No swelling or deformity. Normal range of motion.      Cervical back: Normal range of motion and neck supple. No tenderness.   Skin:     General: Skin is warm and dry.      Capillary Refill: Capillary refill takes less than 2 seconds.   Neurological:      General: No focal deficit present.      Mental Status: She is alert. Mental status is at baseline.      Sensory: No sensory deficit.   Psychiatric:         Mood and Affect: Mood normal.         Behavior: Behavior normal.         Invasive Devices       Peripheral Intravenous Line  Duration             Peripheral IV 05/12/24 Right Antecubital <1 day                  Lab Results: I have personally reviewed all pertinent laboratory/test results from 05/12/24, including the preceding 24 hours.  Recent Labs     05/12/24  1654   WBC 9.09   HGB 13.2   HCT 38.9   *   SODIUM 139   K 4.0      CO2 28   BUN 22   CREATININE 1.09   GLUC 127   MG 2.0   AST 14   ALT 10   ALB 4.1   TBILI 0.60   ALKPHOS 68       Imaging Results: I have personally reviewed pertinent images saved in PACS. CT scan findings (and other pertinent positive findings on images) were discussed with radiology. My interpretation of the images/reports are as follows:  Chest Xray(s): N/A   FAST exam(s): N/A   CT Scan(s): positive for acute findings: Comminuted mildly displaced fracture at the right inferior pubic ramus   Additional Xray(s): pending     Other Studies: None     Code Status: Level 3 - DNAR and DNI  Advance Directive and Living Will: Yes    Power of : Yes  POLST:

## 2024-05-13 NOTE — ASSESSMENT & PLAN NOTE
- EKG on admission demonstrates Mobitz type 1 second degree AV block  - Telemetry monitoring  - Monitor vital signs closely  - Cardiology consult, appreciate recommendations

## 2024-05-13 NOTE — ASSESSMENT & PLAN NOTE
- Right inferior pubic ramus fracture, present on admission.  - Status post fall on 5/12.  - Appreciate Orthopedic surgery evaluation, recommendations and interventions as noted.  - Maintain non weightbearing status on the right lower extremity until formally seen by orthopedic surgery.  - Monitor right lower extremity neurovascular exam.  - Continue multimodal analgesic regimen.  - Continue DVT prophylaxis.  - PT and OT evaluation and treatment as indicated.  - Outpatient follow up with Orthopedic surgery for re-evaluation.

## 2024-05-13 NOTE — CASE MANAGEMENT
Case Management Assessment & Discharge Planning Note    Patient name Berta Estrada  Location ED-22/ED-22 MRN [id number]  : 1935 Date 2024       Current Admission Date: 2024  Current Admission Diagnosis:Closed fracture of ramus of right pubis (HCC)   Patient Active Problem List    Diagnosis Date Noted    Fall 2024    Closed fracture of ramus of right pubis ([facility]) 2024    Mobitz type 1 second degree AV block 2024    Cervical radiculopathy 2022    Lumbar degenerative disc disease 2022    Other hemorrhoids 2021    Gait instability 2021    Vitamin D deficiency 2021    Closed fracture of left foot 2020    Wears hearing aid 2020    Stage 3a chronic kidney disease (Formerly [facility]) 2019    Thrombocytopenia ([facility]ly [facility]) 2019    Essential hypertension 2019    GERD (gastroesophageal reflux disease) 2019    Postmenopausal HRT (hormone replacement therapy) 2019    Vitamin B12 deficiency 2019    COPD (chronic obstructive pulmonary disease) (Formerly [facility]) 2019    Pancreatic cyst 2019    S/P AVR (aortic valve replacement) 03/15/2019    Primary osteoarthritis involving multiple joints 03/15/2019    Other hyperlipidemia 03/15/2019    Allergic rhinitis 03/15/2019    Age-related osteoporosis without current pathological fracture 03/15/2019    Skin cancer 03/15/2019    Coronary artery disease involving native coronary artery of native heart 03/15/2019    Depression 2018    Late onset Alzheimer's disease without behavioral disturbance (Former[facility]) 2018      LOS (days): 1  Geometric Mean LOS (GMLOS) (days):   Days to GMLOS:     OBJECTIVE:    Risk of Unplanned Readmission Score: 7.82         Current admission status: Inpatient       Preferred Pharmacy:   [facility]/pharmacy #[id number] - [place] [address] [address]  Phone: [phone] Fax: [phone]    Primary Care Provider: Kim Silva  MD Zuleima    Primary Insurance: [employer] REP  Secondary Insurance:     ASSESSMENT:  Active Health Care Proxies    There are no active Health Care Proxies on file.                 Readmission Root Cause  30 Day Readmission: No    Patient Information  Admitted from:: Home  Mental Status: Alert (Some slight confusion)  During Assessment patient was accompanied by: Daughter  Assessment information provided by:: Daughter  Primary Caregiver: Family  Caregiver's Name:: Hillary Estrada  Caregiver's Relationship to Patient:: Family Member  Caregiver's Telephone Number:: [phone]  Support Systems: Family members, Daughter  Home entry access options. Select all that apply.: Stairs  Number of steps to enter home.: 3  Do the steps have railings?: Yes  Type of Current Residence: 3 story home  Upon entering residence, is there a bedroom on the main floor (no further steps)?: No  A bedroom is located on the following floor levels of residence (select all that apply):: 2nd Floor  Upon entering residence, is there a bathroom on the main floor (no further steps)?: Yes  Number of steps to 2nd floor from main floor: One Flight  Living Arrangements: Lives w/ Daughter    Activities of Daily Living Prior to Admission  Functional Status: Assistance  Completes ADLs independently?: No  Level of ADL dependence: Assistance  Ambulates independently?: Yes  Does patient use assisted devices?: Yes  Assisted Devices (DME) used: Straight Cane  Does patient currently own DME?: Yes  What DME does the patient currently own?: Straight Cane  Does patient have a history of Outpatient Therapy (PT/OT)?: Yes  Does the patient have a history of Short-Term Rehab?: No  Does patient have a history of HHC?: Yes  Does patient currently have HHC?: Yes    Current Home Health Care  Type of Current Home Care Services: Home health aide  Current Home Health Agency:: Other (please enter name in comment) ([facility], private pay)    Patient Information  Continued  Income Source: Pension/FDC  Does patient have prescription coverage?: Yes  Does patient have a history of substance abuse?: No  Does patient have a history of Mental Health Diagnosis?: No         Means of Transportation  Means of Transport to Appts:: Family transport      Social Determinants of Health (SDOH)      Flowsheet Row Most Recent Value   Housing Stability    In the last 12 months, was there a time when you were not able to pay the mortgage or rent on time? N   In the last 12 months, how many places have you lived? 1   In the last 12 months, was there a time when you did not have a steady place to sleep or slept in a shelter (including now)? N   Transportation Needs    In the past 12 months, has lack of transportation kept you from medical appointments or from getting medications? no   In the past 12 months, has lack of transportation kept you from meetings, work, or from getting things needed for daily living? No   Food Insecurity    Within the past 12 months, you worried that your food would run out before you got the money to buy more. Never true   Within the past 12 months, the food you bought just didn't last and you didn't have money to get more. Never true   Utilities    In the past 12 months has the electric, gas, oil, or water company threatened to shut off services in your home? No            DISCHARGE DETAILS:    Discharge planning discussed with:: Patient and daughter  Freedom of Choice: Yes  Comments - Freedom of Choice: CM met with patient and daugther at bedside. CM introduced self and CM role. Patient lives with daughter and MONA in a 3 story home, uses a cane, no O2, no HHC services at this time, daughter does have a home aide coming in every other Sunday for 3 hrs. Patient goes to adult day services at this [facility] M-F 9-3. PT/OT evals pending, CM will follow  CM contacted family/caregiver?: Yes  Were Treatment Team discharge recommendations reviewed with patient/caregiver?:  Yes  Did patient/caregiver verbalize understanding of patient care needs?: Yes  Were patient/caregiver advised of the risks associated with not following Treatment Team discharge recommendations?: Yes    Contacts  Patient Contacts: Hillary Estrada  Relationship to Patient:: Family  Contact Method: In Person  Reason/Outcome: Emergency Contact, Discharge Planning              Other Referral/Resources/Interventions Provided:  Referral Comments: Evaluations pending

## 2024-05-13 NOTE — CONSULTS
Orthopedics   Berta Estrada 89 y.o. female MRN: [id number]  Unit/Bed#: ED-22      Chief Complaint:   Pelvic pain     HPI:  89 y.o. female who ambulates with a cane at baseline, with history of dementia, depression, hearing loss, hyperlipidemia, ovarian cancer, CAD, who presents s/p fall. She had a witnessed fall earlier in the day when she stood up from a chair, fell backwards. She had complained of hip pain after her fall.   At time of consult patient endorses pelvic pain.   She denies upper extremity pain, or leg pain. No numbness/tingling.   No family at bedside.   Currently in safety belt.     Review Of Systems:   Skin: Normal  Neuro: See HPI  Musculoskeletal: See HPI  14 point review of systems negative except as stated above   Limited with mental acuity.    Past Medical History:   Past Medical History:   Diagnosis Date    Actinic keratosis     Basal cell carcinoma     Back     Cancer (HCC)     Coronary artery disease     Dementia (HCC)     Depression     Ear problems     HL (hearing loss)     Hyperlipidemia     Migraines     Ovarian cancer (HCC) 2004    s/p surgery. no chemo/RT    Phlebitis     R leg       Past Surgical History:   Past Surgical History:   Procedure Laterality Date    ADENOIDECTOMY      APPENDECTOMY      CATARACT EXTRACTION, BILATERAL      HYSTERECTOMY  2005    ovarian CA    KNEE SURGERY Right     SKIN BIOPSY      TONSILECTOMY AND ADNOIDECTOMY      TONSILLECTOMY         Family History:  Family history reviewed and non-contributory  Family History   Problem Relation Age of Onset    Depression Mother     Anxiety disorder Mother     Alcohol abuse Mother     Substance Abuse Mother     Asthma Mother     Diabetes Father 60    Brain cancer Son     Heart attack Sister     Coronary artery disease Sister     Mental illness Neg Hx        Social History:  Social History     Socioeconomic History    Marital status:      Spouse name: Not on file    Number of children: Not on file    Years of  education: Not on file    Highest education level: Not on file   Occupational History    Not on file   Tobacco Use    Smoking status: Former     Current packs/day: 0.25     Types: Cigarettes    Smokeless tobacco: Never    Tobacco comments:     until age 30   Vaping Use    Vaping status: Never Used   Substance and Sexual Activity    Alcohol use: Not Currently    Drug use: Never    Sexual activity: Not Currently   Other Topics Concern    Not on file   Social History Narrative    Drinks coffee/tea- 2 daily half decaf     From [place]    Now lives with daughter    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, 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     Social Determinants of Health     Financial Resource Strain: Low Risk  (6/2/2023)    Overall Financial Resource Strain (CARDIA)     Difficulty of Paying Living Expenses: Not very hard   Food Insecurity: Not on file   Transportation Needs: No Transportation Needs (6/2/2023)    PRAPARE - Transportation     Lack of Transportation (Medical): No     Lack of Transportation (Non-Medical): No   Physical Activity: Not on file   Stress: Not on file   Social Connections: Not on file   Intimate Partner Violence: Not on file   Housing Stability: Not on file       Allergies:   Allergies   Allergen Reactions    Codeine Hives    Morphine Other (See Comments)     Redness in face, swelling    Other      Seasonal    Propoxyphene            Labs:  0   Lab Value Date/Time    HCT 43.6 05/13/2024 0446    HCT 38.9 05/12/2024 1654    HCT 43.6 05/31/2023 0801    HGB 14.5 05/13/2024 0446    HGB 13.2 05/12/2024 1654    HGB 14.4 05/31/2023 0801    WBC 8.73 05/13/2024 0446    WBC 9.09 05/12/2024 1654    WBC 7.09 05/31/2023 0801       Meds:    Current Facility-Administered Medications:     atorvastatin (LIPITOR) tablet 20 mg, 20 mg, Oral, Daily With Dinner, Esther Purvis PA-C    chlorhexidine (PERIDEX) 0.12 % oral rinse 15 mL, 15 mL, Swish & Spit, Q12H [facility], Esther Purvis PA-C    enoxaparin (LOVENOX)  "subcutaneous injection 30 mg, 30 mg, Subcutaneous, Q24H, Esther Purvis PA-C, 30 mg at 05/12/24 2031    famotidine (PEPCID) tablet 20 mg, 20 mg, Oral, Daily, Esther Purvis PA-C    fluticasone (FLONASE) 50 mcg/act nasal spray 1 spray, 1 spray, Each Nare, Daily, Esther Purvis PA-C    Current Outpatient Medications:     aspirin 81 MG tablet, Take 81 mg by mouth daily, Disp: , Rfl:     atorvastatin (LIPITOR) 20 mg tablet, TAKE 1 TABLET BY MOUTH EVERY DAY, Disp: 90 tablet, Rfl: 1    chlorhexidine (PERIDEX) 0.12 % solution, RINSE 2 TIMES A DAY, Disp: , Rfl:     famotidine (PEPCID) 20 mg tablet, TAKE 1 TABLET BY MOUTH EVERY DAY, Disp: 90 tablet, Rfl: 1    fluticasone (FLONASE) 50 mcg/act nasal spray, SPRAY 1 SPRAY INTO EACH NOSTRIL EVERY DAY, Disp: 24 mL, Rfl: 2    Multiple Vitamins-Minerals (MULTIVITAMIN ADULT PO), Take 1 tablet by mouth daily, Disp: , Rfl:     Blood Culture:   No results found for: \"BLOODCX\"    Wound Culture:   No results found for: \"WOUNDCULT\"    Ins and Outs:  I/O last 24 hours:  In: 500 [IV Piggyback:500]  Out: -           Physical Exam:   BP (!) 176/95 (BP Location: Right arm)   Pulse 92   Temp 98.6 °F (37 °C) (Oral)   Resp 20   Ht 5' 5\" (1.651 m)   Wt 66.8 kg (147 lb 4.3 oz)   SpO2 99%   BMI 24.51 kg/m²   Gen: No acute distress, resting comfortably in bed  HEENT: Eyes clear, moist mucus membranes, hearing intact  Respiratory: No audible wheezing or stridor  Cardiovascular: Well Perfused peripherally, 2+ distal pulse  Abdomen: nondistended, no peritoneal signs  Musculoskeletal: bilateral upper extremity  Skin intact.   No pain over bilateral clavicles, shoulders, upper arms, elbows, forearms, wrists or hands.   SILT m/r/u bilaterally.   Motor intact ain/pin/m/r/u bilaterally.   2+ radial and ulnar pulse bilatearlly.   Musculature is soft and compressible, no pain with passive stretch bilaterally.     Musculoskeletal: bilateral lower extremity  Skin intact.   No pain over " bilateral hips, femurs, knees, lower legs, ankles or feet.   SILT s/s/sp/dp/t bilaterally.   Motor intact 5/5 strength with hip flexion/extension, knee flexion/extension, ankle dorsi/plantar flexion, EHL/FHL bilaterally.   2+ DP/PT pulse bilaterally.   Musculature is soft and compressible, no pain with passive stretch bilaterally.   Leg lengths equal    Tertiary: no tenderness over all other joints/long bones as except already stated.    Radiology:   I personally reviewed the films.  Imaging reviewed and discussed with Dr. Montilla.   CT pelvix, XRAYs right hip: right inferior pubic rami fracture.     _*_*_*_*_*_*_*_*_*_*_*_*_*_*_*_*_*_*_*_*_*_*_*_*_*_*_*_*_*_*_*_*_*_*_*_*_*_*_*_*_*    Assessment:  89 y.o.female s/p witnessed fall with right inferior pubic rami fracture.      Plan:   WBAT to the bilateral lower extremities.   No orthopedic intervention indicated.   PT/OT  Pain control per primary team.   DVT ppx per primary team.   Body mass index is 24.51 kg/m².   Dispo: Ortho signing off  Case reviewed and discussed with Dr. Montilla.     Lida Mancera PA-C

## 2024-05-13 NOTE — ASSESSMENT & PLAN NOTE
Lab Results   Component Value Date    EGFR 51 05/13/2024    EGFR 45 05/12/2024    EGFR 43 05/31/2023    CREATININE 0.98 05/13/2024    CREATININE 1.09 05/12/2024    CREATININE 1.12 05/31/2023     - Patient with chronic history of stage 3a CKD  - Baseline Cr 1.0-1.2  - Avoid nephrotoxins  - Monitor renal indices  - Outpatient follow up with PCP

## 2024-05-13 NOTE — ASSESSMENT & PLAN NOTE
- Status post fall with the below noted injuries.  - Fall precautions.  - Geriatric Medicine consultation for evaluation, medication review and recommendations.  - PT and OT evaluation and treatment as indicated.  - Case Management consultation for disposition planning.

## 2024-05-13 NOTE — UTILIZATION REVIEW
Initial Clinical Review    Admission: Date/Time/Statement:   Admission Orders (From admission, onward)       Ordered        05/12/24 2016  Inpatient Admission  Once                          Orders Placed This Encounter   Procedures    Inpatient Admission     Standing Status:   Standing     Number of Occurrences:   1     Order Specific Question:   Level of Care     Answer:   Med Surg [16]     Order Specific Question:   Estimated length of stay     Answer:   More than 2 Midnights     Order Specific Question:   Certification     Answer:   I certify that inpatient services are medically necessary for this patient for a duration of greater than two midnights. See H&P and MD Progress Notes for additional information about the patient's course of treatment.     ED Arrival Information       Expected   -    Arrival   5/12/2024 15:59    Acuity   Urgent              Means of arrival   Ambulance    Escorted by   [facility]    Service   Trauma    Admission type   Emergency              Arrival complaint   EMS             Chief Complaint   Patient presents with    Fall     Pt arrived via ems for a fall around noon today, no head strike, no LOC. Pt takes aspirin daily. Pt twisted and fell onto her knees landing on her right hip.       Initial Presentation: 89 y.o. female presents to the ED via EMS as a trauma eval from home with a witnessed mechanical fall today and R hip pain and difficulty ambulating.  Lost balance and fell backwards.  No head strike or LOC.   GCS 14 with confusion but alert.  PMH: Alzheimer's dementia, HLD, CKD, Mobitz 1 block.    In the ED pt was HTN.  Labs - low platelets.  ECG - SR Mobitz 1. Imaging - comminuted, mildly displaced fx right inferior pubic ramus; multilevel DDD worse at L3-L4, L4 - L5.  Treated with IV fluids, IV Toradol, sq Lovenoix, Zyprexa IM, Lidoderm patch, Tylenol.  On exam R hip pain with palpation.  Admitted to INPATIENT status with Closed fx R pubic ramus, dementia, CKD -  ortho consult, multimodal analgesia, NWB status, DCT PPX, Tele, cardio consult, therapy eval, Geriatrics consult, CM for d/c planning, delirium prec.      Date: 5/13   Day 2:   VS have been stable.  WBAT to BLE, RLE neurovascular exams, continue analgesia, therapy.  On exam pt has no c/o pain today.  Has appetite. Sensation in tact.  GCS still 14.     5/13 Ortho Consult - witnessed fall with right inferior pubic rami fracture - no surgical intervention, therapy, WBAT BLE.      5/13 Cardio Consult - fall with advance age, dementia, Mobitz 1 AV block with episodes of 2-1 heart block with heart rates down to 35 to 38 bpm only overnight approximately 3 AM, presumed sleeping) - will continue tele, ambulate and assess for more advanced heart block and poss need for PPM insertion.  Could consider Zio patch at d/c.      5/13 Geriatric Consult - Upon exam patient was lying in bed, resting. She was slightly restless on exam and was trying to crawl out of bed. She needed frequent reorientation. She was alert to person only. Appetite is stable. She denied any significant pain. She is anxious, disoriented.  Has general abd tenderness.      Date: 5/14  Day 3: Has surpassed a 2nd midnight with active treatments and services.  Pt with HR in 30s with 2:1 AV block on tele review. No syncope.  Recommending PPM insertion, will get EPS advice.  NPO p MN.  Family in agreement.  Check orthostatics, STARR.  On exam no CP, SOB. Lungs clear.        ED Triage Vitals   Temperature Pulse Respirations Blood Pressure SpO2   05/12/24 1604 05/12/24 1604 05/12/24 1604 05/12/24 1604 05/12/24 1604   98.6 °F (37 °C) 83 18 (!) 178/93 96 %      Temp Source Heart Rate Source Patient Position - Orthostatic VS BP Location FiO2 (%)   05/12/24 1604 05/12/24 1730 05/12/24 1604 05/12/24 1604 --   Oral Monitor Sitting Right arm       Pain Score       05/12/24 2315       No Pain          Wt Readings from Last 1 Encounters:   05/14/24 66.7 kg (147 lb)     Additional  Vital Signs:   Date/Time Temp Pulse Resp BP MAP (mmHg) SpO2 O2 Device Patient Position - Orthostatic VS   05/14/24 11:35:46 97 °F (36.1 °C) Abnormal  78 18 115/73 87 100 % -- --   05/14/24 11:34:18 -- 80 18 115/73 87 100 % -- --   05/14/24 1045 -- 68 -- 142/82 -- -- -- --   05/14/24 07:34:56 97.6 °F (36.4 °C) 68 20 142/82 102 98 % -- Lying   05/13/24 2245 98.1 °F (36.7 °C) -- 19 168/98 -- -- -- --   05/13/24 2000 -- -- -- -- -- 96 % None (Room air) --   05/13/24 19:14:35 98.3 °F (36.8 °C) 104 17 154/85 108 96 % -- --   05/13/24 14:25:24 97.5 °F (36.4 °C) 69 18 155/80 105 97 % -- --     05/13/24 1113 -- 90 20 149/75 105 98 % None (Room air) Lying   05/13/24 0720 -- 92 20 176/95 Abnormal  127 99 % None (Room air) Lying   05/13/24 0530 -- 82 -- -- -- -- -- --   05/13/24 0315 -- 41 Abnormal  -- 136/68 97 98 % -- --   05/12/24 2315 -- 58 18 146/83 108 97 % None (Room air) --   05/12/24 2100 -- 66 18 116/64 85 95 % None (Room air) --   05/12/24 1930 -- 77 18 130/85 103 95 % None (Room air) --   05/12/24 1845 -- 63 18 130/68 91 96 % None (Room air) --   05/12/24 1730 -- 82 18 150/97 119 95 % None (Room air) --       Pertinent Labs/Diagnostic Test Results:     5/12 ECG - Sinus rhythm with 2nd degree A-V block (Mobitz I)  Abnormal ECG    CT pelvis wo contrast   Final Result by Aj Espana MD (05/12 1941)      Comminuted mildly displaced fracture at the right inferior pubic ramus      CT spine lumbar without contrast   Final Result by Aj Espana MD (05/12 1938)      No evidence of acute fracture or traumatic subluxation.      Multilevel degenerative disc disease most severe at L3-L4 and L4-L5 with moderate right foraminal stenosis at the L4-L5 level.      XR hip/pelv 2-3 vws right if performed   Final Result by Bradley Landon Kocher, MD (05/13 1148)   Fracture of the right inferior pubic ramus.         Results from last 7 days   Lab Units 05/14/24  0633 05/13/24  0446 05/12/24  1654   WBC Thousand/uL 6.53 8.73 9.09    HEMOGLOBIN g/dL 12.8 14.5 13.2   HEMATOCRIT % 38.7 43.6 38.9   PLATELETS Thousands/uL 114* 140* 142*   TOTAL NEUT ABS Thousands/µL  --   --  6.40         Results from last 7 days   Lab Units 05/14/24  0633 05/13/24  0446 05/12/24  1654   SODIUM mmol/L 141 144 139   POTASSIUM mmol/L 4.5 3.8 4.0   CHLORIDE mmol/L 108 109* 104   CO2 mmol/L 28 26 28   ANION GAP mmol/L 5 9 7   BUN mg/dL 22 18 22   CREATININE mg/dL 1.01 0.98 1.09   EGFR ml/min/1.73sq m 49 51 45   CALCIUM mg/dL 8.7 9.0 8.9   MAGNESIUM mg/dL  --  2.1 2.0   PHOSPHORUS mg/dL  --  3.8  --      Results from last 7 days   Lab Units 05/12/24  1654   AST U/L 14   ALT U/L 10   ALK PHOS U/L 68   TOTAL PROTEIN g/dL 6.1*   ALBUMIN g/dL 4.1   TOTAL BILIRUBIN mg/dL 0.60         Results from last 7 days   Lab Units 05/14/24  0633 05/13/24  0446 05/12/24  1654   GLUCOSE RANDOM mg/dL 108 116 127     Results from last 7 days   Lab Units 05/12/24  1651   CLARITY UA  Clear   COLOR UA  Light Yellow   SPEC GRAV UA  1.011   PH UA  6.0   GLUCOSE UA mg/dl Negative   KETONES UA mg/dl Negative   BLOOD UA  Negative   PROTEIN UA mg/dl Negative   NITRITE UA  Negative   BILIRUBIN UA  Negative   UROBILINOGEN UA (BE) mg/dl <2.0   LEUKOCYTES UA  Moderate*   WBC UA /hpf 10-20*   RBC UA /hpf 1-2   BACTERIA UA /hpf None Seen   EPITHELIAL CELLS WET PREP /hpf Occasional     ED Treatment:   Medication Administration from 05/12/2024 1559 to 05/13/2024 1242         Date/Time Order Dose Route Action     05/12/2024 1844 EDT sodium chloride 0.9 % bolus 500 mL 500 mL Intravenous New Bag     05/12/2024 2038 EDT ketorolac (TORADOL) injection 15 mg 15 mg Intravenous Given     05/12/2024 2031 EDT enoxaparin (LOVENOX) subcutaneous injection 30 mg 30 mg Subcutaneous Given     05/13/2024 0943 EDT famotidine (PEPCID) tablet 20 mg 20 mg Oral Given     05/13/2024 0017 EDT OLANZapine (ZyPREXA) IM injection 5 mg 5 mg Intramuscular Given     05/13/2024 0017 EDT sterile water injection **ADS Override Pull** 10 mL   Given     05/13/2024 1112 EDT lidocaine (LIDODERM) 5 % patch 1 patch 1 patch Topical Medication Applied     05/13/2024 1112 EDT acetaminophen (TYLENOL) tablet 975 mg 975 mg Oral Given          Past Medical History:   Diagnosis Date    Actinic keratosis     Basal cell carcinoma     Back     Cancer (HCC)     Coronary artery disease     Dementia (HCC)     Depression     Ear problems     HL (hearing loss)     Hyperlipidemia     Migraines     Ovarian cancer ([facility]) 2004    s/p surgery. no chemo/RT    Phlebitis     R leg     Present on Admission:   Stage 3a chronic kidney disease ([facility])   Other hyperlipidemia   Late onset Alzheimer's disease without behavioral disturbance ([facility])      Admitting Diagnosis: Hip pain [M25.559]  Knee pain [M25.569]  Mobitz I [I44.1]  Right hip pain [M25.551]  Fall, initial encounter [W19.XXXA]  Pubic ramus fracture, right, closed, initial encounter ([facility]) [S32.591A]  Ambulatory dysfunction [R26.2]  Age/Sex: 89 y.o. female  Admission Orders:  Scheduled Medications:  acetaminophen, 975 mg, Oral, Q8H MAMTA  atorvastatin, 20 mg, Oral, Daily With Dinner  [START ON 5/15/2024] cefazolin, 1,000 mg, Intravenous, Once  chlorhexidine, 15 mL, Swish & Spit, Q12H MAMTA  enoxaparin, 30 mg, Subcutaneous, Q24H  famotidine, 20 mg, Oral, Daily  fluticasone, 1 spray, Each Nare, Daily  gabapentin, 100 mg, Oral, HS  lidocaine, 1 patch, Topical, Daily  melatonin, 3 mg, Oral, HS  OLANZapine, 5 mg, Intramuscular, Once  senna-docusate sodium, 1 tablet, Oral, HS      Continuous IV Infusions:     PRN Meds:  HYDROmorphone, 0.2 mg, Intravenous, Q2H PRN - x 1 5/13  naloxone, 0.04 mg, Intravenous, Q1MIN PRN  oxyCODONE, 2.5 mg, Oral, Q4H PRN   Or  oxyCODONE, 5 mg, Oral, Q4H PRN    Urine culture  Tele  Chair and work with therapy   Neuro checks q 4 hr   Ambulate  Echo  IP CONSULT TO ORTHOPEDIC SURGERY  IP CONSULT TO GERONTOLOGY  IP CONSULT TO CASE MANAGEMENT  IP CONSULT TO CARDIOLOGY    Network Utilization Review  Department  ATTENTION: Please call with any questions or concerns to [phone] and carefully listen to the prompts so that you are directed to the right person. All voicemails are confidential.   For Discharge needs, contact Care Management DC Support Team at [phone] opt. [phone]  Send all requests for admission clinical reviews, approved or denied determinations and any other requests to dedicated fax number below belonging to the campus where the patient is receiving treatment. List of dedicated fax numbers for the Facilities:  FACILITY NAME UR FAX NUMBER   ADMISSION DENIALS (Administrative/Medical Necessity) [phone]   DISCHARGE SUPPORT TEAM (NETWORK) [phone]   PARENT CHILD HEALTH (Maternity/NICU/Pediatrics) [phone]   [facility] [phone]   [facility] [phone]   [facility] [phone]   [facility] [phone]   [facility] [phone]   [facility] [phone]   [facility] [phone]   [facility] [phone]   [facility] [phone]   [facility] [phone]   [facility] [phone]   [facility] [phone]

## 2024-05-13 NOTE — ASSESSMENT & PLAN NOTE
- Right inferior pubic ramus fracture, present on admission.  - Status post fall on 5/12.  - Appreciate Orthopedic surgery evaluation, recommendations and interventions as noted.  - WBAT to b/l LE  - Monitor right lower extremity neurovascular exam.  - Continue multimodal analgesic regimen.  - Continue DVT prophylaxis.  - PT and OT evaluation and treatment as indicated.

## 2024-05-13 NOTE — ASSESSMENT & PLAN NOTE
- EKG on admission demonstrates Mobitz type 1 second degree AV block  - Telemetry monitoring  - Monitor vital signs closely  - Cardiology consult, appreciate recommendations. Follow up ECHO results

## 2024-05-13 NOTE — CONSULTS
Consultation - Cardiology Team 1  Berta Estrada 89 y.o. female MRN: [id number]  Unit/Bed#: ED-22 Encounter: [id number]    Assessment/Plan     Principal Problem:    Closed fracture of ramus of right pubis (HCC)  Active Problems:    Late onset Alzheimer's disease without behavioral disturbance (HCC)    Other hyperlipidemia    Stage 3a chronic kidney disease (HCC)    Fall    Mobitz type 1 second degree AV block      Assessment  Witnessed fall-patient with severe Alzheimer's dementia.  Reports indicate patient was standing up from her chair lost balance and fell backwards.  No head strike or loss of consciousness  Heart block-second-degree AV block- 2:1 and Wenkebach        Presenting ECG sinus rhythm /Wenkebach         Telemetry reveals heart rate overnight in the 30s 2-1 AV block-         Unknown if pt was asleep        History of bioprosthetic AVR        TTE 2020-normal LV function.  Aortic valve mean gradient 24 mmHg  CAD with history of prior PCI- unknown details        No reports of chest pain        On Asa, statin   Essential hypertension-she is mildly hypertensive.  No antihypertensive agents.  Alzheimer's dementia  CKD stage III-creatinine 1.0 which is baseline    Plan  No AV blocking agents.   Continue telemetry. If daytime bradycardia/ 2:1 heart block  would recommend EP evaluation for PPM. Will observe ambulatory HR's as well to assess chronotropic competence.   Check orthostatic VS  Continue BP monitoring  Will check TTE    History of Present Illness   Physician Requesting Consult: Campos Kline,*  Reason for Consult / Principal Problem: mobitz 1 2nd degree av block    HPI: Berta Estrada is a 89 y.o. year old female with bio AVR, CAD with prior PCI done at [facility],  essential HTN, dementia, CKD III, HLD who presents with a fall.  It is documented that she had a witnessed fall by her family.  She stood up from a chair lost balance and fell backwards.  No head strike or loss of  consciousness.  Imaging reveals pubic ramus fracture.  Cardiology consulted for concerns for Mobitz 1 second-degree AV block.  Patient with significant Alzheimer's dementia.  She is a poor historian.  Denies any chest pain shortness of breath.  She reports previous  dizziness. Unable to provide further details.  She presented hypertensive.  Seen in ED.  Has a Posey vest to stay in bed.    BMP and CBC normal except mildly low platelet count.    She was followed by Dr. Adams, not since 2019.     TTE 2020- normal LVEF, grade 1 diastolic dysfunction. Mod MR, AV bio present , MG 24mmHg. Mod TR    Inpatient consult to Cardiology  Consult performed by: JOSH Riddle  Consult ordered by: Esther Purvis PA-C        Review of Systems   Reason unable to perform ROS: dementia, cognitive dysfunction.   Respiratory:  Negative for shortness of breath.    Cardiovascular:  Negative for chest pain.   Musculoskeletal:  Positive for back pain and gait problem.   Neurological:  Positive for dizziness.       Historical Information   Past Medical History:   Diagnosis Date    Actinic keratosis     Basal cell carcinoma     Back     Cancer (HCC)     Coronary artery disease     Dementia (HCC)     Depression     Ear problems     HL (hearing loss)     Hyperlipidemia     Migraines     Ovarian cancer (HCC) 2004    s/p surgery. no chemo/RT    Phlebitis     R leg     Past Surgical History:   Procedure Laterality Date    ADENOIDECTOMY      APPENDECTOMY      CATARACT EXTRACTION, BILATERAL      HYSTERECTOMY  2005    ovarian CA    KNEE SURGERY Right     SKIN BIOPSY      TONSILECTOMY AND ADNOIDECTOMY      TONSILLECTOMY       Social History     Substance and Sexual Activity   Alcohol Use Not Currently     Social History     Substance and Sexual Activity   Drug Use Never     Social History     Tobacco Use   Smoking Status Former    Current packs/day: 0.25    Types: Cigarettes   Smokeless Tobacco Never   Tobacco Comments    until age 30  "    Family History:   Family History   Problem Relation Age of Onset    Depression Mother     Anxiety disorder Mother     Alcohol abuse Mother     Substance Abuse Mother     Asthma Mother     Diabetes Father 60    Brain cancer Son     Heart attack Sister     Coronary artery disease Sister     Mental illness Neg Hx        Meds/Allergies   current meds:   Current Facility-Administered Medications   Medication Dose Route Frequency    atorvastatin (LIPITOR) tablet 20 mg  20 mg Oral Daily With Dinner    chlorhexidine (PERIDEX) 0.12 % oral rinse 15 mL  15 mL Swish & Spit Q12H MAMTA    enoxaparin (LOVENOX) subcutaneous injection 30 mg  30 mg Subcutaneous Q24H    famotidine (PEPCID) tablet 20 mg  20 mg Oral Daily    fluticasone (FLONASE) 50 mcg/act nasal spray 1 spray  1 spray Each Nare Daily    and PTA meds:  (Not in a hospital admission)    Allergies   Allergen Reactions    Codeine Hives    Morphine Other (See Comments)     Redness in face, swelling    Other      Seasonal    Propoxyphene        Objective   Vitals: Blood pressure (!) 176/95, pulse 92, temperature 98.6 °F (37 °C), temperature source Oral, resp. rate 20, height 5' 5\" (1.651 m), weight 66.8 kg (147 lb 4.3 oz), SpO2 99%.  Orthostatic Blood Pressures      Flowsheet Row Most Recent Value   Blood Pressure 176/95 filed at 05/13/2024 0720   Patient Position - Orthostatic VS Lying filed at 05/13/2024 0720              Intake/Output Summary (Last 24 hours) at 5/13/2024 0833  Last data filed at 5/12/2024 2055  Gross per 24 hour   Intake 500 ml   Output --   Net 500 ml       Invasive Devices       Peripheral Intravenous Line  Duration             Peripheral IV 05/13/24 Left;Proximal;Ventral (anterior) Forearm <1 day                    Physical Exam: BP (!) 176/95 (BP Location: Right arm)   Pulse 92   Temp 98.6 °F (37 °C) (Oral)   Resp 20   Ht 5' 5\" (1.651 m)   Wt 66.8 kg (147 lb 4.3 oz)   SpO2 99%   BMI 24.51 kg/m²   General Appearance:    Alert, cooperative, no " distress, appears stated age   Head:    Normocephalic, no scleral icterus   Eyes:    PERRL   Nose:   Nares normal, septum midline, mucosa normal, no drainage    Throat:   Lips, mucosa, and tongue normal   Neck:   Supple, symmetrical, trachea midline     no JVD       Lungs:     Clear to auscultation bilaterally, respirations unlabored   Chest Wall:    No tenderness or deformity    Heart:    Regular rate and rhythm, S1 and S2 normal, no murmur, rub   or gallop   Abdomen:     Soft, non-tender, bowel sounds active all four quadrants,     no masses, no organomegaly   Extremities:   Extremities normal, atraumatic, no cyanosis or edema   Pulses:   2+ and symmetric all extremities   Skin:   Skin color, texture, turgor normal, no rashes or lesions   Neurologic:   Alert and oriented to person place and time.       Lab Results:   Recent Results (from the past 72 hour(s))   UA w Reflex to Microscopic w Reflex to Culture    Collection Time: 05/12/24  4:51 PM    Specimen: Urine, Other   Result Value Ref Range    Color, UA Light Yellow     Clarity, UA Clear     Specific Gravity, UA 1.011 1.003 - 1.030    pH, UA 6.0 4.5, 5.0, 5.5, 6.0, 6.5, 7.0, 7.5, 8.0    Leukocytes, UA Moderate (A) Negative    Nitrite, UA Negative Negative    Protein, UA Negative Negative mg/dl    Glucose, UA Negative Negative mg/dl    Ketones, UA Negative Negative mg/dl    Urobilinogen, UA <2.0 <2.0 mg/dl mg/dl    Bilirubin, UA Negative Negative    Occult Blood, UA Negative Negative   Urine Microscopic    Collection Time: 05/12/24  4:51 PM   Result Value Ref Range    RBC, UA 1-2 None Seen, 1-2 /hpf    WBC, UA 10-20 (A) None Seen, 1-2 /hpf    Epithelial Cells Occasional None Seen, Occasional /hpf    Bacteria, UA None Seen None Seen, Occasional /hpf   Comprehensive metabolic panel    Collection Time: 05/12/24  4:54 PM   Result Value Ref Range    Sodium 139 135 - 147 mmol/L    Potassium 4.0 3.5 - 5.3 mmol/L    Chloride 104 96 - 108 mmol/L    CO2 28 21 - 32 mmol/L     ANION GAP 7 4 - 13 mmol/L    BUN 22 5 - 25 mg/dL    Creatinine 1.09 0.60 - 1.30 mg/dL    Glucose 127 65 - 140 mg/dL    Calcium 8.9 8.4 - 10.2 mg/dL    AST 14 13 - 39 U/L    ALT 10 7 - 52 U/L    Alkaline Phosphatase 68 34 - 104 U/L    Total Protein 6.1 (L) 6.4 - 8.4 g/dL    Albumin 4.1 3.5 - 5.0 g/dL    Total Bilirubin 0.60 0.20 - 1.00 mg/dL    eGFR 45 ml/min/1.73sq m   CBC and differential    Collection Time: 05/12/24  4:54 PM   Result Value Ref Range    WBC 9.09 4.31 - 10.16 Thousand/uL    RBC 4.07 3.81 - 5.12 Million/uL    Hemoglobin 13.2 11.5 - 15.4 g/dL    Hematocrit 38.9 34.8 - 46.1 %    MCV 96 82 - 98 fL    MCH 32.4 26.8 - 34.3 pg    MCHC 33.9 31.4 - 37.4 g/dL    RDW 13.7 11.6 - 15.1 %    MPV 9.3 8.9 - 12.7 fL    Platelets 142 (L) 149 - 390 Thousands/uL    nRBC 0 /100 WBCs    Segmented % 71 43 - 75 %    Immature Grans % 0 0 - 2 %    Lymphocytes % 20 14 - 44 %    Monocytes % 8 4 - 12 %    Eosinophils Relative 1 0 - 6 %    Basophils Relative 0 0 - 1 %    Absolute Neutrophils 6.40 1.85 - 7.62 Thousands/µL    Absolute Immature Grans 0.04 0.00 - 0.20 Thousand/uL    Absolute Lymphocytes 1.78 0.60 - 4.47 Thousands/µL    Absolute Monocytes 0.72 0.17 - 1.22 Thousand/µL    Eosinophils Absolute 0.11 0.00 - 0.61 Thousand/µL    Basophils Absolute 0.04 0.00 - 0.10 Thousands/µL   Magnesium    Collection Time: 05/12/24  4:54 PM   Result Value Ref Range    Magnesium 2.0 1.9 - 2.7 mg/dL   Type and Screen    Collection Time: 05/12/24  8:44 PM   Result Value Ref Range    ABO Grouping O     Rh Factor Positive     Antibody Screen Negative     Specimen Expiration Date 20240515    CBC and Platelet    Collection Time: 05/13/24  4:46 AM   Result Value Ref Range    WBC 8.73 4.31 - 10.16 Thousand/uL    RBC 4.54 3.81 - 5.12 Million/uL    Hemoglobin 14.5 11.5 - 15.4 g/dL    Hematocrit 43.6 34.8 - 46.1 %    MCV 96 82 - 98 fL    MCH 31.9 26.8 - 34.3 pg    MCHC 33.3 31.4 - 37.4 g/dL    RDW 13.7 11.6 - 15.1 %    Platelets 140 (L) 149 - 390  Thousands/uL    MPV 9.2 8.9 - 12.7 fL   Basic metabolic panel    Collection Time: 24  4:46 AM   Result Value Ref Range    Sodium 144 135 - 147 mmol/L    Potassium 3.8 3.5 - 5.3 mmol/L    Chloride 109 (H) 96 - 108 mmol/L    CO2 26 21 - 32 mmol/L    ANION GAP 9 4 - 13 mmol/L    BUN 18 5 - 25 mg/dL    Creatinine 0.98 0.60 - 1.30 mg/dL    Glucose 116 65 - 140 mg/dL    Calcium 9.0 8.4 - 10.2 mg/dL    eGFR 51 ml/min/1.73sq m   Magnesium    Collection Time: 24  4:46 AM   Result Value Ref Range    Magnesium 2.1 1.9 - 2.7 mg/dL   Phosphorus    Collection Time: 24  4:46 AM   Result Value Ref Range    Phosphorus 3.8 2.3 - 4.1 mg/dL     Imaging: I have personally reviewed pertinent reports.    EKG: Sinus rhythm/[facility]  [address] [place] [address] [phone]     Transthoracic Echocardiogram  2D, M-mode, Doppler, and Color Doppler     Study date:  2020     Patient: JENNIFER REDDY  MR number: [id number]  Account number: [id number]  : 1935  Age: 85 years  Gender: Female  Status: Outpatient  Location: [facility]  Height: 65 in  Weight: 150 lb  BP: 110/ 70 mmHg     Indications: Aortic stenosis S/P AVR     Diagnoses: I35.9 - Nonrheumatic aortic valve disorder, unspecified     Sonographer:  YARA Ceballos, RDCS  Primary Physician:  Kim Dewey MD  Referring Physician:  Jayjay Adams MD  Group:  [facility]  Interpreting Physician:  Jayjay Adams MD     SUMMARY     LEFT VENTRICLE:  Systolic function was normal. Ejection fraction was estimated to be 65 %.  There were no regional wall motion abnormalities.  Wall thickness was moderately increased.  Doppler parameters were consistent with abnormal left ventricular relaxation (grade 1 diastolic dysfunction).     RIGHT VENTRICLE:  The size was normal.  Systolic function was normal.     LEFT ATRIUM:  The atrium was mildly dilated.     RIGHT ATRIUM:  The  atrium was mildly dilated.     MITRAL VALVE:  There was marked annular calcification.  There was mildly restricted mobility.  There was moderate regurgitation.     AORTIC VALVE:  A bioprosthesis was present. The mean gradient across the valve was slightly elevated at 24mmHg.     TRICUSPID VALVE:  There was moderate regurgitation.     HISTORY: PRIOR HISTORY: Aortic stenosis; S/P AVR; CAD; Hyperlipidemia; Hypertension; CKD stage 2; Alzheimer's; GERD; COPD     PROCEDURE: The study was performed in the [facility]. This was a routine study. The transthoracic approach was used. The study included complete 2D imaging, M-mode, complete spectral Doppler, and color Doppler. The  heart rate was 79 bpm, at the start of the study. Images were obtained from the parasternal, apical, subcostal, and suprasternal notch acoustic windows. Image quality was adequate.     LEFT VENTRICLE: Size was normal. Systolic function was normal. Ejection fraction was estimated to be 65 %. There were no regional wall motion abnormalities. Wall thickness was moderately increased. DOPPLER: Doppler parameters were  consistent with abnormal left ventricular relaxation (grade 1 diastolic dysfunction).     RIGHT VENTRICLE: The size was normal. Systolic function was normal. Wall thickness was normal.     LEFT ATRIUM: The atrium was mildly dilated.     RIGHT ATRIUM: The atrium was mildly dilated.     MITRAL VALVE: There was marked annular calcification. Valve structure was normal. There was mild-moderate calcification. There was mildly restricted mobility. DOPPLER: Transmitral velocity was minimally increased. There was very mild  stenosis. The mean gradient was 3mmHg. There was moderate regurgitation.     AORTIC VALVE: A bioprosthesis was present. The mean gradient across the valve was slightly elevated at 24mmHg.     TRICUSPID VALVE: The valve structure was normal. There was normal leaflet separation. DOPPLER: The transtricuspid  velocity was within the normal range. There was no evidence for stenosis. There was moderate regurgitation. Estimated peak PA  pressure was 26 mmHg.     PULMONIC VALVE: Leaflets exhibited normal thickness, no calcification, and normal cuspal separation. DOPPLER: The transpulmonic velocity was within the normal range. There was mild regurgitation.     PERICARDIUM: There was no pericardial effusion.     AORTA: The root exhibited normal size.     SYSTEMIC VEINS: IVC: The inferior vena cava was normal in size. Respirophasic changes were normal.    Code Status: Level 3 - DNAR and DNI  Advance Directive and Living Will: Yes    Power of : Yes  POLST:      Counseling / Coordination of Care  Total floor / unit time spent today 45 minutes.  Greater than 50% of total time was spent with the patient and / or family counseling and / or coordination of care.

## 2024-05-13 NOTE — ED NOTES
Pt getting oob, taking gown and wires off. Stating she needs to go downstairs, redirected to stay in bed. Prentiss belt reapplied and pt redressed. Provided pt with some ice water, and new iv placed d/t pt removing her prior one. Bed alarm placed under pt     Moni Phillips RN  05/13/24 [id number]

## 2024-05-13 NOTE — CONSULTS
Consultation - Geriatric Medicine   Berta Estrada 89 y.o. female MRN: [id number]  Unit/Bed#: ED-22 Encounter: [id number]      Assessment/Plan   Fall  [place] fall precautions   Assess patient frequently for physical needs, encourage use of assistant devices as needed and directed by PT/OT  Identify cognitive and physical deficits and behaviors that affect risk of falls  Consider moving patient closer to nursing station to monitor more closely for impulsive behavior which may increase risk of falls  Educate patient/family on patient safety including physical limitations and importance of using call bell for assistance   Modify environment to reduce risk of injury including disconnecting from pole when not in use, ensuring adequate lighting in room and restroom, ensuring that path to restroom is clear and free of trip hazards  PT/OT consulted to assist with strengthening/mobility and assist with discharge planning to appropriate level of care    Closed fracture of ramus of right pubis  S/p fall  CT pelvis showed comminuted mildly displaced fracture of the right inferior pubic ramus  Orthopedics was consulted  No orthopedic intervention indicated  WBAT to bilateral lower extremities  PT/OT consult  Multimodal pain regimen including geriatric pain protocol  Patient is currently on Tylenol 975 every 8, lidocaine patch daily, oxycodone 2.5 mg every 4 as needed for moderate pain, oxycodone 5 mg every 4 as needed for severe pain, and Dilaudid 0.2 mg every 4 as needed for breakthrough pain    Late onset Alzheimer's disease without behavioral disturbance  Patient is alert oriented to person only on exam  Only Cognitive testing on file can found it is MMSE from December 2017 which patient scored 21 out of 30  Previously followed with [facility] geriatric[facility], no recent visits noted  Recommend rechecking TSH and vitamin B12 levels  No recent head CT or MRI brain to review  Most recent QTc from EKG completed yesterday was  414  Patient did receive a one-time dose of Zyprexa a little after midnight  Recommend trying to avoid antipsychotics as able to due to heart block, if necessary would only use Zyprexa 2.5 mg if patient is not able to be redirected  May need to consider restraints as patient is pretty impulsive and trying to get out of bed frequently  Recommend adding melatonin 3 mg nightly  At risk for delirium due to Alzheimer's dementia, hospitalization, medications, sleep disturbance  Recommend delirium precautions  Maintain sleep-wake cycle, avoid nighttime interruptions  minimize overnight interruptions, group overnight vitals/labs/nursing checks as possible  dim lights, close blinds and turn off tv to minimize stimulation and encourage sleep environment in evenings  Provide adequate pain control  Avoid urinary retention and constipation  Provide frequent and early mobilization  Provide frequent redirection and reorientation as needed  Avoid medications that may worsen or precipitate delirium such as tramadol, benzodiazepines, anticholinergics, and Benadryl  Redirect unwanted behaviors as first-line therapy, avoid physical restraints as able to  Continue to monitor    Mobitz type I second-degree AV block  Found on telemetry overnight  Cardiology was consulted  Recommended continuing telemetry monitoring and if daytime bradycardia consider EP evaluation for PPM  Management per cardiology    Stage IIIa CKD  Baseline creatinine appears to be between 1.0-1.2  Creatinine 0.98, BUN 18, GFR 51  Avoid nephrotoxic medication  Renal dose medications as needed  Encourage adequate p.o. Hydration  Avoid hypotension  Periodic lab work to monitor renal function  Continue to monitor    Hyperlipidemia  Due for repeat lipid panel at the end of this month  Lipid panel from May 2023 cholesterol 139, triglycerides 73, HDL 56, LDL 64  Continue outpatient follow-up with PCP    Vision impairment  Patient does have vision impairment  Wears glasses at  baseline   Recommend use of corrective lenses at all appropriate times  Encourage adequate lighting and encourage use of assistance with ambulation  Keep personal belongings close to avoid reaching  Encourage appropriate footwear at all times  Recommend large font for printed materials provided to patient    Hard of hearing  Patient does have hearing impairment   Hearing aids   Hearing impairment  correlated with depression, cognitive impairment, delirium and falls in the older adult  Speak clearly  Use sound amplifier  Speak face to face  Use clear dictation and enunciation of words    Insomnia  First-line treatment is behavior modification  Maintain sleep-wake cycle, avoid nighttime interruptions  Avoid caffeine throughout the day  Avoid napping throughout the day  Encourage physical activity throughout the day  Avoid sedative hypnotics including benzodiazepines and benadryl  Will start melatonin 3 mg nightly    Anxiety/depression  Patient daughter reports history of anxiety/depression  Appears patient previously tried Celexa, did not tolerate  Gabapentin 100 mg nightly for started at bedtime to help with pain, could also help with anxiety  Could consider starting low-dose of Lexapro to see if that helps with patient anxiety    Frailty  Clinical Frail Scale: 6  Total protein 6.1 and albumin 4.1  Encourage adequate hydration and nutrition, including protein in meals  OOB as tolerated  PT/OT consulted  Encourage early and frequent mobilization    Ambulatory dysfunction  At a baseline ambulates with cane  PT/OT following  Fall precautions  Out of bed as tolerated  Encourage early and frequent mobilization  Encourage adequate hydration and nutrition  Provide adequate pain management   Goal is undetermined, daughter interested in short-term rehab  Continue with PT/OT for continued strength and balance training       Home medication review  Lipitor 20mg daily  Famotidine 20mg daily  Flonase nasal spray as  needed    Personally confirmed with [facility] [phone]   Per daughter also takes aspirin 81 mg daily    History of Present Illness   Physician Requesting Consult: Campos Kline,*  Reason for Consult / Principal Problem: Fall, pubic ramus fracture, ISAR 4  Hx and PE limited by: Dementia   Additional history obtained from: Chart review and patient evaluation, speaking with patient's daughter who is bedside      HPI: Berta Estrada is a 89 y.o. year old female who presents with history of Alzheimer's dementia, CAD, cancer, hyperlipidemia, and ambulatory dysfunction.  She presented to the ER yesterday after a fall at home.    Patient lives at home with her daughter and son-in-law in a two-story home.  She lives all on 1 floor.  She generally uses a cane for ambulation.  No other recent fall in the last 6 months.  She requires assistance for bathing and dressing.  There are grab bars shower chairs in the bathroom.  She does have occasional urinary incontinence.  Her daughter does the cooking, cleaning, medications, and finances.  She wears eyeglasses for reading and hearing aids.  She has ongoing issues with anxiety and insomnia.  She no longer drives.  Memory continues to decline.  Patient's daughter reports patient is a level 3, they do not want any CPR or intubation.  History provided by patient's daughter who is bedside.    Upon exam patient was lying in bed, resting.  She was slightly restless on exam and was trying to crawl out of bed.  She needed frequent reorientation.  She was alert to person only.  Appetite is stable.  She denied any significant pain.    Inpatient consult to Gerontology  Consult performed by: JOSH Lira  Consult ordered by: Esther Purvis PA-C          Review of Systems   Unable to perform ROS: Dementia       Historical Information   Past Medical History:   Diagnosis Date    Actinic keratosis     Basal cell carcinoma     Back     Cancer (HCC)     Coronary artery disease      Dementia (HCC)     Depression     Ear problems     HL (hearing loss)     Hyperlipidemia     Migraines     Ovarian cancer (HCC) 2004    s/p surgery. no chemo/RT    Phlebitis     R leg     Past Surgical History:   Procedure Laterality Date    ADENOIDECTOMY      APPENDECTOMY      CATARACT EXTRACTION, BILATERAL      HYSTERECTOMY  2005    ovarian CA    KNEE SURGERY Right     SKIN BIOPSY      TONSILECTOMY AND ADNOIDECTOMY      TONSILLECTOMY       Social History   Social History     Substance and Sexual Activity   Alcohol Use Not Currently     Social History     Substance and Sexual Activity   Drug Use Never     Social History     Tobacco Use   Smoking Status Former    Current packs/day: 0.25    Types: Cigarettes   Smokeless Tobacco Never   Tobacco Comments    until age 30     Family History:   Family History   Problem Relation Age of Onset    Depression Mother     Anxiety disorder Mother     Alcohol abuse Mother     Substance Abuse Mother     Asthma Mother     Diabetes Father 60    Brain cancer Son     Heart attack Sister     Coronary artery disease Sister     Mental illness Neg Hx        Meds/Allergies   all current active meds have been reviewed    Allergies   Allergen Reactions    Codeine Hives    Morphine Other (See Comments)     Redness in face, swelling    Other      Seasonal    Propoxyphene        Objective     Intake/Output Summary (Last 24 hours) at 5/13/2024 1316  Last data filed at 5/12/2024 2055  Gross per 24 hour   Intake 500 ml   Output --   Net 500 ml     Invasive Devices       Peripheral Intravenous Line  Duration             Peripheral IV 05/13/24 Left;Proximal;Ventral (anterior) Forearm <1 day                    Physical Exam  Vitals reviewed.   Constitutional:       General: She is not in acute distress.     Appearance: She is well-developed.   HENT:      Head: Normocephalic and atraumatic.   Cardiovascular:      Rate and Rhythm: Normal rate and regular rhythm.      Heart sounds: Murmur heard.  "  Pulmonary:      Effort: Pulmonary effort is normal.      Breath sounds: Normal breath sounds.   Abdominal:      General: Bowel sounds are normal.      Palpations: Abdomen is soft.   Musculoskeletal:         General: Tenderness present.   Skin:     General: Skin is warm and dry.   Neurological:      Mental Status: She is alert. She is disoriented.      Cranial Nerves: No cranial nerve deficit.      Motor: Weakness present.      Gait: Gait abnormal.      Comments: Alert to person only on exam   Psychiatric:         Mood and Affect: Mood is anxious.         Cognition and Memory: Cognition is impaired. Memory is impaired.         Lab Results:   I have personally reviewed pertinent lab results including the following:  -CBC, BMP, magnesium, phosphorus, UA, urine microscopic    I have personally reviewed the following imaging study reports in PACS:  -CT spine lumbar, CT pelvis        Therapies:   PT: consulted  OT: consulted    VTE Prophylaxis: Sequential compression device (Venodyne)  and Enoxaparin (Lovenox)    Code Status: Level 3 - DNAR and DNI  Advance Directive and Living Will: Yes    Power of : Yes  POLST:      Family and Social Support:   Living Arrangements: Lives w/ Daughter  Support Systems: Family members; Daughter  Assistance Needed: Family Support  Type of Current Residence: [address] [place] home  Current Home Care Services: No  Type of Current Home Care Services: Home health aide  Discharge planning discussed with:: Patient and daughter  Freedom of Choice: Yes      Goals of Care: Undetermined    Please note:  Voice-recognition software may have been used in the preparation of this document.  Occasional wrong word or \"sound-alike\" substitutions may have occurred due to the inherent limitations of voice recognition software.  Interpretation should be guided by context.    "

## 2024-05-14 ENCOUNTER — APPOINTMENT (INPATIENT)
Dept: NON INVASIVE DIAGNOSTICS | Facility: HOSPITAL | Age: 89
DRG: 983 | End: 2024-05-14
Payer: COMMERCIAL

## 2024-05-14 LAB
ANION GAP SERPL CALCULATED.3IONS-SCNC: 5 MMOL/L (ref 4–13)
AORTIC ROOT: 3.3 CM
AORTIC VALVE MEAN VELOCITY: 24.8 M/S
APICAL FOUR CHAMBER EJECTION FRACTION: 75 %
AV AREA BY CONTINUOUS VTI: 0.9 CM2
AV AREA PEAK VELOCITY: 1 CM2
AV LVOT MEAN GRADIENT: 2 MMHG
AV LVOT PEAK GRADIENT: 4 MMHG
AV MEAN GRADIENT: 28 MMHG
AV PEAK GRADIENT: 48 MMHG
AV VALVE AREA: 0.87 CM2
AV VELOCITY RATIO: 0.28
BACTERIA UR CULT: NORMAL
BSA FOR ECHO PROCEDURE: 1.74 M2
BUN SERPL-MCNC: 22 MG/DL (ref 5–25)
CALCIUM SERPL-MCNC: 8.7 MG/DL (ref 8.4–10.2)
CHLORIDE SERPL-SCNC: 108 MMOL/L (ref 96–108)
CO2 SERPL-SCNC: 28 MMOL/L (ref 21–32)
CREAT SERPL-MCNC: 1.01 MG/DL (ref 0.6–1.3)
DOP CALC AO PEAK VEL: 3.45 M/S
DOP CALC AO VTI: 68.45 CM
DOP CALC LVOT AREA: 3.46 CM2
DOP CALC LVOT CARDIAC INDEX: 2.56 L/MIN/M2
DOP CALC LVOT CARDIAC OUTPUT: 4.45 L/MIN
DOP CALC LVOT DIAMETER: 2.1 CM
DOP CALC LVOT PEAK VEL VTI: 17.2 CM
DOP CALC LVOT PEAK VEL: 0.98 M/S
DOP CALC LVOT STROKE INDEX: 32.8 ML/M2
DOP CALC LVOT STROKE VOLUME: 59.54
DOP CALC MV VTI: 23.49 CM
ERYTHROCYTE [DISTWIDTH] IN BLOOD BY AUTOMATED COUNT: 13.9 % (ref 11.6–15.1)
FRACTIONAL SHORTENING: 42 (ref 28–44)
GFR SERPL CREATININE-BSD FRML MDRD: 49 ML/MIN/1.73SQ M
GLUCOSE SERPL-MCNC: 108 MG/DL (ref 65–140)
HCT VFR BLD AUTO: 38.7 % (ref 34.8–46.1)
HGB BLD-MCNC: 12.8 G/DL (ref 11.5–15.4)
INTERVENTRICULAR SEPTUM IN DIASTOLE (PARASTERNAL SHORT AXIS VIEW): 1.3 CM
INTERVENTRICULAR SEPTUM: 1.3 CM (ref 0.6–1.1)
LAAS-AP2: 21.8 CM2
LAAS-AP4: 19.6 CM2
LEFT ATRIUM SIZE: 3.8 CM
LEFT ATRIUM VOLUME (MOD BIPLANE): 62 ML
LEFT ATRIUM VOLUME INDEX (MOD BIPLANE): 35.6 ML/M2
LEFT INTERNAL DIMENSION IN SYSTOLE: 1.8 CM (ref 2.1–4)
LEFT VENTRICULAR INTERNAL DIMENSION IN DIASTOLE: 3.1 CM (ref 3.5–6)
LEFT VENTRICULAR POSTERIOR WALL IN END DIASTOLE: 1.3 CM
LEFT VENTRICULAR STROKE VOLUME: 29 ML
LVSV (TEICH): 29 ML
MCH RBC QN AUTO: 32.3 PG (ref 26.8–34.3)
MCHC RBC AUTO-ENTMCNC: 33.1 G/DL (ref 31.4–37.4)
MCV RBC AUTO: 98 FL (ref 82–98)
MV E'TISSUE VEL-LAT: 5 CM/S
MV E'TISSUE VEL-SEP: 6 CM/S
MV MEAN GRADIENT: 4 MMHG
MV PEAK GRADIENT: 12 MMHG
MV VALVE AREA BY CONTINUITY EQUATION: 2.53 CM2
PLATELET # BLD AUTO: 114 THOUSANDS/UL (ref 149–390)
PMV BLD AUTO: 9.6 FL (ref 8.9–12.7)
POTASSIUM SERPL-SCNC: 4.5 MMOL/L (ref 3.5–5.3)
RA PRESSURE ESTIMATED: 8 MMHG
RBC # BLD AUTO: 3.96 MILLION/UL (ref 3.81–5.12)
RIGHT ATRIUM AREA SYSTOLE A4C: 17.2 CM2
RIGHT VENTRICLE ID DIMENSION: 3.7 CM
RV PSP: 32 MMHG
SL CV LEFT ATRIUM LENGTH A2C: 6.5 CM
SL CV LV EF: 80
SL CV PED ECHO LEFT VENTRICLE DIASTOLIC VOLUME (MOD BIPLANE) 2D: 39 ML
SL CV PED ECHO LEFT VENTRICLE SYSTOLIC VOLUME (MOD BIPLANE) 2D: 10 ML
SODIUM SERPL-SCNC: 141 MMOL/L (ref 135–147)
TR MAX PG: 24 MMHG
TR PEAK VELOCITY: 2.5 M/S
TRICUSPID ANNULAR PLANE SYSTOLIC EXCURSION: 2 CM
TRICUSPID VALVE PEAK REGURGITATION VELOCITY: 2.46 M/S
WBC # BLD AUTO: 6.53 THOUSAND/UL (ref 4.31–10.16)

## 2024-05-14 PROCEDURE — 97167 OT EVAL HIGH COMPLEX 60 MIN: CPT

## 2024-05-14 PROCEDURE — 93306 TTE W/DOPPLER COMPLETE: CPT | Performed by: INTERNAL MEDICINE

## 2024-05-14 PROCEDURE — 85027 COMPLETE CBC AUTOMATED: CPT

## 2024-05-14 PROCEDURE — 80048 BASIC METABOLIC PNL TOTAL CA: CPT

## 2024-05-14 PROCEDURE — 93306 TTE W/DOPPLER COMPLETE: CPT

## 2024-05-14 PROCEDURE — 99232 SBSQ HOSP IP/OBS MODERATE 35: CPT | Performed by: NURSE PRACTITIONER

## 2024-05-14 PROCEDURE — 99232 SBSQ HOSP IP/OBS MODERATE 35: CPT | Performed by: SURGERY

## 2024-05-14 PROCEDURE — 97163 PT EVAL HIGH COMPLEX 45 MIN: CPT

## 2024-05-14 PROCEDURE — 99232 SBSQ HOSP IP/OBS MODERATE 35: CPT | Performed by: INTERNAL MEDICINE

## 2024-05-14 RX ORDER — CEFAZOLIN SODIUM 1 G/50ML
1000 SOLUTION INTRAVENOUS ONCE
Status: COMPLETED | OUTPATIENT
Start: 2024-05-15 | End: 2024-05-15

## 2024-05-14 RX ADMIN — Medication 2.5 MG: at 20:06

## 2024-05-14 RX ADMIN — FAMOTIDINE 20 MG: 20 TABLET, FILM COATED ORAL at 09:56

## 2024-05-14 RX ADMIN — SENNOSIDES AND DOCUSATE SODIUM 1 TABLET: 8.6; 5 TABLET ORAL at 21:30

## 2024-05-14 RX ADMIN — Medication 3 MG: at 20:07

## 2024-05-14 RX ADMIN — ATORVASTATIN CALCIUM 20 MG: 20 TABLET, FILM COATED ORAL at 17:29

## 2024-05-14 RX ADMIN — GABAPENTIN 100 MG: 100 CAPSULE ORAL at 21:30

## 2024-05-14 RX ADMIN — ACETAMINOPHEN 975 MG: 325 TABLET, FILM COATED ORAL at 21:30

## 2024-05-14 RX ADMIN — ACETAMINOPHEN 975 MG: 325 TABLET, FILM COATED ORAL at 15:00

## 2024-05-14 RX ADMIN — CHLORHEXIDINE GLUCONATE 15 ML: 1.2 SOLUTION ORAL at 20:06

## 2024-05-14 RX ADMIN — ACETAMINOPHEN 975 MG: 325 TABLET, FILM COATED ORAL at 06:26

## 2024-05-14 NOTE — PROGRESS NOTES
[facility]  Progress Note  Name: Berta Estrada I  MRN: [id number]  Unit/Bed#: S -01 I Date of Admission: 5/12/2024   Date of Service: 5/14/2024 I Hospital Day: 2    Assessment/Plan   Mobitz type 1 second degree AV block  Assessment & Plan  - EKG on admission demonstrates Mobitz type 1 second degree AV block  - Telemetry monitoring  - Monitor vital signs closely  - Cardiology consult, appreciate recommendations  -Daughter agreeable to PPM. Advise EP evaluation for PPM. Will keep NPO after midnight 5/15  -Check orthostatic VS  -Continue BP monitoring  -F/U with  TTE    Fall  Assessment & Plan  - Status post fall with the below noted injuries.  - Fall precautions.  - Geriatric Medicine consultation for evaluation, medication review and recommendations.  - PT and OT evaluation and treatment as indicated.  - Case Management consultation for disposition planning.       Stage 3a chronic kidney disease (HCC)  Assessment & Plan  Lab Results   Component Value Date    EGFR 49 05/14/2024    EGFR 51 05/13/2024    EGFR 45 05/12/2024    CREATININE 1.01 05/14/2024    CREATININE 0.98 05/13/2024    CREATININE 1.09 05/12/2024     - Patient with chronic history of stage 3a CKD  - Baseline Cr 1.0-1.2  - Avoid nephrotoxins  - Monitor renal indices  - Outpatient follow up with PCP     Other hyperlipidemia  Assessment & Plan  - Continue current medication regimen.  - Outpatient follow-up with PCP.       Late onset Alzheimer's disease without behavioral disturbance (HCC)  Assessment & Plan  - Patient with chronic history of dementia  - Delirium precautions  - Frequent reorientation  - Geriatrics consult, appreciate recommendations  - Outpatient follow up with PCP     * Closed fracture of ramus of right pubis (HCC)  Assessment & Plan  - Right inferior pubic ramus fracture, present on admission.  - Status post fall on 5/12.  - Appreciate Orthopedic surgery evaluation, -non op   - WBAT to b/l LE  - Monitor  right lower extremity neurovascular exam.  - Continue multimodal analgesic regimen.  - Continue DVT prophylaxis.  - PT and OT evaluation and treatment as indicated.               Bowel Regimen: senokot  VTE Prophylaxis:Sequential compression device (Venodyne)  and Enoxaparin (Lovenox)     Disposition: med surg w/tele    Subjective   Chief Complaint: no complaints    Subjective: Patient is pleasant and confused this am (disoriented to situation and time). She denies any pain. Reports she is hungry this am.      Objective   Vitals:   Temp:  [97 °F (36.1 °C)-98.3 °F (36.8 °C)] 97 °F (36.1 °C)  HR:  [] 78  Resp:  [17-20] 18  BP: (115-168)/(73-98) 115/73    I/O         05/12 0701 05/13 0700 05/13 0701  05/14 0700 05/14 0701  05/15 0700    P.O.  120     IV Piggyback 500      Total Intake(mL/kg) 500 (7.5) 120 (1.8)     Urine (mL/kg/hr)  50 (0)     Total Output  50     Net +500 +70            Unmeasured Urine Occurrence 1 x 1 x              Physical Exam:   GEN: NAD  NEURO: GCS 14  HEENT: WNL; atraumatic   CV: Regular rate  RESP: clear to auscultation, normal respiratory effort   GI: soft, non tender, non distended  MSK: distal pulses intact ; moving all extremities with normal strength   SKIN: CDI ; scattered ecchymosis       Invasive Devices       Peripheral Intravenous Line  Duration             Peripheral IV 05/13/24 Left;Proximal;Ventral (anterior) Forearm 1 day                          Lab Results: Results: I have personally reviewed all pertinent laboratory/tests results, BMP/CMP:   Lab Results   Component Value Date    SODIUM 141 05/14/2024    K 4.5 05/14/2024     05/14/2024    CO2 28 05/14/2024    BUN 22 05/14/2024    CREATININE 1.01 05/14/2024    CALCIUM 8.7 05/14/2024    EGFR 49 05/14/2024   , and CBC:   Lab Results   Component Value Date    WBC 6.53 05/14/2024    HGB 12.8 05/14/2024    HCT 38.7 05/14/2024    MCV 98 05/14/2024     (L) 05/14/2024    RBC 3.96 05/14/2024    MCH 32.3 05/14/2024     MCHC 33.1 05/14/2024    RDW 13.9 05/14/2024    MPV 9.6 05/14/2024     Imaging: no new imaging   Other Studies: Echo pending read

## 2024-05-14 NOTE — PROGRESS NOTES
Progress Note - Cardiology Team 1  Bertalizzeth Estrada 89 y.o. female MRN: [id number]  Unit/Bed#: S -01 Encounter: [id number]        Principal Problem:    Closed fracture of ramus of right pubis (HCC)  Active Problems:    Late onset Alzheimer's disease without behavioral disturbance (HCC)    Other hyperlipidemia    Stage 3a chronic kidney disease (HCC)    Fall    Mobitz type 1 second degree AV block      Assessment  Witnessed fall-patient with severe Alzheimer's dementia.  Reports indicate patient was standing up from her chair lost balance and fell backwards.  No head strike or loss of consciousness.        Heart block-second-degree AV block- 2:1 and Wenkebach        Presenting ECG sinus rhythm /Wenkebach         Initial review of telemetry reveals heart rate overnight in the 30s 2-1 AV         block. Unknown if pt was asleep. Otherwise NSR 70-90's with        Wenkebach.           Daughter at bedside today. Patient often complains of lightheadedness.        Worse in the morning hours.        Upon review of telemetry today heart rate in the 30s with 2-1 AV block.        Episode while awake.  No syncope    3.   CAD with history of prior PCI- unknown details        No reports of chest pain        On Asa, statin   Essential hypertension-she is mildly hypertensive.  No antihypertensive agents.  Alzheimer's dementia- not competent to make medical decisions. Daughter is decision maker. Pt lives with family. Ambulatory.   CKD stage III-creatinine 1.0 which is baseline  History of bioprosthetic AVR        TTE 2020-normal LV function.  Aortic valve mean gradient 24 mmHg     Plan  No AV blocking agents.   Daughter agreeable to PPM. Advise EP evaluation for PPM. Will keep NPO after midnight tonight.   Check orthostatic VS  Continue BP monitoring  F/U with  TTE            Subjective/Objective   Chief Complaint/subjective  Events overnight.  No chest pain shortness of breath.  Family at bedside.  Long discussion about  "PPM.        Vitals: /82 (BP Location: Right arm)   Pulse 68   Temp 97.6 °F (36.4 °C) (Temporal)   Resp 20   Ht 5' 5\" (1.651 m)   Wt 66.8 kg (147 lb 4.3 oz)   SpO2 98%   BMI 24.51 kg/m²     Vitals:    05/12/24 1602   Weight: 66.8 kg (147 lb 4.3 oz)     Orthostatic Blood Pressures      Flowsheet Row Most Recent Value   Blood Pressure 142/82 filed at 05/14/2024 0734   Patient Position - Orthostatic VS Lying filed at 05/14/2024 0734              Intake/Output Summary (Last 24 hours) at 5/14/2024 1103  Last data filed at 5/14/2024 0601  Gross per 24 hour   Intake 120 ml   Output 50 ml   Net 70 ml       Invasive Devices       Peripheral Intravenous Line  Duration             Peripheral IV 05/13/24 Left;Proximal;Ventral (anterior) Forearm 1 day                    Current Facility-Administered Medications   Medication Dose Route Frequency    acetaminophen (TYLENOL) tablet 975 mg  975 mg Oral Q8H MAMTA    atorvastatin (LIPITOR) tablet 20 mg  20 mg Oral Daily With Dinner    [START ON 5/15/2024] ceFAZolin (ANCEF) IVPB (premix in dextrose) 1,000 mg 50 mL  1,000 mg Intravenous Once    chlorhexidine (PERIDEX) 0.12 % oral rinse 15 mL  15 mL Swish & Spit Q12H MAMTA    enoxaparin (LOVENOX) subcutaneous injection 30 mg  30 mg Subcutaneous Q24H    famotidine (PEPCID) tablet 20 mg  20 mg Oral Daily    fluticasone (FLONASE) 50 mcg/act nasal spray 1 spray  1 spray Each Nare Daily    gabapentin (NEURONTIN) capsule 100 mg  100 mg Oral HS    lidocaine (LIDODERM) 5 % patch 1 patch  1 patch Topical Daily    melatonin tablet 3 mg  3 mg Oral HS    naloxone (NARCAN) 0.04 mg/mL syringe 0.04 mg  0.04 mg Intravenous Q1MIN PRN    OLANZapine (ZyPREXA) IM injection 5 mg  5 mg Intramuscular Once    oxyCODONE (ROXICODONE) split tablet 2.5 mg  2.5 mg Oral Q4H PRN    Or    oxyCODONE (ROXICODONE) IR tablet 5 mg  5 mg Oral Q4H PRN    senna-docusate sodium (SENOKOT S) 8.6-50 mg per tablet 1 tablet  1 tablet Oral HS         Physical Exam: /82 " "(BP Location: Right arm)   Pulse 68   Temp 97.6 °F (36.4 °C) (Temporal)   Resp 20   Ht 5' 5\" (1.651 m)   Wt 66.8 kg (147 lb 4.3 oz)   SpO2 98%   BMI 24.51 kg/m²     General Appearance:    Alert, cooperative, no distress, appears stated age   Head:    Normocephalic, no scleral icterus   Eyes:    PERRL   Nose:   Nares normal, septum midline, no drainage    Throat:   Lips, mucosa, and tongue normal   Neck:   Supple, symmetrical, trachea midline,       no carotid    bruit or JVD   Back:     Symmetric, no CVA tenderness   Lungs:     Clear to auscultation bilaterally, respirations unlabored   Chest Wall:    No tenderness or deformity    Heart:    Regular rate and rhythm, S1 and S2 normal, no murmur, rub   or gallop   Abdomen:     Soft, non-tender, bowel sounds active all four quadrants,     no masses, no organomegaly   Extremities:   Extremities normal, atraumatic, no cyanosis or edema   Pulses:   2+ and symmetric all extremities   Skin:   Skin color, texture, turgor normal, no rashes or lesions   Neurologic:   Alert and oriented to person place and time,                 Lab Results:   Recent Results (from the past 72 hour(s))   ECG 12 lead    Collection Time: 05/12/24  4:50 PM   Result Value Ref Range    Ventricular Rate 57 BPM    Atrial Rate 89 BPM    FL Interval  ms    QRSD Interval 82 ms    QT Interval 426 ms    QTC Interval 414 ms    P Axis  degrees    QRS Axis 55 degrees    T Wave Axis 72 degrees   UA w Reflex to Microscopic w Reflex to Culture    Collection Time: 05/12/24  4:51 PM    Specimen: Urine, Other   Result Value Ref Range    Color, UA Light Yellow     Clarity, UA Clear     Specific Gravity, UA 1.011 1.003 - 1.030    pH, UA 6.0 4.5, 5.0, 5.5, 6.0, 6.5, 7.0, 7.5, 8.0    Leukocytes, UA Moderate (A) Negative    Nitrite, UA Negative Negative    Protein, UA Negative Negative mg/dl    Glucose, UA Negative Negative mg/dl    Ketones, UA Negative Negative mg/dl    Urobilinogen, UA <2.0 <2.0 mg/dl mg/dl    " Bilirubin, UA Negative Negative    Occult Blood, UA Negative Negative   Urine Microscopic    Collection Time: 05/12/24  4:51 PM   Result Value Ref Range    RBC, UA 1-2 None Seen, 1-2 /hpf    WBC, UA 10-20 (A) None Seen, 1-2 /hpf    Epithelial Cells Occasional None Seen, Occasional /hpf    Bacteria, UA None Seen None Seen, Occasional /hpf   Urine culture    Collection Time: 05/12/24  4:51 PM    Specimen: Urine, Other   Result Value Ref Range    Urine Culture 80,000-89,000 cfu/ml    Comprehensive metabolic panel    Collection Time: 05/12/24  4:54 PM   Result Value Ref Range    Sodium 139 135 - 147 mmol/L    Potassium 4.0 3.5 - 5.3 mmol/L    Chloride 104 96 - 108 mmol/L    CO2 28 21 - 32 mmol/L    ANION GAP 7 4 - 13 mmol/L    BUN 22 5 - 25 mg/dL    Creatinine 1.09 0.60 - 1.30 mg/dL    Glucose 127 65 - 140 mg/dL    Calcium 8.9 8.4 - 10.2 mg/dL    AST 14 13 - 39 U/L    ALT 10 7 - 52 U/L    Alkaline Phosphatase 68 34 - 104 U/L    Total Protein 6.1 (L) 6.4 - 8.4 g/dL    Albumin 4.1 3.5 - 5.0 g/dL    Total Bilirubin 0.60 0.20 - 1.00 mg/dL    eGFR 45 ml/min/1.73sq m   CBC and differential    Collection Time: 05/12/24  4:54 PM   Result Value Ref Range    WBC 9.09 4.31 - 10.16 Thousand/uL    RBC 4.07 3.81 - 5.12 Million/uL    Hemoglobin 13.2 11.5 - 15.4 g/dL    Hematocrit 38.9 34.8 - 46.1 %    MCV 96 82 - 98 fL    MCH 32.4 26.8 - 34.3 pg    MCHC 33.9 31.4 - 37.4 g/dL    RDW 13.7 11.6 - 15.1 %    MPV 9.3 8.9 - 12.7 fL    Platelets 142 (L) 149 - 390 Thousands/uL    nRBC 0 /100 WBCs    Segmented % 71 43 - 75 %    Immature Grans % 0 0 - 2 %    Lymphocytes % 20 14 - 44 %    Monocytes % 8 4 - 12 %    Eosinophils Relative 1 0 - 6 %    Basophils Relative 0 0 - 1 %    Absolute Neutrophils 6.40 1.85 - 7.62 Thousands/µL    Absolute Immature Grans 0.04 0.00 - 0.20 Thousand/uL    Absolute Lymphocytes 1.78 0.60 - 4.47 Thousands/µL    Absolute Monocytes 0.72 0.17 - 1.22 Thousand/µL    Eosinophils Absolute 0.11 0.00 - 0.61 Thousand/µL     Basophils Absolute 0.04 0.00 - 0.10 Thousands/µL   Magnesium    Collection Time: 05/12/24  4:54 PM   Result Value Ref Range    Magnesium 2.0 1.9 - 2.7 mg/dL   Type and Screen    Collection Time: 05/12/24  8:44 PM   Result Value Ref Range    ABO Grouping O     Rh Factor Positive     Antibody Screen Negative     Specimen Expiration Date 20240515    CBC and Platelet    Collection Time: 05/13/24  4:46 AM   Result Value Ref Range    WBC 8.73 4.31 - 10.16 Thousand/uL    RBC 4.54 3.81 - 5.12 Million/uL    Hemoglobin 14.5 11.5 - 15.4 g/dL    Hematocrit 43.6 34.8 - 46.1 %    MCV 96 82 - 98 fL    MCH 31.9 26.8 - 34.3 pg    MCHC 33.3 31.4 - 37.4 g/dL    RDW 13.7 11.6 - 15.1 %    Platelets 140 (L) 149 - 390 Thousands/uL    MPV 9.2 8.9 - 12.7 fL   Basic metabolic panel    Collection Time: 05/13/24  4:46 AM   Result Value Ref Range    Sodium 144 135 - 147 mmol/L    Potassium 3.8 3.5 - 5.3 mmol/L    Chloride 109 (H) 96 - 108 mmol/L    CO2 26 21 - 32 mmol/L    ANION GAP 9 4 - 13 mmol/L    BUN 18 5 - 25 mg/dL    Creatinine 0.98 0.60 - 1.30 mg/dL    Glucose 116 65 - 140 mg/dL    Calcium 9.0 8.4 - 10.2 mg/dL    eGFR 51 ml/min/1.73sq m   Magnesium    Collection Time: 05/13/24  4:46 AM   Result Value Ref Range    Magnesium 2.1 1.9 - 2.7 mg/dL   Phosphorus    Collection Time: 05/13/24  4:46 AM   Result Value Ref Range    Phosphorus 3.8 2.3 - 4.1 mg/dL   CBC and Platelet    Collection Time: 05/14/24  6:33 AM   Result Value Ref Range    WBC 6.53 4.31 - 10.16 Thousand/uL    RBC 3.96 3.81 - 5.12 Million/uL    Hemoglobin 12.8 11.5 - 15.4 g/dL    Hematocrit 38.7 34.8 - 46.1 %    MCV 98 82 - 98 fL    MCH 32.3 26.8 - 34.3 pg    MCHC 33.1 31.4 - 37.4 g/dL    RDW 13.9 11.6 - 15.1 %    Platelets 114 (L) 149 - 390 Thousands/uL    MPV 9.6 8.9 - 12.7 fL   Basic metabolic panel    Collection Time: 05/14/24  6:33 AM   Result Value Ref Range    Sodium 141 135 - 147 mmol/L    Potassium 4.5 3.5 - 5.3 mmol/L    Chloride 108 96 - 108 mmol/L    CO2 28 21 - 32  mmol/L    ANION GAP 5 4 - 13 mmol/L    BUN 22 5 - 25 mg/dL    Creatinine 1.01 0.60 - 1.30 mg/dL    Glucose 108 65 - 140 mg/dL    Calcium 8.7 8.4 - 10.2 mg/dL    eGFR 49 ml/min/1.73sq m     Imaging: I have personally reviewed pertinent reports.        Counseling / Coordination of Care  Total time spent today 35 minutes. Greater than 50% of total time was spent with the patient and / or family counseling and / or coordination of care.

## 2024-05-14 NOTE — CASE MANAGEMENT
Case Management Discharge Planning Note    Patient name Berta He S /S -01 MRN [id number]  : 1935 Date 2024       Current Admission Date: 2024  Current Admission Diagnosis:Closed fracture of ramus of right pubis (HCC)   Patient Active Problem List    Diagnosis Date Noted    Fall 2024    Closed fracture of ramus of right pubis ([facility]) 2024    Mobitz type 1 second degree AV block 2024    Cervical radiculopathy 2022    Lumbar degenerative disc disease 2022    Other hemorrhoids 2021    Gait instability 2021    Vitamin D deficiency 2021    Closed fracture of left foot 2020    Wears hearing aid 2020    Stage 3a chronic kidney disease ([facility]) 2019    Thrombocytopenia ([facility]) 2019    Essential hypertension 2019    GERD (gastroesophageal reflux disease) 2019    Postmenopausal HRT (hormone replacement therapy) 2019    Vitamin B12 deficiency 2019    COPD (chronic obstructive pulmonary disease) ([facility]) 2019    Pancreatic cyst 2019    S/P AVR (aortic valve replacement) 03/15/2019    Primary osteoarthritis involving multiple joints 03/15/2019    Other hyperlipidemia 03/15/2019    Allergic rhinitis 03/15/2019    Age-related osteoporosis without current pathological fracture 03/15/2019    Skin cancer 03/15/2019    Coronary artery disease involving native coronary artery of native heart 03/15/2019    Depression 2018    Late onset Alzheimer's disease without behavioral disturbance ([facility]) 2018      LOS (days): 2  Geometric Mean LOS (GMLOS) (days): 2.8  Days to GMLOS:1     OBJECTIVE:  Risk of Unplanned Readmission Score: 13.64         Current admission status: Inpatient   Preferred Pharmacy:   [facility] #[id number]  SHAWANDA VERDIN [address] [address]  Phone: [phone] Fax: [phone]    Primary Care Provider: Kim Dewey  MD    Primary Insurance: [facility]  Secondary Insurance:     DISCHARGE DETAILS:    Discharge planning discussed with:: Patient and DTR  Freedom of Choice: Yes  Comments - Freedom of Choice: CM reviewed discharge plans per care team recs, patient's daughter requesting blanket SNF referral. CM to f/u with choice.  CM contacted family/caregiver?: Yes  Were Treatment Team discharge recommendations reviewed with patient/caregiver?: Yes  Did patient/caregiver verbalize understanding of patient care needs?: N/A- going to facility  Were patient/caregiver advised of the risks associated with not following Treatment Team discharge recommendations?: Yes    Contacts  Patient Contacts: Hillary Estrada  Relationship to Patient:: Family  Contact Method: In Person  Reason/Outcome: Discharge Planning, Referral    Requested Home Health Care         Is the patient interested in HHC at discharge?: No    DME Referral Provided  Referral made for DME?: No    Other Referral/Resources/Interventions Provided:  Interventions: SNF, Short Term Rehab  Referral Comments: [facility] referral - CM to f/u with choice.    Would you like to participate in our [facility] service program?  : No - Declined    Treatment Team Recommendation: Short Term Rehab, SNF  Discharge Destination Plan:: SNF, Short Term Rehab

## 2024-05-14 NOTE — ASSESSMENT & PLAN NOTE
- EKG on admission demonstrates Mobitz type 1 second degree AV block  - Telemetry monitoring  - Monitor vital signs closely  - Cardiology consult, appreciate recommendations  -Daughter agreeable to PPM. Advise EP evaluation for PPM. Will keep NPO after midnight 5/15  -Check orthostatic VS  -Continue BP monitoring  -F/U with  TTE

## 2024-05-14 NOTE — UTILIZATION REVIEW
NOTIFICATION OF INPATIENT ADMISSION   AUTHORIZATION REQUEST   SERVICING FACILITY:   [place] [address]  Tax ID: [id number]  NPI: [id number]   ATTENDING PROVIDER:  Attending Name and NPI#: Campos Kline Do [[id number]]  Address: [address] [place] [address]  Phone: [phone]     ADMISSION INFORMATION:  Place of Service: Inpatient Acute [facility]  Place of Service Code: 21  Inpatient Admission Date/Time: 5/12/24  8:16 PM  Discharge Date/Time: No discharge date for patient encounter.  Admitting Diagnosis Code/Description:  Hip pain [M25.559]  Knee pain [M25.569]  Mobitz I [I44.1]  Right hip pain [M25.551]  Fall, initial encounter [W19.XXXA]  Pubic ramus fracture, right, closed, initial encounter ([facility]) [S32.591A]  Ambulatory dysfunction [R26.2]     UTILIZATION REVIEW CONTACT:  Nikkie Vasquez Utilization   Network Utilization Review Department  Phone: [phone]  Fax: [phone]  Email: [email or web address]  Contact for approvals/pending authorizations, clinical reviews, and discharge.     PHYSICIAN ADVISORY SERVICES:  Medical Necessity Denial & Fwbv-gf-Prna Review  Phone: [phone]  Fax: [phone]  Email: [email or web address]     DISCHARGE SUPPORT TEAM:  For Patients Discharge Needs & Updates  Phone: [phone] opt. [phone] Fax: [phone]  Email: [email or web address]

## 2024-05-14 NOTE — PHYSICAL THERAPY NOTE
PHYSICAL THERAPY EVALUATION  DATE: 05/14/24  TIME: 0842-0915    NAME:  Berta Estrada  AGE:   89 y.o.  Mrn:   [id number]  Length Of Stay: 2    ADMIT DX:  Hip pain [M25.559]  Knee pain [M25.569]  Mobitz I [I44.1]  Right hip pain [M25.551]  Fall, initial encounter [W19.XXXA]  Pubic ramus fracture, right, closed, initial encounter ([facility]) [S32.591A]  Ambulatory dysfunction [R26.2]    Past Medical History:   Diagnosis Date    Actinic keratosis     Basal cell carcinoma     Back     Cancer (HCC)     Coronary artery disease     Dementia (HCC)     Depression     Ear problems     HL (hearing loss)     Hyperlipidemia     Migraines     Ovarian cancer (HCC) 2004    s/p surgery. no chemo/RT    Phlebitis     R leg     Past Surgical History:   Procedure Laterality Date    ADENOIDECTOMY      APPENDECTOMY      CATARACT EXTRACTION, BILATERAL      HYSTERECTOMY  2005    ovarian CA    KNEE SURGERY Right     SKIN BIOPSY      TONSILECTOMY AND ADNOIDECTOMY      TONSILLECTOMY         Performed at least 2 patient identifiers during session: Name, Birthday, ID bracelet, and Epic photo     05/14/24 0915   PT Last Visit   PT Visit Date 05/14/24   Note Type   Note type Evaluation   Pain Assessment   Pain Assessment Tool FLACC   Pain Location/Orientation Orientation: Right;Location: Hip   Pain Radiating Towards towards R thigh   Pain Onset/Description Frequency: Intermittent;Descriptor: Aching;Descriptor: Discomfort   Effect of Pain on Daily Activities limits gait speed and mechanics, limits tolerance of functional mobility, limits independence with self care tasks   Patient's Stated Pain Goal No pain   Hospital Pain Intervention(s) Repositioned;Ambulation/increased activity;Emotional support   Multiple Pain Sites No   Pain Rating: FLACC (Rest) - Face 0   Pain Rating: FLACC (Rest) - Legs 0   Pain Rating: FLACC (Rest) - Activity 0   Pain Rating: FLACC (Rest) - Cry 0   Pain Rating: FLACC (Rest) - Consolability 0   Score: FLACC (Rest) 0   Pain  "Rating: FLACC (Activity) - Face 1   Pain Rating: FLACC (Activity) - Legs 0   Pain Rating: FLACC (Activity) - Activity 1   Pain Rating: FLACC (Activity) - Cry 1   Pain Rating: FLACC (Activity) - Consolability 0   Score: FLACC (Activity) 3   Restrictions/Precautions   Weight Bearing Precautions Per Order Yes   RLE Weight Bearing Per Order (S)  WBAT  (per orthopedics 5/13/24, s/p R inferior pubic rami fractures)   LLE Weight Bearing Per Order (S)  WBAT  (per orthopedics 5/13/24, s/p R inferior pubic rami fractures)   Other Precautions Cognitive;Chair Alarm;Bed Alarm;WBS;Telemetry;Fall Risk;Pain   Home Living   Type of Home House   Home Layout Two level;Performs ADLs on one level;Able to live on main level with bedroom/bathroom;Stairs to enter without rails  (3 JOSE (dtr reports no formal HR but there is a pole that pt holds onto, always has family assist on stairs), maintains FFSU once inside)   Bathroom Shower/Tub Tub/shower unit   Bathroom Toilet Standard   Bathroom Equipment Tub transfer bench   Bathroom Accessibility Accessible   Home Equipment Walker;Cane   Prior Function   Level of [place] Independent with functional mobility;Needs assistance with ADLs;Needs assistance with IADLS   Lives With Daughter  (& son in law)   Receives Help From Family;Home health;Other (Comment)  (HHA every other Sunday for 3hrs, Goes to [facility] day care M-F 9am-4pm)   IADLs Family/Friend/Other provides transportation;Family/Friend/Other provides meals;Family/Friend/Other provides medication management   Falls in the last 6 months 1 to 4  (per family: 1 fall leading to current hospitalization, h/o additional falls but >6 months ago. 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 Pt lives at home with dtr and MONA, has assistance with all ADLs, pt " "ambulates t/o the home at BLANCO level with SPC, always has supervision/assist on stairs and for community mobility. Dtr reports that pt is usually dizzy in the mornings first waking up but resolves, and is able to walk 2 blocks outside for exercise.   General   Additional Pertinent History Pt is an 89 yr old female admitted 5/12/24 s/p fall resulting in R inferior pubic rami fractures, WBAT B LE, non op. Per cardiology present during PT evaluation, pt planned for pacemaker insertion 5/15/24.   Family/Caregiver Present Yes  (dtr and MONA present t/o session)   Cognition   Overall Cognitive Status Impaired  (pt with baseline dementia, dtr reports that pt is currently at her baseline cognition)   Arousal/Participation Cooperative   Orientation Level Oriented to person;Disoriented to place;Disoriented to time;Disoriented to situation   Memory Decreased long term memory;Decreased short term memory;Decreased recall of recent events;Decreased recall of precautions   Following Commands Follows one step commands with increased time or repetition   Subjective   Subjective \"I've never been to a rehab before so it'll all be a new experience for me.\"   RUE Assessment   RUE Assessment WFL   LUE Assessment   LUE Assessment WFL   RLE Assessment   RLE Assessment X  (grossly 2+/5 to 3/5 MMT throughout, limited due to pain in R hip)   LLE Assessment   LLE Assessment X  (grossly 3+/5 to 4/5 MMT throughout)   Coordination   Movements are Fluid and Coordinated 0   Coordination and Movement Description Segemental movements, limited fluidity d/t pain.   Sensation WFL   Bed Mobility   Supine to Sit 5  Supervision   Additional items Assist x 1;HOB elevated;Bedrails;Increased time required;Verbal cues   Sit to Supine   (NT as pt was left seated OOB in recliner chair at end of session, alarm engaged)   Additional Comments Pt with overall good static and dynamic sitting balance at EOB while completing functional tasks prior to transfers/gait. "   Transfers   Sit to Stand 4  Minimal assistance   Additional items Assist x 1;Increased time required;Verbal cues   Stand to Sit 4  Minimal assistance   Additional items Assist x 1;Armrests;Increased time required;Verbal cues   Stand pivot 4  Minimal assistance   Additional items Assist x 1;Increased time required;Verbal cues  (RW)   Additional Comments Cues for hand placement for safe transfers and optimal mechanics. Cues for RW proximity and safe/controlled descent to target surface.   Ambulation/Elevation   Gait pattern Antalgic;Forward Flexion;Decreased R stance;Decreased foot clearance;Short stride;Decreased hip extension;Decreased heel strike;Decreased toe off;Excessively slow  (dec R stance resulting in dec L foot clearance and stride length)   Gait Assistance 4  Minimal assist   Additional items Assist x 1;Verbal cues;Tactile cues   Assistive Device Rolling walker   Distance 18ft x1   Stair Management Assistance Not tested  (pt has 3 JOSE her home, reports always having family assistance on stairs)   Balance   Static Sitting Good   Dynamic Sitting Fair +   Static Standing Poor +  (w/ RW)   Dynamic Standing Poor +  (w/ RW)   Ambulatory Poor +  (w/ RW)   Endurance Deficit   Endurance Deficit Yes   Activity Tolerance   Activity Tolerance Patient limited by fatigue;Patient limited by pain;Other (Comment)  (impaired cognition)   Medical Staff Made Aware Spoke with CM, OT, RN, cardiology CRNP   Assessment   Prognosis Fair   Problem List Decreased strength;Decreased range of motion;Decreased endurance;Impaired balance;Decreased mobility;Decreased coordination;Decreased cognition;Impaired judgement;Decreased safety awareness;Orthopedic restrictions;Pain   Assessment Pt seen for PT evaluation for mobility assessment & discharge needs. Pt admitted 5/12/2024 s/p fall resulting in Closed fracture of ramus of right pubis (HCC), non op, WBAT B LE. Comorbidities affecting pt's fnxl performance include: Dementia, falls,  lumbar DDD, AVR, hearing impairment, basal cell carcinoma, CAD, depression, migraines, ovarian CA. During PT IE, pt requires close S and increased time for bed mobility, ARIANA for STS transfers, and ARIANA for ambulation of 18ft with RW (see above for gait deviations). Pt displays above outlined functional impairments & limitations, and presents below her baseline level of functional mobility. The AM-PAC & Barthel Index outcome tools were used to assist in determining pt safety w/ mobility/self care & appropriate d/c recommendations, see above for scores. Pt is at risk of falls d/t multiple comorbidities, h/o falls, impaired balance, impaired cognition, impaired insight/safety awareness, use of ambulatory aid, varying levels of pain, advanced age, acuity of medical illness, ongoing medical treatment of primary dx, polypharmacy, and WB restriction. Pt's clinical presentation is currently unstable/unpredictable as seen in pt's presentation of vital sign response, changing level of pain, varying levels of cognitive performance, increased fall risk, new onset of impairment of functional mobility, decreased endurance, and new onset of weakness. Pt will benefit from continued PT services in order to address impairments, decrease risk of falls, maximize independence w/ fnxl mobility, & ensure safety w/ mobility for transition to next level of care. Based on pt presentation & impairments, pt would most appropriately benefit from Level II (moderate PT intensity) resources upon d/c.   Barriers to Discharge Inaccessible home environment;Decreased caregiver support  (family reports that they don't feel comfortable providing physical level of assistance pt currently needs; reports that in order to return home pt needs to be able to walk in/out of adult day care program)   Goals   Patient Goals dtr goal: to go to rehab and be able to regain ability to walk in / out of adult  program   STG Expiration Date 05/28/24   Short Term  "Goal #1 Patient PT goals established in order to address pt self reported goal of \"to be able to walk in/out of adult  program\". Pt will: complete all bed mobility independently in order to promote increased OOB functional mobility and simulate home environment; complete all transfers with RW at BLANCO level in order to increase safety with functional mobility; ambulate >80ft with RW and S in order to increase safety with household and in facility distance functional mobility; negotiate 3 stairs with HR and no greater than ARIANA in order to facilitate safe access to her home with family assist; improve R LE strength to >/= 4-/5 MMT t/o in order to increase safety with functional mobility and decrease risk of falls; demonstrate understanding and independence with LE strengthening HEP; improve ambulatory balance to >/= good grade with LRAD in order to promote safety and increased independence with mobility; tolerate >3hrs OOB in upright position, in order to improve muscular endurance and respiratory status; improve AM-PAC score to >/= 21/24 in order to increase independence with mobility and decrease burden of care; improve Barthel Index score to >/= 55/100 in order to increase independence and decrease risk of falls.   PT Treatment Day 0   Plan   Treatment/Interventions Functional transfer training;LE strengthening/ROM;Elevations;Therapeutic exercise;Endurance training;Cognitive reorientation;Patient/family training;Equipment eval/education;Bed mobility;Gait training;Compensatory technique education;Spoke to nursing;Spoke to case management   PT Frequency 3-5x/wk   Discharge Recommendation   Rehab Resource Intensity Level, PT II (Moderate Resource Intensity)   AM-PAC Basic Mobility Inpatient   Turning in Flat Bed Without Bedrails 3   Lying on Back to Sitting on Edge of Flat Bed Without Bedrails 3   Moving Bed to Chair 3   Standing Up From Chair Using Arms 3   Walk in Room 3   Climb 3-5 Stairs With Railing 1 "   Basic Mobility Inpatient Raw Score 16   Basic Mobility Standardized Score 38.32   [facility] Highest Level Of Mobility   -[facility] Goal 5: Stand one or more mins   JH-HLM Achieved 6: Walk 10 steps or more   Modified Flip Scale   Modified Parke Scale 4   Barthel Index   Feeding 5   Bathing 0   Grooming Score 0   Dressing Score 5   Bladder Score 10   Bowels Score 10   Toilet Use Score 5   Transfers (Bed/Chair) Score 10   Mobility (Level Surface) Score 0   Stairs Score 0   Barthel Index Score 45   End of Consult   Patient Position at End of Consult Bedside chair;Bed/Chair alarm activated;All needs within reach  (family in room)     This session, pt required and most appropriately benefited from partial or full skilled PT/OT co-eval due to cognitive-communication impairments, significant regression from baseline level of mobility, decreased activity tolerance, and unpredictable medical and/or functional status. PT and OT goals were addressed separately as seen in documentation.    Based on patient's [facility] Highest Level of Mobility scores today, patient currently has a goal of -[facility] Levels: 6: WALK 10 STEPS OR MORE, to be completed with RN staffing each shift, in order to improve overall activity tolerance and mobility, combat hospital related deconditioning, and maximize outcomes for d/c from the acute care setting.     The patient's AM-PAC Basic Mobility Inpatient Short Form Raw Score is 16. A Raw score of less than or equal to 16 suggests the patient may benefit from discharge to post-acute rehabilitation services. Please also refer to the recommendation of the Physical Therapist for safe discharge planning.      Riri Garcia, PT, DPT   Available via [employer]  [facility] # [id number]  [place] License - [id number]  5/14/2024

## 2024-05-14 NOTE — PLAN OF CARE
Problem: PHYSICAL THERAPY ADULT  Goal: Performs mobility at highest level of function for planned discharge setting.  See evaluation for individualized goals.  Description: Treatment/Interventions: Functional transfer training, LE strengthening/ROM, Elevations, Therapeutic exercise, Endurance training, Cognitive reorientation, Patient/family training, Equipment eval/education, Bed mobility, Gait training, Compensatory technique education, Spoke to nursing, Spoke to case management     See flowsheet documentation for full assessment, interventions and recommendations.  Note: Prognosis: Fair  Problem List: Decreased strength, Decreased range of motion, Decreased endurance, Impaired balance, Decreased mobility, Decreased coordination, Decreased cognition, Impaired judgement, Decreased safety awareness, Orthopedic restrictions, Pain  Assessment: Pt seen for PT evaluation for mobility assessment & discharge needs. Pt admitted 5/12/2024 s/p fall resulting in Closed fracture of ramus of right pubis (HCC), non op, WBAT B LE. Comorbidities affecting pt's fnxl performance include: Dementia, falls, lumbar DDD, AVR, hearing impairment, basal cell carcinoma, CAD, depression, migraines, ovarian CA. During PT IE, pt requires close S and increased time for bed mobility, ARIANA for STS transfers, and ARIANA for ambulation of 18ft with RW (see above for gait deviations). Pt displays above outlined functional impairments & limitations, and presents below her baseline level of functional mobility. The AM-PAC & Barthel Index outcome tools were used to assist in determining pt safety w/ mobility/self care & appropriate d/c recommendations, see above for scores. Pt is at risk of falls d/t multiple comorbidities, h/o falls, impaired balance, impaired cognition, impaired insight/safety awareness, use of ambulatory aid, varying levels of pain, advanced age, acuity of medical illness, ongoing medical treatment of primary dx, polypharmacy, and  WB restriction. Pt's clinical presentation is currently unstable/unpredictable as seen in pt's presentation of vital sign response, changing level of pain, varying levels of cognitive performance, increased fall risk, new onset of impairment of functional mobility, decreased endurance, and new onset of weakness. Pt will benefit from continued PT services in order to address impairments, decrease risk of falls, maximize independence w/ fnxl mobility, & ensure safety w/ mobility for transition to next level of care. Based on pt presentation & impairments, pt would most appropriately benefit from Level II (moderate PT intensity) resources upon d/c.    Barriers to Discharge: Inaccessible home environment, Decreased caregiver support (family reports that they don't feel comfortable providing physical level of assistance pt currently needs; reports that in order to return home pt needs to be able to walk in/out of adult day care program)     Rehab Resource Intensity Level, PT: II (Moderate Resource Intensity)    See flowsheet documentation for full assessment.

## 2024-05-14 NOTE — PLAN OF CARE
Problem: Potential for Falls  Goal: Patient will remain free of falls  Description: INTERVENTIONS:  - Educate patient/family on patient safety including physical limitations  - Instruct patient to call for assistance with activity   - Consult OT/PT to assist with strengthening/mobility   - Keep Call bell within reach  - Keep bed low and locked with side rails adjusted as appropriate  - Keep care items and personal belongings within reach  - Initiate and maintain comfort rounds  - Make Fall Risk Sign visible to staff  - Offer Toileting every 2 Hours, in advance of need  - Initiate/Maintain bed alarm  - Obtain necessary fall risk management equipment: alarms  - Apply yellow socks and bracelet for high fall risk patients  - Consider moving patient to room near nurses station  Outcome: Progressing     Problem: Prexisting or High Potential for Compromised Skin Integrity  Goal: Skin integrity is maintained or improved  Description: INTERVENTIONS:  - Identify patients at risk for skin breakdown  - Assess and monitor skin integrity  - Assess and monitor nutrition and hydration status  - Monitor labs   - Assess for incontinence   - Turn and reposition patient  - Assist with mobility/ambulation  - Relieve pressure over bony prominences  - Avoid friction and shearing  - Provide appropriate hygiene as needed including keeping skin clean and dry  - Evaluate need for skin moisturizer/barrier cream  - Collaborate with interdisciplinary team   - Patient/family teaching  - Consider wound care consult   Outcome: Progressing     Problem: PAIN - ADULT  Goal: Verbalizes/displays adequate comfort level or baseline comfort level  Description: Interventions:  - Encourage patient to monitor pain and request assistance  - Assess pain using appropriate pain scale  - Administer analgesics based on type and severity of pain and evaluate response  - Implement non-pharmacological measures as appropriate and evaluate response  - Consider  cultural and social influences on pain and pain management  - Notify physician/advanced practitioner if interventions unsuccessful or patient reports new pain  Outcome: Progressing     Problem: INFECTION - ADULT  Goal: Absence or prevention of progression during hospitalization  Description: INTERVENTIONS:  - Assess and monitor for signs and symptoms of infection  - Monitor lab/diagnostic results  - Monitor all insertion sites, i.e. indwelling lines, tubes, and drains  - Monitor endotracheal if appropriate and nasal secretions for changes in amount and color  - [place] appropriate cooling/warming therapies per order  - Administer medications as ordered  - Instruct and encourage patient and family to use good hand hygiene technique  - Identify and instruct in appropriate isolation precautions for identified infection/condition  Outcome: Progressing     Problem: DISCHARGE PLANNING  Goal: Discharge to home or other facility with appropriate resources  Description: INTERVENTIONS:  - Identify barriers to discharge w/patient and caregiver  - Arrange for needed discharge resources and transportation as appropriate  - Identify discharge learning needs (meds, wound care, etc.)  - Arrange for interpretive services to assist at discharge as needed  - Refer to Case Management Department for coordinating discharge planning if the patient needs post-hospital services based on physician/advanced practitioner order or complex needs related to functional status, cognitive ability, or social support system  Outcome: Progressing     Problem: Knowledge Deficit  Goal: Patient/family/caregiver demonstrates understanding of disease process, treatment plan, medications, and discharge instructions  Description: Complete learning assessment and assess knowledge base.  Interventions:  - Provide teaching at level of understanding  - Provide teaching via preferred learning methods  Outcome: Progressing     Problem: SAFETY,RESTRAINT:  NV/NON-SELF DESTRUCTIVE BEHAVIOR  Goal: Remains free of harm/injury (restraint for non violent/non self-detsructive behavior)  Description: INTERVENTIONS:  - Instruct patient/family regarding restraint use   - Assess and monitor physiologic and psychological status   - Provide interventions and comfort measures to meet assessed patient needs   - Identify and implement measures to help patient regain control  - Assess readiness for release of restraint   Outcome: Progressing  Goal: Returns to optimal restraint-free functioning  Description: INTERVENTIONS:  - Assess the patient's behavior and symptoms that indicate continued need for restraint  - Identify and implement measures to help patient regain control  - Assess readiness for release of restraint   Outcome: Progressing

## 2024-05-14 NOTE — ASSESSMENT & PLAN NOTE
- Right inferior pubic ramus fracture, present on admission.  - Status post fall on 5/12.  - Appreciate Orthopedic surgery evaluation, -non op   - WBAT to b/l LE  - Monitor right lower extremity neurovascular exam.  - Continue multimodal analgesic regimen.  - Continue DVT prophylaxis.  - PT and OT evaluation and treatment as indicated.

## 2024-05-14 NOTE — PROGRESS NOTES
Progress Note - Geriatric Medicine   Berta Estrada 89 y.o. female MRN: [id number]  Unit/Bed#: S -01 Encounter: [id number]      Assessment/Plan:  Closed fracture of ramus of right pubis  S/p fall  Orthopedics was consulted  Nonoperative management  WBAT to bilateral lower extremities  PT/OT following  Outpatient follow-up with orthopedics as needed    Late onset Alzheimer's disease without behavioral disturbance  Patient is alert oriented to person  Had an MMSE completed in December 20, 2017 which scored a 21 out of 30 completed with [facility] geriatrics, no recent visits   Recommend checking TSH and vitamin B12 levels  No recent head CT or MRI brain to review  Most recent QTc from EKG completed yesterday was 414 which is stable  Did receive one-time dose of Zyprexa night of 5/12/2024 and 5/13/2024  One-time dose of Zyprexa last night was ordered, but not given  Continue to try to redirect patient is able to avoid antipsychotics and restraints as able to  At risk for delirium due to hospitalization, medications, sleep disturbance, acute pain, Alzheimer's   Recommend delirium precautions  Maintain sleep-wake cycle, avoid nighttime interruptions  minimize overnight interruptions, group overnight vitals/labs/nursing checks as possible  dim lights, close blinds and turn off tv to minimize stimulation and encourage sleep environment in evenings  Provide adequate pain control  Avoid urinary retention and constipation  Provide frequent and early mobilization  Provide frequent redirection and reorientation as needed  Avoid medications that may worsen or precipitate delirium such as tramadol, benzodiazepines, anticholinergics, and Benadryl  Redirect unwanted behaviors as first-line therapy, avoid physical restraints as able to  Continue to monitor    Chronic back pain  Patient's daughter had mentioned yesterday that patient has chronic back pain that has been worsening recently  Lidocaine patch to back  Recommend  "outpatient follow-up with PCP and possibly pain and spine services for pain management    Mobitz type I second-degree AV block  Intermittent bradycardia with heart rates in the 30s overnight with 2-1 AV block  EP consult for PPM, possible placement tomorrow  Management per cardiology    Insomnia  First-line treatment is behavior modification  Maintain sleep-wake cycle, avoid nighttime interruptions  Avoid caffeine throughout the day  Avoid napping throughout the day  Encourage physical activity throughout the day  Avoid sedative hypnotics including benzodiazepines and benadryl  Continue melatonin 3 mg nightly    Anxiety/depression  Occasional anxiety and depression  Did require a dose of Zyprexa on night of 5/12/2024   Dose of Zyprexa was ordered last night, but not given  Continue to provide emotional support    Constipation  Last BM was prior to hospitalization  Is currently on Senokot-S 1 tablet nightly  Encourage adequate p.o. Hydration  Encourage prune juice as tolerated    Ambulatory dysfunction  At a baseline ambulates with cane  PT/OT following  Fall precautions  Out of bed as tolerated  Encourage early and frequent mobilization  Encourage adequate hydration and nutrition  Provide adequate pain management   Goal is undetermined  Continue with PT/OT for continued strength and balance training     Subjective:   Berta is an 89-year-old female being seen for a geriatrics follow-up.  Upon exam patient was up in the chair, resting.  She was slightly anxious on exam.  She has been impulsive at times, but has been able to be redirected by nursing staff.  She did okay overnight.  Per nursing no acute concerns or issues at this time.    Review of Systems   Unable to perform ROS: Dementia         Objective:     Vitals: Blood pressure 115/73, pulse 78, temperature (!) 97 °F (36.1 °C), resp. rate 18, height 5' 5\" (1.651 m), weight 66.7 kg (147 lb), SpO2 100%.,Body mass index is 24.46 kg/m².      Intake/Output Summary " "(Last 24 hours) at 5/14/2024 1322  Last data filed at 5/14/2024 0601  Gross per 24 hour   Intake 120 ml   Output 50 ml   Net 70 ml       Current Medications: Reviewed    Physical Exam:   Physical Exam  Vitals reviewed.   Constitutional:       General: She is not in acute distress.     Appearance: She is well-developed. She is not ill-appearing.      Comments: Frail appearing    HENT:      Head: Normocephalic and atraumatic.   Cardiovascular:      Rate and Rhythm: Normal rate and regular rhythm.      Heart sounds: Murmur heard.   Pulmonary:      Effort: Pulmonary effort is normal.      Breath sounds: Normal breath sounds.   Abdominal:      General: Bowel sounds are normal.      Palpations: Abdomen is soft.   Musculoskeletal:         General: Tenderness present.   Skin:     General: Skin is warm and dry.   Neurological:      Mental Status: She is alert. She is disoriented.      Cranial Nerves: No cranial nerve deficit.      Motor: Weakness present.      Gait: Gait abnormal.      Comments: Alert to person only on exam   Psychiatric:         Mood and Affect: Mood is anxious.         Cognition and Memory: Cognition is impaired. Memory is impaired.          Invasive Devices       Peripheral Intravenous Line  Duration             Peripheral IV 05/13/24 Left;Proximal;Ventral (anterior) Forearm 1 day                    Lab, Imaging and other studies:    Lab Results:   I have personally reviewed pertinent lab results including the following:  - CBC, BMP    I have personally reviewed the following imaging study reports in PACS:  No new imaging to review    - I have personally reviewed pertinent reports.      Please note:  Voice-recognition software may have been used in the preparation of this document.  Occasional wrong word or \"sound-alike\" substitutions may have occurred due to the inherent limitations of voice recognition software.  Interpretation should be guided by context.    "

## 2024-05-14 NOTE — ASSESSMENT & PLAN NOTE
Lab Results   Component Value Date    EGFR 49 05/14/2024    EGFR 51 05/13/2024    EGFR 45 05/12/2024    CREATININE 1.01 05/14/2024    CREATININE 0.98 05/13/2024    CREATININE 1.09 05/12/2024     - Patient with chronic history of stage 3a CKD  - Baseline Cr 1.0-1.2  - Avoid nephrotoxins  - Monitor renal indices  - Outpatient follow up with PCP

## 2024-05-14 NOTE — OCCUPATIONAL THERAPY NOTE
Occupational Therapy Evaluation     Patient Name: Berta Estrada  Today's Date: 5/14/2024  Problem List  Principal Problem:    Closed fracture of ramus of right pubis (HCC)  Active Problems:    Late onset Alzheimer's disease without behavioral disturbance (HCC)    Other hyperlipidemia    Stage 3a chronic kidney disease (HCC)    Fall    Mobitz type 1 second degree AV block    Past Medical History  Past Medical History:   Diagnosis Date    Actinic keratosis     Basal cell carcinoma     Back     Cancer (HCC)     Coronary artery disease     Dementia (HCC)     Depression     Ear problems     HL (hearing loss)     Hyperlipidemia     Migraines     Ovarian cancer (HCC) 2004    s/p surgery. no chemo/RT    Phlebitis     R leg     Past Surgical History  Past Surgical History:   Procedure Laterality Date    ADENOIDECTOMY      APPENDECTOMY      CATARACT EXTRACTION, BILATERAL      HYSTERECTOMY  2005    ovarian CA    KNEE SURGERY Right     SKIN BIOPSY      TONSILECTOMY AND ADNOIDECTOMY      TONSILLECTOMY               05/14/24 0845   OT Last Visit   OT Visit Date 05/14/24   Note Type   Note type Evaluation   Pain Assessment   Pain Assessment Tool 0-10   Pain Score No Pain  (while laying)   Restrictions/Precautions   Weight Bearing Precautions Per Order Yes   RLE Weight Bearing Per Order WBAT   Home Living   Type of Home House   Home Layout One level;Stairs to enter without rails  (3 JOSE - with pole to hold onto. Family alsways with her on steps)   Bathroom Shower/Tub Tub/shower unit   Bathroom Toilet Standard   Bathroom Equipment Tub transfer bench   Bathroom Accessibility Accessible   Home Equipment Cane;Walker   Additional Comments use of SPC at baseline with mod I   Prior Function   Level of [place] Needs assistance with ADLs  ((A) with dressing/bathing)   Lives With Daughter   Receives Help From   (HHAs every other Sunday[facility]   IADLs Family/Friend/Other provides  "transportation;Family/Friend/Other provides medication management;Family/Friend/Other provides meals   Falls in the last 6 months 1 to 4  (1: reason for admission \"Well I used to fall before but not this often\" does have pain spasms at times)   Comments usually dizzy in mornings. 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 Found to have Mobitz type 1 second degree AV block. Cardiology following and tentative for pacemaker placement. PMH: alzheimer's dementia   Family/Caregiver Present Yes  (daughter at bedside to provide hx)   Subjective   Subjective \"Oh let me tell you this, you'll this I'm hysterical\" Pt highly verbose and tangential with stories   ADL   Eating Assistance 5  Supervision/Setup   Grooming Assistance 5  Supervision/Setup   UB Bathing Assistance 4  Minimal Assistance   LB Bathing Assistance 2  Maximal Assistance   UB Dressing Assistance 3  Moderate Assistance   LB Dressing Assistance 2  Maximal Assistance   LB Dressing Deficit Increased time to complete;Supervision/safety;Verbal cueing;Thread RLE into underwear;Thread LLE into underwear;Pull up over hips  (A for threading BLE into underwear, Able to pull over hips with min A x1 in standing)   Toileting Assistance  3  Moderate Assistance   Bed Mobility   Supine to Sit 5  Supervision   Additional items Increased time required;Verbal cues;LE management   Transfers   Sit to Stand 4  Minimal assistance   Additional items Assist x 1;Increased time required;Verbal cues   Stand to Sit 4  Minimal assistance   Additional items Assist x " 1;Increased time required;Verbal cues   Additional Comments use of RW   Functional Mobility   Functional Mobility 4  Minimal assistance   Additional Comments Ax1, around end of bed to recliner chair. Pt noted to have difficulty lifting LLE and shuffling with RLE   Additional items Rolling walker   Balance   Static Sitting Fair +   Dynamic Sitting Fair   Static Standing Fair -   Dynamic Standing Poor +   Ambulatory Poor +   Activity Tolerance   Activity Tolerance Patient limited by fatigue;Patient limited by pain   Medical Staff Made Aware PT VIANCA Kramer, spoke to EMMA Choudhary   RUOMAR Assessment   RUE Assessment WFL   LUE Assessment   LUE Assessment WFL   Hand Function   Gross Motor Coordination Functional   Fine Motor Coordination Functional   Cognition   Overall Cognitive Status Impaired   Arousal/Participation Alert;Cooperative   Attention Attends with cues to redirect   Orientation Level Oriented to person;Disoriented to place;Disoriented to time;Disoriented to situation   Memory Decreased short term memory;Decreased recall of recent events;Decreased recall of precautions   Following Commands Follows one step commands with increased time or repetition   Comments Dementia baseline, pt highly verbose and tangential in conversation. Pt enjoying telling stories. Poor historian   Assessment   Limitation Decreased ADL status;Decreased UE strength;Decreased Safe judgement during ADL;Decreased cognition;Decreased endurance;Decreased self-care trans;Decreased high-level ADLs  (balance, fxnl mobility, act micaela, fxnl reach, standing micaela, strength, fxnl sitting balance, fxnl sitting micaela, attention, direction following, safety awareness, insight, pacing, problem solving, learning new tasks, termination of conversation)   Prognosis Good   Assessment Pt is a 89 y.o. female seen for OT evaluation s/p admission to [facility] on 5/12/[facility]24 due to fall. Diagnosed with Closed fracture of ramus of right pubis (HCC). Personal and env factors  supporting pt at time of IE include supportive daughter + MONA, accessible home environment, and FFSU. Personal and env factors inhibiting engagement in occupations include advanced age, limited social support, difficulty completing ADLs, and difficulty completing IADLs. Performance deficits that affect the pt’s occupational performance can be seen above. Due to pt's current functional limitations and medical complications pt is functioning below baseline. Pt would benefit from continued skilled OT treatment in order to maximize safety, independence and overall performance with ADLs, functional mobility, functional transfers, and cognition in order to achieve highest level of function.   Goals   Patient Goals Myather reports that goal is for pt to go to rehab to get better   LTG Time Frame 10-14   Long Term Goal see goals listed below   Plan   Treatment Interventions ADL retraining;Functional transfer training;Endurance training;Cognitive reorientation;Equipment evaluation/education;Patient/family training;Compensatory technique education;Energy conservation;Activityengagement   Goal Expiration Date 05/24/24   OT Treatment Day 0   OT Frequency 2-3x/wk   Discharge Recommendation   Rehab Resource Intensity Level, OT III (Minimum Resource Intensity)  (If pt to receive pacemaker level of intensity may change pending post surgical restrictions)   AM-PAC Daily Activity Inpatient   Lower Body Dressing 2   Bathing 2   Toileting 2   Upper Body Dressing 3   Grooming 3   Eating 3   Daily Activity Raw Score 15   Daily Activity Standardized Score (Calc for Raw Score >=11) 34.69   AM-PAC Applied Cognition Inpatient   Following a Speech/Presentation 2   Understanding Ordinary Conversation 2   Taking Medications 1   Remembering Where Things Are Placed or Put Away 1   Remembering List of 4-5 Errands 1   Taking Care of Complicated Tasks 1   Applied Cognition Raw Score 8   Applied Cognition Standardized Score 19.32   End of Consult    Patient Position at End of Consult Bedside chair;Bed/Chair alarm activated;All needs within reach     GOALS:      -Patient will perform grooming tasks standing at sink with overall Mod I in order to increase overall independence    -Patient will be Mod I with UB dressing using AE and AD as needed in order to increase (I) with ADLs    -Patient will be Mod I with UB bathing using AE and AD as needed in order to increase (I) with ADLs    -Patient will be Min A  with LB dressing with use of AE and AD as needed in order to increase (I) with ADLs    -Patient will be Min A  with LB bathing with use of AE and AD as needed in order to increase (I) with ADLs    -Patient will complete toileting w/ Mod I w/ G hygiene/thoroughness in order to reduce caregiver burden    -Patient will demonstrate Mod I with bed mobility for ability to manage own comfort and initiate OOB tasks.     -Patient will perform functional transfers with Mod I to/from all surfaces using DME as needed in order to increase (I) with functional tasks    -Patient will be Mod I with functional mobility to/from bathroom for increased independence with toileting tasks    -Patient will tolerate therapeutic activities for greater than 30 min, in order to increase tolerance for functional activities.     -Patient’s caregivers will be independent with providing appropriate cognitive cues in order to facilitate patient’s independence during functional tasks.      The patient's raw score on the -PAC Daily Activity Inpatient Short Form is 15. A raw score of less than 19 suggests the patient may benefit from discharge to post-acute rehabilitation services. Please refer to the recommendation of the Occupational Therapist for safe discharge planning.    This session, pt required and most appropriately benefited from skilled OT/PT co-eval due to cognitive-communication impairments, decreased activity tolerance, and unpredictable medical and/or functional status. OT and PT  goals were addressed separately as seen in documentation.     Temi Romeo MS, OTR/L

## 2024-05-14 NOTE — PLAN OF CARE
Problem: OCCUPATIONAL THERAPY ADULT  Goal: Performs self-care activities at highest level of function for planned discharge setting.  See evaluation for individualized goals.  Description: Treatment Interventions: ADL retraining, Functional transfer training, Endurance training, Cognitive reorientation, Equipment evaluation/education, Patient/family training, Compensatory technique education, Energy conservation, Activityengagement          See flowsheet documentation for full assessment, interventions and recommendations.   Note: Limitation: Decreased ADL status, Decreased UE strength, Decreased Safe judgement during ADL, Decreased cognition, Decreased endurance, Decreased self-care trans, Decreased high-level ADLs (balance, fxnl mobility, act micaela, fxnl reach, standing micaela, strength, fxnl sitting balance, fxnl sitting micaela, attention, direction following, safety awareness, insight, pacing, problem solving, learning new tasks, termination of conversation)  Prognosis: Good  Assessment: Pt is a 89 y.o. female seen for OT evaluation s/p admission to [facility] on 5/12/2024 due to fall. Diagnosed with Closed fracture of ramus of right pubis (HCC). Personal and env factors supporting pt at time of IE include supportive daughter + MONA, accessible home environment, and FFSU. Personal and env factors inhibiting engagement in occupations include advanced age, limited social support, difficulty completing ADLs, and difficulty completing IADLs. Performance deficits that affect the pt’s occupational performance can be seen above. Due to pt's current functional limitations and medical complications pt is functioning below baseline. Pt would benefit from continued skilled OT treatment in order to maximize safety, independence and overall performance with ADLs, functional mobility, functional transfers, and cognition in order to achieve highest level of function.     Rehab Resource Intensity Level, OT: III (Minimum Resource Intensity)  (If pt to receive pacemaker level of intensity may change pending post surgical restrictions)

## 2024-05-15 ENCOUNTER — ANESTHESIA EVENT (INPATIENT)
Dept: NON INVASIVE DIAGNOSTICS | Facility: HOSPITAL | Age: 89
DRG: 983 | End: 2024-05-15
Payer: COMMERCIAL

## 2024-05-15 ENCOUNTER — APPOINTMENT (INPATIENT)
Dept: RADIOLOGY | Facility: HOSPITAL | Age: 89
DRG: 983 | End: 2024-05-15
Payer: COMMERCIAL

## 2024-05-15 LAB
ATRIAL RATE: 75 BPM
P AXIS: 43 DEGREES
PR INTERVAL: 326 MS
QRS AXIS: 30 DEGREES
QRSD INTERVAL: 84 MS
QT INTERVAL: 398 MS
QTC INTERVAL: 444 MS
T WAVE AXIS: 63 DEGREES
VENTRICULAR RATE: 75 BPM

## 2024-05-15 PROCEDURE — 99232 SBSQ HOSP IP/OBS MODERATE 35: CPT | Performed by: PHYSICIAN ASSISTANT

## 2024-05-15 PROCEDURE — 33207 INSERT HEART PM VENTRICULAR: CPT | Performed by: INTERNAL MEDICINE

## 2024-05-15 PROCEDURE — C1786 PMKR, SINGLE, RATE-RESP: HCPCS | Performed by: INTERNAL MEDICINE

## 2024-05-15 PROCEDURE — C1887 CATHETER, GUIDING: HCPCS | Performed by: INTERNAL MEDICINE

## 2024-05-15 PROCEDURE — 71045 X-RAY EXAM CHEST 1 VIEW: CPT

## 2024-05-15 PROCEDURE — 99232 SBSQ HOSP IP/OBS MODERATE 35: CPT | Performed by: NURSE PRACTITIONER

## 2024-05-15 PROCEDURE — C1898 LEAD, PMKR, OTHER THAN TRANS: HCPCS | Performed by: INTERNAL MEDICINE

## 2024-05-15 PROCEDURE — 93010 ELECTROCARDIOGRAM REPORT: CPT | Performed by: INTERNAL MEDICINE

## 2024-05-15 PROCEDURE — C1769 GUIDE WIRE: HCPCS | Performed by: INTERNAL MEDICINE

## 2024-05-15 PROCEDURE — 93005 ELECTROCARDIOGRAM TRACING: CPT

## 2024-05-15 PROCEDURE — 0JH604Z INSERTION OF PACEMAKER, SINGLE CHAMBER INTO CHEST SUBCUTANEOUS TISSUE AND FASCIA, OPEN APPROACH: ICD-10-PCS | Performed by: INTERNAL MEDICINE

## 2024-05-15 PROCEDURE — 02HK0JZ INSERTION OF PACEMAKER LEAD INTO RIGHT VENTRICLE, OPEN APPROACH: ICD-10-PCS | Performed by: INTERNAL MEDICINE

## 2024-05-15 PROCEDURE — C1892 INTRO/SHEATH,FIXED,PEEL-AWAY: HCPCS | Performed by: INTERNAL MEDICINE

## 2024-05-15 DEVICE — ENVELOPE CMRM6122 ABSORB MED MR
Type: IMPLANTABLE DEVICE | Site: CHEST  WALL | Status: FUNCTIONAL
Brand: TYRX™

## 2024-05-15 DEVICE — IPG W1SR01 AZURE XT SR MRI USA
Type: IMPLANTABLE DEVICE | Site: CHEST  WALL | Status: FUNCTIONAL
Brand: AZURE™ XT SR MRI SURESCAN™

## 2024-05-15 DEVICE — LEAD 3830 US MKT/ 69CM MRI LBBAP
Type: IMPLANTABLE DEVICE | Site: HEART | Status: FUNCTIONAL
Brand: SELECTSECURE™ MRI SURESCAN™

## 2024-05-15 RX ORDER — ONDANSETRON 2 MG/ML
4 INJECTION INTRAMUSCULAR; INTRAVENOUS ONCE AS NEEDED
Status: DISCONTINUED | OUTPATIENT
Start: 2024-05-15 | End: 2024-05-15 | Stop reason: HOSPADM

## 2024-05-15 RX ORDER — PROPOFOL 10 MG/ML
INJECTION, EMULSION INTRAVENOUS CONTINUOUS PRN
Status: DISCONTINUED | OUTPATIENT
Start: 2024-05-15 | End: 2024-05-15

## 2024-05-15 RX ORDER — LIDOCAINE HYDROCHLORIDE 10 MG/ML
INJECTION, SOLUTION EPIDURAL; INFILTRATION; INTRACAUDAL; PERINEURAL AS NEEDED
Status: DISCONTINUED | OUTPATIENT
Start: 2024-05-15 | End: 2024-05-15

## 2024-05-15 RX ORDER — FENTANYL CITRATE/PF 50 MCG/ML
25 SYRINGE (ML) INJECTION
Status: DISCONTINUED | OUTPATIENT
Start: 2024-05-15 | End: 2024-05-15 | Stop reason: HOSPADM

## 2024-05-15 RX ORDER — ONDANSETRON 2 MG/ML
INJECTION INTRAMUSCULAR; INTRAVENOUS AS NEEDED
Status: DISCONTINUED | OUTPATIENT
Start: 2024-05-15 | End: 2024-05-15

## 2024-05-15 RX ORDER — GLYCOPYRROLATE 0.2 MG/ML
INJECTION INTRAMUSCULAR; INTRAVENOUS AS NEEDED
Status: DISCONTINUED | OUTPATIENT
Start: 2024-05-15 | End: 2024-05-15

## 2024-05-15 RX ORDER — GENTAMICIN 40 MG/ML
INJECTION, SOLUTION INTRAMUSCULAR; INTRAVENOUS CODE/TRAUMA/SEDATION MEDICATION
Status: DISCONTINUED | OUTPATIENT
Start: 2024-05-15 | End: 2024-05-15 | Stop reason: HOSPADM

## 2024-05-15 RX ORDER — LIDOCAINE HYDROCHLORIDE 10 MG/ML
INJECTION, SOLUTION EPIDURAL; INFILTRATION; INTRACAUDAL; PERINEURAL CODE/TRAUMA/SEDATION MEDICATION
Status: DISCONTINUED | OUTPATIENT
Start: 2024-05-15 | End: 2024-05-15 | Stop reason: HOSPADM

## 2024-05-15 RX ORDER — SODIUM CHLORIDE, SODIUM LACTATE, POTASSIUM CHLORIDE, CALCIUM CHLORIDE 600; 310; 30; 20 MG/100ML; MG/100ML; MG/100ML; MG/100ML
INJECTION, SOLUTION INTRAVENOUS CONTINUOUS PRN
Status: DISCONTINUED | OUTPATIENT
Start: 2024-05-15 | End: 2024-05-15

## 2024-05-15 RX ADMIN — CEFAZOLIN SODIUM 1000 MG: 1 SOLUTION INTRAVENOUS at 09:30

## 2024-05-15 RX ADMIN — SENNOSIDES AND DOCUSATE SODIUM 1 TABLET: 8.6; 5 TABLET ORAL at 21:00

## 2024-05-15 RX ADMIN — ATORVASTATIN CALCIUM 20 MG: 20 TABLET, FILM COATED ORAL at 17:49

## 2024-05-15 RX ADMIN — OXYCODONE HYDROCHLORIDE 5 MG: 5 TABLET ORAL at 00:12

## 2024-05-15 RX ADMIN — LIDOCAINE HYDROCHLORIDE 50 MG: 10 INJECTION, SOLUTION EPIDURAL; INFILTRATION; INTRACAUDAL; PERINEURAL at 09:42

## 2024-05-15 RX ADMIN — GLYCOPYRROLATE 0.2 MG: 0.2 INJECTION INTRAMUSCULAR; INTRAVENOUS at 10:02

## 2024-05-15 RX ADMIN — ACETAMINOPHEN 975 MG: 325 TABLET, FILM COATED ORAL at 21:00

## 2024-05-15 RX ADMIN — PROPOFOL 50 MCG/KG/MIN: 10 INJECTION, EMULSION INTRAVENOUS at 09:42

## 2024-05-15 RX ADMIN — ONDANSETRON 4 MG: 2 INJECTION INTRAMUSCULAR; INTRAVENOUS at 09:35

## 2024-05-15 RX ADMIN — PHENYLEPHRINE HYDROCHLORIDE 20 MCG/MIN: 50 INJECTION INTRAVENOUS at 09:47

## 2024-05-15 RX ADMIN — ACETAMINOPHEN 975 MG: 325 TABLET, FILM COATED ORAL at 06:29

## 2024-05-15 RX ADMIN — Medication 3 MG: at 21:00

## 2024-05-15 RX ADMIN — Medication 2.5 MG: at 21:00

## 2024-05-15 RX ADMIN — GABAPENTIN 100 MG: 100 CAPSULE ORAL at 21:00

## 2024-05-15 RX ADMIN — SODIUM CHLORIDE, SODIUM LACTATE, POTASSIUM CHLORIDE, AND CALCIUM CHLORIDE: .6; .31; .03; .02 INJECTION, SOLUTION INTRAVENOUS at 09:21

## 2024-05-15 RX ADMIN — ENOXAPARIN SODIUM 30 MG: 30 INJECTION SUBCUTANEOUS at 21:00

## 2024-05-15 NOTE — PLAN OF CARE
Problem: Potential for Falls  Goal: Patient will remain free of falls  Description: INTERVENTIONS:  - Educate patient/family on patient safety including physical limitations  - Instruct patient to call for assistance with activity   - Consult OT/PT to assist with strengthening/mobility   - Keep Call bell within reach  - Keep bed low and locked with side rails adjusted as appropriate  - Keep care items and personal belongings within reach  - Initiate and maintain comfort rounds  - Make Fall Risk Sign visible to staff  - Offer Toileting every 2 Hours, in advance of need  - Initiate/Maintain bed alarm  - Obtain necessary fall risk management equipment: alarms  - Apply yellow socks and bracelet for high fall risk patients  - Consider moving patient to room near nurses station  Outcome: Progressing     Problem: Prexisting or High Potential for Compromised Skin Integrity  Goal: Skin integrity is maintained or improved  Description: INTERVENTIONS:  - Identify patients at risk for skin breakdown  - Assess and monitor skin integrity  - Assess and monitor nutrition and hydration status  - Monitor labs   - Assess for incontinence   - Turn and reposition patient  - Assist with mobility/ambulation  - Relieve pressure over bony prominences  - Avoid friction and shearing  - Provide appropriate hygiene as needed including keeping skin clean and dry  - Evaluate need for skin moisturizer/barrier cream  - Collaborate with interdisciplinary team   - Patient/family teaching  - Consider wound care consult   Outcome: Progressing     Problem: PAIN - ADULT  Goal: Verbalizes/displays adequate comfort level or baseline comfort level  Description: Interventions:  - Encourage patient to monitor pain and request assistance  - Assess pain using appropriate pain scale  - Administer analgesics based on type and severity of pain and evaluate response  - Implement non-pharmacological measures as appropriate and evaluate response  - Consider  cultural and social influences on pain and pain management  - Notify physician/advanced practitioner if interventions unsuccessful or patient reports new pain  Outcome: Progressing     Problem: INFECTION - ADULT  Goal: Absence or prevention of progression during hospitalization  Description: INTERVENTIONS:  - Assess and monitor for signs and symptoms of infection  - Monitor lab/diagnostic results  - Monitor all insertion sites, i.e. indwelling lines, tubes, and drains  - Monitor endotracheal if appropriate and nasal secretions for changes in amount and color  - [place] appropriate cooling/warming therapies per order  - Administer medications as ordered  - Instruct and encourage patient and family to use good hand hygiene technique  - Identify and instruct in appropriate isolation precautions for identified infection/condition  Outcome: Progressing     Problem: DISCHARGE PLANNING  Goal: Discharge to home or other facility with appropriate resources  Description: INTERVENTIONS:  - Identify barriers to discharge w/patient and caregiver  - Arrange for needed discharge resources and transportation as appropriate  - Identify discharge learning needs (meds, wound care, etc.)  - Arrange for interpretive services to assist at discharge as needed  - Refer to Case Management Department for coordinating discharge planning if the patient needs post-hospital services based on physician/advanced practitioner order or complex needs related to functional status, cognitive ability, or social support system  Outcome: Progressing     Problem: Knowledge Deficit  Goal: Patient/family/caregiver demonstrates understanding of disease process, treatment plan, medications, and discharge instructions  Description: Complete learning assessment and assess knowledge base.  Interventions:  - Provide teaching at level of understanding  - Provide teaching via preferred learning methods  Outcome: Progressing     Problem: SAFETY,RESTRAINT:  NV/NON-SELF DESTRUCTIVE BEHAVIOR  Goal: Remains free of harm/injury (restraint for non violent/non self-detsructive behavior)  Description: INTERVENTIONS:  - Instruct patient/family regarding restraint use   - Assess and monitor physiologic and psychological status   - Provide interventions and comfort measures to meet assessed patient needs   - Identify and implement measures to help patient regain control  - Assess readiness for release of restraint   Outcome: Progressing  Goal: Returns to optimal restraint-free functioning  Description: INTERVENTIONS:  - Assess the patient's behavior and symptoms that indicate continued need for restraint  - Identify and implement measures to help patient regain control  - Assess readiness for release of restraint   Outcome: Progressing     Problem: Nutrition/Hydration-ADULT  Goal: Nutrient/Hydration intake appropriate for improving, restoring or maintaining nutritional needs  Description: Monitor and assess patient's nutrition/hydration status for malnutrition. Collaborate with interdisciplinary team and initiate plan and interventions as ordered.  Monitor patient's weight and dietary intake as ordered or per policy. Utilize nutrition screening tool and intervene as necessary. Determine patient's food preferences and provide high-protein, high-caloric foods as appropriate.     INTERVENTIONS:  - Monitor oral intake, urinary output, labs, and treatment plans  - Assess nutrition and hydration status and recommend course of action  - Evaluate amount of meals eaten  - Assist patient with eating if necessary   - Allow adequate time for meals  - Recommend/ encourage appropriate diets, oral nutritional supplements, and vitamin/mineral supplements  - Order, calculate, and assess calorie counts as needed  - Recommend, monitor, and adjust tube feedings and TPN/PPN based on assessed needs  - Assess need for intravenous fluids  - Provide specific nutrition/hydration education as  appropriate  - Include patient/family/caregiver in decisions related to nutrition  Outcome: Progressing

## 2024-05-15 NOTE — ASSESSMENT & PLAN NOTE
- Patient with chronic history of hyperlipidemia.  - Continue current medication regimen.  - Outpatient follow-up with PCP.

## 2024-05-15 NOTE — ASSESSMENT & PLAN NOTE
- Patient noted to have arrhythmia with EKG on admission demonstrating Mobitz type 1 second degree AV block.  - Appreciate Cardiology evaluation, recommendations and assistance with management.  - Patient is now status post EP evaluation and placement of single-chamber Medtronic permanent pacemaker on 5/15/2024.  - Telemetry monitoring.  - Continue to monitor hemodynamic status.  - Continue current medication regimen.  - Local wound care as indicated to pacemaker insertion site.  - Analgesia as needed.  - Outpatient follow-up with Cardiology as recommended.

## 2024-05-15 NOTE — PROGRESS NOTES
[facility]  Progress Note  Name: Berta Estrada I  MRN: [id number]  Unit/Bed#: S -01 I Date of Admission: 5/12/2024   Date of Service: 5/15/2024 I Hospital Day: 3    Assessment & Plan   Fall  Assessment & Plan  - Status post fall with the below noted injuries.  - Fall precautions.  - Geriatric Medicine consultation for evaluation, medication review and recommendations.  - PT and OT evaluation and treatment as indicated.  - Case Management consultation for disposition planning.    - Anticipate discharge to postacute care facility when medically appropriate.      * Closed fracture of ramus of right pubis (HCC)  Assessment & Plan  - Right inferior pubic ramus fracture, present on admission.  - Appreciate Orthopedic surgery evaluation and recommendations.  - Nonoperative management recommended.  - Maintain weightbearing status on the bilateral lower extremities as tolerated.  - Monitor right lower extremity neurovascular exam.  - Continue multimodal analgesic regimen.  - Continue DVT prophylaxis.  - PT and OT evaluation and treatment as indicated.  - Outpatient follow-up with Orthopedic surgery for evaluation.      Mobitz type 1 second degree AV block  Assessment & Plan  - Patient noted to have arrhythmia with EKG on admission demonstrating Mobitz type 1 second degree AV block.  - Appreciate Cardiology evaluation, recommendations and assistance with management.  - Patient is now status post EP evaluation and placement of single-chamber Medtronic permanent pacemaker on 5/15/2024.  - Telemetry monitoring.  - Continue to monitor hemodynamic status.  - Continue current medication regimen.  - Local wound care as indicated to pacemaker insertion site.  - Analgesia as needed.  - Outpatient follow-up with Cardiology as recommended.    Stage 3a chronic kidney disease (HCC)  Assessment & Plan  Lab Results   Component Value Date    EGFR 49 05/14/2024    EGFR 51 05/13/2024    EGFR 45 05/12/2024     "CREATININE 1.01 05/14/2024    CREATININE 0.98 05/13/2024    CREATININE 1.09 05/12/2024     - Patient with chronic history of stage 3a CKD without evidence of VIMAL at this time.  - Baseline Cr 1.0-1.2.  - Avoid nephrotoxi medications and hypotension.  - Continue to monitor renal function and electrolytes as indicated.  - Outpatient follow up with PCP per routine.    Late onset Alzheimer's disease without behavioral disturbance (HCC)  Assessment & Plan  - Patient with chronic history of dementia.  - Delirium precautions.  - Frequent reorientation.  - Geriatrics consult, appreciate recommendations and assistance with management.  - Outpatient follow up with PCP per routine.    Other hyperlipidemia  Assessment & Plan  - Patient with chronic history of hyperlipidemia.  - Continue current medication regimen.  - Outpatient follow-up with PCP.                Bowel Regimen: On Senokot-S.  VTE Prophylaxis:Sequential compression device (Venodyne)  and Enoxaparin (Lovenox)     Disposition: Continue current level of care.  Anticipate discharge to postacute care facility for rehab.  Continue therapy evaluation and treatment as indicated.  Case management following for disposition planning.      Subjective   Chief Complaint: \"I'm fine.\"    Subjective: Patient reports she is feeling well at this time.  She offers no complaints and denies having pain anywhere.  She denies any chest pain, shortness of breath or difficulty breathing.     Objective   Vitals:   Temp:  [97 °F (36.1 °C)-98.2 °F (36.8 °C)] 98 °F (36.7 °C)  HR:  [] 103  Resp:  [15-16] 16  BP: (110-167)/(64-97) 148/91    I/O         05/13 0701  05/14 0700 05/14 0701  05/15 0700 05/15 0701  05/16 0700    P.O. 120 480 240    I.V. (mL/kg)   250 (3.7)    IV Piggyback       Total Intake(mL/kg) 120 (1.8) 480 (7.2) 490 (7.3)    Urine (mL/kg/hr) 50 (0)      Blood   5    Total Output 50  5    Net +70 +480 +485           Unmeasured Urine Occurrence 1 x 1 x     Unmeasured Stool " Occurrence  1 x              Physical Exam:   GENERAL APPEARANCE: Patient in no acute distress.  HEENT: NCAT; EOMs intact; Mucous membranes moist  CV: Regular rate and rhythm; no murmur/gallops/rubs appreciated.  CHEST / LUNGS: Clear to auscultation; no wheezes/rales/rhonci.  Minimal left upper chest wall tenderness anteriorly with clean/dry/intact dressing in place.  ABD: NABS; soft; non-distended; non-tender.  : Voiding spontaneously.  EXT: +2 pulses bilaterally upper & lower extremities; no edema.  Left upper extremity sling in place.  NEURO: GCS 14 (E4, V4, M6) with mild baseline confusion noted.  There were no focal neurologic deficits; neurovascularly intact.  SKIN: Warm, dry and well perfused; no rash; no jaundice.  Scattered small areas of ecchymosis without tenderness.    Invasive Devices       Peripheral Intravenous Line  Duration             Peripheral IV 05/13/24 Left;Proximal;Ventral (anterior) Forearm 2 days                          Lab Results: Results: I have personally reviewed all pertinent laboratory/tests results  Imaging: I have personally reviewed pertinent reports.     Other Studies: N/A     Trevon Mcclendon PA-C  5/15/2024  4:10 PM

## 2024-05-15 NOTE — ASSESSMENT & PLAN NOTE
- Status post fall with the below noted injuries.  - Fall precautions.  - Geriatric Medicine consultation for evaluation, medication review and recommendations.  - PT and OT evaluation and treatment as indicated.  - Case Management consultation for disposition planning.    - Anticipate discharge to postacute care facility when medically appropriate.

## 2024-05-15 NOTE — DISCHARGE INSTR - AVS FIRST PAGE
Please refer to post pacemaker implantation discharge instructions and restrictions and your pacemaker booklet/temporary card.     Keep incision dry for one week. Leave outer bandage in place for 1 week - it is water proof, and as long as it is fully adhered to your skin you may shower with it.  If it appears as though the bandage is coming off and/or there is any communication to the area of device incision, please then keep the whole area dry for the remaining week.  After 1 week, please remove by pulling all edges away from the center of the bandage. After the bandage is removed, you may then shower normally and get the area wet with soap and water, no scrubbing, and pat dry. Do not use lotions/powders/creams on incision.    No overhead reaching/pushing/pulling/lifting greater than 5-10lbs with left arm for six weeks. Please call the office if you notice redness, swelling, bleeding, or drainage from incision or if you develop fevers.       AFTER PACEMAKER CARE:    If you have any questions, please call [phone] to speak with a nurse (8:30am-4pm, or [phone] after hours). For appointments, please call [phone].      WHAT YOU SHOULD KNOW:   A pacemaker is a small, battery-powered device that is placed under your skin in your upper chest area with wires placed through a vein that lead directly into the heart. It helps regulate your heart rate and prevent your heart from beating too slowly.                 AFTER YOU LEAVE:     Medicines:     Pain medicine:  You may need medicine to take away or decrease pain.     Learn how to take your medicine. Ask what medicine and how much you should take. Be sure you know how, when, and how often to take it. Usually Over the counter pain medicine is sufficient to control pain (Acetominophen or Ibuprofen) Ask your doctor if you may take these. If this does not control your pain, narcotic pain killers may be prescribed, please call if you need prescription.     Do not  wait until the pain is severe before you take your medicine. Tell caregivers if your pain does not decrease.    Pain medicine can make you dizzy or sleepy. Prevent falls by calling someone when you get out of bed or if you need help.        Take your medicine as directed.  Call your healthcare provider if you think your medicine is not helping or if you have side effects. Tell him if you are allergic to any medicine.    Follow up with your cardiologist after your procedure:  You will need a follow-up visit approximately 2 weeks after you leave the hospital. Your cardiologist will check your wound and make sure that your pacemaker is working correctly.     Follow the instructions to check your pacemaker:  Your cardiologist or primary healthcare provider will check your pacemaker and the battery regularly.  He will use a computer to check your pacemaker over the telephone or wireless device which will be given to you.     Pacemaker batteries usually last 8 to 10 years. The pacemaker unit will be replaced when the battery gets low. This is a simpler procedure than the original one to implant your pacemaker.    Wound care:  Keep your incision dry for one week.  Do not use lotions/powders/creams on incision.     Leave outer bandage in place for 1 week - it is water proof, and as long as it is fully adhered to your skin you may shower with it.  If it appears as though the bandage is coming off and/or there is any communication to the area of device incision, please then keep the whole area dry for the remaining week.  After 1 week, please remove by pulling all edges away from the center of the bandage.    Please call the office if you notice redness, swelling, bleeding, or drainage from incision or if you develop fevers.       Activity:   Arm movement and lifting:  Be careful using the arm on the side of your pacemaker. Do not move your arm for the first 24 hours after your procedure. Do not  lift your arm above your  shoulder or lift more than 10 pounds for one month after your procedure. Avoid pushing, pulling, or repetitive arm movements for one month. This helps the leads stay in place and helps your wound heal. Ask your caregiver when you can drive after your procedure. You may move your arm side to side without lifting above your shoulder, and do not need to wear a sling at home.   Driving: you are ok to drive 48 hours after pacemaker is implanted   Sports:  Ask your caregiver when it is okay to play tennis, golf, basketball, or any sport that requires you to lift your arms. Do not play full contact sports, such as football, that could damage your pacemaker. Ask your cardiologist or primary healthcare provider how much and what kinds of physical activity are safe for you.    Living with a pacemaker:   Tell all caregivers you have a pacemaker:  This includes surgeons, radiologists, and medical technicians. You may want to wear a medical alert ID bracelet or necklace that states that you have a pacemaker.    Carry your pacemaker ID card:  Make sure you receive a pacemaker ID card. Carry it with you at all times. It lists important information about your pacemaker. Show it to airport security if you travel.     Avoid electrical interference:  Avoid welding equipment and other equipment with large magnets or electric fields. These things could interfere with how your pacemaker works. Use your cell phone on the ear opposite from your pacemaker. Do not carry your cell phone in your shirt pocket over your chest.     Some Pacemakers are MRI safe. Ask you doctor if it is safe to proceed with MRI and let the radiologist and staff know you have a pacemaker.     Do not touch the skin around your pacemaker:  This can cause damage to the lead wires or move the pacemaker unit from where it should be.    Contact your cardiologist or primary healthcare provider if:   The area around your pacemaker has increasing amount of pain after  surgery. The pain should improve over first few days after implantation.     The skin around your stitches has increasing redness, swelling, or has drainage. This may mean that you have an infection.     You have a fever.     You have chills, a cough, and feel weak or achy. These are also signs of infection.    Your feet or ankles are more swollen than your baseline.     Your Heart rate is less than 50 beats per minute     Seek care immediately if:   Your bandage becomes soaked with blood.     Your pacemaker is swelling rapidly    Your stitches open up.     You feel your heart suddenly beating very slowly or quickly.    You become too weak or dizzy to stand, or you pass out.     Your arm or leg feels warm, tender, and painful. It may look swollen and red.    You have chest pain that does not go away with rest or medicine.     You feel lightheaded, short of breath, and have chest pain.     You cough up blood.        © 2014 [employer]. Information is for End User's use only and may not be sold, redistributed or otherwise used for commercial purposes. All illustrations and images included in CareNotes® are the copyrighted property of [employer] or [employer].  The above information is an  only. It is not intended as medical advice for individual conditions or treatments. Talk to your doctor, nurse or pharmacist before following any medical regimen to see if it is safe and effective for you.      Discharge Instructions - Orthopedics      Weight Bearing Status:                                           - Weightbearing as tolerated on the bilateral lower extremities.    Pain:  - Continue analgesics as directed.    Appt Instructions:   - If you do not have your appointment, please call the Orthopedic Surgery Clinic at [phone] to schedule an appointment as instructed.  - Otherwise, followup as scheduled.  - Contact the office sooner if you experience any increased  numbness/tingling in the extremities.    Miscellaneous:  - Activity as tolerated with assistance.  - Continue PT and OT evaluation and treatment as indicated.  - Activity per rehab recommendations.

## 2024-05-15 NOTE — ASSESSMENT & PLAN NOTE
- Patient with chronic history of dementia.  - Delirium precautions.  - Frequent reorientation.  - Geriatrics consult, appreciate recommendations and assistance with management.  - Outpatient follow up with PCP per routine.

## 2024-05-15 NOTE — ASSESSMENT & PLAN NOTE
- Right inferior pubic ramus fracture, present on admission.  - Appreciate Orthopedic surgery evaluation and recommendations.  - Nonoperative management recommended.  - Maintain weightbearing status on the bilateral lower extremities as tolerated.  - Monitor right lower extremity neurovascular exam.  - Continue multimodal analgesic regimen.  - Continue DVT prophylaxis.  - PT and OT evaluation and treatment as indicated.  - Outpatient follow-up with Orthopedic surgery for evaluation.

## 2024-05-15 NOTE — ANESTHESIA POSTPROCEDURE EVALUATION
Post-Op Assessment Note    CV Status:  Stable  Pain Score: 0    Pain management: adequate       Mental Status:  Arousable and sleepy   Hydration Status:  Euvolemic   PONV Controlled:  Controlled   Airway Patency:  Patent  Two or more mitigation strategies used for obstructive sleep apnea   Post Op Vitals Reviewed: Yes    No anethesia notable event occurred.    Staff: CRNA, Anesthesiologist               BP   110/66   Temp   97   Pulse  65   Resp   16   SpO2   98

## 2024-05-15 NOTE — ANESTHESIA PREPROCEDURE EVALUATION
Procedure:  Cardiac pacer implant (Chest)    Relevant Problems   CARDIO   (+) Coronary artery disease involving native coronary artery of native heart   (+) Essential hypertension   (+) Mobitz type 1 second degree AV block   (+) Other hemorrhoids   (+) Other hyperlipidemia      GI/HEPATIC   (+) GERD (gastroesophageal reflux disease)   (+) Pancreatic cyst      /RENAL   (+) Stage 3a chronic kidney disease (HCC)      HEMATOLOGY   (+) Thrombocytopenia (HCC)      MUSCULOSKELETAL   (+) Lumbar degenerative disc disease   (+) Primary osteoarthritis involving multiple joints      NEURO/PSYCH   (+) Depression   (+) Late onset Alzheimer's disease without behavioral disturbance (HCC)      PULMONARY   (+) COPD (chronic obstructive pulmonary disease) (HCC)      Ear/Nose/Throat   (+) Allergic rhinitis      Obstetrics/Gynecology   (+) Postmenopausal HRT (hormone replacement therapy)      Care Coordination   (+) Gait instability      Surgery/Wound/Pain   (+) S/P AVR (aortic valve replacement)      Orthopedic/Musculoskeletal   (+) Age-related osteoporosis without current pathological fracture   (+) Cervical radiculopathy   (+) Closed fracture of left foot   (+) Closed fracture of ramus of right pubis ([facility])      Dermatology   (+) Skin cancer      Other   (+) Fall   (+) Vitamin B12 deficiency   (+) Vitamin D deficiency   (+) Wears hearing aid        Physical Exam    Airway    Mallampati score: II  TM Distance: <3 FB  Neck ROM: full     Dental   No notable dental hx     Cardiovascular  Cardiovascular exam normal    Pulmonary  Pulmonary exam normal     Other Findings  post-pubertal.      Anesthesia Plan  ASA Score- 3     Anesthesia Type- IV sedation with anesthesia with ASA Monitors.         Additional Monitors:     Airway Plan:     Comment: [facility]   [facility] [address]  [phone]  Name: Berta Estrada                       : 1935  MRN: [id number]                        "Age: 89 y.o.  Patient Status: Inpatient          Gender: female  Echo complete    Height: 5' 5\" (1.651 m)  Weight: 66.7 kg (147 lb)  BSA: 1.74 m²  Blood Pressure: 142/82     Date of Study: 5/14/24  Ordering Provider: JOSH Riddle  Clinical Indications: CAD     Reading Physicians  Performing Staff  Cardiology: Gerard Stoddard DO     Tech: Yazmin Brumfieldjosselin      Vitals    Height Weight BSA (Calculated - m2) BP Pulse  5' 5\" (1.651 m) 66.7 kg (147 lb) 1.74 sq meters 142/82 68    PACS Images     Show images for Echo complete w/ contrast if indicated  Study Details    This transthoracic echocardiogram was performed at bedside. This was a routine, inpatient study. Study quality was adequate. This was a technically difficult study due to lung interference. A complete 2D, color flow Doppler, spectral Doppler, 2D, color flow Doppler and spectral Doppler transthoracic echocardiogram was performed.  The apical, parasternal, subcostal and suprasternal views were obtained.    History    CAD, S/P AVR, GERD, HLD, CKD3, COPD, Mobitz type 1 second degree AV block    Interpretation Summary       ·  Left Ventricle: Left ventricular cavity size is normal. Wall thickness is moderately increased. The left ventricular ejection fraction is 80%. Systolic function is hyperdynamic. Wall motion is normal. Diastolic function is abnormal.  ·  IVS: There is sigmoid appearance of the septum.  ·  Right Ventricle: Systolic function is normal.  ·  Aortic Valve: There is a bioprosthetic valve. There is mild regurgitation. The aortic valve has no significant stenosis. The aortic valve mean gradient is 28 mmHg. The dimensionless velocity index is 0.28.  Mean gradients have been elevated for several years. The aortic valve velocity is increased in the setting of stenosis and increased flow.  ·  Mitral Valve: There is mild thickening. There is moderate calcification. There is moderate annular calcification. There is mild to moderate " regurgitation.  ·  Tricuspid Valve: There is mild regurgitation. The estimated right ventricular systolic pressure is 32.00 mmHg.     Findings    Left Ventricle Left ventricular cavity size is normal. Wall thickness is moderately increased. The left ventricular ejection fraction is 80%. Systolic function is hyperdynamic. Wall motion is normal. Diastolic function is abnormal.  Interventricular Septum There is sigmoid appearance of the septum.  Right Ventricle Right ventricular cavity size is normal. Systolic function is normal. Wall thickness is normal.  Left Atrium The atrium is normal in size.  Right Atrium The atrium is normal in size.  Aortic Valve There is a bioprosthetic valve. There is mild regurgitation. The aortic valve has no significant stenosis. The aortic valve mean gradient is 28 mmHg. The dimensionless velocity index is 0.28.  Mean gradients have been elevated for several years. The aortic valve velocity is increased in the setting of stenosis and increased flow.  Mitral Valve There is mild thickening. There is moderate calcification. There is moderate annular calcification.  There is mild to moderate regurgitation. There is no evidence of stenosis.  Tricuspid Valve Tricuspid valve structure is normal. There is mild regurgitation. There is no evidence of stenosis. The estimated right ventricular systolic pressure is 32.00 mmHg.  Pulmonic Valve Pulmonic valve structure is normal. There is no evidence of regurgitation. There is no evidence of stenosis.  Ascending Aorta The aortic root is normal in size.  IVC/SVC The right atrial pressure is estimated at 8.0 mmHg. The inferior vena cava is normal in size.  Pericardium There is no pericardial effusion. The pericardium is normal in appearance.    Left Ventricle Measurements    Function/Volumes  A4C EF   75 %      LVOT stroke volume   59.54      LVOT stroke volume index   32.8 ml/m2      LVOT Cardiac Output   4.45 l/min      LVOT Cardiac Index   2.56  l/min/m2      Dimensions  LVIDd   3.1 cm      LVIDS   1.8 cm      IVSd   1.3 cm      LVPWd   1.3 cm      LVOT area   3.46 cm2      FS   42      Diastolic Filling  MV E' Tissue Velocity Septal   6 cm/s      MV E' Tissue Velocity Lateral   5 cm/s      LA Volume Index (BP)   35.6 mL/m2         Report Measurements  AV LVOT peak gradient   4 mmHg          Interventricular Septum Measurements    Shunt Ratio  LVOT peak VTI   17.2 cm      LVOT peak darek   0.98 m/s          Right Ventricle Measurements    Dimensions  RVID d   3.7 cm      Tricuspid annular plane systolic excursion   2 cm           Left Atrium Measurements    Dimensions  LA size   3.8 cm      LA length (A2C)   6.5 cm      Volumes  LA volume (BP)   62 mL      LA Volume Index (BP)   35.6 mL/m2           Right Atrium Measurements    Dimensions  RAA A4C   17.2 cm2           Atrial Septum Measurements    Shunt Ratio  LVOT peak VTI   17.2 cm      LVOT peak darek   0.98 m/s           Aortic Valve Measurements    Stenosis  Aortic valve peak velocity   3.45 m/s      LVOT peak darek   0.98 m/s      Ao VTI   68.45 cm      LVOT peak VTI   17.2 cm      AV mean gradient   28 mmHg      LVOT mn grad   2 mmHg      AV peak gradient   48 mmHg      AV LVOT peak gradient   4 mmHg      Area/Dimensions  DVI   0.28      AV valve area   0.87 cm2      AV area by cont VTI   0.9 cm2      AV area peak darek   1 cm2      LVOT diameter   2.1 cm      LVOT area   3.46 cm2           Mitral Valve Measurements    Stenosis  MV mean gradient antegrade   4 mmHg      MV peak gradient antegrade   12 mmHg      MV VTI   23.49 cm      MV valve area by continuity eq   2.53 cm2           Tricuspid Valve Measurements    RVSP Parameters  TR Peak Darek   2.5 m/s      Est. RA pres   8 mmHg      Triscuspid Valve Regurgitation Peak Gradient   24 mmHg      Right Ventricular Peak Systolic Pressure   32 mmHg           Aorta Measurements    Aortic Dimensions  Ao root   3.3 cm           IVC/SVC Measurements    IVC/SVC  Est.  RA pres   8 mmHg          Exam Details    Performed Procedure Technologist Supporting Staff Performing Physician  Echo complete Yazmin Landers         Appointment Date/Status Modality Department   5/14/2024     Arrived AN ECHO PORT 2 AN CAR NON INV        Begin Exam End Exam  End Exam Questionnaires  5/14/2024 10:40 AM 5/14/2024 11:25 AM  PATIENT EDUCATION         All Reviewers List    JOSH Riddle on 5/14/2024 14:42    Signed    Electronically signed by Gerard Stoddard DO on 5/14/24 at 1402 EDT    .       Plan Factors-Exercise tolerance (METS): <4 METS.    Chart reviewed. EKG reviewed. Imaging results reviewed. Existing labs reviewed. Patient summary reviewed.    Patient is not a current smoker. Patient not instructed to abstain from smoking on day of procedure. Patient did not smoke on day of surgery.            Induction- intravenous.    Postoperative Plan-         Informed Consent- Anesthetic plan and risks discussed with daughter.  I personally reviewed this patient with the CRNA. Discussed and agreed on the Anesthesia Plan with the CRNA..

## 2024-05-15 NOTE — CASE MANAGEMENT
Case Management Discharge Planning Note    Patient name Berta Estrada  Location S /S -01 MRN [id number]  : 1935 Date 5/15/2024       Current Admission Date: 2024  Current Admission Diagnosis:Closed fracture of ramus of right pubis (HCC)   Patient Active Problem List    Diagnosis Date Noted Date Diagnosed    Fall 2024     Closed fracture of ramus of right pubis ([facility]) 2024     Mobitz type 1 second degree AV block 2024     Cervical radiculopathy 2022     Lumbar degenerative disc disease 2022     Other hemorrhoids 2021     Gait instability 2021     Vitamin D deficiency 2021     Closed fracture of left foot 2020     Wears hearing aid 2020     Stage 3a chronic kidney disease ([facility]) 2019     Thrombocytopenia ([facility]) 2019     Essential hypertension 2019     GERD (gastroesophageal reflux disease) 2019     Postmenopausal HRT (hormone replacement therapy) 2019     Vitamin B12 deficiency 2019     COPD (chronic obstructive pulmonary disease) ([facility]) 2019     Pancreatic cyst 2019     S/P AVR (aortic valve replacement) 03/15/2019     Primary osteoarthritis involving multiple joints 03/15/2019     Other hyperlipidemia 03/15/2019     Allergic rhinitis 03/15/2019     Age-related osteoporosis without current pathological fracture 03/15/2019     Skin cancer 03/15/2019     Coronary artery disease involving native coronary artery of native heart 03/15/2019     Depression 2018     Late onset Alzheimer's disease without behavioral disturbance ([facility]) 2018       LOS (days): 3  Geometric Mean LOS (GMLOS) (days): 2.8  Days to GMLOS:0     OBJECTIVE:  Risk of Unplanned Readmission Score: 14.12         Current admission status: Inpatient   Preferred Pharmacy:   [facility]/pharmacy #[id number]  [place] VERDIN - [address] [address]  Phone: [phone] Fax:  [phone]    Primary Care Provider: Kim Dewey MD    Primary Insurance: [employer] REP  Secondary Insurance:     DISCHARGE DETAILS:    Discharge planning discussed with:: Patient and DTR  Freedom of Choice: Yes  Comments - Freedom of Choice: CM reviewed SNF choice list and DTR requesting NPA - patient made aware.  CM contacted family/caregiver?: Yes  Were Treatment Team discharge recommendations reviewed with patient/caregiver?: Yes  Did patient/caregiver verbalize understanding of patient care needs?: N/A- going to facility  Were patient/caregiver advised of the risks associated with not following Treatment Team discharge recommendations?: Yes    Contacts  Patient Contacts: Hillary Estrada  Relationship to Patient:: Family  Contact Method: In Person  Reason/Outcome: Discharge Planning    Requested Home Health Care         Is the patient interested in HHC at discharge?: No    DME Referral Provided  Referral made for DME?: No    Other Referral/Resources/Interventions Provided:  Interventions: SNF, Short Term Rehab  Referral Comments: NPA - facility to submit auth.    Would you like to participate in our [facility] service program?  : No - Declined       Discharge Destination Plan:: SNF, Short Term Rehab     Accepting Facility Name, City & State : [facility]  Receiving Facility/Agency Phone Number: [phone]  Facility/Agency Fax Number: [phone]

## 2024-05-15 NOTE — ASSESSMENT & PLAN NOTE
Lab Results   Component Value Date    EGFR 49 05/14/2024    EGFR 51 05/13/2024    EGFR 45 05/12/2024    CREATININE 1.01 05/14/2024    CREATININE 0.98 05/13/2024    CREATININE 1.09 05/12/2024     - Patient with chronic history of stage 3a CKD without evidence of VIMAL at this time.  - Baseline Cr 1.0-1.2.  - Avoid nephrotoxi medications and hypotension.  - Continue to monitor renal function and electrolytes as indicated.  - Outpatient follow up with PCP per routine.

## 2024-05-15 NOTE — PROGRESS NOTES
Progress Note - Geriatric Medicine   Berta Estrada 89 y.o. female MRN: [id number]  Unit/Bed#: AN CATH LAB ROOM Encounter: [id number]      Assessment/Plan:  Closed fracture of ramus of right pubis  S/p fall  Orthopedics was consulted  Nonoperative management  WBAT to bilateral lower extremities  PT/OT following  Multimodal pain regimen including geriatric pain protocol  Patient is currently on Tylenol 975 every 8, lidocaine patch daily, oxycodone 2.5 mg every 4 as needed for moderate pain, oxycodone 5 mg every 4 as needed for severe pain  Outpatient follow-up with orthopedics as needed    Late onset Alzheimer's disease without behavioral disturbance  Patient is alert oriented to first name only  Had an MMSE completed in December 20, 2017 which scored a 21 out of 30 completed with [facility] geriatrics, no recent visits   Recommend checking TSH and vitamin B12 levels  No recent head CT or MRI brain to review  Most recent QTc from EKG completed yesterday was 414 which is stable  Did receive one-time dose of Zyprexa night of 5/12/2024 and 5/13/2024  Has not required any Zyprexa since her initial night when she came to the ER  Continue to try to redirect patient is able to avoid antipsychotics and restraints as able to  At risk for delirium due to hospitalization, medications, sleep disturbance, acute pain, Alzheimer's   Recommend delirium precautions  Maintain sleep-wake cycle, avoid nighttime interruptions  minimize overnight interruptions, group overnight vitals/labs/nursing checks as possible  dim lights, close blinds and turn off tv to minimize stimulation and encourage sleep environment in evenings  Provide adequate pain control  Avoid urinary retention and constipation  Provide frequent and early mobilization  Provide frequent redirection and reorientation as needed  Avoid medications that may worsen or precipitate delirium such as tramadol, benzodiazepines, anticholinergics, and Benadryl  Redirect unwanted  behaviors as first-line therapy, avoid physical restraints as able to  Continue to monitor    Chronic back pain  Patient's daughter had mentioned yesterday that patient has chronic back pain that has been worsening recently  Lidocaine patch to back  Recommend outpatient follow-up with PCP and possibly pain and spine services for pain management    Marcellusitz type I second-degree AV block  Intermittent bradycardia with heart rates in the 30s overnight with 2-1 AV block  Underwent pacemaker insertion today with EP    Insomnia  First-line treatment is behavior modification  Maintain sleep-wake cycle, avoid nighttime interruptions  Avoid caffeine throughout the day  Avoid napping throughout the day  Encourage physical activity throughout the day  Avoid sedative hypnotics including benzodiazepines and benadryl  Slept well overnight  Continue melatonin 3 mg nightly    Anxiety/depression  Occasional anxiety and depression  Did require a dose of Zyprexa on night of 5/12/2024   Dose of Zyprexa was ordered last night, but not given  Continue to provide emotional support    Constipation  Last BM was prior to hospitalization  Is currently on Senokot-S 1 tablet nightly  Recommend increasing to twice daily  Encourage adequate p.o. Hydration  Encourage prune juice as tolerated    Ambulatory dysfunction  At a baseline ambulates with cane  PT/OT following  Fall precautions  Out of bed as tolerated  Encourage early and frequent mobilization  Encourage adequate hydration and nutrition  Provide adequate pain management   Goal is undetermined  Continue with PT/OT for continued strength and balance training     Subjective:   Berta is an 89-year-old female being seen for a geriatrics follow-up.  Upon exam patient was lying in bed, resting.  She had just finished eating her lunch, ate about 75% of it.  She had her pacemaker placed today.  He did well overnight, did not require any additional Zyprexa.  Nursing reports she is usually easily  "redirectable.  Per nursing no acute concerns or issues at this time.    Review of Systems   Unable to perform ROS: Dementia         Objective:     Vitals: Blood pressure 115/69, pulse 74, temperature (!) 97.1 °F (36.2 °C), resp. rate 16, height 5' 5\" (1.651 m), weight 66.7 kg (147 lb), SpO2 100%.,Body mass index is 24.46 kg/m².      Intake/Output Summary (Last 24 hours) at 5/15/2024 1148  Last data filed at 5/15/2024 1050  Gross per 24 hour   Intake 730 ml   Output 5 ml   Net 725 ml       Current Medications: Reviewed    Physical Exam:   Physical Exam  Vitals reviewed.   Constitutional:       General: She is not in acute distress.     Appearance: She is well-developed. She is not ill-appearing.      Comments: Frail appearing    HENT:      Head: Normocephalic and atraumatic.   Cardiovascular:      Rate and Rhythm: Normal rate and regular rhythm.      Heart sounds: Murmur heard.   Pulmonary:      Effort: Pulmonary effort is normal.      Breath sounds: Normal breath sounds.   Abdominal:      General: Bowel sounds are normal.      Palpations: Abdomen is soft.   Musculoskeletal:      Comments: Sling on left arm   Skin:     General: Skin is warm and dry.   Neurological:      Mental Status: She is alert. She is disoriented.      Cranial Nerves: No cranial nerve deficit.      Motor: Weakness present.      Gait: Gait abnormal.      Comments: Alert to first name only on exam   Psychiatric:         Mood and Affect: Mood is anxious.         Cognition and Memory: Cognition is impaired. Memory is impaired.          Invasive Devices       Peripheral Intravenous Line  Duration             Peripheral IV 05/13/24 Left;Proximal;Ventral (anterior) Forearm 2 days                    Lab, Imaging and other studies:    Lab Results:   I have personally reviewed pertinent lab results including the following:  -No new labs to review today    I have personally reviewed the following imaging study reports in PACS:  Chest x-ray    - I have " "personally reviewed pertinent reports.      Please note:  Voice-recognition software may have been used in the preparation of this document.  Occasional wrong word or \"sound-alike\" substitutions may have occurred due to the inherent limitations of voice recognition software.  Interpretation should be guided by context.    "

## 2024-05-15 NOTE — OCCUPATIONAL THERAPY NOTE
Occupational Therapy Cancellation Note        Patient Name: Berta Estrada  Today's Date: 5/15/2024         05/15/24 1003   Note Type   Note type Cancelled Session   Cancel Reasons Medical status   Additional Comments Pt currently off the floor for pacemaker placement. Will hold OT treatment at this time and see s/p procedure as appropriate/able. Will cont to follow     Temi Romeo MS, OTR/L

## 2024-05-16 ENCOUNTER — TELEPHONE (OUTPATIENT)
Age: 89
End: 2024-05-16

## 2024-05-16 ENCOUNTER — TRANSITIONAL CARE MANAGEMENT (OUTPATIENT)
Dept: INTERNAL MEDICINE CLINIC | Facility: CLINIC | Age: 89
End: 2024-05-16

## 2024-05-16 ENCOUNTER — APPOINTMENT (INPATIENT)
Dept: RADIOLOGY | Facility: HOSPITAL | Age: 89
DRG: 983 | End: 2024-05-16
Payer: COMMERCIAL

## 2024-05-16 ENCOUNTER — TELEPHONE (OUTPATIENT)
Dept: OTHER | Facility: OTHER | Age: 89
End: 2024-05-16

## 2024-05-16 VITALS
DIASTOLIC BLOOD PRESSURE: 78 MMHG | HEART RATE: 93 BPM | WEIGHT: 147 LBS | TEMPERATURE: 98.7 F | SYSTOLIC BLOOD PRESSURE: 127 MMHG | BODY MASS INDEX: 24.49 KG/M2 | RESPIRATION RATE: 16 BRPM | HEIGHT: 65 IN | OXYGEN SATURATION: 95 %

## 2024-05-16 DIAGNOSIS — S32.591A: Primary | ICD-10-CM

## 2024-05-16 DIAGNOSIS — J30.1 SEASONAL ALLERGIC RHINITIS DUE TO POLLEN: ICD-10-CM

## 2024-05-16 LAB
ANION GAP SERPL CALCULATED.3IONS-SCNC: 6 MMOL/L (ref 4–13)
BUN SERPL-MCNC: 20 MG/DL (ref 5–25)
CALCIUM SERPL-MCNC: 8.6 MG/DL (ref 8.4–10.2)
CHLORIDE SERPL-SCNC: 108 MMOL/L (ref 96–108)
CO2 SERPL-SCNC: 28 MMOL/L (ref 21–32)
CREAT SERPL-MCNC: 1.08 MG/DL (ref 0.6–1.3)
ERYTHROCYTE [DISTWIDTH] IN BLOOD BY AUTOMATED COUNT: 14 % (ref 11.6–15.1)
GFR SERPL CREATININE-BSD FRML MDRD: 45 ML/MIN/1.73SQ M
GLUCOSE SERPL-MCNC: 104 MG/DL (ref 65–140)
HCT VFR BLD AUTO: 35.7 % (ref 34.8–46.1)
HGB BLD-MCNC: 11.9 G/DL (ref 11.5–15.4)
MAGNESIUM SERPL-MCNC: 2 MG/DL (ref 1.9–2.7)
MCH RBC QN AUTO: 32.4 PG (ref 26.8–34.3)
MCHC RBC AUTO-ENTMCNC: 33.3 G/DL (ref 31.4–37.4)
MCV RBC AUTO: 97 FL (ref 82–98)
PLATELET # BLD AUTO: 105 THOUSANDS/UL (ref 149–390)
PMV BLD AUTO: 9.3 FL (ref 8.9–12.7)
POTASSIUM SERPL-SCNC: 4.6 MMOL/L (ref 3.5–5.3)
RBC # BLD AUTO: 3.67 MILLION/UL (ref 3.81–5.12)
SODIUM SERPL-SCNC: 142 MMOL/L (ref 135–147)
WBC # BLD AUTO: 6.53 THOUSAND/UL (ref 4.31–10.16)

## 2024-05-16 PROCEDURE — NC001 PR NO CHARGE: Performed by: PHYSICIAN ASSISTANT

## 2024-05-16 PROCEDURE — 85027 COMPLETE CBC AUTOMATED: CPT | Performed by: PHYSICIAN ASSISTANT

## 2024-05-16 PROCEDURE — 83735 ASSAY OF MAGNESIUM: CPT | Performed by: PHYSICIAN ASSISTANT

## 2024-05-16 PROCEDURE — 99232 SBSQ HOSP IP/OBS MODERATE 35: CPT | Performed by: NURSE PRACTITIONER

## 2024-05-16 PROCEDURE — 80048 BASIC METABOLIC PNL TOTAL CA: CPT | Performed by: PHYSICIAN ASSISTANT

## 2024-05-16 PROCEDURE — 71046 X-RAY EXAM CHEST 2 VIEWS: CPT

## 2024-05-16 PROCEDURE — 99024 POSTOP FOLLOW-UP VISIT: CPT | Performed by: INTERNAL MEDICINE

## 2024-05-16 PROCEDURE — 99238 HOSP IP/OBS DSCHRG MGMT 30/<: CPT | Performed by: PHYSICIAN ASSISTANT

## 2024-05-16 RX ORDER — OXYCODONE HYDROCHLORIDE 5 MG/1
2.5 TABLET ORAL EVERY 6 HOURS PRN
Qty: 6 TABLET | Refills: 0 | Status: SHIPPED | OUTPATIENT
Start: 2024-05-16 | End: 2024-05-21

## 2024-05-16 RX ORDER — LANOLIN ALCOHOL/MO/W.PET/CERES
3 CREAM (GRAM) TOPICAL
Start: 2024-05-16

## 2024-05-16 RX ORDER — ACETAMINOPHEN 325 MG/1
650 TABLET ORAL EVERY 4 HOURS PRN
Start: 2024-05-16 | End: 2024-05-21

## 2024-05-16 RX ORDER — OXYCODONE HYDROCHLORIDE 5 MG/1
2.5 TABLET ORAL EVERY 6 HOURS PRN
Qty: 6 TABLET | Refills: 0 | Status: ON HOLD | OUTPATIENT
Start: 2024-05-16 | End: 2024-05-18

## 2024-05-16 RX ORDER — AMOXICILLIN 250 MG
1 CAPSULE ORAL 2 TIMES DAILY
Start: 2024-05-16 | End: 2024-05-21

## 2024-05-16 RX ORDER — AMOXICILLIN 250 MG
1 CAPSULE ORAL 2 TIMES DAILY
Status: DISCONTINUED | OUTPATIENT
Start: 2024-05-16 | End: 2024-05-16 | Stop reason: HOSPADM

## 2024-05-16 RX ADMIN — SENNOSIDES, DOCUSATE SODIUM 1 TABLET: 8.6; 5 TABLET ORAL at 14:30

## 2024-05-16 RX ADMIN — ATORVASTATIN CALCIUM 20 MG: 20 TABLET, FILM COATED ORAL at 15:45

## 2024-05-16 RX ADMIN — ACETAMINOPHEN 975 MG: 325 TABLET, FILM COATED ORAL at 14:30

## 2024-05-16 RX ADMIN — LIDOCAINE 5% 1 PATCH: 700 PATCH TOPICAL at 07:49

## 2024-05-16 RX ADMIN — FLUTICASONE PROPIONATE 1 SPRAY: 50 SPRAY, METERED NASAL at 07:49

## 2024-05-16 RX ADMIN — FAMOTIDINE 20 MG: 20 TABLET, FILM COATED ORAL at 07:49

## 2024-05-16 NOTE — ASSESSMENT & PLAN NOTE
- Patient noted to have arrhythmia with EKG on admission demonstrating Mobitz type 1 second degree AV block.  - Appreciate Cardiology evaluation, recommendations and assistance with management.  - Patient is now status post EP evaluation and placement of single-chamber [employer] permanent pacemaker on 5/15/2024.   - Plan for review postprocedure chest x-ray and device interrogation today prior to discharge.  - Telemetry monitoring may be discontinued.  - Continue to monitor hemodynamic status.  - Continue current medication regimen.  - Local wound care as indicated to pacemaker insertion site.  - Analgesia as needed.  - Outpatient follow-up with Cardiology as recommended.

## 2024-05-16 NOTE — CASE MANAGEMENT
Case Management Discharge Planning Note    Patient name Berta Estrada  Location S /S -01 MRN [id number]  : 1935 Date 2024       Current Admission Date: 2024  Current Admission Diagnosis:Closed fracture of ramus of right pubis (HCC)   Patient Active Problem List    Diagnosis Date Noted Date Diagnosed    Fall 2024     Closed fracture of ramus of right pubis ([facility]) 2024     Mobitz type 1 second degree AV block 2024     Cervical radiculopathy 2022     Lumbar degenerative disc disease 2022     Other hemorrhoids 2021     Gait instability 2021     Vitamin D deficiency 2021     Closed fracture of left foot 2020     Wears hearing aid 2020     Stage 3a chronic kidney disease ([facility]) 2019     Thrombocytopenia ([facility]) 2019     Essential hypertension 2019     GERD (gastroesophageal reflux disease) 2019     Postmenopausal HRT (hormone replacement therapy) 2019     Vitamin B12 deficiency 2019     COPD (chronic obstructive pulmonary disease) ([facility]) 2019     Pancreatic cyst 2019     S/P AVR (aortic valve replacement) 03/15/2019     Primary osteoarthritis involving multiple joints 03/15/2019     Other hyperlipidemia 03/15/2019     Allergic rhinitis 03/15/2019     Age-related osteoporosis without current pathological fracture 03/15/2019     Skin cancer 03/15/2019     Coronary artery disease involving native coronary artery of native heart 03/15/2019     Depression 2018     Late onset Alzheimer's disease without behavioral disturbance ([facility]) 2018       LOS (days): 4  Geometric Mean LOS (GMLOS) (days): 2.8  Days to GMLOS:-1     OBJECTIVE:  Risk of Unplanned Readmission Score: 12.63         Current admission status: Inpatient   Preferred Pharmacy:   [facility]/pharmacy #[id number] [place]ENDO[place] - [address] [address]  Phone: [phone] Fax:  [phone]    Primary Care Provider: Kim Dewey MD    Primary Insurance: [employer] REP  Secondary Insurance:     DISCHARGE DETAILS:         Treatment Team Recommendation: Short Term Rehab, SNF  Discharge Destination Plan:: Short Term Rehab, SNF  Transport at Discharge : [facility]  Dispatcher Contacted: Yes  Number/Name of Dispatcher: [employer]  Transported by (Company and Unit #): [employer]  ETA of Transport (Date): 05/16/24  ETA of Transport (Time): 1815     Transfer Mode: Stretcher  Accompanied by: EMS personnel     IMM Given (Date):: 05/16/24  IMM Given to:: Family  Family notified:: DTR   ..IMM reviewed with patient's caregiver, patient's caregiver agrees with discharge determination.

## 2024-05-16 NOTE — INCIDENTAL FINDINGS
The following findings require follow up:  Radiographic finding     Findings and Follow up required:       1) A 2.4 cm nodule projecting over the lateral right midlung zone. It is unclear if this is within the lung or adjacent scapula.  This was incidentally identified on your trauma imaging.  A malignancy (cancer) cannot be completely excluded based on trauma imaging alone.  Recommend short-term outpatient follow-up with primary care provider to review the finding and for further surveillance as indicated. Recommend further evaluation with CT chest.      Follow up should be done within 2 week(s)    The above noted incidental findings were discussed with the patient and her daughter, Hillary Estrada. Her daughter, Hillary Estrada, demonstrated understanding of the findings and the follow-up recommendations.    Please notify the following clinician to assist with the follow up:   Dr. Kim Dewey - I contacted Dr. Dewey's office to notify them of the hospital encounter and the above-noted incidental finding requiring further workup.

## 2024-05-16 NOTE — PROGRESS NOTES
[facility]  Progress Note  Name: Berta Estrada I  MRN: [id number]  Unit/Bed#: S -01 I Date of Admission: 5/12/2024   Date of Service: 5/16/2024 I Hospital Day: 4    Assessment & Plan   Fall  Assessment & Plan  - Status post fall with the below noted injuries.  - Fall precautions.  - Geriatric Medicine consultation for evaluation, medication review and recommendations.  - PT and OT evaluation and treatment as indicated.  - Case Management consultation for disposition planning.    - Anticipate discharge to postacute care facility when able; patient is medically appropriate for discharge and currently pending insurance authorization.      * Closed fracture of ramus of right pubis (HCC)  Assessment & Plan  - Right inferior pubic ramus fracture, present on admission.  - Appreciate Orthopedic surgery evaluation and recommendations.  - Nonoperative management recommended.  - Maintain weightbearing status on the bilateral lower extremities as tolerated.  - Monitor right lower extremity neurovascular exam.  - Continue multimodal analgesic regimen.  - Continue DVT prophylaxis.  - PT and OT evaluation and treatment as indicated.  - Outpatient follow-up with Orthopedic surgery for evaluation.      Mobitz type 1 second degree AV block  Assessment & Plan  - Patient noted to have arrhythmia with EKG on admission demonstrating Mobitz type 1 second degree AV block.  - Appreciate Cardiology evaluation, recommendations and assistance with management.  - Patient is now status post EP evaluation and placement of single-chamber Medtronic permanent pacemaker on 5/15/2024.   - Plan for review postprocedure chest x-ray and device interrogation today prior to discharge.  - Telemetry monitoring may be discontinued.  - Continue to monitor hemodynamic status.  - Continue current medication regimen.  - Local wound care as indicated to pacemaker insertion site.  - Analgesia as needed.  - Outpatient follow-up  "with Cardiology as recommended.    Stage 3a chronic kidney disease (HCC)  Assessment & Plan  Lab Results   Component Value Date    EGFR 45 05/16/2024    EGFR 49 05/14/2024    EGFR 51 05/13/2024    CREATININE 1.08 05/16/2024    CREATININE 1.01 05/14/2024    CREATININE 0.98 05/13/2024     - Patient with chronic history of stage 3a CKD without evidence of VIMAL at this time.  - Baseline Cr 1.0-1.2.  - Avoid nephrotoxi medications and hypotension.  - Continue to monitor renal function and electrolytes as indicated.  - Outpatient follow up with PCP per routine.    Late onset Alzheimer's disease without behavioral disturbance (HCC)  Assessment & Plan  - Patient with chronic history of dementia.  - Delirium precautions.  - Frequent reorientation.  - Geriatrics consult, appreciate recommendations and assistance with management.  - Outpatient follow up with PCP per routine.    Other hyperlipidemia  Assessment & Plan  - Patient with chronic history of hyperlipidemia.  - Continue current medication regimen.  - Outpatient follow-up with PCP.                Bowel Regimen: On Senokot-S.  VTE Prophylaxis:Sequential compression device (Venodyne)  and Enoxaparin (Lovenox)     Disposition: Continue current level of care.  Anticipate discharge to postacute care facility for rehab.  Continue therapy evaluation and treatment as indicated.  Case management following for disposition planning; insurance authorization pending.    Subjective   Chief Complaint: \"I feel fine.\"    Subjective: Patient is doing well today.  She offers no complaints and denies having any pain.  She specifically denied any left chest wall or shoulder pain, shortness of breath or difficulty breathing.  She is tolerating oral intake without any nausea or vomiting.       Objective   Vitals:   Temp:  [97.6 °F (36.4 °C)-98.5 °F (36.9 °C)] 98.1 °F (36.7 °C)  HR:  [] 87  Resp:  [17-19] 19  BP: (111-155)/(62-98) 116/78    I/O         05/14 0701  05/15 0700 05/15 " 0701  05/16 0700 05/16 0701  05/17 0700    P.O. 480 240 735    I.V. (mL/kg)  250 (3.7)     Total Intake(mL/kg) 480 (7.2) 490 (7.3) 735 (11)    Urine (mL/kg/hr)  725 (0.5) 350 (0.7)    Blood  5     Total Output  730 350    Net +480 -240 +385           Unmeasured Urine Occurrence 1 x      Unmeasured Stool Occurrence 1 x               Physical Exam:   GENERAL APPEARANCE: Patient in no acute distress.  HEENT: NCAT; EOMs intact; Mucous membranes moist  CV: Regular rate and rhythm; no murmur/gallops/rubs appreciated.  CHEST / LUNGS: Clear to auscultation; no wheezes/rales/rhonci.  Left upper anterior chest wall dressing with scant serosanguineous drainage unchanged from postoperative valuation yesterday with minimal tenderness and swelling noted.  ABD: NABS; soft; non-distended; non-tender.  : Voiding spontaneously.  EXT: +2 pulses bilaterally upper & lower extremities; no edema.  NEURO: GCS 14 (E4, V4, M6) with stable baseline confusion; no focal neurologic deficits; neurovascularly intact.  SKIN: Warm, dry and well perfused; no rash; no jaundice.    Invasive Devices       Peripheral Intravenous Line  Duration             Peripheral IV 05/13/24 Left;Proximal;Ventral (anterior) Forearm 3 days                          Lab Results: Results: I have personally reviewed all pertinent laboratory/tests results  Imaging: I have personally reviewed pertinent reports.     Other Studies: N/A     Trevon Mcclendon PA-C  5/16/2024  08:28 AM

## 2024-05-16 NOTE — DISCHARGE SUMMARY
[facility]  Discharge- Berta Estrada 1935, 89 y.o. female MRN: [id number]  Unit/Bed#: S MS [facility] Encounter: [id number]  Primary Care Provider: Kim Dewey MD   Date and time admitted to hospital: 5/12/2024  4:00 PM    Fall  Assessment & Plan  - Status post fall with the below noted injuries.  - Fall precautions.  - Geriatric Medicine consultation for evaluation, medication review and recommendations.  - PT and OT evaluation and treatment as indicated.  - Case Management consultation for disposition planning.    - Anticipate discharge to postacute care facility when able; patient is medically appropriate for discharge and currently pending insurance authorization.      * Closed fracture of ramus of right pubis (HCC)  Assessment & Plan  - Right inferior pubic ramus fracture, present on admission.  - Appreciate Orthopedic surgery evaluation and recommendations.  - Nonoperative management recommended.  - Maintain weightbearing status on the bilateral lower extremities as tolerated.  - Monitor right lower extremity neurovascular exam.  - Continue multimodal analgesic regimen.  - Continue DVT prophylaxis.  - PT and OT evaluation and treatment as indicated.  - Outpatient follow-up with Orthopedic surgery for evaluation.      Mobitz type 1 second degree AV block  Assessment & Plan  - Patient noted to have arrhythmia with EKG on admission demonstrating Mobitz type 1 second degree AV block.  - Appreciate Cardiology evaluation, recommendations and assistance with management.  - Patient is now status post EP evaluation and placement of single-chamber Medtronic permanent pacemaker on 5/15/2024.   - Plan for review postprocedure chest x-ray and device interrogation today prior to discharge.  - Telemetry monitoring may be discontinued.  - Continue to monitor hemodynamic status.  - Continue current medication regimen.  - Local wound care as indicated to pacemaker insertion site.  -  Analgesia as needed.  - Outpatient follow-up with Cardiology as recommended.    Stage 3a chronic kidney disease (HCC)  Assessment & Plan  Lab Results   Component Value Date    EGFR 45 05/16/2024    EGFR 49 05/14/2024    EGFR 51 05/13/2024    CREATININE 1.08 05/16/2024    CREATININE 1.01 05/14/2024    CREATININE 0.98 05/13/2024     - Patient with chronic history of stage 3a CKD without evidence of VIMAL at this time.  - Baseline Cr 1.0-1.2.  - Avoid nephrotoxi medications and hypotension.  - Continue to monitor renal function and electrolytes as indicated.  - Outpatient follow up with PCP per routine.    Late onset Alzheimer's disease without behavioral disturbance (HCC)  Assessment & Plan  - Patient with chronic history of dementia.  - Delirium precautions.  - Frequent reorientation.  - Geriatrics consult, appreciate recommendations and assistance with management.  - Outpatient follow up with PCP per routine.    Other hyperlipidemia  Assessment & Plan  - Patient with chronic history of hyperlipidemia.  - Continue current medication regimen.  - Outpatient follow-up with PCP.           Discharge Summary - Trauma Service   Berta Estrada 89 y.o. female MRN: [id number]  Unit/Bed#: S -01 Encounter: [id number]    Admission Date: 5/12/2024     Discharge Date: dermatitis    Admitting Diagnosis: Hip pain [M25.559]  Knee pain [M25.569]  Mobitz I [I44.1]  Right hip pain [M25.551]  Fall, initial encounter [W19.XXXA]  Pubic ramus fracture, right, closed, initial encounter ([facility]) [S32.591A]  Ambulatory dysfunction [R26.2]    Discharge Diagnosis: See above.    Attending and Service: Dr. Hoffman, Acute Care Surgical Services.    Consulting Physician(s):     Orthopedic surgery.  Geriatric medicine.  Cardiology.    Imaging and Procedures Performed:   Orders Placed This Encounter   Procedures    Cardiac ep lab eps/ablations       XR chest portable    Result Date: 5/16/2024  Impression: 1. 2.4 cm nodule projecting over the lateral  right midlung zone. It is unclear if this is within the lung or adjacent scapula. Recommend further evaluation with CT chest. 2. Mildly increased prominence of the aortic knob; recommend attention to on CT chest. 3. No pneumothorax. The study was marked in EPIC for immediate notification. Workstation performed: [id number]     XR hip/pelv 2-3 vws right if performed    Result Date: 5/13/2024  Impression: Fracture of the right inferior pubic ramus. Workstation performed: [id number]     CT pelvis wo contrast    Result Date: 5/12/2024  Impression: Comminuted mildly displaced fracture at the right inferior pubic ramus Workstation performed: [id number]     CT spine lumbar without contrast    Result Date: 5/12/2024  Impression: No evidence of acute fracture or traumatic subluxation. Multilevel degenerative disc disease most severe at L3-L4 and L4-L5 with moderate right foraminal stenosis at the L4-L5 level. Workstation performed: [id number]     Insertion of single-chamber pacemaker on 5/15/2024.    Hospital Course: Berta Estrada is a 89-year-old female who presented to the emergency department following a fall complaining of right hip pain.  She had a mechanical witnessed fall earlier in the day when she lost her balance standing up from a chair.  She fell backwards and did not strike her head or lose consciousness.  During her ER workup, she was found to have a right hip fracture.  On her initial evaluation by the trauma service, her primary survey was notable for GCS of 14 with 1 taken off of her verbal score from confusion due to dementia; the remainder of her primary survey was unremarkable.  On secondary survey, she was hypertensive with normal vital signs otherwise; she had tenderness to the right hip without swelling or deformity; remainder of her secondary survey was unremarkable.  Her initial workup included labs and the above and imaging studies.    She was admitted to the trauma service status post mechanical  fall with right hip fracture and was also found to have secondary the AV block Mobitz type I.  Cardiology was consulted due to the arrhythmia and assisted with her care during her hospital stay.  Orthopedic surgery was consulted due to her pelvic fracture and recommended conservative/nonoperative management with weightbearing as tolerated on the bilateral lower extremities and analgesia as well as therapy.  Geriatric medicine was consulted to assist with medication review medical management during her hospital encounter.  Cardiology did recommend that the patient undergo further workup and intervention for pacemaker placement.  The patient and her family agreed to proceed with intervention and she underwent insertion of single-chamber pacemaker on 5/15/2024.  Post procedure, she continues to do well and device interrogation as well as postprocedure chest x-ray the following day were completed without significant issue identified.  PT and OT evaluated her during hospital stay and recommended rehab.  Case management assisted with disposition planning, and the patient was deemed stable for discharge with appropriate acute care facility placement on 5/16/2024.  For further details of her hospital encounter, please see her complete medical records.    On discharge, the patient is instructed to follow-up with the patient's primary care provider to review the events of the patient's recent hospitalization and incidental finding; the patient's daughter was informed of the incidental findings due to the patient's history of dementia. The patient is instructed to follow-up in the Trauma Clinic as needed.  The patient is instructed to follow-up with orthopedic surgery in 4 to 6 weeks for reevaluation of her pelvic injury.  The patient is instructed to follow-up with cardiology for cardiac device placement follow-up and ongoing management.  The patient should follow the provided discharge instructions.    Condition at  Discharge: stable     Discharge instructions/Information to patient and family:   See after visit summary for information provided to patient and family.      Provisions for Follow-Up Care:  See after visit summary for information related to follow-up care and any pertinent home health orders.      Disposition: See After Visit Summary for discharge disposition information.    Planned Readmission: No    Discharge Statement   I spent 30 minutes discharging the patient. This time was spent on the day of discharge. I had direct contact with the patient on the day of discharge. Additional documentation is required if more than 30 minutes were spent on discharge.     Discharge Medications:  See after visit summary for reconciled discharge medications provided to patient and family.      Trveon Mcclendon PA-C  5/16/2024  3:54 PM

## 2024-05-16 NOTE — TELEPHONE ENCOUNTER
Patient has lung node will need outpatint follow up care. Non urgent . Daughter is aware. All info in epic. Any questions call [facility] Office clinical unavailable at time of call

## 2024-05-16 NOTE — PROGRESS NOTES
Progress Note - Geriatric Medicine   Berta Estrada 89 y.o. female MRN: [id number]  Unit/Bed#: S -01 Encounter: [id number]      Assessment/Plan:  Closed fracture of ramus of right pubis  S/p fall  Orthopedics was consulted  Nonoperative management  WBAT to bilateral lower extremities  PT/OT following  Multimodal pain regimen including geriatric pain protocol  Patient is currently on Tylenol 975 every 8, lidocaine patch daily, oxycodone 2.5 mg every 4 as needed for moderate pain, oxycodone 5 mg every 4 as needed for severe pain  She denied any pain on exam  Last dose of oxycodone was 9 PM last night  Outpatient follow-up with orthopedics as needed    Late onset Alzheimer's disease without behavioral disturbance  Patient is alert oriented person only on exam  Requires some assistance for ADLs and IADLs at home from her daughter  Had an MMSE completed in December 20, 2017 which scored a 21 out of 30 completed with [facility] geriatrics, no recent visits   Recommend checking TSH and vitamin B12 levels  No recent head CT or MRI brain to review  Most recent QTc from EKG completed yesterday was 414 which is stable  No further doses of Zyprexa needed since admission  At risk for delirium due to hospitalization, medications, sleep disturbance, acute pain, Alzheimer's   Recommend delirium precautions  Maintain sleep-wake cycle, avoid nighttime interruptions  minimize overnight interruptions, group overnight vitals/labs/nursing checks as possible  dim lights, close blinds and turn off tv to minimize stimulation and encourage sleep environment in evenings  Provide adequate pain control  Avoid urinary retention and constipation  Provide frequent and early mobilization  Provide frequent redirection and reorientation as needed  Avoid medications that may worsen or precipitate delirium such as tramadol, benzodiazepines, anticholinergics, and Benadryl  Redirect unwanted behaviors as first-line therapy, avoid physical  restraints as able to  Continue to monitor    Chronic back pain  Patient's daughter had mentioned yesterday that patient has chronic back pain that has been worsening recently  Lidocaine patch to back  Recommend outpatient follow-up with PCP and possibly pain and spine services for pain management    Mobitz type I second-degree AV block  Intermittent bradycardia with heart rates in the 30s overnight with 2-1 AV block  Underwent pacemaker insertion 5/15/2024    Insomnia  First-line treatment is behavior modification  Maintain sleep-wake cycle, avoid nighttime interruptions  Avoid caffeine throughout the day  Avoid napping throughout the day  Encourage physical activity throughout the day  Avoid sedative hypnotics including benzodiazepines and benadryl  Continues to sleep okay overnight with the melatonin 3 mg nightly and gabapentin 100 mg nightly  Continue current regimen    Anxiety/depression  Occasional anxiety and depression-although relatively well-controlled senna  Did require a dose of Zyprexa on night of 5/12/2024   No further doses of Zyprexa required since admission  Continue to provide emotional support    Constipation  Last BM was 5/14/2024  Is currently on Senokot-S 1 tablet nightly  Will increase to twice daily  Encourage adequate p.o. Hydration  Encourage prune juice as tolerated    Ambulatory dysfunction  At a baseline ambulates with cane  PT/OT following  Fall precautions  Out of bed as tolerated  Encourage early and frequent mobilization  Encourage adequate hydration and nutrition  Provide adequate pain management   Goal is undetermined  Continue with PT/OT for continued strength and balance training     Subjective:   Berta is an 89-year-old female being seen for a geriatrics follow-up.  Upon exam patient was out of bed in the chair, resting.  Nursing staff was in there getting her cleaned up for the day.  She did well overnight.  She can be confused and impulsive, but is redirectable.  She ate her  "breakfast.  Per chart review she had a bowel movement on the 14th.  Per nursing no other acute concerns or issues at this time.        Review of Systems   Unable to perform ROS: Dementia         Objective:     Vitals: Blood pressure 116/78, pulse 87, temperature 98.1 °F (36.7 °C), resp. rate 19, height 5' 5\" (1.651 m), weight 66.7 kg (147 lb), SpO2 97%.,Body mass index is 24.46 kg/m².      Intake/Output Summary (Last 24 hours) at 5/16/2024 1057  Last data filed at 5/15/2024 2109  Gross per 24 hour   Intake 240 ml   Output 725 ml   Net -485 ml       Current Medications: Reviewed    Physical Exam:   Physical Exam  Vitals reviewed.   Constitutional:       General: She is not in acute distress.     Appearance: She is well-developed. She is not ill-appearing.      Comments: Frail appearing    HENT:      Head: Normocephalic and atraumatic.   Cardiovascular:      Rate and Rhythm: Normal rate and regular rhythm.      Heart sounds: Murmur heard.   Pulmonary:      Effort: Pulmonary effort is normal.      Breath sounds: Normal breath sounds.   Abdominal:      General: Bowel sounds are normal.      Palpations: Abdomen is soft.   Skin:     General: Skin is warm and dry.   Neurological:      Mental Status: She is alert. She is disoriented.      Cranial Nerves: No cranial nerve deficit.      Motor: Weakness present.      Gait: Gait abnormal.      Comments: Alert to person    Psychiatric:         Cognition and Memory: Cognition is impaired. Memory is impaired.         Judgment: Judgment is impulsive.          Invasive Devices       Peripheral Intravenous Line  Duration             Peripheral IV 05/13/24 Left;Proximal;Ventral (anterior) Forearm 3 days                    Lab, Imaging and other studies:    Lab Results:   I have personally reviewed pertinent lab results including the following:  -CBC, BMP, magnesium    I have personally reviewed the following imaging study reports in PACS:  Chest x-ray    - I have personally reviewed " "pertinent reports.      Please note:  Voice-recognition software may have been used in the preparation of this document.  Occasional wrong word or \"sound-alike\" substitutions may have occurred due to the inherent limitations of voice recognition software.  Interpretation should be guided by context.    "

## 2024-05-16 NOTE — PROGRESS NOTES
Progress Note - Cardiology Team 1  Bertalizzeth Estrada 89 y.o. female MRN: [id number]  Unit/Bed#: S -01 Encounter: [id number]        Principal Problem:    Closed fracture of ramus of right pubis (HCC)  Active Problems:    Late onset Alzheimer's disease without behavioral disturbance (HCC)    Other hyperlipidemia    Stage 3a chronic kidney disease (HCC)    Fall    Mobitz type 1 second degree AV block      Assessment  Witnessed fall-patient with severe Alzheimer's dementia, poor historian.   Reports indicate patient was standing up from her chair lost balance and fell backwards.  No head strike or loss of consciousness.        Heart block-second-degree AV block- 2:1 and Wenkebach        Presenting ECG sinus rhythm /Wenkebach         Initial review of telemetry reveals heart rate overnight in the 30s 2-1 AV         block. Unknown if pt was asleep. Otherwise NSR 70-90's with        Wenkebach.            After discussion with daughter patient often complains of lightheadedness.        Worse in the morning hours.        Upon review of telemetry  heart rate in the 30s with 2-1 AV block.        Episode while awake.  No syncope        Reached out to EP for PPM     3.   S/P PPM with His lead in LPF region 5/15     3.   CAD with history of prior PCI- unknown details        No reports of chest pain        PTA- On Asa, statin     Essential hypertension-she is mildly hypertensive.  No antihypertensive agents.  Alzheimer's dementia- not competent to make medical decisions. Daughter is decision maker. Pt lives with family. Ambulatory.     CKD stage III-creatinine 1.0 which is baseline    History of bioprosthetic AVR        Current TTE normal LV function.  Bio AVR no significant stenosis.  Mild MR.  MG 20 mmHg.     Plan  Patient can be discharged from cardiovascular standpoint as long as follow-up chest x-ray acceptable and pacemaker interrogation within normal limits  Will arrange 10 day site check and pacemaker interrogation.   "Will also see if we can coordinate hospital follow-up at that time.  Post pacemaker instructions - to discharge instruction. Can use left arm sling to remind patient not to lift left arm above shoulder level.  Resume Asa 81mg .     Subjective/Objective   Chief Complaint/subjective  No events overnght  Keeping sling on due to patient's confusion.  No chest pain or shortness of breath reported      Vitals: /78   Pulse 87   Temp 98.1 °F (36.7 °C)   Resp 19   Ht 5' 5\" (1.651 m)   Wt 66.7 kg (147 lb)   SpO2 97%   BMI 24.46 kg/m²     Vitals:    05/12/24 1602 05/14/24 1045   Weight: 66.8 kg (147 lb 4.3 oz) 66.7 kg (147 lb)     Orthostatic Blood Pressures      Flowsheet Row Most Recent Value   Blood Pressure 116/78 filed at 05/16/2024 1031   Patient Position - Orthostatic VS Lying filed at 05/14/2024 0734              Intake/Output Summary (Last 24 hours) at 5/16/2024 1116  Last data filed at 5/15/2024 2109  Gross per 24 hour   Intake 240 ml   Output 725 ml   Net -485 ml       Invasive Devices       Peripheral Intravenous Line  Duration             Peripheral IV 05/13/24 Left;Proximal;Ventral (anterior) Forearm 3 days                    Current Facility-Administered Medications   Medication Dose Route Frequency    acetaminophen (TYLENOL) tablet 975 mg  975 mg Oral Q8H MAMTA    atorvastatin (LIPITOR) tablet 20 mg  20 mg Oral Daily With Dinner    chlorhexidine (PERIDEX) 0.12 % oral rinse 15 mL  15 mL Swish & Spit Q12H [facility]    enoxaparin (LOVENOX) subcutaneous injection 30 mg  30 mg Subcutaneous Q24H    famotidine (PEPCID) tablet 20 mg  20 mg Oral Daily    fluticasone (FLONASE) 50 mcg/act nasal spray 1 spray  1 spray Each Nare Daily    gabapentin (NEURONTIN) capsule 100 mg  100 mg Oral HS    lidocaine (LIDODERM) 5 % patch 1 patch  1 patch Topical Daily    melatonin tablet 3 mg  3 mg Oral HS    naloxone (NARCAN) 0.04 mg/mL syringe 0.04 mg  0.04 mg Intravenous Q1MIN PRN    OLANZapine (ZyPREXA) IM injection 5 mg  5 mg " "Intramuscular Once    oxyCODONE (ROXICODONE) split tablet 2.5 mg  2.5 mg Oral Q4H PRN    Or    oxyCODONE (ROXICODONE) IR tablet 5 mg  5 mg Oral Q4H PRN    senna-docusate sodium (SENOKOT S) 8.6-50 mg per tablet 1 tablet  1 tablet Oral HS         Physical Exam: /78   Pulse 87   Temp 98.1 °F (36.7 °C)   Resp 19   Ht 5' 5\" (1.651 m)   Wt 66.7 kg (147 lb)   SpO2 97%   BMI 24.46 kg/m²     General Appearance:    Alert, cooperative, no distress, appears stated age   Head:    Normocephalic, no scleral icterus   Eyes:    PERRL   Nose:   Nares normal, septum midline, no drainage    Throat:   Lips, mucosa, and tongue normal   Neck:   Supple, symmetrical, trachea midline,             Lungs:     Clear to auscultation bilaterally, respirations unlabored   Chest Wall:    Lt SLC dressing intact    Heart:    Regular rate and rhythm, S1 and S2 normal, no murmur, rub   or gallop   Abdomen:     Soft, non-tender, bowel sounds active all four quadrants,     no masses, no organomegaly   Extremities:   Extremities normal, atraumatic, no cyanosis or edema       Skin:   Skin color, texture, turgor normal, no rashes or lesions   Neurologic:   Alert and disoriented to person place and time, no focal deficits                 Lab Results:   Recent Results (from the past 72 hour(s))   CBC and Platelet    Collection Time: 05/14/24  6:33 AM   Result Value Ref Range    WBC 6.53 4.31 - 10.16 Thousand/uL    RBC 3.96 3.81 - 5.12 Million/uL    Hemoglobin 12.8 11.5 - 15.4 g/dL    Hematocrit 38.7 34.8 - 46.1 %    MCV 98 82 - 98 fL    MCH 32.3 26.8 - 34.3 pg    MCHC 33.1 31.4 - 37.4 g/dL    RDW 13.9 11.6 - 15.1 %    Platelets 114 (L) 149 - 390 Thousands/uL    MPV 9.6 8.9 - 12.7 fL   Basic metabolic panel    Collection Time: 05/14/24  6:33 AM   Result Value Ref Range    Sodium 141 135 - 147 mmol/L    Potassium 4.5 3.5 - 5.3 mmol/L    Chloride 108 96 - 108 mmol/L    CO2 28 21 - 32 mmol/L    ANION GAP 5 4 - 13 mmol/L    BUN 22 5 - 25 mg/dL    " Creatinine 1.01 0.60 - 1.30 mg/dL    Glucose 108 65 - 140 mg/dL    Calcium 8.7 8.4 - 10.2 mg/dL    eGFR 49 ml/min/1.73sq m   Echo complete w/ contrast if indicated    Collection Time: 05/14/24 11:25 AM   Result Value Ref Range    BSA 1.74 m2    A4C EF 75 %    LVOT stroke volume 59.54     LVOT stroke volume index 32.80 ml/m2    LVOT Cardiac Output 4.45 l/min    LVOT Cardiac Index 2.56 l/min/m2    LVIDd 3.10 cm    LVIDS 1.80 cm    IVSd 1.30 cm    LVPWd 1.30 cm    LVOT diameter 2.1 cm    LVOT peak VTI 17.2 cm    FS 42 28 - 44    MV E' Tissue Velocity Septal 6 cm/s    MV E' Tissue Velocity Lateral 5 cm/s    LA Volume Index (BP) 35.6 mL/m2    AV LVOT peak gradient 4 mmHg    LVOT peak darek 0.98 m/s    RVID d 3.7 cm    Tricuspid annular plane systolic excursion 2.00 cm    LA size 3.8 cm    LA length (A2C) 6.50 cm    LA volume (BP) 62 mL    RAA A4C 17.2 cm2    Aortic valve peak velocity 3.45 m/s    Ao VTI 68.45 cm    AV mean gradient 28 mmHg    LVOT mn grad 2.0 mmHg    AV peak gradient 48 mmHg    AV area by cont VTI 0.9 cm2    AV area peak darek 1.0 cm2    MV mean gradient antegrade 4 mmHg    MV peak gradient antegrade 12 mmHg    MV VTI 23.49 cm    TR Peak Darek 2.5 m/s    Triscuspid Valve Regurgitation Peak Gradient 24.0 mmHg    Ao root 3.30 cm    Aortic valve mean velocity 24.80 m/s    Tricuspid valve peak regurgitation velocity 2.46 m/s    Left ventricular stroke volume (2D) 29.00 mL    IVS 1.3 cm    LEFT VENTRICLE SYSTOLIC VOLUME (MOD BIPLANE) 2D 10 mL    LV DIASTOLIC VOLUME (MOD BIPLANE) 2D 39 mL    Left Atrium Area-systolic Four Chamber 19.6 cm2    Left Atrium Area-systolic Apical Two Chamber 21.8 cm2    LVSV, 2D 29 mL    LVOT area 3.46 cm2    DVI 0.28     AV valve area 0.87 cm2    MV valve area by continuity eq 2.53 cm2    LV EF 80     Est. RA pres 8.0 mmHg    Right Ventricular Peak Systolic Pressure 32.00 mmHg   ECG 12 lead    Collection Time: 05/15/24 12:29 PM   Result Value Ref Range    Ventricular Rate 75 BPM     Atrial Rate 75 BPM    MO Interval 326 ms    QRSD Interval 84 ms    QT Interval 398 ms    QTC Interval 444 ms    P Axis 43 degrees    QRS Axis 30 degrees    T Wave Axis 63 degrees   CBC    Collection Time: 05/16/24  4:31 AM   Result Value Ref Range    WBC 6.53 4.31 - 10.16 Thousand/uL    RBC 3.67 (L) 3.81 - 5.12 Million/uL    Hemoglobin 11.9 11.5 - 15.4 g/dL    Hematocrit 35.7 34.8 - 46.1 %    MCV 97 82 - 98 fL    MCH 32.4 26.8 - 34.3 pg    MCHC 33.3 31.4 - 37.4 g/dL    RDW 14.0 11.6 - 15.1 %    Platelets 105 (L) 149 - 390 Thousands/uL    MPV 9.3 8.9 - 12.7 fL   Basic metabolic panel    Collection Time: 05/16/24  4:31 AM   Result Value Ref Range    Sodium 142 135 - 147 mmol/L    Potassium 4.6 3.5 - 5.3 mmol/L    Chloride 108 96 - 108 mmol/L    CO2 28 21 - 32 mmol/L    ANION GAP 6 4 - 13 mmol/L    BUN 20 5 - 25 mg/dL    Creatinine 1.08 0.60 - 1.30 mg/dL    Glucose 104 65 - 140 mg/dL    Calcium 8.6 8.4 - 10.2 mg/dL    eGFR 45 ml/min/1.73sq m   Magnesium    Collection Time: 05/16/24  4:31 AM   Result Value Ref Range    Magnesium 2.0 1.9 - 2.7 mg/dL     Imaging: I have personally reviewed pertinent reports.    Tele- NSR     Counseling / Coordination of Care  Total time spent today 25  minutes. Greater than 50% of total time was spent with the patient and / or family counseling and / or coordination of care.

## 2024-05-16 NOTE — PROGRESS NOTES
Facility did not receive printed Rx for Oxycodone from hospital, requested be sent on admission to [facility], three day supply sent by on call provider.

## 2024-05-16 NOTE — TELEPHONE ENCOUNTER
Called patient daughter, pt is being discharged this evening. TCM scheduled for next Wednesday 05/22.

## 2024-05-16 NOTE — ASSESSMENT & PLAN NOTE
Lab Results   Component Value Date    EGFR 45 05/16/2024    EGFR 49 05/14/2024    EGFR 51 05/13/2024    CREATININE 1.08 05/16/2024    CREATININE 1.01 05/14/2024    CREATININE 0.98 05/13/2024     - Patient with chronic history of stage 3a CKD without evidence of VIMAL at this time.  - Baseline Cr 1.0-1.2.  - Avoid nephrotoxi medications and hypotension.  - Continue to monitor renal function and electrolytes as indicated.  - Outpatient follow up with PCP per routine.

## 2024-05-16 NOTE — ASSESSMENT & PLAN NOTE
- Status post fall with the below noted injuries.  - Fall precautions.  - Geriatric Medicine consultation for evaluation, medication review and recommendations.  - PT and OT evaluation and treatment as indicated.  - Case Management consultation for disposition planning.    - Anticipate discharge to postacute care facility when able; patient is medically appropriate for discharge and currently pending insurance authorization.

## 2024-05-17 ENCOUNTER — HOSPITAL ENCOUNTER (INPATIENT)
Facility: HOSPITAL | Age: 89
LOS: 3 days | Discharge: NURSING, REHAB OR LONG-TERM CARE | DRG: 392 | End: 2024-05-21
Attending: EMERGENCY MEDICINE | Admitting: STUDENT IN AN ORGANIZED HEALTH CARE EDUCATION/TRAINING PROGRAM
Payer: COMMERCIAL

## 2024-05-17 ENCOUNTER — APPOINTMENT (EMERGENCY)
Dept: RADIOLOGY | Facility: HOSPITAL | Age: 89
DRG: 392 | End: 2024-05-17
Payer: COMMERCIAL

## 2024-05-17 ENCOUNTER — NURSING HOME VISIT (OUTPATIENT)
Dept: GERIATRICS | Facility: OTHER | Age: 89
End: 2024-05-17
Payer: COMMERCIAL

## 2024-05-17 ENCOUNTER — APPOINTMENT (EMERGENCY)
Dept: CT IMAGING | Facility: HOSPITAL | Age: 89
DRG: 392 | End: 2024-05-17
Payer: COMMERCIAL

## 2024-05-17 DIAGNOSIS — K59.00 CONSTIPATION, UNSPECIFIED CONSTIPATION TYPE: ICD-10-CM

## 2024-05-17 DIAGNOSIS — K59.03 DRUG-INDUCED CONSTIPATION: ICD-10-CM

## 2024-05-17 DIAGNOSIS — R33.9 URINARY RETENTION: Primary | ICD-10-CM

## 2024-05-17 DIAGNOSIS — R11.13 VOMITING OF FECAL MATTER WITH NAUSEA: ICD-10-CM

## 2024-05-17 DIAGNOSIS — F02.80 LATE ONSET ALZHEIMER'S DISEASE WITHOUT BEHAVIORAL DISTURBANCE (HCC): ICD-10-CM

## 2024-05-17 DIAGNOSIS — E78.49 OTHER HYPERLIPIDEMIA: ICD-10-CM

## 2024-05-17 DIAGNOSIS — K21.9 GASTROESOPHAGEAL REFLUX DISEASE, UNSPECIFIED WHETHER ESOPHAGITIS PRESENT: ICD-10-CM

## 2024-05-17 DIAGNOSIS — S32.591D CLOSED FRACTURE OF RAMUS OF RIGHT PUBIS WITH ROUTINE HEALING, SUBSEQUENT ENCOUNTER: Primary | ICD-10-CM

## 2024-05-17 DIAGNOSIS — G30.1 LATE ONSET ALZHEIMER'S DISEASE WITHOUT BEHAVIORAL DISTURBANCE (HCC): ICD-10-CM

## 2024-05-17 DIAGNOSIS — I44.1 MOBITZ TYPE 1 SECOND DEGREE AV BLOCK: ICD-10-CM

## 2024-05-17 LAB
ALBUMIN SERPL BCP-MCNC: 4.4 G/DL (ref 3.5–5)
ALP SERPL-CCNC: 77 U/L (ref 34–104)
ALT SERPL W P-5'-P-CCNC: 33 U/L (ref 7–52)
ANION GAP SERPL CALCULATED.3IONS-SCNC: 12 MMOL/L (ref 4–13)
APTT PPP: 27 SECONDS (ref 23–37)
AST SERPL W P-5'-P-CCNC: 33 U/L (ref 13–39)
BASOPHILS # BLD AUTO: 0.02 THOUSANDS/ÂΜL (ref 0–0.1)
BASOPHILS NFR BLD AUTO: 0 % (ref 0–1)
BILIRUB SERPL-MCNC: 1.39 MG/DL (ref 0.2–1)
BUN SERPL-MCNC: 22 MG/DL (ref 5–25)
CALCIUM SERPL-MCNC: 9.6 MG/DL (ref 8.4–10.2)
CHLORIDE SERPL-SCNC: 98 MMOL/L (ref 96–108)
CO2 SERPL-SCNC: 27 MMOL/L (ref 21–32)
CREAT SERPL-MCNC: 0.85 MG/DL (ref 0.6–1.3)
EOSINOPHIL # BLD AUTO: 0 THOUSAND/ÂΜL (ref 0–0.61)
EOSINOPHIL NFR BLD AUTO: 0 % (ref 0–6)
ERYTHROCYTE [DISTWIDTH] IN BLOOD BY AUTOMATED COUNT: 13.6 % (ref 11.6–15.1)
GFR SERPL CREATININE-BSD FRML MDRD: 60 ML/MIN/1.73SQ M
GLUCOSE SERPL-MCNC: 168 MG/DL (ref 65–140)
HCT VFR BLD AUTO: 41.6 % (ref 34.8–46.1)
HGB BLD-MCNC: 14.3 G/DL (ref 11.5–15.4)
IMM GRANULOCYTES # BLD AUTO: 0.05 THOUSAND/UL (ref 0–0.2)
IMM GRANULOCYTES NFR BLD AUTO: 0 % (ref 0–2)
INR PPP: 1.02 (ref 0.84–1.19)
LIPASE SERPL-CCNC: 14 U/L (ref 11–82)
LYMPHOCYTES # BLD AUTO: 1.06 THOUSANDS/ÂΜL (ref 0.6–4.47)
LYMPHOCYTES NFR BLD AUTO: 10 % (ref 14–44)
MAGNESIUM SERPL-MCNC: 2 MG/DL (ref 1.9–2.7)
MCH RBC QN AUTO: 32.4 PG (ref 26.8–34.3)
MCHC RBC AUTO-ENTMCNC: 34.4 G/DL (ref 31.4–37.4)
MCV RBC AUTO: 94 FL (ref 82–98)
MONOCYTES # BLD AUTO: 0.6 THOUSAND/ÂΜL (ref 0.17–1.22)
MONOCYTES NFR BLD AUTO: 5 % (ref 4–12)
NEUTROPHILS # BLD AUTO: 9.45 THOUSANDS/ÂΜL (ref 1.85–7.62)
NEUTS SEG NFR BLD AUTO: 85 % (ref 43–75)
NRBC BLD AUTO-RTO: 0 /100 WBCS
PLATELET # BLD AUTO: 146 THOUSANDS/UL (ref 149–390)
PMV BLD AUTO: 9.3 FL (ref 8.9–12.7)
POTASSIUM SERPL-SCNC: 3.9 MMOL/L (ref 3.5–5.3)
PROT SERPL-MCNC: 7.3 G/DL (ref 6.4–8.4)
PROTHROMBIN TIME: 14 SECONDS (ref 11.6–14.5)
RBC # BLD AUTO: 4.42 MILLION/UL (ref 3.81–5.12)
SODIUM SERPL-SCNC: 137 MMOL/L (ref 135–147)
WBC # BLD AUTO: 11.18 THOUSAND/UL (ref 4.31–10.16)

## 2024-05-17 PROCEDURE — 96361 HYDRATE IV INFUSION ADD-ON: CPT

## 2024-05-17 PROCEDURE — 85025 COMPLETE CBC W/AUTO DIFF WBC: CPT

## 2024-05-17 PROCEDURE — 99285 EMERGENCY DEPT VISIT HI MDM: CPT | Performed by: EMERGENCY MEDICINE

## 2024-05-17 PROCEDURE — 99305 1ST NF CARE MODERATE MDM 35: CPT | Performed by: INTERNAL MEDICINE

## 2024-05-17 PROCEDURE — 0T9B70Z DRAINAGE OF BLADDER WITH DRAINAGE DEVICE, VIA NATURAL OR ARTIFICIAL OPENING: ICD-10-PCS | Performed by: INTERNAL MEDICINE

## 2024-05-17 PROCEDURE — 96374 THER/PROPH/DIAG INJ IV PUSH: CPT

## 2024-05-17 PROCEDURE — 99284 EMERGENCY DEPT VISIT MOD MDM: CPT

## 2024-05-17 PROCEDURE — 83735 ASSAY OF MAGNESIUM: CPT

## 2024-05-17 PROCEDURE — 85610 PROTHROMBIN TIME: CPT

## 2024-05-17 PROCEDURE — 36415 COLL VENOUS BLD VENIPUNCTURE: CPT

## 2024-05-17 PROCEDURE — 83690 ASSAY OF LIPASE: CPT

## 2024-05-17 PROCEDURE — 71260 CT THORAX DX C+: CPT

## 2024-05-17 PROCEDURE — 85730 THROMBOPLASTIN TIME PARTIAL: CPT

## 2024-05-17 PROCEDURE — 71045 X-RAY EXAM CHEST 1 VIEW: CPT

## 2024-05-17 PROCEDURE — 80053 COMPREHEN METABOLIC PANEL: CPT

## 2024-05-17 PROCEDURE — 74177 CT ABD & PELVIS W/CONTRAST: CPT

## 2024-05-17 RX ORDER — FLUTICASONE PROPIONATE 50 MCG
SPRAY, SUSPENSION (ML) NASAL
Qty: 48 ML | Refills: 1 | Status: SHIPPED | OUTPATIENT
Start: 2024-05-17

## 2024-05-17 RX ORDER — ONDANSETRON 2 MG/ML
4 INJECTION INTRAMUSCULAR; INTRAVENOUS ONCE
Status: COMPLETED | OUTPATIENT
Start: 2024-05-17 | End: 2024-05-17

## 2024-05-17 RX ADMIN — ONDANSETRON 4 MG: 2 INJECTION INTRAMUSCULAR; INTRAVENOUS at 22:57

## 2024-05-17 RX ADMIN — SODIUM CHLORIDE 500 ML: 0.9 INJECTION, SOLUTION INTRAVENOUS at 22:58

## 2024-05-17 RX ADMIN — IOHEXOL 100 ML: 350 INJECTION, SOLUTION INTRAVENOUS at 23:37

## 2024-05-17 NOTE — UTILIZATION REVIEW
NOTIFICATION OF ADMISSION DISCHARGE   This is a Notification of Discharge from [facility]. Please be advised that this patient has been discharge from our facility. Below you will find the admission and discharge date and time including the patient’s disposition.   UTILIZATION REVIEW CONTACT:  Isabel Montoya  Utilization   Network Utilization Review Department  Phone: [phone] x carefully listen to the prompts. All voicemails are confidential.  Email: [email or web address]     ADMISSION INFORMATION  PRESENTATION DATE: 5/12/2024  4:00 PM  OBERVATION ADMISSION DATE:   INPATIENT ADMISSION DATE: 5/12/24  8:16 PM   DISCHARGE DATE: 5/16/2024  6:49 PM   DISPOSITION:Non [facility] SNF/TCU/SNU    Network Utilization Review Department  ATTENTION: Please call with any questions or concerns to [phone] and carefully listen to the prompts so that you are directed to the right person. All voicemails are confidential.   For Discharge needs, contact Care Management DC Support Team at [phone] opt. 2  Send all requests for admission clinical reviews, approved or denied determinations and any other requests to dedicated fax number below belonging to the campus where the patient is receiving treatment. List of dedicated fax numbers for the Facilities:  FACILITY NAME UR FAX NUMBER   ADMISSION DENIALS (Administrative/Medical Necessity) [phone]   DISCHARGE SUPPORT TEAM ([facility]) [phone]   PARENT CHILD HEALTH (Maternity/NICU/Pediatrics) [phone]   [facility] [phone]   [facility] [phone]   [facility] [phone]   [facility] [phone]   [facility] [phone]   [facility] [phone]   [facility] [phone]   [facility] [phone]    [facility] [phone]   [facility] [phone]   [facility] [phone]   [facility] [phone]

## 2024-05-18 PROBLEM — Z95.0 HISTORY OF PERMANENT CARDIAC PACEMAKER PLACEMENT: Status: ACTIVE | Noted: 2024-05-18

## 2024-05-18 PROBLEM — K59.00 CONSTIPATION: Status: ACTIVE | Noted: 2024-05-18

## 2024-05-18 PROBLEM — K76.89 LIVER NODULE: Status: ACTIVE | Noted: 2024-05-18

## 2024-05-18 LAB
ATRIAL RATE: 110 BPM
BILIRUB UR QL STRIP: NEGATIVE
CLARITY UR: CLEAR
COLOR UR: ABNORMAL
GLUCOSE UR STRIP-MCNC: NEGATIVE MG/DL
HGB UR QL STRIP.AUTO: NEGATIVE
KETONES UR STRIP-MCNC: ABNORMAL MG/DL
LEUKOCYTE ESTERASE UR QL STRIP: NEGATIVE
NITRITE UR QL STRIP: NEGATIVE
P AXIS: 84 DEGREES
PH UR STRIP.AUTO: 6.5 [PH]
PROT UR STRIP-MCNC: NEGATIVE MG/DL
QRS AXIS: 72 DEGREES
QRSD INTERVAL: 82 MS
QT INTERVAL: 402 MS
QTC INTERVAL: 458 MS
SP GR UR STRIP.AUTO: 1.04 (ref 1–1.03)
T WAVE AXIS: 88 DEGREES
UROBILINOGEN UR STRIP-ACNC: <2 MG/DL
VENTRICULAR RATE: 78 BPM

## 2024-05-18 PROCEDURE — 99223 1ST HOSP IP/OBS HIGH 75: CPT | Performed by: NURSE PRACTITIONER

## 2024-05-18 PROCEDURE — 96375 TX/PRO/DX INJ NEW DRUG ADDON: CPT

## 2024-05-18 PROCEDURE — 96361 HYDRATE IV INFUSION ADD-ON: CPT

## 2024-05-18 PROCEDURE — 93005 ELECTROCARDIOGRAM TRACING: CPT

## 2024-05-18 PROCEDURE — 81003 URINALYSIS AUTO W/O SCOPE: CPT

## 2024-05-18 RX ORDER — DOCUSATE SODIUM 100 MG/1
100 CAPSULE, LIQUID FILLED ORAL 2 TIMES DAILY
Status: DISCONTINUED | OUTPATIENT
Start: 2024-05-18 | End: 2024-05-21 | Stop reason: HOSPADM

## 2024-05-18 RX ORDER — SENNOSIDES 8.6 MG
1 TABLET ORAL ONCE
Status: COMPLETED | OUTPATIENT
Start: 2024-05-18 | End: 2024-05-18

## 2024-05-18 RX ORDER — ASPIRIN 81 MG/1
81 TABLET, CHEWABLE ORAL DAILY
Status: DISCONTINUED | OUTPATIENT
Start: 2024-05-18 | End: 2024-05-21 | Stop reason: HOSPADM

## 2024-05-18 RX ORDER — FAMOTIDINE 20 MG/1
20 TABLET, FILM COATED ORAL DAILY
Status: DISCONTINUED | OUTPATIENT
Start: 2024-05-18 | End: 2024-05-21 | Stop reason: HOSPADM

## 2024-05-18 RX ORDER — CALCIUM CARBONATE 500 MG/1
500 TABLET, CHEWABLE ORAL DAILY PRN
Status: DISCONTINUED | OUTPATIENT
Start: 2024-05-18 | End: 2024-05-21 | Stop reason: HOSPADM

## 2024-05-18 RX ORDER — FLUTICASONE PROPIONATE 50 MCG
1 SPRAY, SUSPENSION (ML) NASAL DAILY
Status: DISCONTINUED | OUTPATIENT
Start: 2024-05-18 | End: 2024-05-21 | Stop reason: HOSPADM

## 2024-05-18 RX ORDER — LANOLIN ALCOHOL/MO/W.PET/CERES
3 CREAM (GRAM) TOPICAL
Status: DISCONTINUED | OUTPATIENT
Start: 2024-05-18 | End: 2024-05-20

## 2024-05-18 RX ORDER — ACETAMINOPHEN 325 MG/1
650 TABLET ORAL EVERY 6 HOURS PRN
Status: DISCONTINUED | OUTPATIENT
Start: 2024-05-18 | End: 2024-05-21 | Stop reason: HOSPADM

## 2024-05-18 RX ORDER — METOCLOPRAMIDE HYDROCHLORIDE 5 MG/ML
10 INJECTION INTRAMUSCULAR; INTRAVENOUS EVERY 6 HOURS PRN
Status: DISCONTINUED | OUTPATIENT
Start: 2024-05-18 | End: 2024-05-21 | Stop reason: HOSPADM

## 2024-05-18 RX ORDER — POLYETHYLENE GLYCOL 3350 17 G/17G
17 POWDER, FOR SOLUTION ORAL DAILY
Status: DISCONTINUED | OUTPATIENT
Start: 2024-05-18 | End: 2024-05-21 | Stop reason: HOSPADM

## 2024-05-18 RX ORDER — HEPARIN SODIUM 5000 [USP'U]/ML
5000 INJECTION, SOLUTION INTRAVENOUS; SUBCUTANEOUS EVERY 8 HOURS SCHEDULED
Status: DISCONTINUED | OUTPATIENT
Start: 2024-05-18 | End: 2024-05-21 | Stop reason: HOSPADM

## 2024-05-18 RX ORDER — SENNOSIDES 8.6 MG
2 TABLET ORAL
Status: DISCONTINUED | OUTPATIENT
Start: 2024-05-18 | End: 2024-05-20

## 2024-05-18 RX ORDER — METOCLOPRAMIDE HYDROCHLORIDE 5 MG/ML
10 INJECTION INTRAMUSCULAR; INTRAVENOUS ONCE
Status: COMPLETED | OUTPATIENT
Start: 2024-05-18 | End: 2024-05-18

## 2024-05-18 RX ORDER — ATORVASTATIN CALCIUM 20 MG/1
20 TABLET, FILM COATED ORAL DAILY
Status: DISCONTINUED | OUTPATIENT
Start: 2024-05-18 | End: 2024-05-21 | Stop reason: HOSPADM

## 2024-05-18 RX ADMIN — POLYETHYLENE GLYCOL 3350 17 G: 17 POWDER, FOR SOLUTION ORAL at 08:39

## 2024-05-18 RX ADMIN — DOCUSATE SODIUM 100 MG: 100 CAPSULE, LIQUID FILLED ORAL at 17:19

## 2024-05-18 RX ADMIN — FAMOTIDINE 20 MG: 20 TABLET, FILM COATED ORAL at 08:39

## 2024-05-18 RX ADMIN — SENNOSIDES 8.6 MG: 8.6 TABLET, FILM COATED ORAL at 02:34

## 2024-05-18 RX ADMIN — SENNOSIDES 17.2 MG: 8.6 TABLET, FILM COATED ORAL at 21:43

## 2024-05-18 RX ADMIN — CALCIUM CARBONATE (ANTACID) CHEW TAB 500 MG 500 MG: 500 CHEW TAB at 17:19

## 2024-05-18 RX ADMIN — HEPARIN SODIUM 5000 UNITS: 5000 INJECTION INTRAVENOUS; SUBCUTANEOUS at 13:00

## 2024-05-18 RX ADMIN — ACETAMINOPHEN 650 MG: 325 TABLET, FILM COATED ORAL at 15:40

## 2024-05-18 RX ADMIN — METOCLOPRAMIDE 10 MG: 5 INJECTION, SOLUTION INTRAMUSCULAR; INTRAVENOUS at 02:31

## 2024-05-18 RX ADMIN — METHYLNALTREXONE BROMIDE 6 MG: 12 INJECTION, SOLUTION SUBCUTANEOUS at 02:44

## 2024-05-18 RX ADMIN — HEPARIN SODIUM 5000 UNITS: 5000 INJECTION INTRAVENOUS; SUBCUTANEOUS at 21:43

## 2024-05-18 RX ADMIN — ASPIRIN 81 MG 81 MG: 81 TABLET ORAL at 08:39

## 2024-05-18 RX ADMIN — MULTIPLE VITAMINS W/ MINERALS TAB 1 TABLET: TAB ORAL at 08:39

## 2024-05-18 RX ADMIN — ATORVASTATIN CALCIUM 20 MG: 20 TABLET, FILM COATED ORAL at 08:39

## 2024-05-18 RX ADMIN — HEPARIN SODIUM 5000 UNITS: 5000 INJECTION INTRAVENOUS; SUBCUTANEOUS at 06:58

## 2024-05-18 RX ADMIN — Medication 3 MG: at 21:43

## 2024-05-18 NOTE — ASSESSMENT & PLAN NOTE
Recent admission under Trauma service from 05/12/2024-05/16/2024 due to right inferior pubic ramus fracture 2/2 fall  Nonoperative management recommended by Orthopedic surgery.  Maintain weightbearing status on bilateral lower extremities as tolerated.  Continue multimodal analgesic regimen.  DVT prophylaxis.  Outpatient follow up with Orthopedic surgery.

## 2024-05-18 NOTE — ASSESSMENT & PLAN NOTE
Lab Results   Component Value Date    EGFR 60 05/17/2024    EGFR 45 05/16/2024    EGFR 49 05/14/2024    CREATININE 0.85 05/17/2024    CREATININE 1.08 05/16/2024    CREATININE 1.01 05/14/2024   Creatinine upon admission: 0.85  Baseline: 1.0-1.2  Avoid nephrotoxins and hypotension.  Monitor BMP.

## 2024-05-18 NOTE — ED PROVIDER NOTES
History  Chief Complaint   Patient presents with    Constipation     Patient presents with last BM on the 14th. On opiates for chronic pain. Had an episode of emesis tonight that staff at [facility] said looked like fecal matter. Patient is confused at baseline. Patient discharged from here yesterday.     89-year-old female history of CKD 3, GERD, recent admissions for Right inferior pubic ramus fracture and pacemaker placement brought in by ambulance for constipation and vomiting.  Per daughter at bedside has been almost a week since last bowel movement complicated by IV admission and starting opioids.  This evening was eating however had episodes of vomiting that looked like fecal matter.  No blood in the vomit.  Not black/tarry.  No chest pain or trouble breathing.  Has had previous abdominal surgery.      Constipation  Associated symptoms: nausea and vomiting    Associated symptoms: no abdominal pain, no diarrhea, no dysuria and no fever        Prior to Admission Medications   Prescriptions Last Dose Informant Patient Reported? Taking?   Multiple Vitamins-Minerals (MULTIVITAMIN ADULT PO)  Self Yes No   Sig: Take 1 tablet by mouth daily   acetaminophen (TYLENOL) 325 mg tablet   No No   Sig: Take 2 tablets (650 mg total) by mouth every 4 (four) hours as needed for mild pain   aspirin 81 MG tablet  Self Yes No   Sig: Take 81 mg by mouth daily   atorvastatin (LIPITOR) 20 mg tablet   No No   Sig: TAKE 1 TABLET BY MOUTH EVERY DAY   chlorhexidine (PERIDEX) 0.12 % solution  Self Yes No   Sig: RINSE 2 TIMES A DAY   famotidine (PEPCID) 20 mg tablet   No No   Sig: TAKE 1 TABLET BY MOUTH EVERY DAY   fluticasone (FLONASE) 50 mcg/act nasal spray   No No   Sig: SPRAY 1 SPRAY INTO EACH NOSTRIL EVERY DAY   melatonin 3 mg   No No   Sig: Take 1 tablet (3 mg total) by mouth daily at bedtime   oxyCODONE (ROXICODONE) 5 immediate release tablet   No No   Sig: Take 0.5 tablets (2.5 mg total) by mouth every 6 (six) hours as  needed for moderate pain or severe pain for up to 3 days Max Daily Amount: 10 mg   oxyCODONE (Roxicodone) 5 immediate release tablet   No No   Sig: Take 0.5 tablets (2.5 mg total) by mouth every 6 (six) hours as needed for moderate pain for up to 3 days Max Daily Amount: 10 mg   senna-docusate sodium (SENOKOT S) 8.6-50 mg per tablet   No No   Sig: Take 1 tablet by mouth 2 (two) times a day      Facility-Administered Medications: None       Past Medical History:   Diagnosis Date    Actinic keratosis     Basal cell carcinoma     Back     Cancer (HCC)     Coronary artery disease     Dementia (HCC)     Depression     Ear problems     HL (hearing loss)     Hyperlipidemia     Migraines     Ovarian cancer (HCC) 2004    s/p surgery. no chemo/RT    Phlebitis     R leg       Past Surgical History:   Procedure Laterality Date    ADENOIDECTOMY      APPENDECTOMY      CARDIAC ELECTROPHYSIOLOGY PROCEDURE N/A 5/15/2024    Procedure: Cardiac pacer implant;  Surgeon: Rufus Raines MD;  Location: [facility];  Service: Cardiology    CATARACT EXTRACTION, BILATERAL      HYSTERECTOMY  2005    ovarian CA    KNEE SURGERY Right     SKIN BIOPSY      TONSILECTOMY AND ADNOIDECTOMY      TONSILLECTOMY         Family History   Problem Relation Age of Onset    Depression Mother     Anxiety disorder Mother     Alcohol abuse Mother     Substance Abuse Mother     Asthma Mother     Diabetes Father 60    Brain cancer Son     Heart attack Sister     Coronary artery disease Sister     Mental illness Neg Hx      I have reviewed and agree with the history as documented.    E-Cigarette/Vaping    E-Cigarette Use Never User      E-Cigarette/Vaping Substances    Nicotine No     THC No     CBD No     Flavoring No     Other No     Unknown No      Social History     Tobacco Use    Smoking status: Former     Current packs/day: 0.25     Types: Cigarettes    Smokeless tobacco: Never    Tobacco comments:     until age 30   Vaping Use    Vaping status: Never  Used   Substance Use Topics    Alcohol use: Not Currently    Drug use: Never        Review of Systems   Constitutional:  Negative for activity change, fever and unexpected weight change.   Respiratory:  Negative for cough, chest tightness and shortness of breath.    Cardiovascular:  Negative for chest pain and palpitations.   Gastrointestinal:  Positive for constipation, nausea and vomiting. Negative for abdominal pain and diarrhea.   Genitourinary:  Negative for dysuria and hematuria.   Musculoskeletal:  Positive for gait problem.   Skin:  Negative for wound.   Allergic/Immunologic: Negative for immunocompromised state.   Neurological:  Negative for syncope.   All other systems reviewed and are negative.      Physical Exam  ED Triage Vitals   Temperature Pulse Respirations Blood Pressure SpO2   05/17/24 2259 05/17/24 2218 05/17/24 2218 05/17/24 2218 05/17/24 2218   97.5 °F (36.4 °C) 80 18 167/86 96 %      Temp Source Heart Rate Source Patient Position - Orthostatic VS BP Location FiO2 (%)   05/17/24 2259 05/17/24 2218 05/17/24 2218 05/17/24 2218 --   Axillary Monitor Lying Right arm       Pain Score       --                    Orthostatic Vital Signs  Vitals:    05/17/24 2218 05/18/24 0015   BP: 167/86    Pulse: 80 77   Patient Position - Orthostatic VS: Lying        Physical Exam  Vitals and nursing note reviewed.   Constitutional:       Appearance: She is ill-appearing. She is not toxic-appearing or diaphoretic.   HENT:      Head: Normocephalic and atraumatic.      Right Ear: External ear normal.      Left Ear: External ear normal.      Nose: Nose normal.      Mouth/Throat:      Mouth: Mucous membranes are moist.   Eyes:      General: No scleral icterus.     Extraocular Movements: Extraocular movements intact.      Conjunctiva/sclera: Conjunctivae normal.   Cardiovascular:      Rate and Rhythm: Normal rate and regular rhythm.      Pulses: Normal pulses.           Radial pulses are 2+ on the right side.         Dorsalis pedis pulses are 2+ on the right side and 2+ on the left side.      Heart sounds: Normal heart sounds, S1 normal and S2 normal. No murmur heard.  Pulmonary:      Effort: Pulmonary effort is normal. No respiratory distress.      Breath sounds: Normal breath sounds. No stridor. No wheezing.   Abdominal:      Palpations: Abdomen is soft.      Tenderness: There is generalized abdominal tenderness. There is no guarding or rebound.   Musculoskeletal:         General: Normal range of motion.      Cervical back: Normal range of motion.   Skin:     General: Skin is warm and dry.   Neurological:      General: No focal deficit present.      Mental Status: She is alert. Mental status is at baseline.      GCS: GCS eye subscore is 4. GCS verbal subscore is 4. GCS motor subscore is 6.   Psychiatric:         Mood and Affect: Mood normal.         ED Medications  Medications   sodium chloride 0.9 % bolus 500 mL (0 mL Intravenous Stopped 5/18/24 0236)   ondansetron (ZOFRAN) injection 4 mg (4 mg Intravenous Given 5/17/24 2257)   iohexol (OMNIPAQUE) 350 MG/ML injection (MULTI-DOSE) 100 mL (100 mL Intravenous Given 5/17/24 2337)   methylnaltrexone (Relistor) subcutaneous injection 6 mg (6 mg Subcutaneous Given 5/18/24 0244)   senna (SENOKOT) tablet 8.6 mg (8.6 mg Oral Given 5/18/24 0234)   metoclopramide (REGLAN) injection 10 mg (10 mg Intravenous Given 5/18/24 0231)       Diagnostic Studies  Results Reviewed       Procedure Component Value Units Date/Time    UA w Reflex to Microscopic w Reflex to Culture [[id number]]  (Abnormal) Collected: 05/18/24 0137    Lab Status: Final result Specimen: Urine, Indwelling Contreras Catheter Updated: 05/18/24 0153     Color, UA Light Yellow     Clarity, UA Clear     Specific Gravity, UA 1.036     pH, UA 6.5     Leukocytes, UA Negative     Nitrite, UA Negative     Protein, UA Negative mg/dl      Glucose, UA Negative mg/dl      Ketones, UA 20 (1+) mg/dl      Urobilinogen, UA <2.0 mg/dl       Bilirubin, UA Negative     Occult Blood, UA Negative    Comprehensive metabolic panel [[id number]]  (Abnormal) Collected: 05/17/24 2259    Lab Status: Final result Specimen: Blood from Arm, Right Updated: 05/17/24 2330     Sodium 137 mmol/L      Potassium 3.9 mmol/L      Chloride 98 mmol/L      CO2 27 mmol/L      ANION GAP 12 mmol/L      BUN 22 mg/dL      Creatinine 0.85 mg/dL      Glucose 168 mg/dL      Calcium 9.6 mg/dL      AST 33 U/L      ALT 33 U/L      Alkaline Phosphatase 77 U/L      Total Protein 7.3 g/dL      Albumin 4.4 g/dL      Total Bilirubin 1.39 mg/dL      eGFR 60 ml/min/1.73sq m     Narrative:      National Kid[employer] Foundation guidelines for Chronic Kidney Disease (CKD):     Stage 1 with normal or high GFR (GFR > 90 mL/min/1.73 square meters)    Stage 2 Mild CKD (GFR = 60-89 mL/min/1.73 square meters)    Stage 3A Moderate CKD (GFR = 45-59 mL/min/1.73 square meters)    Stage 3B Moderate CKD (GFR = 30-44 mL/min/1.73 square meters)    Stage 4 Severe CKD (GFR = 15-29 mL/min/1.73 square meters)    Stage 5 End Stage CKD (GFR <15 mL/min/1.73 square meters)  Note: GFR calculation is accurate only with a steady state creatinine    Magnesium [[id number]]  (Normal) Collected: 05/17/24 2259    Lab Status: Final result Specimen: Blood from Arm, Right Updated: 05/17/24 2330     Magnesium 2.0 mg/dL     Lipase [[id number]]  (Normal) Collected: 05/17/24 2259    Lab Status: Final result Specimen: Blood from Arm, Right Updated: 05/17/24 2330     Lipase 14 u/L     Protime-INR [[id number]]  (Normal) Collected: 05/17/24 2259    Lab Status: Final result Specimen: Blood from Arm, Right Updated: 05/17/24 2326     Protime 14.0 seconds      INR 1.02    APTT [[id number]]  (Normal) Collected: 05/17/24 2259    Lab Status: Final result Specimen: Blood from Arm, Right Updated: 05/17/24 2326     PTT 27 seconds     CBC and differential [[id number]]  (Abnormal) Collected: 05/17/24 2259    Lab Status: Final result Specimen: Blood from  Arm, Right Updated: 05/17/24 2310     WBC 11.18 Thousand/uL      RBC 4.42 Million/uL      Hemoglobin 14.3 g/dL      Hematocrit 41.6 %      MCV 94 fL      MCH 32.4 pg      MCHC 34.4 g/dL      RDW 13.6 %      MPV 9.3 fL      Platelets 146 Thousands/uL      nRBC 0 /100 WBCs      Segmented % 85 %      Immature Grans % 0 %      Lymphocytes % 10 %      Monocytes % 5 %      Eosinophils Relative 0 %      Basophils Relative 0 %      Absolute Neutrophils 9.45 Thousands/µL      Absolute Immature Grans 0.05 Thousand/uL      Absolute Lymphocytes 1.06 Thousands/µL      Absolute Monocytes 0.60 Thousand/µL      Eosinophils Absolute 0.00 Thousand/µL      Basophils Absolute 0.02 Thousands/µL                    XR chest 1 view portable   ED Interpretation by Jared Bowling MD (05/17 8830)   Chest x-ray independently interpreted by me.  Pacemaker in place.  Suspicious nodule in right middle lung again noted and similar to chest x-ray from yesterday.      CT chest abdomen pelvis w contrast    (Results Pending)         Procedures  ECG 12 Lead Documentation Only    Date/Time: 5/18/2024 3:03 AM    Performed by: Jared Bowling MD  Authorized by: Jared Bowling MD    Indications / Diagnosis:  Vomiting  ECG reviewed by me, the ED Provider: yes    Patient location:  ED  Previous ECG:     Previous ECG:  Compared to current    Comparison ECG info:  5/15/24    Similarity:  Changes noted (sinus rhythm replaced by 2nd degree block type 1)    Comparison to cardiac monitor: Yes    Interpretation:     Interpretation: abnormal    Quality:     Tracing quality:  Limited by artifact  Rate:     ECG rate:  78  Rhythm:     Rhythm: sinus rhythm and A-V block    Ectopy:     Ectopy: PVCs      PVCs:  Infrequent  QRS:     QRS axis:  Right    QRS intervals:  Normal  Conduction:     Conduction: abnormal      Abnormal conduction: 2nd degree (Mobitz 1)    ST segments:     ST segments:  Normal  T waves:     T waves: normal          ED Course  ED Course as of  05/18/24 0307   Fri May 17, 2024   2331 CBC and differential(!)  wnl   2331 Comprehensive metabolic panel(!)  wnl   2331 LIPASE: 14   2331 MAGNESIUM: 2.0   2344 XR chest 1 view portable  Chest x-ray independently interpreted by me.  Pacemaker in place.  Suspicious nodule in right middle lung again noted and similar to chest x-ray from yesterday.     Sat May 18, 2024   0154 UA w Reflex to Microscopic w Reflex to Culture(!)  No uti, some ketones suggestive of dehydration   0219 Per [facility]:      IMPRESSION:  1. Partially cavitary right upper lobe pulmonary nodule as well as consolidative changes of the lung  bases. Findings could be infectious/inflammatory or neoplastic and correlation with symptoms as well  as follow-up imaging is recommended.  2. There is heterogeneity of the thyroid gland for which nonemergent thyroid ultrasound should be  considered.  3. Please see the dedicated interpretation of abdomen and pelvis for findings in that region.   0219 Per [facility]    1. 1.5 cm nodule in the right hepatic lobe which is incompletely characterized on this study and  further evaluation with multiphase CT or MRI is suggested.  2. The gallbladder is moderately distended, consider right upper quadrant ultrasound if any concern  for cholecystitis.  3. Significantly distended urinary bladder.  4. Please see the dedicated interpretation of the thorax for findings in that region.                                       Medical Decision Making  Initial Impression: Most likely constipated due to being immobile from the recent fracture and starting opioids.  Also worried about possibility of ileus or SBO.  Do not think this is ACS given lack of diaphoresis, tachypnea, does not appear an extremis, no chest pain.  Do not think PE given lack of chest pain, shortness of breath, tachycardia, tachypnea, hypoxia.  Will get labs to assess for intra-abdominal pathology/infection, CT abdomen/pelvis to help differentiate between constipation versus  SBO versus ileus    Final Impression: Evaluation here revealed no SBO or ileus.  Mild to moderate amount of stool burden without severe constipation.  Did note lung nodule seen on chest x-ray which was there in the chest x-ray from the day before, added a CT chest to the CT abdomen/pelvis which did show a cavitary lesion in right middle lung which is suspicious for neoplastic process.  Did also incidentally note nodule on liver.  Discussed all these incidental findings with patient's daughter.  Patient already has an appointment with PCP in the next week who can see these results and will review them with her.  Did also note urinary retention requiring Contreras catheter insertion.  Possible that this is contributing to patient's constipation.  Given how uncomfortable patient appears in her poor baseline health status, had patient admitted for further care evaluation to make sure that she gets better.    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  Does have pacemaker in place    Problems Addressed:  Constipation, unspecified constipation type: complicated acute illness or injury with systemic symptoms  Urinary retention: complicated acute illness or injury  Vomiting of fecal matter with nausea: complicated acute illness or injury    Amount and/or Complexity of Data Reviewed  Independent Historian:      Details: History provided by daughter  Labs: ordered. Decision-making details documented in ED Course.  Radiology: ordered and independent interpretation performed. Decision-making details documented in ED Course.  ECG/medicine tests: ordered and independent interpretation performed.    Risk  OTC drugs.  Prescription drug management.  Decision regarding hospitalization.          Disposition  Final diagnoses:   Urinary retention   Constipation, unspecified constipation type   Vomiting of fecal matter with nausea     Time reflects when diagnosis was documented in both MDM as  applicable and the Disposition within this note       Time User Action Codes Description Comment    5/18/2024  2:39 AM Jared Bowling Add [R33.9] Urinary retention     5/18/2024  2:39 AM Aggie Bowlinga Add [K59.00] Constipation, unspecified constipation type     5/18/2024  2:39 AM Aggie Bowlinga Add [R11.13] Vomiting of fecal matter with nausea           ED Disposition       ED Disposition   Admit    Condition   Stable    Date/Time   Sat May 18, 2024  2:39 AM    Comment   Case was discussed with Naty Brooke and the patient's admission status was agreed to be Admission Status: observation status to the service of Dr. Gonzales .               Follow-up Information    None         Patient's Medications   Discharge Prescriptions    No medications on file     No discharge procedures on file.    PDMP Review         Value Time User    PDMP Reviewed  Yes 5/17/2024 10:34 PM Fei Esparza MD             ED Provider  Attending physically available and evaluated Bertalizzeth Estrada. I managed the patient along with the ED Attending.    Electronically Signed by           Jared Bowling MD  05/18/24 8101

## 2024-05-18 NOTE — PLAN OF CARE
Problem: PAIN - ADULT  Goal: Verbalizes/displays adequate comfort level or baseline comfort level  Description: Interventions:  - Encourage patient to monitor pain and request assistance  - Assess pain using appropriate pain scale  - Administer analgesics based on type and severity of pain and evaluate response  - Implement non-pharmacological measures as appropriate and evaluate response  - Consider cultural and social influences on pain and pain management  - Notify physician/advanced practitioner if interventions unsuccessful or patient reports new pain  Outcome: Progressing     Problem: INFECTION - ADULT  Goal: Absence or prevention of progression during hospitalization  Description: INTERVENTIONS:  - Assess and monitor for signs and symptoms of infection  - Monitor lab/diagnostic results  - Monitor all insertion sites, i.e. indwelling lines, tubes, and drains  - Monitor endotracheal if appropriate and nasal secretions for changes in amount and color  - [place] appropriate cooling/warming therapies per order  - Administer medications as ordered  - Instruct and encourage patient and family to use good hand hygiene technique  - Identify and instruct in appropriate isolation precautions for identified infection/condition  Outcome: Progressing  Goal: Absence of fever/infection during neutropenic period  Description: INTERVENTIONS:  - Monitor WBC    Outcome: Progressing     Problem: SAFETY ADULT  Goal: Patient will remain free of falls  Description: INTERVENTIONS:  - Educate patient/family on patient safety including physical limitations  - Instruct patient to call for assistance with activity   - Consult OT/PT to assist with strengthening/mobility   - Keep Call bell within reach  - Keep bed low and locked with side rails adjusted as appropriate  - Keep care items and personal belongings within reach  - Initiate and maintain comfort rounds  - Make Fall Risk Sign visible to staff  - Offer Toileting every 2 Hours,  in advance of need  - Initiate/Maintain bed/chair alarm  - Obtain necessary fall risk management equipment: yellow socks, yellow bracelet  - Apply yellow socks and bracelet for high fall risk patients  - Consider moving patient to room near nurses station  Outcome: Progressing  Goal: Maintain or return to baseline ADL function  Description: INTERVENTIONS:  -  Assess patient's ability to carry out ADLs; assess patient's baseline for ADL function and identify physical deficits which impact ability to perform ADLs (bathing, care of mouth/teeth, toileting, grooming, dressing, etc.)  - Assess/evaluate cause of self-care deficits   - Assess range of motion  - Assess patient's mobility; develop plan if impaired  - Assess patient's need for assistive devices and provide as appropriate  - Encourage maximum independence but intervene and supervise when necessary  - Involve family in performance of ADLs  - Assess for home care needs following discharge   - Consider OT consult to assist with ADL evaluation and planning for discharge  - Provide patient education as appropriate  Outcome: Progressing  Goal: Maintains/Returns to pre admission functional level  Description: INTERVENTIONS:  - Perform AM-PAC 6 Click Basic Mobility/ Daily Activity assessment daily.  - Set and communicate daily mobility goal to care team and patient/family/caregiver.   - Collaborate with rehabilitation services on mobility goals if consulted  - Perform Range of Motion 3 times a day.  - Reposition patient every 2 hours.  - Dangle patient 3 times a day  - Stand patient 3 times a day  - Ambulate patient 3 times a day  - Out of bed to chair 3 times a day   - Out of bed for meals 3 times a day  - Out of bed for toileting  - Record patient progress and toleration of activity level   Outcome: Progressing     Problem: DISCHARGE PLANNING  Goal: Discharge to home or other facility with appropriate resources  Description: INTERVENTIONS:  - Identify barriers to  discharge w/patient and caregiver  - Arrange for needed discharge resources and transportation as appropriate  - Identify discharge learning needs (meds, wound care, etc.)  - Arrange for interpretive services to assist at discharge as needed  - Refer to Case Management Department for coordinating discharge planning if the patient needs post-hospital services based on physician/advanced practitioner order or complex needs related to functional status, cognitive ability, or social support system  Outcome: Progressing

## 2024-05-18 NOTE — ASSESSMENT & PLAN NOTE
Blood pressure is reviewed and acceptable.  Last recorded pressure: 159/86  Currently not on any antihypertensive medications as an outpatient.  Monitor blood pressure.

## 2024-05-18 NOTE — UTILIZATION REVIEW
Initial Clinical Review    WAS OBSERVATION 5/18/24 @ 0241 (start of care 5/17/24) CONVERTED TO INPATIENT ADMISSION 5/18/24 @ 1508 DUE TO CONTINUED STAY REQUIRED TO CARE FOR PATIENT WITH DX: CONSTIPATION.    Admission: Date/Time/Statement:   Admission Orders (From admission, onward)       Ordered        05/18/24 1508  INPATIENT ADMISSION  Once            05/18/24 0241  Place in Observation  Once                          Orders Placed This Encounter   Procedures    INPATIENT ADMISSION     Standing Status:   Standing     Number of Occurrences:   1     Order Specific Question:   Level of Care     Answer:   Med Surg [16]     Order Specific Question:   Estimated length of stay     Answer:   More than 2 Midnights     Order Specific Question:   Certification     Answer:   I certify that inpatient services are medically necessary for this patient for a duration of greater than two midnights. See H&P and MD Progress Notes for additional information about the patient's course of treatment.     ED Arrival Information       Expected   -    Arrival   5/17/2024 22:13    Acuity   Urgent              Means of arrival   Ambulance    Escorted by   [facility]    Service   Hospitalist    Admission type   Emergency              Arrival complaint   EMS             Chief Complaint   Patient presents with    Constipation     Patient presents with last BM on the 14th. On opiates for chronic pain. Had an episode of emesis tonight that staff at [facility] said looked like fecal matter. Patient is confused at baseline. Patient discharged from here yesterday.       Initial Presentation: 89 y.o. female to ED via EMS from Acute Rehab  Present to ED with complaints of constipation and vomiting x 1 per nursing staff. Patient has not had a bowel movement in 4 days. Patient is a poor historian at baseline due to underlying dementia.  Patient received Relistor and Senna in the ED.   Of note, patient was recently  hospitalized under Trauma service from 05/12/2024-05/16/2024 due to right inferior pubic ramus fracture secondary to a fall, nonoperative management was recommended. Patient has been on opioids for pain control related to the fracture. At that time, EKG on admission displayed Mobitz type 1 second degree AV block; therefore, Cardiology and EP were consulted. Patient had placement of single-chamber Medtronic PPM on 05/15/2024.   PMHX: dementia, depression, HLD, HTN, CKD, GERD and s/p PPM   Admitted to OBS with DX: Constipation   on exam: hypertensive; generalized abdominal tenderness. UA: +ketones   CT imaging displayed distended bladder; therefore, an indwelling lerner catheter was placed.   PLAN: stool record; clear - adv diet as micaela; monitor labs; pain control       Anticipated Length of Stay/Certification Statement: Patient will be admitted on an observation basis with an anticipated length of stay of less than 2 midnights secondary to constipation and urinary retention.       Date: 5/18/24 - CHANGED TO INPATIENT    CT chest 2.7 cm right upper lobe mass infectious/inflammatory or malignant  - 3.8 cm right lower lobe mass, infectious/inflammatory or malignant  - Indeterminate right upper lobe and right middle lobe peribronchial cardiovascular nodules, requiring follow-up  - Indeterminate right hepatic lobe lesion, with dilated common bile duct-consider MRI/MRCP  - Wall thickening of esophagus  - Simple cyst in the pancreas body, 0.9 cm, requiring follow-up every 2 years for 2 times-4 years no further follow-up  - 2.2 cm cyst arising from the pancreatic uncinate process-recommendation of GI or surgical oncology with possible need for EUS  Discussed with daughter, overall she would not like any biopsies, and certainly would not consider any therapeutic treatment if malignancy is present  Plan: stool record; clear - adv diet as micaela; monitor labs; pain control       Date: 5/19/24   Day 3: Has surpassed a 2nd midnight  with active treatments and services.  Feels overall well today, denies any abdominal complaints. Albumin 3.4; will call again to provide updated CT abdomen/chest findings   Plan: stool record; clear - adv diet as micaela; monitor labs; pain control     ED Triage Vitals   Temperature Pulse Respirations Blood Pressure SpO2   24   97.5 °F (36.4 °C) 80 18 167/86 96 %      Temp Source Heart Rate Source Patient Position - Orthostatic VS BP Location FiO2 (%)   24 --   Axillary Monitor Lying Right arm       Pain Score       24 1540       3          Wt Readings from Last 1 Encounters:   24 66.7 kg (147 lb)     Additional Vital Signs:   Date/Time Temp Pulse Resp BP MAP (mmHg) SpO2 O2 Device Patient Position - Orthostatic VS   24 08:02:22 98.7 °F (37.1 °C) 80 16 113/72 86 96 % -- --   24 22:00:21 97.9 °F (36.6 °C) 91 16 166/96 119 95 % -- --   24 1540 -- -- -- -- -- 98 % -- --   24 15:21:58 97.8 °F (36.6 °C) 86 16 154/95 115 96 % Nasal cannula      Date/Time Temp Pulse Resp BP MAP (mmHg) SpO2 O2 Device Patient Position - Orthostatic VS   24 07:49:33 97.6 °F (36.4 °C) 97 18 127/77 94 96 % -- --   24 03:55:22 98.2 °F (36.8 °C) 83 18 159/86 110 98 % -- --   24 0015 -- 77 18 -- -- 96 % None (Room air) --   24 97.5 °F (36.4 °C) -- -- -- -- -- -- --   24 -- 80 18 167/86 -- 96 % None (Room air) Lying       EKnd degree AV block with PVCs, attempting to pace.       Pertinent Labs/Diagnostic Test Results:   CT chest abdomen pelvis w contrast   Final Result by Britt Harrison MD ( [id number])      1.  2.7 cm right upper lobe mass with cavitation/necrosis. This may represent infectious/inflammatory or malignant lesion.   2.  3.8 cm right lower lobe masslike lesion, infectious/inflammatory versus neoplastic.   3.  Indeterminate right upper lobe  and right middle lobe peribronchovascular nodules. Recommend attention on follow-up imaging.   4.  Indeterminate right hepatic lobe lesion. Distended gallbladder. Dilated common bile duct. Consider MRI/MRCP for further evaluation.   5.  Circumferential wall thickening of the esophagus may represent esophagitis.   6.  Incidental findings in the pancreas and thyroid gland with recommendations as outlined above.   7.  New nondisplaced fracture of the right superior pubic ramus compared to 5/12/2024 CT. Known comminuted fracture of the right inferior pubic ramus.      The major findings are in agreement with the preliminary report provided by [facility] which was provided shortly after completion of the exam. The additional finding of dilated common bile duct and new right superior pubic ramus fracture  will    now be communicated with patient's clinical team by our radiology liaison.      The study was marked in EPIC for immediate notification.      Workstation performed: [id number]         XR chest 1 view portable   ED Interpretation by Jared Bowling MD (05/17 7724)   Chest x-ray independently interpreted by me.  Pacemaker in place.  Suspicious nodule in right middle lung again noted and similar to chest x-ray from yesterday.      Final Result by Thania Zamora MD (05/18 1040)      Right upper and right lower lobe nodules, see subsequent chest CT.            Workstation performed: [id number]              Results from last 7 days   Lab Units 05/19/24 0602 05/17/24 2259 05/16/24 0431 05/14/24 0633 05/13/24 0446 05/12/24  1654   WBC Thousand/uL 9.66 11.18* 6.53 6.53 8.73 9.09   HEMOGLOBIN g/dL 13.0 14.3 11.9 12.8 14.5 13.2   HEMATOCRIT % 38.2 41.6 35.7 38.7 43.6 38.9   PLATELETS Thousands/uL 157 146* 105* 114* 140* 142*   TOTAL NEUT ABS Thousands/µL 6.25 9.45*  --   --   --  6.40        Results from last 7 days   Lab Units 05/19/24 0602 05/17/24 2259 05/16/24 0431 05/14/24 0633 05/13/24 0446  05/12/24  1654   SODIUM mmol/L 139 137 142 141 144 139   POTASSIUM mmol/L 4.0 3.9 4.6 4.5 3.8 4.0   CHLORIDE mmol/L 105 98 108 108 109* 104   CO2 mmol/L 26 27 28 28 26 28   ANION GAP mmol/L 8 12 6 5 9 7   BUN mg/dL 16 22 20 22 18 22   CREATININE mg/dL 0.81 0.85 1.08 1.01 0.98 1.09   EGFR ml/min/1.73sq m 64 60 45 49 51 45   CALCIUM mg/dL 8.5 9.6 8.6 8.7 9.0 8.9   MAGNESIUM mg/dL  --  2.0 2.0  --  2.1 2.0   PHOSPHORUS mg/dL  --   --   --   --  3.8  --      Results from last 7 days   Lab Units 05/19/24  0602 05/17/24 2259 05/12/24  1654   AST U/L 25 33 14   ALT U/L 27 33 10   ALK PHOS U/L 58 77 68   TOTAL PROTEIN g/dL 5.5* 7.3 6.1*   ALBUMIN g/dL 3.4* 4.4 4.1   TOTAL BILIRUBIN mg/dL 0.87 1.39* 0.60        Results from last 7 days   Lab Units 05/19/24  0602 05/17/24  2259 05/16/24  0431 05/14/24  0633 05/13/24  0446 05/12/24  1654   GLUCOSE RANDOM mg/dL 110 168* 104 108 116 127        Results from last 7 days   Lab Units 05/17/24  2259   PROTIME seconds 14.0   INR  1.02   PTT seconds 27        Results from last 7 days   Lab Units 05/17/24  2259   LIPASE u/L 14        Results from last 7 days   Lab Units 05/18/24  0137 05/12/24  1651   CLARITY UA  Clear Clear   COLOR UA  Light Yellow Light Yellow   SPEC GRAV UA  1.036* 1.011   PH UA  6.5 6.0   GLUCOSE UA mg/dl Negative Negative   KETONES UA mg/dl 20 (1+)* Negative   BLOOD UA  Negative Negative   PROTEIN UA mg/dl Negative Negative   NITRITE UA  Negative Negative   BILIRUBIN UA  Negative Negative   UROBILINOGEN UA (BE) mg/dl <2.0 <2.0   LEUKOCYTES UA  Negative Moderate*   WBC UA /hpf  --  10-20*   RBC UA /hpf  --  1-2   BACTERIA UA /hpf  --  None Seen   EPITHELIAL CELLS WET PREP /hpf  --  Occasional        Results from last 7 days   Lab Units 05/12/24  1651   URINE CULTURE  80,000-89,000 cfu/ml          ED Treatment:   Medication Administration from 05/17/2024 2211 to 05/18/2024 0347         Date/Time Order Dose Route Action     05/18/2024 0236 EDT sodium chloride 0.9 %  bolus 500 mL 0 mL Intravenous Stopped     05/17/2024 2258 EDT sodium chloride 0.9 % bolus 500 mL 500 mL Intravenous New Bag     05/17/2024 2257 EDT ondansetron (ZOFRAN) injection 4 mg 4 mg Intravenous Given     05/17/2024 2337 EDT iohexol (OMNIPAQUE) 350 MG/ML injection (MULTI-DOSE) 100 mL 100 mL Intravenous Given     05/18/2024 0244 EDT methylnaltrexone (Relistor) subcutaneous injection 6 mg 6 mg Subcutaneous Given     05/18/2024 0234 EDT senna (SENOKOT) tablet 8.6 mg 8.6 mg Oral Given     05/18/2024 0231 EDT metoclopramide (REGLAN) injection 10 mg 10 mg Intravenous Given            Present on Admission:   Essential hypertension   GERD (gastroesophageal reflux disease)   Late onset Alzheimer's disease without behavioral disturbance (HCC)   Other hyperlipidemia   Closed fracture of ramus of right pubis (HCC)   Stage 3a chronic kidney disease (HCC)      Admitting Diagnosis: Urinary retention [R33.9]  Constipation [K59.00]  Vomiting of fecal matter with nausea [R11.13]  Constipation, unspecified constipation type [K59.00]    Age/Sex: 89 y.o. female    Admission Orders: SCDS; I/O; Stool Record; Clears    Scheduled Medications:  aspirin, 81 mg, Oral, Daily  atorvastatin, 20 mg, Oral, Daily  docusate sodium, 100 mg, Oral, BID  famotidine, 20 mg, Oral, Daily  fluticasone, 1 spray, Each Nare, Daily  heparin (porcine), 5,000 Units, Subcutaneous, Q8H MAMTA  melatonin, 3 mg, Oral, HS  multivitamin-minerals, 1 tablet, Oral, Daily  polyethylene glycol, 17 g, Oral, Daily  senna, 2 tablet, Oral, HS    methylnaltrexone (Relistor) subcutaneous injection 6 mg  Dose: 6 mg  Freq: Once Route: SC  Start: 05/18/24 0200 End: 05/18/24 0244      Continuous IV Infusions: none        PRN Meds:  acetaminophen, 650 mg, Oral, Q6H PRN  metoclopramide, 10 mg, Intravenous, Q6H PRN  (5/18 recd x1)  oxyCODONE, 2.5 mg, Oral, Q6H PRN        Network Utilization Review Department  ATTENTION: Please call with any questions or concerns to [phone] and  carefully listen to the prompts so that you are directed to the right person. All voicemails are confidential.   For Discharge needs, contact Care Management DC Support Team at [phone] opt. 2  Send all requests for admission clinical reviews, approved or denied determinations and any other requests to dedicated fax number below belonging to the campus where the patient is receiving treatment. List of dedicated fax numbers for the Facilities:  FACILITY NAME UR FAX NUMBER   ADMISSION DENIALS (Administrative/Medical Necessity) [phone]   DISCHARGE SUPPORT TEAM (NETWORK) [phone]   PARENT CHILD HEALTH (Maternity/NICU/Pediatrics) [phone]   [facility] [phone]   [facility] [phone]   [facility] [phone]   [facility] [phone]   [facility] [phone]   [facility] [phone]   [facility] [phone]   [facility] [phone]   [facility] [phone]   [facility] [phone]   [facility] [phone]   [facility] [phone]

## 2024-05-18 NOTE — ASSESSMENT & PLAN NOTE
"Incidental finding on CT; \"1.5 cm nodule in the right hepatic lobe\"  Recommend MRI or multiphase CT for further evaluation.  "

## 2024-05-18 NOTE — ASSESSMENT & PLAN NOTE
During recent hospitalization, patient was noted to have arrhythmia with EKG on that admission displaying Mobitz type 1 second degree AV block; therefore, Cardiology and EP were consulted.  S/P placement of single-chamber Medtronic PPM on 05/15/2024.  Local wound care as indicated to pacemaker insertion site.

## 2024-05-18 NOTE — H&P
"[facility]  H&P  Name: Berta Estrada 89 y.o. female I MRN: [id number]  Unit/Bed#: S -01 I Date of Admission: 5/17/2024   Date of Service: 5/18/2024 I Hospital Day: 0      Assessment & Plan   * Constipation  Assessment & Plan  Presentation: Patient presents from [facility] due to complaints of constipation and vomiting x 1 per nursing staff. Patient has not had a bowel movement in 4 days. Suspect 2/2 to recent opioid use for pain.   CT A/P: \"The gallbladder is moderately distended, consider right upper quadrant ultrasound if concerned. Significantly distended urinary bladder.\"  UA: +ketones  Indwelling lerner catheter placed in ED due to urinary retention 2/2 constipation.  Received Senna and Relistor in ED.  Bowel regimen.   Stool record.  IV antiemetics.  Clear liquid diet, advance as tolerated.    Closed fracture of ramus of right pubis (HCC)  Assessment & Plan  Recent admission under Trauma service from 05/12/2024-05/16/2024 due to right inferior pubic ramus fracture 2/2 fall  Nonoperative management recommended by Orthopedic surgery.  Maintain weightbearing status on bilateral lower extremities as tolerated.  Continue multimodal analgesic regimen.  DVT prophylaxis.  Outpatient follow up with Orthopedic surgery.    History of permanent cardiac pacemaker placement  Assessment & Plan  During recent hospitalization, patient was noted to have arrhythmia with EKG on that admission displaying Mobitz type 1 second degree AV block; therefore, Cardiology and EP were consulted.  S/P placement of single-chamber Medtronic PPM on 05/15/2024.  Local wound care as indicated to pacemaker insertion site.    Liver nodule  Assessment & Plan  Incidental finding on CT; \"1.5 cm nodule in the right hepatic lobe\"  Recommend MRI or multiphase CT for further evaluation.    Late onset Alzheimer's disease without behavioral disturbance (HCC)  Assessment & Plan  Alert & oriented to self. "   Delirium precautions.  Decrease external stimuli.  Fall precautions.    Stage 3a chronic kidney disease (HCC)  Assessment & Plan  Lab Results   Component Value Date    EGFR 60 05/17/2024    EGFR 45 05/16/2024    EGFR 49 05/14/2024    CREATININE 0.85 05/17/2024    CREATININE 1.08 05/16/2024    CREATININE 1.01 05/14/2024   Creatinine upon admission: 0.85  Baseline: 1.0-1.2  Avoid nephrotoxins and hypotension.  Monitor BMP.    Essential hypertension  Assessment & Plan  Blood pressure is reviewed and acceptable.  Last recorded pressure: 159/86  Currently not on any antihypertensive medications as an outpatient.  Monitor blood pressure.    Other hyperlipidemia  Assessment & Plan  Continue statin.    GERD (gastroesophageal reflux disease)  Assessment & Plan  Continue Pepcid.         VTE Pharmacologic Prophylaxis: VTE Score: 8 High Risk (Score >/= 5) - Pharmacological DVT Prophylaxis Ordered: heparin. Sequential Compression Devices Ordered.  Code Status: Level 3 - DNAR and DNI per SNF paperwork  Discussion with family:  Daughter updated by ED.     Anticipated Length of Stay: Patient will be admitted on an observation basis with an anticipated length of stay of less than 2 midnights secondary to constipation and urinary retention.    Total Time Spent on Date of Encounter in care of patient: 70 mins. This time was spent on one or more of the following: performing physical exam; counseling and coordination of care; obtaining or reviewing history; documenting in the medical record; reviewing/ordering tests, medications or procedures; communicating with other healthcare professionals and discussing with patient's family/caregivers.    Chief Complaint: Constipation    History of Present Illness:  Berta Estrada is a 89 y.o. female with a PMH of dementia, depression, HLD, HTN, CKD, GERD and s/p PPM who presents from [facility] due to complaints of constipation and vomiting x 1 per nursing staff. Patient has not  had a bowel movement in 4 days. Patient is a poor historian at baseline due to underlying dementia.     Of note, patient was recently hospitalized under Trauma service from 05/12/2024-05/16/2024 due to right inferior pubic ramus fracture secondary to a fall, nonoperative management was recommended. Patient has been on opioids for pain control related to the fracture. At that time, EKG on admission displayed Mobitz type 1 second degree AV block; therefore, Cardiology and EP were consulted. Patient had placement of single-chamber Medtronic PPM on 05/15/2024.    Upon evaluation in the ED, CT imaging displayed distended bladder; therefore, an indwelling lerner catheter was placed. Patient received Relistor and Senna in the ED. Patient will be admitted for constipation and urinary retention.    Review of Systems:  Review of Systems   Unable to perform ROS: Dementia       Past Medical and Surgical History:   Past Medical History:   Diagnosis Date    Actinic keratosis     Basal cell carcinoma     Back     Cancer (HCC)     Coronary artery disease     Dementia (HCC)     Depression     Ear problems     HL (hearing loss)     Hyperlipidemia     Migraines     Ovarian cancer (HCC) 2004    s/p surgery. no chemo/RT    Phlebitis     R leg       Past Surgical History:   Procedure Laterality Date    ADENOIDECTOMY      APPENDECTOMY      CARDIAC ELECTROPHYSIOLOGY PROCEDURE N/A 5/15/2024    Procedure: Cardiac pacer implant;  Surgeon: Rufus Raines MD;  [facility];  Service: Cardiology    CATARACT EXTRACTION, BILATERAL      HYSTERECTOMY  2005    ovarian CA    KNEE SURGERY Right     SKIN BIOPSY      TONSILECTOMY AND ADNOIDECTOMY      TONSILLECTOMY         Meds/Allergies:  Prior to Admission medications    Medication Sig Start Date End Date Taking? Authorizing Provider   acetaminophen (TYLENOL) 325 mg tablet Take 2 tablets (650 mg total) by mouth every 4 (four) hours as needed for mild pain 5/16/24   Trevon Mcclendon PA-C    aspirin 81 MG tablet Take 81 mg by mouth daily    Historical Provider, MD   atorvastatin (LIPITOR) 20 mg tablet TAKE 1 TABLET BY MOUTH EVERY DAY 2/8/24   Kim Dewey MD   chlorhexidine (PERIDEX) 0.12 % solution RINSE 2 TIMES A DAY 4/29/20   Historical Provider, MD   famotidine (PEPCID) 20 mg tablet TAKE 1 TABLET BY MOUTH EVERY DAY 4/26/24   Kim Dewey MD   fluticasone (FLONASE) 50 mcg/act nasal spray SPRAY 1 SPRAY INTO EACH NOSTRIL EVERY DAY 5/17/24   Kim Dewey MD   melatonin 3 mg Take 1 tablet (3 mg total) by mouth daily at bedtime 5/16/24   Trevon Mcclendon PA-C   Multiple Vitamins-Minerals (MULTIVITAMIN ADULT PO) Take 1 tablet by mouth daily    Historical Provider, MD   oxyCODONE (ROXICODONE) 5 immediate release tablet Take 0.5 tablets (2.5 mg total) by mouth every 6 (six) hours as needed for moderate pain or severe pain for up to 3 days Max Daily Amount: 10 mg 5/16/24 5/19/24  Trevon Mcclendon PA-C   senna-docusate sodium (SENOKOT S) 8.6-50 mg per tablet Take 1 tablet by mouth 2 (two) times a day 5/16/24   Trevon Mcclendon PA-C   oxyCODONE (Roxicodone) 5 immediate release tablet Take 0.5 tablets (2.5 mg total) by mouth every 6 (six) hours as needed for moderate pain for up to 3 days Max Daily Amount: 10 mg 5/16/24 5/18/24  Idania Rawls, DO     I have reveiwed home medications using records provided by [facility].    Allergies:   Allergies   Allergen Reactions    Codeine Hives    Morphine Other (See Comments)     Redness in face, swelling    Other      Seasonal    Propoxyphene        Social History:  Marital Status:    Occupation: Retired Nurse's Aide  Patient Pre-hospital Living Situation: Skilled Nursing Facility: [facility]  Patient Pre-hospital Level of Mobility: unable to be assessed at time of evaluation  Patient Pre-hospital Diet Restrictions: None  Substance Use History:   Social History     Substance and Sexual Activity   Alcohol Use Not Currently     Social History  "    Tobacco Use   Smoking Status Former    Current packs/day: 0.25    Types: Cigarettes   Smokeless Tobacco Never   Tobacco Comments    until age 30     Social History     Substance and Sexual Activity   Drug Use Never       Family History:  Family History   Problem Relation Age of Onset    Depression Mother     Anxiety disorder Mother     Alcohol abuse Mother     Substance Abuse Mother     Asthma Mother     Diabetes Father 60    Brain cancer Son     Heart attack Sister     Coronary artery disease Sister     Mental illness Neg Hx        Physical Exam:     Vitals:   Blood Pressure: 159/86 (05/18/24 0355)  Pulse: 83 (05/18/24 0355)  Temperature: 98.2 °F (36.8 °C) (05/18/24 0355)  Temp Source: Axillary (05/17/24 2259)  Respirations: 18 (05/18/24 0355)  Height: 5' 5\" (165.1 cm) (05/18/24 0008)  SpO2: 98 % (05/18/24 0355)    Physical Exam  Vitals and nursing note reviewed.   HENT:      Head: Normocephalic.      Nose: Nose normal.      Mouth/Throat:      Pharynx: Oropharynx is clear.   Eyes:      General: No scleral icterus.     Extraocular Movements: Extraocular movements intact.   Cardiovascular:      Rate and Rhythm: Normal rate and regular rhythm.      Pulses:           Posterior tibial pulses are 1+ on the right side and 1+ on the left side.   Pulmonary:      Effort: Pulmonary effort is normal. No respiratory distress.      Breath sounds: Normal breath sounds. No wheezing or rales.   Chest:      Chest wall: No tenderness.      Comments: LCW dressing D&I  Abdominal:      General: Bowel sounds are normal. There is no distension.      Palpations: Abdomen is soft.      Tenderness: There is no abdominal tenderness.   Skin:     General: Skin is dry.   Neurological:      Mental Status: She is alert. She is disoriented.      Comments: Oriented to self          Additional Data:     Lab Results:  Results from last 7 days   Lab Units 05/17/24  2259   WBC Thousand/uL 11.18*   HEMOGLOBIN g/dL 14.3   HEMATOCRIT % 41.6   PLATELETS " Thousands/uL 146*   SEGS PCT % 85*   LYMPHO PCT % 10*   MONO PCT % 5   EOS PCT % 0     Results from last 7 days   Lab Units 24  2259   SODIUM mmol/L 137   POTASSIUM mmol/L 3.9   CHLORIDE mmol/L 98   CO2 mmol/L 27   BUN mg/dL 22   CREATININE mg/dL 0.85   ANION GAP mmol/L 12   CALCIUM mg/dL 9.6   ALBUMIN g/dL 4.4   TOTAL BILIRUBIN mg/dL 1.39*   ALK PHOS U/L 77   ALT U/L 33   AST U/L 33   GLUCOSE RANDOM mg/dL 168*     Results from last 7 days   Lab Units 24  2259   INR  1.02                   Lines/Drains:  Invasive Devices       Peripheral Intravenous Line  Duration             Peripheral IV 24 Proximal;Right;Ventral (anterior) Forearm <1 day              Drain  Duration             Urethral Catheter 18 Fr. <1 day                  Urinary Catheter:  Goal for removal: Voiding trial when ambulation improves             Imaging: Personally reviewed the following imaging: chest xray  XR chest 1 view portable   ED Interpretation by Jared Bowling MD ( [id number])   Chest x-ray independently interpreted by me.  Pacemaker in place.  Suspicious nodule in right middle lung again noted and similar to chest x-ray from yesterday.      CT chest abdomen pelvis w contrast    (Results Pending)       EKG and Other Studies Reviewed on Admission:   EKnd degree AV block with PVCs, attempting to pace.    ** Please Note: This note has been constructed using a voice recognition system. **

## 2024-05-18 NOTE — QUICK NOTE
Update was provided to daughter, intending to come this afternoon to see mother.    [facility] Medicine

## 2024-05-18 NOTE — PLAN OF CARE
Problem: PAIN - ADULT  Goal: Verbalizes/displays adequate comfort level or baseline comfort level  Description: Interventions:  - Encourage patient to monitor pain and request assistance  - Assess pain using appropriate pain scale  - Administer analgesics based on type and severity of pain and evaluate response  - Implement non-pharmacological measures as appropriate and evaluate response  - Consider cultural and social influences on pain and pain management  - Notify physician/advanced practitioner if interventions unsuccessful or patient reports new pain  Outcome: Progressing     Problem: INFECTION - ADULT  Goal: Absence or prevention of progression during hospitalization  Description: INTERVENTIONS:  - Assess and monitor for signs and symptoms of infection  - Monitor lab/diagnostic results  - Monitor all insertion sites, i.e. indwelling lines, tubes, and drains  - Monitor endotracheal if appropriate and nasal secretions for changes in amount and color  - [place] appropriate cooling/warming therapies per order  - Administer medications as ordered  - Instruct and encourage patient and family to use good hand hygiene technique  - Identify and instruct in appropriate isolation precautions for identified infection/condition  Outcome: Progressing  Goal: Absence of fever/infection during neutropenic period  Description: INTERVENTIONS:  - Monitor WBC    Outcome: Progressing     Problem: SAFETY ADULT  Goal: Patient will remain free of falls  Description: INTERVENTIONS:  - Educate patient/family on patient safety including physical limitations  - Instruct patient to call for assistance with activity   - Consult OT/PT to assist with strengthening/mobility   - Keep Call bell within reach  - Keep bed low and locked with side rails adjusted as appropriate  - Keep care items and personal belongings within reach  - Initiate and maintain comfort rounds  - Make Fall Risk Sign visible to staff  - Offer Toileting every 2 Hours,  in advance of need  - Initiate/Maintain bed/chair alarm  - Obtain necessary fall risk management equipment:   - Apply yellow socks and bracelet for high fall risk patients  - Consider moving patient to room near nurses station  Outcome: Progressing  Goal: Maintain or return to baseline ADL function  Description: INTERVENTIONS:  -  Assess patient's ability to carry out ADLs; assess patient's baseline for ADL function and identify physical deficits which impact ability to perform ADLs (bathing, care of mouth/teeth, toileting, grooming, dressing, etc.)  - Assess/evaluate cause of self-care deficits   - Assess range of motion  - Assess patient's mobility; develop plan if impaired  - Assess patient's need for assistive devices and provide as appropriate  - Encourage maximum independence but intervene and supervise when necessary  - Involve family in performance of ADLs  - Assess for home care needs following discharge   - Consider OT consult to assist with ADL evaluation and planning for discharge  - Provide patient education as appropriate  Outcome: Progressing  Goal: Maintains/Returns to pre admission functional level  Description: INTERVENTIONS:  - Perform AM-PAC 6 Click Basic Mobility/ Daily Activity assessment daily.  - Set and communicate daily mobility goal to care team and patient/family/caregiver.   - Collaborate with rehabilitation services on mobility goals if consulted  - Perform Range of Motion 3 times a day.  - Reposition patient every 2 hours.  - Dangle patient 3 times a day  - Stand patient 3 times a day  - Ambulate patient 3 times a day  - Out of bed to chair 3 times a day   - Out of bed for meals 3 times a day  - Out of bed for toileting  - Record patient progress and toleration of activity level   Outcome: Progressing     Problem: DISCHARGE PLANNING  Goal: Discharge to home or other facility with appropriate resources  Description: INTERVENTIONS:  - Identify barriers to discharge w/patient and  caregiver  - Arrange for needed discharge resources and transportation as appropriate  - Identify discharge learning needs (meds, wound care, etc.)  - Arrange for interpretive services to assist at discharge as needed  - Refer to Case Management Department for coordinating discharge planning if the patient needs post-hospital services based on physician/advanced practitioner order or complex needs related to functional status, cognitive ability, or social support system  Outcome: Progressing     Problem: Knowledge Deficit  Goal: Patient/family/caregiver demonstrates understanding of disease process, treatment plan, medications, and discharge instructions  Description: Complete learning assessment and assess knowledge base.  Interventions:  - Provide teaching at level of understanding  - Provide teaching via preferred learning methods  Outcome: Progressing

## 2024-05-18 NOTE — ED ATTENDING ATTESTATION
5/17/2024  I, Fei Esparza MD, saw and evaluated the patient. I have discussed the patient with the resident/non-physician practitioner and agree with the resident's/non-physician practitioner's findings, Plan of Care, and MDM as documented in the resident's/non-physician practitioner's note, except where noted. All available labs and Radiology studies were reviewed.  I was present for key portions of any procedure(s) performed by the resident/non-physician practitioner and I was immediately available to provide assistance.       At this point I agree with the current assessment done in the Emergency Department.  I have conducted an independent evaluation of this patient a history and physical is as follows: Patient is a 89 year old female with no BM since this past Sunday. No flatus. (+) abdominal pain and N/V tonight. Vomited fecal looking material as per daughter. Patient has dementia and cannot provide accurate hx. Was last seen in this ED on 5/12/24 for pelvic fracture and 2nd degree heart block and got a PPM. No fever. No urinary sx. Has had prior hysterectomy and appy. PMPAWARERX website checked on this patient and no Rx found. NCAT. No scleral icterus. Mucous membranes somewhat dry. Heart regular without murmur. Abdomen soft and nontender. Diminished bowel sounds. Mild LE edema. No rash noted. No jaundice. DDx including but not limited to: gastroenteritis, gastritis, PUD, GERD, gastroparesis, hepatitis, pancreatitis, colitis, enteritis, diverticulitis, food poisoning, mesenteric adenitis, epiploic appendagitis, mesenteric panniculitis, mesenteric ischemia, IBD, IBS, ileus, bowel obstruction, volvulus, internal hernia, AAA, cholecystitis, biliary colic, choledocholithiasis, perforated viscus, tumor, splenic etiology, constipation, pelvic pathology, renal colic, pyelonephritis, UTI, metabolic abnormality, dehydration; doubt cardiac etiology.  Will check EKG, CXR, labs and CT and give IVFs and IV zofran.      ED Course         Critical Care Time  Procedures

## 2024-05-18 NOTE — ASSESSMENT & PLAN NOTE
"Presentation: Patient presents from [facility] due to complaints of constipation and vomiting x 1 per nursing staff. Patient has not had a bowel movement in 4 days. Suspect 2/2 to recent opioid use for pain.   CT A/P: \"The gallbladder is moderately distended, consider right upper quadrant ultrasound if concerned. Significantly distended urinary bladder.\"  UA: +ketones  Indwelling lerner catheter placed in ED due to urinary retention 2/2 constipation.  Received Senna and Relistor in ED.  Bowel regimen.   Stool record.  IV antiemetics.  Clear liquid diet, advance as tolerated.  "

## 2024-05-19 PROBLEM — R93.89 ABNORMAL CT SCAN: Status: ACTIVE | Noted: 2024-05-19

## 2024-05-19 LAB
ALBUMIN SERPL BCP-MCNC: 3.4 G/DL (ref 3.5–5)
ALP SERPL-CCNC: 58 U/L (ref 34–104)
ALT SERPL W P-5'-P-CCNC: 27 U/L (ref 7–52)
ANION GAP SERPL CALCULATED.3IONS-SCNC: 8 MMOL/L (ref 4–13)
AST SERPL W P-5'-P-CCNC: 25 U/L (ref 13–39)
BASOPHILS # BLD AUTO: 0.05 THOUSANDS/ÂΜL (ref 0–0.1)
BASOPHILS NFR BLD AUTO: 1 % (ref 0–1)
BILIRUB SERPL-MCNC: 0.87 MG/DL (ref 0.2–1)
BUN SERPL-MCNC: 16 MG/DL (ref 5–25)
CALCIUM ALBUM COR SERPL-MCNC: 9 MG/DL (ref 8.3–10.1)
CALCIUM SERPL-MCNC: 8.5 MG/DL (ref 8.4–10.2)
CHLORIDE SERPL-SCNC: 105 MMOL/L (ref 96–108)
CO2 SERPL-SCNC: 26 MMOL/L (ref 21–32)
CREAT SERPL-MCNC: 0.81 MG/DL (ref 0.6–1.3)
EOSINOPHIL # BLD AUTO: 0.19 THOUSAND/ÂΜL (ref 0–0.61)
EOSINOPHIL NFR BLD AUTO: 2 % (ref 0–6)
ERYTHROCYTE [DISTWIDTH] IN BLOOD BY AUTOMATED COUNT: 13.8 % (ref 11.6–15.1)
GFR SERPL CREATININE-BSD FRML MDRD: 64 ML/MIN/1.73SQ M
GLUCOSE SERPL-MCNC: 110 MG/DL (ref 65–140)
HCT VFR BLD AUTO: 38.2 % (ref 34.8–46.1)
HGB BLD-MCNC: 13 G/DL (ref 11.5–15.4)
IMM GRANULOCYTES # BLD AUTO: 0.04 THOUSAND/UL (ref 0–0.2)
IMM GRANULOCYTES NFR BLD AUTO: 0 % (ref 0–2)
LYMPHOCYTES # BLD AUTO: 2.22 THOUSANDS/ÂΜL (ref 0.6–4.47)
LYMPHOCYTES NFR BLD AUTO: 23 % (ref 14–44)
MCH RBC QN AUTO: 32.4 PG (ref 26.8–34.3)
MCHC RBC AUTO-ENTMCNC: 34 G/DL (ref 31.4–37.4)
MCV RBC AUTO: 95 FL (ref 82–98)
MONOCYTES # BLD AUTO: 0.91 THOUSAND/ÂΜL (ref 0.17–1.22)
MONOCYTES NFR BLD AUTO: 9 % (ref 4–12)
NEUTROPHILS # BLD AUTO: 6.25 THOUSANDS/ÂΜL (ref 1.85–7.62)
NEUTS SEG NFR BLD AUTO: 65 % (ref 43–75)
NRBC BLD AUTO-RTO: 0 /100 WBCS
PLATELET # BLD AUTO: 157 THOUSANDS/UL (ref 149–390)
PMV BLD AUTO: 9.6 FL (ref 8.9–12.7)
POTASSIUM SERPL-SCNC: 4 MMOL/L (ref 3.5–5.3)
PROT SERPL-MCNC: 5.5 G/DL (ref 6.4–8.4)
RBC # BLD AUTO: 4.01 MILLION/UL (ref 3.81–5.12)
SODIUM SERPL-SCNC: 139 MMOL/L (ref 135–147)
WBC # BLD AUTO: 9.66 THOUSAND/UL (ref 4.31–10.16)

## 2024-05-19 PROCEDURE — 85025 COMPLETE CBC W/AUTO DIFF WBC: CPT | Performed by: STUDENT IN AN ORGANIZED HEALTH CARE EDUCATION/TRAINING PROGRAM

## 2024-05-19 PROCEDURE — 80053 COMPREHEN METABOLIC PANEL: CPT | Performed by: STUDENT IN AN ORGANIZED HEALTH CARE EDUCATION/TRAINING PROGRAM

## 2024-05-19 PROCEDURE — 99233 SBSQ HOSP IP/OBS HIGH 50: CPT | Performed by: STUDENT IN AN ORGANIZED HEALTH CARE EDUCATION/TRAINING PROGRAM

## 2024-05-19 RX ADMIN — HEPARIN SODIUM 5000 UNITS: 5000 INJECTION INTRAVENOUS; SUBCUTANEOUS at 16:26

## 2024-05-19 RX ADMIN — MULTIPLE VITAMINS W/ MINERALS TAB 1 TABLET: TAB ORAL at 10:08

## 2024-05-19 RX ADMIN — Medication 3 MG: at 21:21

## 2024-05-19 RX ADMIN — FLUTICASONE PROPIONATE 1 SPRAY: 50 SPRAY, METERED NASAL at 10:15

## 2024-05-19 RX ADMIN — DOCUSATE SODIUM 100 MG: 100 CAPSULE, LIQUID FILLED ORAL at 10:08

## 2024-05-19 RX ADMIN — HEPARIN SODIUM 5000 UNITS: 5000 INJECTION INTRAVENOUS; SUBCUTANEOUS at 21:21

## 2024-05-19 RX ADMIN — FAMOTIDINE 20 MG: 20 TABLET, FILM COATED ORAL at 10:08

## 2024-05-19 RX ADMIN — DOCUSATE SODIUM 100 MG: 100 CAPSULE, LIQUID FILLED ORAL at 17:49

## 2024-05-19 RX ADMIN — ATORVASTATIN CALCIUM 20 MG: 20 TABLET, FILM COATED ORAL at 10:09

## 2024-05-19 RX ADMIN — ASPIRIN 81 MG 81 MG: 81 TABLET ORAL at 10:08

## 2024-05-19 RX ADMIN — HEPARIN SODIUM 5000 UNITS: 5000 INJECTION INTRAVENOUS; SUBCUTANEOUS at 05:47

## 2024-05-19 RX ADMIN — SENNOSIDES 17.2 MG: 8.6 TABLET, FILM COATED ORAL at 21:20

## 2024-05-19 RX ADMIN — POLYETHYLENE GLYCOL 3350 17 G: 17 POWDER, FOR SOLUTION ORAL at 10:08

## 2024-05-19 NOTE — QUICK NOTE
Unable to reach daughter via phone, left VM    Wanted to discuss new CT findings    Per discussion with daughter yesterday, she is aware of concern for cancer but would like focus to be comfort without any biopsies or surgeries.     Will try to reach again tomorrow to discuss further GOC      Aj Durant

## 2024-05-19 NOTE — UTILIZATION REVIEW
NOTIFICATION OF INPATIENT ADMISSION   AUTHORIZATION REQUEST   SERVICING FACILITY:   [place] [address]  Tax ID: [id number]  NPI: [id number]   ATTENDING PROVIDER:  Attending Name and NPI#: Aj Durant Md [[id number]]  Address: [address] [place] [address]  Phone: [phone]     ADMISSION INFORMATION:  Place of Service: Inpatient [facility]  Place of Service Code: 21  Inpatient Admission Date/Time: 5/18/24  3:08 PM  Discharge Date/Time: No discharge date for patient encounter.  Admitting Diagnosis Code/Description:  Urinary retention [R33.9]  Constipation [K59.00]  Vomiting of fecal matter with nausea [R11.13]  Constipation, unspecified constipation type [K59.00]     UTILIZATION REVIEW CONTACT:  Nikkie Vasquez Utilization   Network Utilization Review Department  Phone: [phone]  Fax: [phone]  Email: [email or web address]  Contact for approvals/pending authorizations, clinical reviews, and discharge.     PHYSICIAN ADVISORY SERVICES:  Medical Necessity Denial & Abum-zd-Cuii Review  Phone: [phone]  Fax: [phone]  Email: [email or web address]     DISCHARGE SUPPORT TEAM:  For Patients Discharge Needs & Updates  Phone: [phone] opt. 2 Fax: [phone]  Email: [email or web address]

## 2024-05-19 NOTE — PROGRESS NOTES
"[facility]     Name: Berta Estrada 89 y.o. female I MRN: [id number]  Unit/Bed#: S -01 I Date of Admission: 5/17/2024   Date of Service: 5/19/2024 I Hospital Day: 1           Assessment & Plan  Abnormal CT scan  Assessment & Plan  Multiple significant findings on CT chest and abdomen briefly discussed below  -2.7 cm right upper lobe mass infectious/inflammatory or malignant  - 3.8 cm right lower lobe mass, infectious/inflammatory or malignant  - Indeterminate right upper lobe and right middle lobe peribronchial cardiovascular nodules, requiring follow-up  - Indeterminate right hepatic lobe lesion, with dilated common bile duct-consider MRI/MRCP  - Wall thickening of esophagus  - Simple cyst in the pancreas body, 0.9 cm, requiring follow-up every 2 years for 2 times-4 years no further follow-up  - 2.2 cm cyst arising from the pancreatic uncinate process-recommendation of GI or surgical oncology with possible need for EUS  - Discussed with daughter, overall she would not like any biopsies, and certainly would not consider any therapeutic treatment if malignancy is present-will call again to provide updated CT abdomen/chest findings     * Constipation  Assessment & Plan  Presentation: Patient presents from [facility] due to complaints of constipation and vomiting x 1 per nursing staff. Patient has not had a bowel movement in 4 days. Suspect 2/2 to recent opioid use for pain.   CT A/P: \"The gallbladder is moderately distended, consider right upper quadrant ultrasound if concerned. Significantly distended urinary bladder.\"  UA: +ketones  Indwelling lerner catheter placed in ED due to urinary retention 2/2 constipation.  Received Senna and Relistor in ED.  Bowel regimen.   Stool record.  IV antiemetics.  Diet advanced from clear liquids  No BM as of 5/19 a.m.     Liver nodule  Assessment & Plan  Incidental finding on CT; \"1.5 cm nodule in the right hepatic lobe\"  Recommend " MRI or multiphase CT for further evaluation.  See abnormal CT findings above and subsequent plan     History of permanent cardiac pacemaker placement  Assessment & Plan  During recent hospitalization, patient was noted to have arrhythmia with EKG on that admission displaying Mobitz type 1 second degree AV block; therefore, Cardiology and EP were consulted.  S/P placement of single-chamber [employer] PPM on 05/15/2024.  Local wound care as indicated to pacemaker insertion site.     Closed fracture of ramus of right pubis (HCC)  Assessment & Plan  Recent admission under Trauma service from 05/12/2024-05/16/2024 due to right inferior pubic ramus fracture 2/2 fall  Nonoperative management recommended by Orthopedic surgery.  Maintain weightbearing status on bilateral lower extremities as tolerated.  Continue multimodal analgesic regimen.  DVT prophylaxis.  Outpatient follow up with Orthopedic surgery.     Stage 3a chronic kidney disease (HCC)  Assessment & Plan        Lab Results   Component Value Date     EGFR 64 05/19/2024     EGFR 60 05/17/2024     EGFR 45 05/16/2024     CREATININE 0.81 05/19/2024     CREATININE 0.85 05/17/2024     CREATININE 1.08 05/16/2024   Creatinine upon admission: 0.85  Baseline: 1.0-1.2  Avoid nephrotoxins and hypotension.  Monitor BMP.     GERD (gastroesophageal reflux disease)  Assessment & Plan  Continue Pepcid.     Essential hypertension  Assessment & Plan  Blood pressure is reviewed and acceptable.  Last recorded pressure: 159/86  Currently not on any antihypertensive medications as an outpatient.  Monitor blood pressure.     Other hyperlipidemia  Assessment & Plan  Continue statin.     Late onset Alzheimer's disease without behavioral disturbance (HCC)  Assessment & Plan  Alert & oriented to self.   Delirium precautions.  Decrease external stimuli.  Fall precautions.              VTE Pharmacologic Prophylaxis: VTE Score: 8 High Risk (Score >/= 5) - Pharmacological DVT Prophylaxis Ordered:  heparin. Sequential Compression Devices Ordered.  Code Status: Level 3 - DNAR and DNI per SNF paperwork  Discussion with family: Plan to discuss with daughter today  Anticipated Length of Stay: Patient will be admitted on an observation basis with an anticipated length of stay of less than 2 midnights secondary to constipation and urinary retention.     Total Time Spent on Date of Encounter in care of patient: 70 mins. This time was spent on one or more of the following: performing physical exam; counseling and coordination of care; obtaining or reviewing history; documenting in the medical record; reviewing/ordering tests, medications or procedures; communicating with other healthcare professionals and discussing with patient's family/caregivers.     Subjective         Feels overall well today, denies any abdominal complaints        Review of Systems:  Review of Systems   Unable to perform ROS: Dementia         Past Medical and Surgical History:   Medical History        Past Medical History:   Diagnosis Date    Actinic keratosis      Basal cell carcinoma       Back     Cancer (HCC)      Coronary artery disease      Dementia (HCC)      Depression      Ear problems      HL (hearing loss)      Hyperlipidemia      Migraines      Ovarian cancer (HCC) 2004     s/p surgery. no chemo/RT    Phlebitis       R leg            Surgical History         Past Surgical History:   Procedure Laterality Date    ADENOIDECTOMY        APPENDECTOMY        CARDIAC ELECTROPHYSIOLOGY PROCEDURE N/A 5/15/2024     Procedure: Cardiac pacer implant;  Surgeon: Rufus Raines MD;  [facility]: [facility];  Service: Cardiology    CATARACT EXTRACTION, BILATERAL        HYSTERECTOMY   2005     ovarian CA    KNEE SURGERY Right      SKIN BIOPSY        TONSILECTOMY AND ADNOIDECTOMY        TONSILLECTOMY                Meds/Allergies:          Prior to Admission medications    Medication Sig Start Date End Date Taking? Authorizing Provider    acetaminophen (TYLENOL) 325 mg tablet Take 2 tablets (650 mg total) by mouth every 4 (four) hours as needed for mild pain 5/16/24     Trevon Mcclendon PA-C   aspirin 81 MG tablet Take 81 mg by mouth daily       Historical Provider, MD   atorvastatin (LIPITOR) 20 mg tablet TAKE 1 TABLET BY MOUTH EVERY DAY 2/8/24     Kim Dewey MD   chlorhexidine (PERIDEX) 0.12 % solution RINSE 2 TIMES A DAY 4/29/20     Historical Provider, MD   famotidine (PEPCID) 20 mg tablet TAKE 1 TABLET BY MOUTH EVERY DAY 4/26/24     Kim Dewey MD   fluticasone (FLONASE) 50 mcg/act nasal spray SPRAY 1 SPRAY INTO EACH NOSTRIL EVERY DAY 5/17/24     Kim Dewey MD   melatonin 3 mg Take 1 tablet (3 mg total) by mouth daily at bedtime 5/16/24     Trevon Mcclendon PA-C   Multiple Vitamins-Minerals (MULTIVITAMIN ADULT PO) Take 1 tablet by mouth daily       Historical Provider, MD   oxyCODONE (ROXICODONE) 5 immediate release tablet Take 0.5 tablets (2.5 mg total) by mouth every 6 (six) hours as needed for moderate pain or severe pain for up to 3 days Max Daily Amount: 10 mg 5/16/24 5/19/24   Trevon Mcclendon PA-C   senna-docusate sodium (SENOKOT S) 8.6-50 mg per tablet Take 1 tablet by mouth 2 (two) times a day 5/16/24     Trevon Mcclendon PA-C   oxyCODONE (Roxicodone) 5 immediate release tablet Take 0.5 tablets (2.5 mg total) by mouth every 6 (six) hours as needed for moderate pain for up to 3 days Max Daily Amount: 10 mg 5/16/24 5/18/24   Idania Rawsl, DO      I have reveiwed home medications using records provided by [facility].     Allergies:   Allergies         Allergies   Allergen Reactions    Codeine Hives    Morphine Other (See Comments)       Redness in face, swelling    Other         Seasonal    Propoxyphene              Social History:  Marital Status:    Occupation: Retired Nurse's Aide  Patient Pre-hospital Living Situation: Skilled Nursing Facility: [facility]  Patient Pre-hospital Level of Mobility: unable to  "be assessed at time of evaluation  Patient Pre-hospital Diet Restrictions: None  Substance Use History:   Social History          Substance and Sexual Activity   Alcohol Use Not Currently      Tobacco Use History   Social History           Tobacco Use   Smoking Status Former    Current packs/day: 0.25    Types: Cigarettes   Smokeless Tobacco Never   Tobacco Comments     until age 30         Social History          Substance and Sexual Activity   Drug Use Never         Family History:  Family History         Family History   Problem Relation Age of Onset    Depression Mother      Anxiety disorder Mother      Alcohol abuse Mother      Substance Abuse Mother      Asthma Mother      Diabetes Father 60    Brain cancer Son      Heart attack Sister      Coronary artery disease Sister      Mental illness Neg Hx              Physical Exam:      Vitals:   Blood Pressure: 113/72 (05/19/24 0802)  Pulse: 80 (05/19/24 0802)  Temperature: 98.7 °F (37.1 °C) (05/19/24 0802)  Temp Source: Oral (05/18/24 0749)  Respirations: 16 (05/19/24 0802)  Height: 5' 5\" (165.1 cm) (05/18/24 0008)  SpO2: 96 % (05/19/24 0802)     Physical Exam  Vitals and nursing note reviewed.   HENT:      Head: Normocephalic.      Nose: Nose normal.      Mouth/Throat:      Pharynx: Oropharynx is clear.   Eyes:      General: No scleral icterus.     Extraocular Movements: Extraocular movements intact.   Cardiovascular:      Rate and Rhythm: Normal rate and regular rhythm.      Pulses:           Posterior tibial pulses are 1+ on the right side and 1+ on the left side.   Pulmonary:      Effort: Pulmonary effort is normal. No respiratory distress.      Breath sounds: Normal breath sounds. No wheezing or rales.   Chest:      Chest wall: No tenderness.      Comments: LCW dressing D&I  Abdominal:      General: Bowel sounds are normal. There is no distension.      Palpations: Abdomen is soft. There is no mass.      Tenderness: There is no abdominal tenderness.      " Hernia: No hernia is present.   Skin:     General: Skin is dry.   Neurological:      Mental Status: She is alert. She is disoriented.      Comments: Oriented to self   Psychiatric:         Mood and Affect: Mood normal.         Behavior: Behavior normal.            Additional Data:      Lab Results:       Results from last 7 days   Lab Units 05/19/24  0602   WBC Thousand/uL 9.66   HEMOGLOBIN g/dL 13.0   HEMATOCRIT % 38.2   PLATELETS Thousands/uL 157   SEGS PCT % 65   LYMPHO PCT % 23   MONO PCT % 9   EOS PCT % 2           Results from last 7 days   Lab Units 05/19/24  0602   SODIUM mmol/L 139   POTASSIUM mmol/L 4.0   CHLORIDE mmol/L 105   CO2 mmol/L 26   BUN mg/dL 16   CREATININE mg/dL 0.81   ANION GAP mmol/L 8   CALCIUM mg/dL 8.5   ALBUMIN g/dL 3.4*   TOTAL BILIRUBIN mg/dL 0.87   ALK PHOS U/L 58   ALT U/L 27   AST U/L 25   GLUCOSE RANDOM mg/dL 110           Results from last 7 days   Lab Units 05/17/24  2259   INR   1.02                     Lines/Drains:  Invasive Devices         Peripheral Intravenous Line  Duration                Peripheral IV 05/17/24 Proximal;Right;Ventral (anterior) Forearm 1 day                  Drain  Duration                Urethral Catheter 18 Fr. 1 day                       Urinary Catheter:  Goal for removal: Voiding trial when ambulation improves                  Imaging: Personally reviewed the following imaging: chest xray  CT chest abdomen pelvis w contrast   Final Result by Britt Harrison MD (05/18 2451)       1.  2.7 cm right upper lobe mass with cavitation/necrosis. This may represent infectious/inflammatory or malignant lesion.   2.  3.8 cm right lower lobe masslike lesion, infectious/inflammatory versus neoplastic.   3.  Indeterminate right upper lobe and right middle lobe peribronchovascular nodules. Recommend attention on follow-up imaging.   4.  Indeterminate right hepatic lobe lesion. Distended gallbladder. Dilated common bile duct. Consider MRI/MRCP for further  evaluation.   5.  Circumferential wall thickening of the esophagus may represent esophagitis.   6.  Incidental findings in the pancreas and thyroid gland with recommendations as outlined above.   7.  New nondisplaced fracture of the right superior pubic ramus compared to 2024 CT. Known comminuted fracture of the right inferior pubic ramus.       The major findings are in agreement with the preliminary report provided by [facility] which was provided shortly after completion of the exam. The additional finding of dilated common bile duct and new right superior pubic ramus fracture  will    now be communicated with patient's clinical team by our radiology liaison.       The study was marked in EPIC for immediate notification.       Workstation performed: [id number]           XR chest 1 view portable   ED Interpretation by Jared Bowling MD ( [id number])   Chest x-ray independently interpreted by me.  Pacemaker in place.  Suspicious nodule in right middle lung again noted and similar to chest x-ray from yesterday.       Final Result by Thania Zamora MD ( [id number])       Right upper and right lower lobe nodules, see subsequent chest CT.               Workstation performed: [id number]                 EKG and Other Studies Reviewed on Admission:   EKnd degree AV block with PVCs, attempting to pace.     ** Please Note: This note has been constructed using a voice recognition system. **

## 2024-05-19 NOTE — ASSESSMENT & PLAN NOTE
Patient presented from rehab due to complaints of constipation and vomiting x 1 per nursing staff. Suspect 2/2 to recent opioid use for pain.   Daughter reports patient had not had a bowel movement for nearly a week  Imaging did not reveal any obstruction  Patient is now starting to have bowel movements but would continue aggressive bowel regimen for the time being

## 2024-05-19 NOTE — ASSESSMENT & PLAN NOTE
Lab Results   Component Value Date    EGFR 64 05/19/2024    EGFR 60 05/17/2024    EGFR 45 05/16/2024    CREATININE 0.81 05/19/2024    CREATININE 0.85 05/17/2024    CREATININE 1.08 05/16/2024   Creatinine upon admission: 0.85  Baseline: 1.0-1.2  Avoid nephrotoxins and hypotension.  Monitor BMP.

## 2024-05-19 NOTE — ASSESSMENT & PLAN NOTE
"Multiple abnormal incidental findings noted below.  No plan for additional workup or intervention based on age and underlying dementia   CT A/P \"2.7 cm right upper lobe mass with cavitation/necrosis. This may represent infectious/inflammatory or malignant lesion 3.8 cm right lower lobe masslike lesion, infectious/inflammatory versus neoplastic.Indeterminate right upper lobe and right middle lobe peribronchovascular nodules.  Indeterminate right hepatic lobe lesion. Distended gallbladder. Dilated common bile duct. Circumferential wall thickening of the esophagus may represent esophagitis.  Incidental findings in the pancreas and thyroid gland   "

## 2024-05-19 NOTE — ASSESSMENT & PLAN NOTE
"Incidental finding on CT; \"1.5 cm nodule in the right hepatic lobe\"  Recommend MRI or multiphase CT for further evaluation.  See abnormal CT findings above and subsequent plan  "

## 2024-05-19 NOTE — ASSESSMENT & PLAN NOTE
Blood pressure has been elevated during the admission  Could consider addition of an antihypertensive agent --will continue to observe for now

## 2024-05-19 NOTE — ASSESSMENT & PLAN NOTE
"Recent admission under Trauma service from 05/12/2024-05/16/2024 due to right inferior pubic ramus fracture 2/2 fall  Nonoperative management recommended by Orthopedic surgery.  Incidentally found on CAT scan here to have \"new nondisplaced fracture of the right superior pubic ramus:  maintain weightbearing status as tolerated.  Continue multimodal analgesic regimen.  DVT prophylaxis.    "

## 2024-05-19 NOTE — H&P
"[facility]    Name: Berta Estrada 89 y.o. female I MRN: [id number]  Unit/Bed#: S -01 I Date of Admission: 5/17/2024   Date of Service: 5/19/2024 I Hospital Day: 1      Assessment & Plan   Abnormal CT scan  Assessment & Plan  Multiple significant findings on CT chest and abdomen briefly discussed below  -2.7 cm right upper lobe mass infectious/inflammatory or malignant  - 3.8 cm right lower lobe mass, infectious/inflammatory or malignant  - Indeterminate right upper lobe and right middle lobe peribronchial cardiovascular nodules, requiring follow-up  - Indeterminate right hepatic lobe lesion, with dilated common bile duct-consider MRI/MRCP  - Wall thickening of esophagus  - Simple cyst in the pancreas body, 0.9 cm, requiring follow-up every 2 years for 2 times-4 years no further follow-up  - 2.2 cm cyst arising from the pancreatic uncinate process-recommendation of GI or surgical oncology with possible need for EUS  - Discussed with daughter, overall she would not like any biopsies, and certainly would not consider any therapeutic treatment if malignancy is present-will call again revisit today    * Constipation  Assessment & Plan  Presentation: Patient presents from [facility] due to complaints of constipation and vomiting x 1 per nursing staff. Patient has not had a bowel movement in 4 days. Suspect 2/2 to recent opioid use for pain.   CT A/P: \"The gallbladder is moderately distended, consider right upper quadrant ultrasound if concerned. Significantly distended urinary bladder.\"  UA: +ketones  Indwelling lerner catheter placed in ED due to urinary retention 2/2 constipation.  Received Senna and Relistor in ED.  Bowel regimen.   Stool record.  IV antiemetics.  Diet advanced from clear liquids  No BM as of 5/19 a.m.    Liver nodule  Assessment & Plan  Incidental finding on CT; \"1.5 cm nodule in the right hepatic lobe\"  Recommend MRI or multiphase CT for further " evaluation.  See abnormal CT findings above and subsequent plan    History of permanent cardiac pacemaker placement  Assessment & Plan  During recent hospitalization, patient was noted to have arrhythmia with EKG on that admission displaying Mobitz type 1 second degree AV block; therefore, Cardiology and EP were consulted.  S/P placement of single-chamber Medtronic PPM on 05/15/2024.  Local wound care as indicated to pacemaker insertion site.    Closed fracture of ramus of right pubis (HCC)  Assessment & Plan  Recent admission under Trauma service from 05/12/2024-05/16/2024 due to right inferior pubic ramus fracture 2/2 fall  Nonoperative management recommended by Orthopedic surgery.  Maintain weightbearing status on bilateral lower extremities as tolerated.  Continue multimodal analgesic regimen.  DVT prophylaxis.  Outpatient follow up with Orthopedic surgery.    Stage 3a chronic kidney disease (HCC)  Assessment & Plan  Lab Results   Component Value Date    EGFR 64 05/19/2024    EGFR 60 05/17/2024    EGFR 45 05/16/2024    CREATININE 0.81 05/19/2024    CREATININE 0.85 05/17/2024    CREATININE 1.08 05/16/2024   Creatinine upon admission: 0.85  Baseline: 1.0-1.2  Avoid nephrotoxins and hypotension.  Monitor BMP.    GERD (gastroesophageal reflux disease)  Assessment & Plan  Continue Pepcid.    Essential hypertension  Assessment & Plan  Blood pressure is reviewed and acceptable.  Last recorded pressure: 159/86  Currently not on any antihypertensive medications as an outpatient.  Monitor blood pressure.    Other hyperlipidemia  Assessment & Plan  Continue statin.    Late onset Alzheimer's disease without behavioral disturbance (HCC)  Assessment & Plan  Alert & oriented to self.   Delirium precautions.  Decrease external stimuli.  Fall precautions.         VTE Pharmacologic Prophylaxis: VTE Score: 8 High Risk (Score >/= 5) - Pharmacological DVT Prophylaxis Ordered: heparin. Sequential Compression Devices Ordered.  Code  Status: Level 3 - DNAR and DNI per SNF paperwork  Discussion with family: Plan to discuss with daughter today  Anticipated Length of Stay: Patient will be admitted on an observation basis with an anticipated length of stay of less than 2 midnights secondary to constipation and urinary retention.    Total Time Spent on Date of Encounter in care of patient: 70 mins. This time was spent on one or more of the following: performing physical exam; counseling and coordination of care; obtaining or reviewing history; documenting in the medical record; reviewing/ordering tests, medications or procedures; communicating with other healthcare professionals and discussing with patient's family/caregivers.    Subjective       Feels overall well today, denies any abdominal complaints      Review of Systems:  Review of Systems   Unable to perform ROS: Dementia       Past Medical and Surgical History:   Past Medical History:   Diagnosis Date    Actinic keratosis     Basal cell carcinoma     Back     Cancer (HCC)     Coronary artery disease     Dementia (HCC)     Depression     Ear problems     HL (hearing loss)     Hyperlipidemia     Migraines     Ovarian cancer (HCC) 2004    s/p surgery. no chemo/RT    Phlebitis     R leg       Past Surgical History:   Procedure Laterality Date    ADENOIDECTOMY      APPENDECTOMY      CARDIAC ELECTROPHYSIOLOGY PROCEDURE N/A 5/15/2024    Procedure: Cardiac pacer implant;  Surgeon: Rufus Raines MD;  [facility];  Service: Cardiology    CATARACT EXTRACTION, BILATERAL      HYSTERECTOMY  2005    ovarian CA    KNEE SURGERY Right     SKIN BIOPSY      TONSILECTOMY AND ADNOIDECTOMY      TONSILLECTOMY         Meds/Allergies:  Prior to Admission medications    Medication Sig Start Date End Date Taking? Authorizing Provider   acetaminophen (TYLENOL) 325 mg tablet Take 2 tablets (650 mg total) by mouth every 4 (four) hours as needed for mild pain 5/16/24   Trevon Mcclendon PA-C   aspirin 81 MG  tablet Take 81 mg by mouth daily    Historical Provider, MD   atorvastatin (LIPITOR) 20 mg tablet TAKE 1 TABLET BY MOUTH EVERY DAY 2/8/24   Kim Dewey MD   chlorhexidine (PERIDEX) 0.12 % solution RINSE 2 TIMES A DAY 4/29/20   Historical Provider, MD   famotidine (PEPCID) 20 mg tablet TAKE 1 TABLET BY MOUTH EVERY DAY 4/26/24   Kim Dewey MD   fluticasone (FLONASE) 50 mcg/act nasal spray SPRAY 1 SPRAY INTO EACH NOSTRIL EVERY DAY 5/17/24   Kim Dewey MD   melatonin 3 mg Take 1 tablet (3 mg total) by mouth daily at bedtime 5/16/24   Trevon Mcclendon PA-C   Multiple Vitamins-Minerals (MULTIVITAMIN ADULT PO) Take 1 tablet by mouth daily    Historical Provider, MD   oxyCODONE (ROXICODONE) 5 immediate release tablet Take 0.5 tablets (2.5 mg total) by mouth every 6 (six) hours as needed for moderate pain or severe pain for up to 3 days Max Daily Amount: 10 mg 5/16/24 5/19/24  Trevon Mcclendon PA-C   senna-docusate sodium (SENOKOT S) 8.6-50 mg per tablet Take 1 tablet by mouth 2 (two) times a day 5/16/24   Trevon Mcclendon PA-C   oxyCODONE (Roxicodone) 5 immediate release tablet Take 0.5 tablets (2.5 mg total) by mouth every 6 (six) hours as needed for moderate pain for up to 3 days Max Daily Amount: 10 mg 5/16/24 5/18/24  Idania Rawls, DO     I have reveiwed home medications using records provided by [facility].    Allergies:   Allergies   Allergen Reactions    Codeine Hives    Morphine Other (See Comments)     Redness in face, swelling    Other      Seasonal    Propoxyphene        Social History:  Marital Status:    Occupation: Retired Nurse's Aide  Patient Pre-hospital Living Situation: Skilled Nursing Facility: [facility]  Patient Pre-hospital Level of Mobility: unable to be assessed at time of evaluation  Patient Pre-hospital Diet Restrictions: None  Substance Use History:   Social History     Substance and Sexual Activity   Alcohol Use Not Currently     Social History     Tobacco Use  "  Smoking Status Former    Current packs/day: 0.25    Types: Cigarettes   Smokeless Tobacco Never   Tobacco Comments    until age 30     Social History     Substance and Sexual Activity   Drug Use Never       Family History:  Family History   Problem Relation Age of Onset    Depression Mother     Anxiety disorder Mother     Alcohol abuse Mother     Substance Abuse Mother     Asthma Mother     Diabetes Father 60    Brain cancer Son     Heart attack Sister     Coronary artery disease Sister     Mental illness Neg Hx        Physical Exam:     Vitals:   Blood Pressure: 113/72 (05/19/24 0802)  Pulse: 80 (05/19/24 0802)  Temperature: 98.7 °F (37.1 °C) (05/19/24 0802)  Temp Source: Oral (05/18/24 0749)  Respirations: 16 (05/19/24 0802)  Height: 5' 5\" (165.1 cm) (05/18/24 0008)  SpO2: 96 % (05/19/24 0802)    Physical Exam  Vitals and nursing note reviewed.   HENT:      Head: Normocephalic.      Nose: Nose normal.      Mouth/Throat:      Pharynx: Oropharynx is clear.   Eyes:      General: No scleral icterus.     Extraocular Movements: Extraocular movements intact.   Cardiovascular:      Rate and Rhythm: Normal rate and regular rhythm.      Pulses:           Posterior tibial pulses are 1+ on the right side and 1+ on the left side.   Pulmonary:      Effort: Pulmonary effort is normal. No respiratory distress.      Breath sounds: Normal breath sounds. No wheezing or rales.   Chest:      Chest wall: No tenderness.      Comments: LCW dressing D&I  Abdominal:      General: Bowel sounds are normal. There is no distension.      Palpations: Abdomen is soft. There is no mass.      Tenderness: There is no abdominal tenderness.      Hernia: No hernia is present.   Skin:     General: Skin is dry.   Neurological:      Mental Status: She is alert. She is disoriented.      Comments: Oriented to self   Psychiatric:         Mood and Affect: Mood normal.         Behavior: Behavior normal.          Additional Data:     Lab Results:  Results " from last 7 days   Lab Units 05/19/24  0602   WBC Thousand/uL 9.66   HEMOGLOBIN g/dL 13.0   HEMATOCRIT % 38.2   PLATELETS Thousands/uL 157   SEGS PCT % 65   LYMPHO PCT % 23   MONO PCT % 9   EOS PCT % 2     Results from last 7 days   Lab Units 05/19/24  0602   SODIUM mmol/L 139   POTASSIUM mmol/L 4.0   CHLORIDE mmol/L 105   CO2 mmol/L 26   BUN mg/dL 16   CREATININE mg/dL 0.81   ANION GAP mmol/L 8   CALCIUM mg/dL 8.5   ALBUMIN g/dL 3.4*   TOTAL BILIRUBIN mg/dL 0.87   ALK PHOS U/L 58   ALT U/L 27   AST U/L 25   GLUCOSE RANDOM mg/dL 110     Results from last 7 days   Lab Units 05/17/24  2259   INR  1.02                   Lines/Drains:  Invasive Devices       Peripheral Intravenous Line  Duration             Peripheral IV 05/17/24 Proximal;Right;Ventral (anterior) Forearm 1 day              Drain  Duration             Urethral Catheter 18 Fr. 1 day                  Urinary Catheter:  Goal for removal: Voiding trial when ambulation improves             Imaging: Personally reviewed the following imaging: chest xray  CT chest abdomen pelvis w contrast   Final Result by Britt Harrison MD (05/18 3419)      1.  2.7 cm right upper lobe mass with cavitation/necrosis. This may represent infectious/inflammatory or malignant lesion.   2.  3.8 cm right lower lobe masslike lesion, infectious/inflammatory versus neoplastic.   3.  Indeterminate right upper lobe and right middle lobe peribronchovascular nodules. Recommend attention on follow-up imaging.   4.  Indeterminate right hepatic lobe lesion. Distended gallbladder. Dilated common bile duct. Consider MRI/MRCP for further evaluation.   5.  Circumferential wall thickening of the esophagus may represent esophagitis.   6.  Incidental findings in the pancreas and thyroid gland with recommendations as outlined above.   7.  New nondisplaced fracture of the right superior pubic ramus compared to 5/12/2024 CT. Known comminuted fracture of the right inferior pubic ramus.      The major  findings are in agreement with the preliminary report provided by [facility] which was provided shortly after completion of the exam. The additional finding of dilated common bile duct and new right superior pubic ramus fracture  will    now be communicated with patient's clinical team by our radiology liaison.      The study was marked in EPIC for immediate notification.      Workstation performed: [id number]         XR chest 1 view portable   ED Interpretation by Jared Bowling MD ( [id number])   Chest x-ray independently interpreted by me.  Pacemaker in place.  Suspicious nodule in right middle lung again noted and similar to chest x-ray from yesterday.      Final Result by Thania Zamora MD ( [id number])      Right upper and right lower lobe nodules, see subsequent chest CT.            Workstation performed: [id number]             EKG and Other Studies Reviewed on Admission:   EKnd degree AV block with PVCs, attempting to pace.    ** Please Note: This note has been constructed using a voice recognition system. **

## 2024-05-19 NOTE — PLAN OF CARE
Problem: PAIN - ADULT  Goal: Verbalizes/displays adequate comfort level or baseline comfort level  Description: Interventions:  - Encourage patient to monitor pain and request assistance  - Assess pain using appropriate pain scale  - Administer analgesics based on type and severity of pain and evaluate response  - Implement non-pharmacological measures as appropriate and evaluate response  - Consider cultural and social influences on pain and pain management  - Notify physician/advanced practitioner if interventions unsuccessful or patient reports new pain  Outcome: Progressing     Problem: INFECTION - ADULT  Goal: Absence or prevention of progression during hospitalization  Description: INTERVENTIONS:  - Assess and monitor for signs and symptoms of infection  - Monitor lab/diagnostic results  - Monitor all insertion sites, i.e. indwelling lines, tubes, and drains  - Monitor endotracheal if appropriate and nasal secretions for changes in amount and color  - [place] appropriate cooling/warming therapies per order  - Administer medications as ordered  - Instruct and encourage patient and family to use good hand hygiene technique  - Identify and instruct in appropriate isolation precautions for identified infection/condition  Outcome: Progressing  Goal: Absence of fever/infection during neutropenic period  Description: INTERVENTIONS:  - Monitor WBC    Outcome: Progressing     Problem: SAFETY ADULT  Goal: Patient will remain free of falls  Description: INTERVENTIONS:  - Educate patient/family on patient safety including physical limitations  - Instruct patient to call for assistance with activity   - Consult OT/PT to assist with strengthening/mobility   - Keep Call bell within reach  - Keep bed low and locked with side rails adjusted as appropriate  - Keep care items and personal belongings within reach  - Initiate and maintain comfort rounds  - Make Fall Risk Sign visible to staff  - Apply yellow socks and bracelet  for high fall risk patients  - Consider moving patient to room near nurses station  Outcome: Progressing  Goal: Maintain or return to baseline ADL function  Description: INTERVENTIONS:  -  Assess patient's ability to carry out ADLs; assess patient's baseline for ADL function and identify physical deficits which impact ability to perform ADLs (bathing, care of mouth/teeth, toileting, grooming, dressing, etc.)  - Assess/evaluate cause of self-care deficits   - Assess range of motion  - Assess patient's mobility; develop plan if impaired  - Assess patient's need for assistive devices and provide as appropriate  - Encourage maximum independence but intervene and supervise when necessary  - Involve family in performance of ADLs  - Assess for home care needs following discharge   - Consider OT consult to assist with ADL evaluation and planning for discharge  - Provide patient education as appropriate  Outcome: Progressing  Goal: Maintains/Returns to pre admission functional level  Description: INTERVENTIONS:  - Perform AM-PAC 6 Click Basic Mobility/ Daily Activity assessment daily.  - Set and communicate daily mobility goal to care team and patient/family/caregiver.   - Collaborate with rehabilitation services on mobility goals if consulted  - Out of bed for toileting  - Record patient progress and toleration of activity level   Outcome: Progressing     Problem: DISCHARGE PLANNING  Goal: Discharge to home or other facility with appropriate resources  Description: INTERVENTIONS:  - Identify barriers to discharge w/patient and caregiver  - Arrange for needed discharge resources and transportation as appropriate  - Identify discharge learning needs (meds, wound care, etc.)  - Arrange for interpretive services to assist at discharge as needed  - Refer to Case Management Department for coordinating discharge planning if the patient needs post-hospital services based on physician/advanced practitioner order or complex needs  related to functional status, cognitive ability, or social support system  Outcome: Progressing     Problem: Prexisting or High Potential for Compromised Skin Integrity  Goal: Skin integrity is maintained or improved  Description: INTERVENTIONS:  - Identify patients at risk for skin breakdown  - Assess and monitor skin integrity  - Assess and monitor nutrition and hydration status  - Monitor labs   - Assess for incontinence   - Turn and reposition patient  - Assist with mobility/ambulation  - Relieve pressure over bony prominences  - Avoid friction and shearing  - Provide appropriate hygiene as needed including keeping skin clean and dry  - Evaluate need for skin moisturizer/barrier cream  - Collaborate with interdisciplinary team   - Patient/family teaching  - Consider wound care consult   Outcome: Progressing     Problem: Potential for Falls  Goal: Patient will remain free of falls  Description: INTERVENTIONS:  - Educate patient/family on patient safety including physical limitations  - Instruct patient to call for assistance with activity   - Consult OT/PT to assist with strengthening/mobility   - Keep Call bell within reach  - Keep bed low and locked with side rails adjusted as appropriate  - Keep care items and personal belongings within reach  - Initiate and maintain comfort rounds  - Make Fall Risk Sign visible to staff  - Apply yellow socks and bracelet for high fall risk patients  - Consider moving patient to room near nurses station  Outcome: Progressing

## 2024-05-20 ENCOUNTER — TELEPHONE (OUTPATIENT)
Dept: CARDIOLOGY CLINIC | Facility: CLINIC | Age: 89
End: 2024-05-20

## 2024-05-20 PROBLEM — R33.9 URINARY RETENTION: Status: ACTIVE | Noted: 2024-05-20

## 2024-05-20 LAB
ATRIAL RATE: 110 BPM
P AXIS: 84 DEGREES
QRS AXIS: 72 DEGREES
QRSD INTERVAL: 82 MS
QT INTERVAL: 402 MS
QTC INTERVAL: 458 MS
T WAVE AXIS: 88 DEGREES
VENTRICULAR RATE: 78 BPM

## 2024-05-20 PROCEDURE — 93010 ELECTROCARDIOGRAM REPORT: CPT | Performed by: INTERNAL MEDICINE

## 2024-05-20 PROCEDURE — 97167 OT EVAL HIGH COMPLEX 60 MIN: CPT

## 2024-05-20 PROCEDURE — 99232 SBSQ HOSP IP/OBS MODERATE 35: CPT | Performed by: PHYSICIAN ASSISTANT

## 2024-05-20 PROCEDURE — 97163 PT EVAL HIGH COMPLEX 45 MIN: CPT

## 2024-05-20 RX ORDER — MINERAL OIL 100 G/100G
1 OIL RECTAL ONCE
Status: COMPLETED | OUTPATIENT
Start: 2024-05-20 | End: 2024-05-20

## 2024-05-20 RX ORDER — SENNOSIDES 8.6 MG
2 TABLET ORAL 2 TIMES DAILY
Status: DISCONTINUED | OUTPATIENT
Start: 2024-05-20 | End: 2024-05-21 | Stop reason: HOSPADM

## 2024-05-20 RX ORDER — LANOLIN ALCOHOL/MO/W.PET/CERES
3 CREAM (GRAM) TOPICAL
Status: DISCONTINUED | OUTPATIENT
Start: 2024-05-20 | End: 2024-05-21 | Stop reason: HOSPADM

## 2024-05-20 RX ORDER — OLANZAPINE 10 MG/2ML
2.5 INJECTION, POWDER, FOR SOLUTION INTRAMUSCULAR
Status: DISCONTINUED | OUTPATIENT
Start: 2024-05-20 | End: 2024-05-21 | Stop reason: HOSPADM

## 2024-05-20 RX ADMIN — HEPARIN SODIUM 5000 UNITS: 5000 INJECTION INTRAVENOUS; SUBCUTANEOUS at 22:20

## 2024-05-20 RX ADMIN — HEPARIN SODIUM 5000 UNITS: 5000 INJECTION INTRAVENOUS; SUBCUTANEOUS at 13:47

## 2024-05-20 RX ADMIN — POLYETHYLENE GLYCOL 3350 17 G: 17 POWDER, FOR SOLUTION ORAL at 10:13

## 2024-05-20 RX ADMIN — SENNOSIDES 17.2 MG: 8.6 TABLET, FILM COATED ORAL at 10:13

## 2024-05-20 RX ADMIN — SENNOSIDES 17.2 MG: 8.6 TABLET, FILM COATED ORAL at 18:23

## 2024-05-20 RX ADMIN — DOCUSATE SODIUM 100 MG: 100 CAPSULE, LIQUID FILLED ORAL at 10:13

## 2024-05-20 RX ADMIN — ATORVASTATIN CALCIUM 20 MG: 20 TABLET, FILM COATED ORAL at 10:13

## 2024-05-20 RX ADMIN — MINERAL OIL 1 ENEMA: 100 ENEMA RECTAL at 15:34

## 2024-05-20 RX ADMIN — DOCUSATE SODIUM 100 MG: 100 CAPSULE, LIQUID FILLED ORAL at 18:23

## 2024-05-20 RX ADMIN — MULTIPLE VITAMINS W/ MINERALS TAB 1 TABLET: TAB ORAL at 10:13

## 2024-05-20 RX ADMIN — ACETAMINOPHEN 650 MG: 325 TABLET, FILM COATED ORAL at 13:47

## 2024-05-20 RX ADMIN — Medication 3 MG: at 20:50

## 2024-05-20 RX ADMIN — HEPARIN SODIUM 5000 UNITS: 5000 INJECTION INTRAVENOUS; SUBCUTANEOUS at 05:37

## 2024-05-20 RX ADMIN — FAMOTIDINE 20 MG: 20 TABLET, FILM COATED ORAL at 10:13

## 2024-05-20 RX ADMIN — ASPIRIN 81 MG 81 MG: 81 TABLET ORAL at 10:13

## 2024-05-20 NOTE — PHYSICAL THERAPY NOTE
PHYSICAL THERAPY EVALUATION NOTE          Patient Name: Berta Estrada  Today's Date: 2024          AGE:   89 y.o.  Mrn:   [id number]  ADMIT DX:  Urinary retention [R33.9]  Constipation [K59.00]  Vomiting of fecal matter with nausea [R11.13]  Constipation, unspecified constipation type [K59.00]    Past Medical History:  Past Medical History:   Diagnosis Date    Actinic keratosis     Basal cell carcinoma     Back     Cancer (HCC)     Coronary artery disease     Dementia (HCC)     Depression     Ear problems     HL (hearing loss)     Hyperlipidemia     Migraines     Ovarian cancer (HCC)     s/p surgery. no chemo/RT    Phlebitis     R leg       Past Surgical History:  Past Surgical History:   Procedure Laterality Date    ADENOIDECTOMY      APPENDECTOMY      CARDIAC ELECTROPHYSIOLOGY PROCEDURE N/A 5/15/2024    Procedure: Cardiac pacer implant;  Surgeon: Rufus Raines MD;  [facility];  Service: Cardiology    CATARACT EXTRACTION, BILATERAL      HYSTERECTOMY      ovarian CA    KNEE SURGERY Right     SKIN BIOPSY      TONSILECTOMY AND ADNOIDECTOMY      TONSILLECTOMY       Length Of Stay: 2        PHYSICAL THERAPY EVALUATION:    Patient's identity confirmed via 2 patient identifiers (full name and ) at start of session       24 0925   PT Last Visit   PT Visit Date 24   Note Type   Note type Evaluation   Pain Assessment   Pain Assessment Tool 0-10   Pain Score No Pain   Restrictions/Precautions   Weight Bearing Precautions Per Order No   RLE Weight Bearing Per Order WBAT   LLE Weight Bearing Per Order WBAT   Other Precautions Cognitive;Chair Alarm;Bed Alarm;Multiple lines  (catheter)   Home Living   Type of Home House   Home Layout Two level;Performs ADLs on one level;Able to live on main level with bedroom/bathroom;Stairs to enter without rails  (3 JOSE)   Bathroom Shower/Tub Tub/shower unit   Bathroom Toilet Standard    Bathroom Equipment Tub transfer bench   Home Equipment Walker;Cane   Prior Function   Level of Anton Independent with functional mobility;Needs assistance with ADLs;Needs assistance with IADLS   Lives With Daughter;Family  (and son-in-law)   Receives Help From Family   IADLs Family/Friend/Other provides transportation;Family/Friend/Other provides meals;Family/Friend/Other provides medication management   Falls in the last 6 months 1 to 4  (1 per pt's family, reason for previous admission)   Comments Pt currently admitted from [facility] for STR. At baseline pt lives w/ her daughter and son-in law, ambulates mod I w/ SPC, has assistance w/ stair negotiation, ADLs, and IADLs. Pt was going to the [facility] senior care M-F 9am-4pm   General   Additional Pertinent History Ortho note 24: R inferior pubic rami fx, WBAT BLE. Pt is s/p cardiac pacemaker placement 5/15/24, Cardio note 24: can use L arm sling to remind pt not to lift L arm above shoulder level. Pt's daughter reports pt has not been wearing the sling and states the pt is R handed   Family/Caregiver Present Yes  (pt's daughter)   Cognition   Overall Cognitive Status Impaired   Arousal/Participation Cooperative   Attention Attends with cues to redirect   Orientation Level Oriented to person;Disoriented to place;Disoriented to time;Disoriented to situation  (oriented to name only)   Memory Decreased recall of biographical information;Decreased short term memory;Decreased recall of recent events;Decreased recall of precautions   Following Commands Follows one step commands with increased time or repetition   Comments Pt ID via name and  on wristband; pt agreeable to PT eval and mobility. Pt pleasant and motivated throughout   Strength RLE   RLE Overall Strength 3+/5  (grossly assessed w/ functional mobility)   Strength LLE   LLE Overall Strength 3+/5  (grossly assessed w/ functional mobility)   Bed Mobility   Supine to Sit 4  Minimal  assistance   Additional items Assist x 1;HOB elevated;Increased time required;Verbal cues   Additional Comments able to maintain sitting balance at EOB w/ supervision   Transfers   Sit to Stand 5  Supervision   Additional items Assist x 1;Increased time required;Verbal cues   Stand to Sit 5  Supervision   Additional items Assist x 1;Armrests;Increased time required;Verbal cues   Ambulation/Elevation   Gait pattern Improper Weight shift;Decreased foot clearance;Short stride;Excessively slow;Decreased hip extension;Decreased heel strike;Decreased toe off;Antalgic   Gait Assistance 4  Minimal assist   Additional items Assist x 1;Verbal cues   Assistive Device Rolling walker   Distance 60'   Ambulation/Elevation Additional Comments VC for directional changes, ambulate to target surface   Balance   Static Sitting Fair +   Dynamic Sitting Fair   Static Standing Fair -  (w/ RW)   Dynamic Standing Poor +   Ambulatory Poor +  (w/ RW)   Activity Tolerance   Activity Tolerance Patient tolerated treatment well   Medical Staff Made Aware Pt benefited from PT/OT care coordination w/ OT Suki due to to allow for challenge of pt's activity tolerance, PT and OT goals were addressed individually during session; EMMA Andujar   Nurse Made Aware VIANCA Espinoza   Assessment   Prognosis Good   Problem List Decreased strength;Decreased endurance;Impaired balance;Decreased mobility;Decreased safety awareness;Decreased cognition   Assessment Berta Estrada is a 89 y.o. Female who presents to [facility] on [facility]17[facility] due to constipation and diagnosis of constipation. Orders for PT eval and treat received, w/ activity orders of up and OOB as tolerated. Comorbidities affecting pt's functional mobility at time of evaluation include: Alzheimer's disease, HTN, CKD, COPD, osteoporosis. Personal factors affecting DC include: ambulating w/ assistive device, stairs to enter home, decreased cognition, positive fall history, inability to perform IADLs, and inability  to perform ADLs. At baseline, pt mobilizes mod I w/ SPC, and w/ 1-4 fall(s) in the previous 6 months. Upon evaluation, pt presents w/ the following deficits: impaired strength, impaired balance, impaired cognition, and gait deviations. Pt currently requires  supervision for bed mobility, supervision for transfers, min Ax1 w/ RW for ambulation. Pt's clinical presentation is unstable/unpredictable due to abnormal lab values, need for increased assistance w/ functional mobility compared to baseline, need for input for mobility technique, need for input for task focus, recent readmission w/in 30 days, recent h/o falls, ongoing medical management. From a PT/mobility standpoint given the above findings, DC recommendation is level: II (Moderate Rehab Resource Intensity). During current admission, pt will benefit from continued skilled inpatient PT in the acute care setting in order to address the above deficits and to maximize function and mobility prior to DC from acute care.   Goals   [facility]   Short Term Goal #1 Pt will: perform bed mobility w/ mod I to decrease pt's burden of care and increase pt's independence w/ repositioning in bed; perform transfers w/ mod I to promote OOB mobility; ambulate at least 150' w/ LRAD and mod I to increase pt's ambulatory endurance/tolerance; negotiate 3 stair(s) w/ UE support and min Ax1 to facilitate pt returning to previous living environment; increase all balance ratings by at least 1 grade to decrease pt's risk of falls   PT Treatment Day 0   Plan   Treatment/Interventions Functional transfer training;LE strengthening/ROM;Elevations;Therapeutic exercise;Endurance training;Cognitive reorientation;Patient/family training;Equipment eval/education;Bed mobility;Gait training;Compensatory technique education   PT Frequency 3-5x/wk   Discharge Recommendation   Rehab Resource Intensity Level, PT II (Moderate Resource Intensity)   Equipment  Recommended Walker   Walker Package Recommended Wheeled walker   Change/add to Walker Package? No   AM-PAC Basic Mobility Inpatient   Turning in Flat Bed Without Bedrails 3   Lying on Back to Sitting on Edge of Flat Bed Without Bedrails 2   Moving Bed to Chair 3   Standing Up From Chair Using Arms 3   Walk in Room 3   Climb 3-5 Stairs With Railing 1   Basic Mobility Inpatient Raw Score 15   Basic Mobility Standardized Score 36.97   [facility] Highest Level Of Mobility   -[facility] Goal 4: Move to chair/commode   -[facility] Achieved 7: Walk 25 feet or more   End of Consult   Patient Position at End of Consult Bedside chair;Bed/Chair alarm activated;All needs within reach       The patient's AM-PAC Basic Mobility Inpatient Short Form Raw Score is 15. A Raw score of less than or equal to 16 suggests the patient may benefit from discharge to post-acute rehabilitation services. Please also refer to the recommendation of the Physical Therapist for safe discharge planning.    Pt will benefit from skilled inpatient PT during this admission in order to facilitate progress towards goals and to maximize functional independence prior to DC      DC rec: level II (Moderate Rehab Resource Intensity)        Yazmin Koenig, PT, DPT  05/20/24

## 2024-05-20 NOTE — TELEPHONE ENCOUNTER
----- Message from Tete SANDERS sent at 5/16/2024  3:21 PM EDT -----    ----- Message -----  From: JOSH Riddle  Sent: 5/15/2024   4:09 PM EDT  To: Cardiology [place]    Hi hoping you can schedule ap follow up with this patient . She is coming in for site check 5/30 10am and ep office. Hoping we can we coordinate the office visits. Daughter is contact #  Thanks  iwona

## 2024-05-20 NOTE — PLAN OF CARE
Problem: PAIN - ADULT  Goal: Verbalizes/displays adequate comfort level or baseline comfort level  Description: Interventions:  - Encourage patient to monitor pain and request assistance  - Assess pain using appropriate pain scale  - Administer analgesics based on type and severity of pain and evaluate response  - Implement non-pharmacological measures as appropriate and evaluate response  - Consider cultural and social influences on pain and pain management  - Notify physician/advanced practitioner if interventions unsuccessful or patient reports new pain  Outcome: Progressing     Problem: INFECTION - ADULT  Goal: Absence or prevention of progression during hospitalization  Description: INTERVENTIONS:  - Assess and monitor for signs and symptoms of infection  - Monitor lab/diagnostic results  - Monitor all insertion sites, i.e. indwelling lines, tubes, and drains  - Monitor endotracheal if appropriate and nasal secretions for changes in amount and color  - [place] appropriate cooling/warming therapies per order  - Administer medications as ordered  - Instruct and encourage patient and family to use good hand hygiene technique  - Identify and instruct in appropriate isolation precautions for identified infection/condition  Outcome: Progressing  Goal: Absence of fever/infection during neutropenic period  Description: INTERVENTIONS:  - Monitor WBC    Outcome: Progressing     Problem: SAFETY ADULT  Goal: Patient will remain free of falls  Description: INTERVENTIONS:  - Educate patient/family on patient safety including physical limitations  - Instruct patient to call for assistance with activity   - Consult OT/PT to assist with strengthening/mobility   - Keep Call bell within reach  - Keep bed low and locked with side rails adjusted as appropriate  - Keep care items and personal belongings within reach  - Initiate and maintain comfort rounds  - Make Fall Risk Sign visible to staff  - Offer Toileting every  Hours,  in advance of need  - Initiate/Maintain alarm  - Obtain necessary fall risk management equipment:   - Apply yellow socks and bracelet for high fall risk patients  - Consider moving patient to room near nurses station  Outcome: Progressing  Goal: Maintain or return to baseline ADL function  Description: INTERVENTIONS:  -  Assess patient's ability to carry out ADLs; assess patient's baseline for ADL function and identify physical deficits which impact ability to perform ADLs (bathing, care of mouth/teeth, toileting, grooming, dressing, etc.)  - Assess/evaluate cause of self-care deficits   - Assess range of motion  - Assess patient's mobility; develop plan if impaired  - Assess patient's need for assistive devices and provide as appropriate  - Encourage maximum independence but intervene and supervise when necessary  - Involve family in performance of ADLs  - Assess for home care needs following discharge   - Consider OT consult to assist with ADL evaluation and planning for discharge  - Provide patient education as appropriate  Outcome: Progressing  Goal: Maintains/Returns to pre admission functional level  Description: INTERVENTIONS:  - Perform AM-PAC 6 Click Basic Mobility/ Daily Activity assessment daily.  - Set and communicate daily mobility goal to care team and patient/family/caregiver.   - Collaborate with rehabilitation services on mobility goals if consulted  - Perform Range of Motion  times a day.  - Reposition patient every hours.  - Dangle patient  times a day  - Stand patient  times a day  - Ambulate patient  times a day  - Out of bed to chair  times a day   - Out of bed for meals  times a day  - Out of bed for toileting  - Record patient progress and toleration of activity level   Outcome: Progressing     Problem: DISCHARGE PLANNING  Goal: Discharge to home or other facility with appropriate resources  Description: INTERVENTIONS:  - Identify barriers to discharge w/patient and caregiver  - Arrange for  needed discharge resources and transportation as appropriate  - Identify discharge learning needs (meds, wound care, etc.)  - Arrange for interpretive services to assist at discharge as needed  - Refer to Case Management Department for coordinating discharge planning if the patient needs post-hospital services based on physician/advanced practitioner order or complex needs related to functional status, cognitive ability, or social support system  Outcome: Progressing     Problem: Knowledge Deficit  Goal: Patient/family/caregiver demonstrates understanding of disease process, treatment plan, medications, and discharge instructions  Description: Complete learning assessment and assess knowledge base.  Interventions:  - Provide teaching at level of understanding  - Provide teaching via preferred learning methods  Outcome: Progressing     Problem: Prexisting or High Potential for Compromised Skin Integrity  Goal: Skin integrity is maintained or improved  Description: INTERVENTIONS:  - Identify patients at risk for skin breakdown  - Assess and monitor skin integrity  - Assess and monitor nutrition and hydration status  - Monitor labs   - Assess for incontinence   - Turn and reposition patient  - Assist with mobility/ambulation  - Relieve pressure over bony prominences  - Avoid friction and shearing  - Provide appropriate hygiene as needed including keeping skin clean and dry  - Evaluate need for skin moisturizer/barrier cream  - Collaborate with interdisciplinary team   - Patient/family teaching  - Consider wound care consult   Outcome: Progressing     Problem: Potential for Falls  Goal: Patient will remain free of falls  Description: INTERVENTIONS:  - Educate patient/family on patient safety including physical limitations  - Instruct patient to call for assistance with activity   - Consult OT/PT to assist with strengthening/mobility   - Keep Call bell within reach  - Keep bed low and locked with side rails adjusted as  appropriate  - Keep care items and personal belongings within reach  - Initiate and maintain comfort rounds  - Make Fall Risk Sign visible to staff  - Offer Toileting every  Hours, in advance of need  - Initiate/Maintain alarm  - Obtain necessary fall risk management equipment:   - Apply yellow socks and bracelet for high fall risk patients  - Consider moving patient to room near nurses station  Outcome: Progressing

## 2024-05-20 NOTE — PLAN OF CARE
Problem: PHYSICAL THERAPY ADULT  Goal: Performs mobility at highest level of function for planned discharge setting.  See evaluation for individualized goals.  Description: Treatment/Interventions: Functional transfer training, LE strengthening/ROM, Elevations, Therapeutic exercise, Endurance training, Cognitive reorientation, Patient/family training, Equipment eval/education, Bed mobility, Gait training, Compensatory technique education  Equipment Recommended: Walker       See flowsheet documentation for full assessment, interventions and recommendations.  5/20/2024 1105 by Yazmin Koenig PT  Note: Prognosis: Good  Problem List: Decreased strength, Decreased endurance, Impaired balance, Decreased mobility, Decreased safety awareness, Decreased cognition  Assessment: Berta Estrada is a 89 y.o. Female who presents to [facility] on 5/17/24 due to constipation and diagnosis of constipation. Orders for PT eval and treat received, w/ activity orders of up and OOB as tolerated. Comorbidities affecting pt's functional mobility at time of evaluation include: Alzheimer's disease, HTN, CKD, COPD, osteoporosis. Personal factors affecting DC include: ambulating w/ assistive device, stairs to enter home, decreased cognition, positive fall history, inability to perform IADLs, and inability to perform ADLs. At baseline, pt mobilizes mod I w/ SPC, and w/ 1-4 fall(s) in the previous 6 months. Upon evaluation, pt presents w/ the following deficits: impaired strength, impaired balance, impaired cognition, and gait deviations. Pt currently requires  supervision for bed mobility, supervision for transfers, min Ax1 w/ RW for ambulation. Pt's clinical presentation is unstable/unpredictable due to abnormal lab values, need for increased assistance w/ functional mobility compared to baseline, need for input for mobility technique, need for input for task focus, recent readmission w/in 30 days, recent h/o falls, ongoing medical management.  From a PT/mobility standpoint given the above findings, DC recommendation is level: II (Moderate Rehab Resource Intensity). During current admission, pt will benefit from continued skilled inpatient PT in the acute care setting in order to address the above deficits and to maximize function and mobility prior to DC from acute care.        Rehab Resource Intensity Level, PT: II (Moderate Resource Intensity)    See flowsheet documentation for full assessment.

## 2024-05-20 NOTE — PLAN OF CARE
Problem: OCCUPATIONAL THERAPY ADULT  Goal: Performs self-care activities at highest level of function for planned discharge setting.  See evaluation for individualized goals.  Description: Treatment Interventions: ADL retraining, UE strengthening/ROM, Functional transfer training, Endurance training, Cognitive reorientation, Patient/family training, Equipment evaluation/education, Compensatory technique education, Energy conservation, Activityengagement          See flowsheet documentation for full assessment, interventions and recommendations.   Note: Limitation: Decreased ADL status, Decreased UE strength, Decreased Safe judgement during ADL, Decreased cognition, Decreased endurance, Decreased self-care trans, Decreased high-level ADLs  Prognosis: Good  Assessment: Patient is a 89 y.o. female seen for OT evaluation at [facility] following admission on 5/17/2024  s/p Constipation. Please see above for comprehensive list of comorbidities and significant PMHx impacting functional performance.  Pt admit from [facility], at true baseline pt living with daughter + MONA in 1SH with 3 JOSE, pt requiring (A) with ADLs and IADLs, (+)falls, (-)drives, use of SPC at baseline. Upon initial evaluation, pt appears to be performing below baseline functional status. Pt requires ARIANA for UB ADLs, MODA for LB ADLs, supervision for bed mobility, supervision for transfers and ARIANA for functional mobility household distance with RW. The AM-PAC & Barthel Index outcome tools were used to assist in determining pt safety w/self care /mobility and appropriate d/c recommendations, see above for score. Occupational performance is affected by the following deficits: endurance ,  decreased muscular strength , decreased dynamic balance impacting functional reach, decreased activity tolerance , impaired memory , attention to task, impaired learning ability d/t current cognitive status , and impaired global mental status.  Personal/Environmental factors impacting D/C include: (+) Hx of falls , steps to enter/navigate the home, Assistance needed for ADL/IADLs, Assistance needed for ADLs and functional mobility, High fall risk , decreased insight toward deficits , decreased recall of precautions , and baseline cognitive deficits. Supporting factors include: able to maintain FFSU, support system available, facility staff available for continued assistance, and attitude towards recovery Patient would benefit from OT services within the acute care setting to maximize level of functional independence in the following areas fall prevention , self-care transfers, bed mobility , functional mobility, and ADLs.  From OT standpoint, recommendation at time of D/C would be level II (moderate resource intensity).     Rehab Resource Intensity Level, OT: II (Moderate Resource Intensity)     Suki De MS OTR/L   [place] Licensure# [id number]

## 2024-05-20 NOTE — PLAN OF CARE
Problem: PAIN - ADULT  Goal: Verbalizes/displays adequate comfort level or baseline comfort level  Description: Interventions:  - Encourage patient to monitor pain and request assistance  - Assess pain using appropriate pain scale  - Administer analgesics based on type and severity of pain and evaluate response  - Implement non-pharmacological measures as appropriate and evaluate response  - Consider cultural and social influences on pain and pain management  - Notify physician/advanced practitioner if interventions unsuccessful or patient reports new pain  Outcome: Progressing     Problem: INFECTION - ADULT  Goal: Absence or prevention of progression during hospitalization  Description: INTERVENTIONS:  - Assess and monitor for signs and symptoms of infection  - Monitor lab/diagnostic results  - Monitor all insertion sites, i.e. indwelling lines, tubes, and drains  - Monitor endotracheal if appropriate and nasal secretions for changes in amount and color  - [place] appropriate cooling/warming therapies per order  - Administer medications as ordered  - Instruct and encourage patient and family to use good hand hygiene technique  - Identify and instruct in appropriate isolation precautions for identified infection/condition  Outcome: Progressing  Goal: Absence of fever/infection during neutropenic period  Description: INTERVENTIONS:  - Monitor WBC    Outcome: Progressing     Problem: SAFETY ADULT  Goal: Patient will remain free of falls  Description: INTERVENTIONS:  - Educate patient/family on patient safety including physical limitations  - Instruct patient to call for assistance with activity   - Consult OT/PT to assist with strengthening/mobility   - Keep Call bell within reach  - Keep bed low and locked with side rails adjusted as appropriate  - Keep care items and personal belongings within reach  - Initiate and maintain comfort rounds  - Make Fall Risk Sign visible to staff  - Apply yellow socks and bracelet  for high fall risk patients  - Consider moving patient to room near nurses station  Outcome: Progressing  Goal: Maintain or return to baseline ADL function  Description: INTERVENTIONS:  -  Assess patient's ability to carry out ADLs; assess patient's baseline for ADL function and identify physical deficits which impact ability to perform ADLs (bathing, care of mouth/teeth, toileting, grooming, dressing, etc.)  - Assess/evaluate cause of self-care deficits   - Assess range of motion  - Assess patient's mobility; develop plan if impaired  - Assess patient's need for assistive devices and provide as appropriate  - Encourage maximum independence but intervene and supervise when necessary  - Involve family in performance of ADLs  - Assess for home care needs following discharge   - Consider OT consult to assist with ADL evaluation and planning for discharge  - Provide patient education as appropriate  Outcome: Progressing  Goal: Maintains/Returns to pre admission functional level  Description: INTERVENTIONS:  - Perform AM-PAC 6 Click Basic Mobility/ Daily Activity assessment daily.  - Set and communicate daily mobility goal to care team and patient/family/caregiver.   - Collaborate with rehabilitation services on mobility goals if consulted  - Out of bed for toileting  - Record patient progress and toleration of activity level   Outcome: Progressing     Problem: DISCHARGE PLANNING  Goal: Discharge to home or other facility with appropriate resources  Description: INTERVENTIONS:  - Identify barriers to discharge w/patient and caregiver  - Arrange for needed discharge resources and transportation as appropriate  - Identify discharge learning needs (meds, wound care, etc.)  - Arrange for interpretive services to assist at discharge as needed  - Refer to Case Management Department for coordinating discharge planning if the patient needs post-hospital services based on physician/advanced practitioner order or complex needs  related to functional status, cognitive ability, or social support system  Outcome: Progressing     Problem: Knowledge Deficit  Goal: Patient/family/caregiver demonstrates understanding of disease process, treatment plan, medications, and discharge instructions  Description: Complete learning assessment and assess knowledge base.  Interventions:  - Provide teaching at level of understanding  - Provide teaching via preferred learning methods  Outcome: Progressing     Problem: Prexisting or High Potential for Compromised Skin Integrity  Goal: Skin integrity is maintained or improved  Description: INTERVENTIONS:  - Identify patients at risk for skin breakdown  - Assess and monitor skin integrity  - Assess and monitor nutrition and hydration status  - Monitor labs   - Assess for incontinence   - Turn and reposition patient  - Assist with mobility/ambulation  - Relieve pressure over bony prominences  - Avoid friction and shearing  - Provide appropriate hygiene as needed including keeping skin clean and dry  - Evaluate need for skin moisturizer/barrier cream  - Collaborate with interdisciplinary team   - Patient/family teaching  - Consider wound care consult   Outcome: Progressing     Problem: Potential for Falls  Goal: Patient will remain free of falls  Description: INTERVENTIONS:  - Educate patient/family on patient safety including physical limitations  - Instruct patient to call for assistance with activity   - Consult OT/PT to assist with strengthening/mobility   - Keep Call bell within reach  - Keep bed low and locked with side rails adjusted as appropriate  - Keep care items and personal belongings within reach  - Initiate and maintain comfort rounds  - Make Fall Risk Sign visible to staff  - Apply yellow socks and bracelet for high fall risk patients  - Consider moving patient to room near nurses station  Outcome: Progressing

## 2024-05-20 NOTE — CASE MANAGEMENT
Case Management Progress Note    Patient name Berta He S /S -01 MRN [id number]  : 1935 Date 2024       LOS (days): 2  Geometric Mean LOS (GMLOS) (days): 2.6  Days to GMLOS:0.7        OBJECTIVE:        Current admission status: Inpatient  Preferred Pharmacy:   [facility]/pharmacy #[id number] [place] [id number] [address] [address]  Phone: [phone] Fax: [phone]    [place] [address]  Phone: [phone] Fax: [phone]    Primary Care Provider: Kim Dewey MD    Primary Insurance: [employer]  Secondary Insurance:     PROGRESS NOTE:    Authorization has been APPROVED for patient's admission to [facility] upon medical stability as per Ni @ admissions.

## 2024-05-20 NOTE — OCCUPATIONAL THERAPY NOTE
Occupational Therapy Evaluation     Patient Name: Berta Estrada  Today's Date: 5/20/2024  Problem List  Principal Problem:    Constipation  Active Problems:    Late onset Alzheimer's disease without behavioral disturbance (HCC)    Other hyperlipidemia    Essential hypertension    GERD (gastroesophageal reflux disease)    Stage 3a chronic kidney disease (HCC)    Closed fracture of ramus of right pubis (HCC)    History of permanent cardiac pacemaker placement    Liver nodule    Abnormal CT scan    Past Medical History  Past Medical History:   Diagnosis Date    Actinic keratosis     Basal cell carcinoma     Back     Cancer (HCC)     Coronary artery disease     Dementia (HCC)     Depression     Ear problems     HL (hearing loss)     Hyperlipidemia     Migraines     Ovarian cancer (HCC) 2004    s/p surgery. no chemo/RT    Phlebitis     R leg     Past Surgical History  Past Surgical History:   Procedure Laterality Date    ADENOIDECTOMY      APPENDECTOMY      CARDIAC ELECTROPHYSIOLOGY PROCEDURE N/A 5/15/2024    Procedure: Cardiac pacer implant;  Surgeon: Rufus Raines MD;  [facility];  Service: Cardiology    CATARACT EXTRACTION, BILATERAL      HYSTERECTOMY  2005    ovarian CA    KNEE SURGERY Right     SKIN BIOPSY      TONSILECTOMY AND ADNOIDECTOMY      TONSILLECTOMY          05/20/24 0941   OT Last Visit   OT Visit Date 05/20/24   Note Type   Note type Evaluation   Pain Assessment   Pain Assessment Tool 0-10   Pain Score No Pain   Restrictions/Precautions   Weight Bearing Precautions Per Order Yes   RLE Weight Bearing Per Order WBAT   LLE Weight Bearing Per Order WBAT   Other Precautions Cognitive;Chair Alarm;Bed Alarm;Multiple lines;Fall Risk;Pain  (cardiac precuations - pacemaker placement 5/15/2024; (+) lerner catheter)   Home Living   Type of Home House   Home Layout One level;Stairs to enter without rails  (3 JOSE)   Bathroom Shower/Tub Tub/shower unit   Bathroom Toilet Standard   Bathroom  "Equipment Tub transfer bench   Bathroom Accessibility Accessible   Home Equipment Walker;Cane   Additional Comments Information provided above is patients baseline living situation - admit from [facility].   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 At STR, pt has been assist w/ ADLs/IADLs and ambulating MOD I w/ use of RW.   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   General   Additional Pertinent History Admit due to vomiting fecal looking matter. Found to have constipation.  PMH: alzheimer's dementia, recent admission under Trauma service from 05/12/2024-05/16/2024 due to right inferior pubic ramus fracture 2/2 fall, S/P placement of single-chamber [employer] PPM on 05/15/2024.   Family/Caregiver Present Yes  (supportive daughter)   Subjective   Subjective \"Everyone here is so nice\"   ADL   Eating Assistance 6  Modified independent   Grooming Assistance 5  Supervision/Setup   UB Bathing Assistance 4  Minimal Assistance   LB Bathing Assistance 3  Moderate Assistance   UB Dressing Assistance 4  Minimal Assistance   LB Dressing Assistance 3  " Moderate Assistance   Toileting Assistance  3  Moderate Assistance   Additional Comments Unable to formally assess bathing, dressing, grooming or toileting at time of eval. The above levels of assistance are anticipated based on functional performance deficits with use of clinical judgement.   Bed Mobility   Supine to Sit 5  Supervision   Additional items HOB elevated;Increased time required;Bedrails   Sit to Supine Unable to assess   Additional items Comment  (pt OOB to recliner chair)   Additional Comments Supervision to maintain sitting balance at the EOB   Transfers   Sit to Stand 5  Supervision   Additional items Assist x 1;Increased time required;Verbal cues   Stand to Sit 5  Supervision   Additional items Assist x 1;Increased time required;Verbal cues   Additional Comments VC for optimal hand placement and body mechanics for safe transfers. RW for support   Functional Mobility   Functional Mobility 4  Minimal assistance   Additional Comments Ax1 for functional household distance w/ use of RW. Assist for RW managment. Verbal, tactile cues for body and spaital awarness.   Additional items Rolling walker   Balance   Static Sitting Fair +   Dynamic Sitting Fair   Static Standing Poor +   Dynamic Standing Poor   Activity Tolerance   Activity Tolerance Patient limited by fatigue;Other (Comment)  (cognition)   Medical Staff Made Aware Spoke with CM, PT   Nurse Made Aware Spoke with RN pre/post   RUE Assessment   RUE Assessment WFL   LUE Assessment   LUE Assessment X  (cardiac precautions; s/p PPM on 05/15/2024.)   Hand Function   Gross Motor Coordination Functional   Fine Motor Coordination Functional   Hand Function Comments Pt is R handed ; dtr reports patient to be complinat w/ cardiac precautions w/o use of sling    Sensation   Light Touch No apparent deficits   Cognition   Overall Cognitive Status Impaired  (alzheimer's dementia)   Arousal/Participation Alert;Responsive;Cooperative   Attention Attends with cues  "to redirect   Orientation Level Oriented to person;Disoriented to place;Disoriented to time;Disoriented to situation  (able to recall full name, recalls \"3\" but unable to identify either month or day.)   Memory Decreased recall of precautions;Decreased recall of recent events;Decreased long term memory;Decreased recall of biographical information;Decreased short term memory   Following Commands Follows one step commands with increased time or repetition   Comments Pt ID via wristband, name and . Pt is a poor historian at baseline. Hyperverbal, cueing provided throughout to maintain attention.   Assessment   Limitation Decreased ADL status;Decreased UE strength;Decreased Safe judgement during ADL;Decreased cognition;Decreased endurance;Decreased self-care trans;Decreased high-level ADLs   Prognosis Good   Assessment Patient is a 89 y.o. female seen for OT evaluation at [facility] following admission on 2024  s/p Constipation. Please see above for comprehensive list of comorbidities and significant PMHx impacting functional performance.  Pt admit from [facility], at true baseline pt living with daughter + MONA in 1SH with 3 JOSE, pt requiring (A) with ADLs and IADLs, (+)falls, (-)drives, use of SPC at baseline. Upon initial evaluation, pt appears to be performing below baseline functional status. Pt requires ARIANA for UB ADLs, MODA for LB ADLs, supervision for bed mobility, supervision for transfers and ARIANA for functional mobility household distance with RW. The AM-PAC & Barthel Index outcome tools were used to assist in determining pt safety w/self care /mobility and appropriate d/c recommendations, see above for score. Occupational performance is affected by the following deficits: endurance ,  decreased muscular strength , decreased dynamic balance impacting functional reach, decreased activity tolerance , impaired memory , attention to task, impaired learning ability d/t current cognitive status , " and impaired global mental status. Personal/Environmental factors impacting D/C include: (+) Hx of falls , steps to enter/navigate the home, Assistance needed for ADL/IADLs, Assistance needed for ADLs and functional mobility, High fall risk , decreased insight toward deficits , decreased recall of precautions , and baseline cognitive deficits. Supporting factors include: able to maintain FFSU, support system available, facility staff available for continued assistance, and attitude towards recovery Patient would benefit from OT services within the acute care setting to maximize level of functional independence in the following areas fall prevention , self-care transfers, bed mobility , functional mobility, and ADLs.  From OT standpoint, recommendation at time of D/C would be level II (moderate resource intensity).   Goals   Patient Goals no direct rehab goals stated on this date - will continue to f/u   LTG Time Frame 10-14   Long Term Goal See below   Plan   Treatment Interventions ADL retraining;UE strengthening/ROM;Functional transfer training;Endurance training;Cognitive reorientation;Patient/family training;Equipment evaluation/education;Compensatory technique education;Energy conservation;Activityengagement   Goal Expiration Date 05/30/24   OT Treatment Day 0   OT Frequency 3-5x/wk   Discharge Recommendation   Rehab Resource Intensity Level, OT II (Moderate Resource Intensity)   Additional Comments  The patient's raw score on the AM-PAC Daily Activity Inpatient Short Form is 16. A raw score of less than 19 suggests the patient may benefit from discharge to post-acute rehabilitation services. Please refer to the recommendation of the Occupational Therapist for safe discharge planning.   AM-PAC Daily Activity Inpatient   Lower Body Dressing 2   Bathing 2   Toileting 3   Upper Body Dressing 3   Grooming 3   Eating 3   Daily Activity Raw Score 16   Daily Activity Standardized Score (Calc for Raw Score >=11) 35.96    AM-PAC Applied Cognition Inpatient   Following a Speech/Presentation 2   Understanding Ordinary Conversation 3   Taking Medications 1   Remembering Where Things Are Placed or Put Away 2   Remembering List of 4-5 Errands 1   Taking Care of Complicated Tasks 1   Applied Cognition Raw Score 10   Applied Cognition Standardized Score 24.98   Barthel Index   Feeding 5   Bathing 0   Grooming Score 0   Dressing Score 5   Bladder Score 0   Bowels Score 5   Toilet Use Score 5   Transfers (Bed/Chair) Score 10   Mobility (Level Surface) Score 0   Stairs Score 0   Barthel Index Score 30   End of Consult   Education Provided Yes;Family or social support of family present for education by provider   Patient Position at End of Consult Bedside chair;Bed/Chair alarm activated;All needs within reach   Nurse Communication Nurse aware of consult     Goals established on initial evaluation in order to achieve pt's goal of no direct rehab goals stated on this date (see above).      Pt will complete UB ADLs Supervision for increased ADL independence within 10 days.     Pt will complete LB ADLs Supervision for increased ADL independence within 10 days.     Pt will complete toileting Supervision with use of DME for increased ADL independence within 10 days.     Pt will demonstrate proper body mechanics to complete self-care transfers and functional mobility with Mod independent  and use of LRAD for increased safety and functional independence within 10 days.     Pt will demonstrate standing tolerance of 5-8 min with Supervision and use of LRAD for increased activity tolerance during ADL/IADL tasks within 10 days.     Pt will complete bed mobility Mod independent  for increased independence in repositioning, pressure offloading, and managing comfort.     Pt will demonstrate proper body mechanics and fall prevention strategies during 100% of tx sessions for increased safety awareness during ADL/IADLs    Pt will improve functional activity  tolerance to 30 mins of sustained functional tasks to increase participation in basic self-care tasks and minimize assistance level.     Pt will attend to treatment task or activity for 15 minutes without need for redirection to improve activity engagement within 10 days.     Pt will consistently follow multi step directions during ADL performances w/ less than 1 cue and/or prompt to maximize independence in ad safety with ADL performance.     Pt will participate in ongoing cognitive assessments to assist with safe D/C planning and supervision/assistance recommendations.     Pt will maintain cardiac precautions 100% of the time during functional mobility with minimal VC to promote protection of spine within 10 days     Pt benefited from co-session of skilled OT and PT therapists in order to most appropriately address functional deficits d/t regression from functioning level prior to admission  and decreased activity tolerance.  OT/PT objectives were addressed separately; please see PT note for specific goal areas targeted.    Suki De MS OTR/L   [place] Licensure# [id number]

## 2024-05-20 NOTE — PROGRESS NOTES
"[facility]  Progress Note  Name: Berta Estrada I  MRN: [id number]  Unit/Bed#: S -01 I Date of Admission: 5/17/2024   Date of Service: 5/20/2024 I Hospital Day: 2    Assessment & Plan   * Constipation  Assessment & Plan  Patient presented from rehab due to complaints of constipation and vomiting x 1 per nursing staff. Suspect 2/2 to recent opioid use for pain.   Daughter reports patient had not had a bowel movement for nearly a week  Imaging did not reveal any obstruction  Patient is now starting to have bowel movements but would continue aggressive bowel regimen for the time being    Abnormal CT scan  Assessment & Plan  Multiple abnormal incidental findings noted below.  No plan for additional workup or intervention based on age and underlying dementia   CT A/P \"2.7 cm right upper lobe mass with cavitation/necrosis. This may represent infectious/inflammatory or malignant lesion 3.8 cm right lower lobe masslike lesion, infectious/inflammatory versus neoplastic.Indeterminate right upper lobe and right middle lobe peribronchovascular nodules.  Indeterminate right hepatic lobe lesion. Distended gallbladder. Dilated common bile duct. Circumferential wall thickening of the esophagus may represent esophagitis.  Incidental findings in the pancreas and thyroid gland     Urinary retention  Assessment & Plan  Likely due to constipation  Contreras catheter placed.  Consider voiding trial tomorrow    History of permanent cardiac pacemaker placement  Assessment & Plan  During recent hospitalization, patient was noted to have arrhythmia with EKG on that admission displaying Mobitz type 1 second degree AV block; therefore, Cardiology and EP were consulted.  S/P placement of single-chamber Med[employer]onic PPM on 05/15/2024.  Local wound care as indicated to pacemaker insertion site.    Closed fracture of ramus of right pubis (HCC)  Assessment & Plan  Recent admission under Trauma service from " "05/12/2024-05/16/2024 due to right inferior pubic ramus fracture 2/2 fall  Nonoperative management recommended by Orthopedic surgery.  Incidentally found on CAT scan here to have \"new nondisplaced fracture of the right superior pubic ramus:  maintain weightbearing status as tolerated.  Continue multimodal analgesic regimen.  DVT prophylaxis.      Essential hypertension  Assessment & Plan  Blood pressure has been elevated during the admission  Could consider addition of an antihypertensive agent --will continue to observe for now    Late onset Alzheimer's disease without behavioral disturbance (HCC)  Assessment & Plan  Calm and cooperative         VTE Pharmacologic Prophylaxis: VTE Score: 8 sc heparin    Mobility:   Basic Mobility Inpatient Raw Score: 15  JH-HLM Goal: 4: Move to chair/commode  JH-HLM Achieved: 7: Walk 25 feet or more  JH-HLM Goal achieved. Continue to encourage appropriate mobility.    Patient Centered Rounds: I performed bedside rounds with nursing staff today.   Discussions with Specialists or Other Care Team Provider: case mgmt    Education and Discussions with Family / Patient: Updated  (daughter) at bedside.    Total Time Spent on Date of Encounter in care of patient: 30 mins. This time was spent on one or more of the following: performing physical exam; counseling and coordination of care; obtaining or reviewing history; documenting in the medical record; reviewing/ordering tests, medications or procedures; communicating with other healthcare professionals and discussing with patient's family/caregivers.    Current Length of Stay: 2 day(s)  Current Patient Status: Inpatient   Certification Statement: The patient will continue to require additional inpatient hospital stay due to pending safe discharge plan  Discharge Plan: Anticipate discharge tomorrow to rehab facility.    Code Status: Level 3 - DNAR and DNI    Subjective:   Patient just had some bowel movements after prolonged " period of constipation but feels she probably still has more that she could go.  Patient was not complaining of any pain. Daughter asking when we can try removing lerner.     Objective:     Vitals:   Temp (24hrs), Av.6 °F (36.4 °C), Min:97.4 °F (36.3 °C), Max:97.9 °F (36.6 °C)    Temp:  [97.4 °F (36.3 °C)-97.9 °F (36.6 °C)] 97.6 °F (36.4 °C)  HR:  [] 99  Resp:  [16-18] 16  BP: (134-185)/() 157/95  SpO2:  [96 %-97 %] 96 %  Body mass index is 24.46 kg/m².     Input and Output Summary (last 24 hours):     Intake/Output Summary (Last 24 hours) at 2024 1321  Last data filed at 2024 1126  Gross per 24 hour   Intake 300 ml   Output --   Net 300 ml       Physical Exam:   Physical Exam  Vitals reviewed.   Constitutional:       General: She is not in acute distress.     Appearance: She is not ill-appearing, toxic-appearing or diaphoretic.   Eyes:      General: No scleral icterus.        Right eye: No discharge.         Left eye: No discharge.      Conjunctiva/sclera: Conjunctivae normal.   Cardiovascular:      Rate and Rhythm: Normal rate and regular rhythm.      Heart sounds: No murmur heard.  Pulmonary:      Effort: No respiratory distress.      Breath sounds: No stridor. No wheezing, rhonchi or rales.   Abdominal:      General: There is no distension.      Tenderness: There is no abdominal tenderness. There is no guarding.   Genitourinary:     Comments: Lerner in place with dark concentrated nonbloody urine  Musculoskeletal:      Right lower leg: No edema.      Left lower leg: No edema.   Skin:     Coloration: Skin is not jaundiced or pale.      Findings: No bruising, erythema, lesion or rash.   Neurological:      Mental Status: She is alert.      Comments: Awake alert interactive   Psychiatric:         Mood and Affect: Mood normal.      Comments: Pleasant and cooperative          Additional Data:     Labs:  Results from last 7 days   Lab Units 24  0602   WBC Thousand/uL 9.66   HEMOGLOBIN g/dL  13.0   HEMATOCRIT % 38.2   PLATELETS Thousands/uL 157   SEGS PCT % 65   LYMPHO PCT % 23   MONO PCT % 9   EOS PCT % 2     Results from last 7 days   Lab Units 05/19/24  0602   SODIUM mmol/L 139   POTASSIUM mmol/L 4.0   CHLORIDE mmol/L 105   CO2 mmol/L 26   BUN mg/dL 16   CREATININE mg/dL 0.81   ANION GAP mmol/L 8   CALCIUM mg/dL 8.5   ALBUMIN g/dL 3.4*   TOTAL BILIRUBIN mg/dL 0.87   ALK PHOS U/L 58   ALT U/L 27   AST U/L 25   GLUCOSE RANDOM mg/dL 110     Results from last 7 days   Lab Units 05/17/24  2259   INR  1.02                   Lines/Drains:  Invasive Devices       Peripheral Intravenous Line  Duration             Peripheral IV 05/17/24 Proximal;Right;Ventral (anterior) Forearm 2 days              Drain  Duration             Urethral Catheter Double-lumen;Straight-tip 18 Fr. <1 day                  Urinary Catheter:  Goal for removal:  Will attempt to remove Contreras tomorrow after patient has more bowel evacuation today             Imaging:     Recent Cultures (last 7 days):         Last 24 Hours Medication List:   Current Facility-Administered Medications   Medication Dose Route Frequency Provider Last Rate    acetaminophen  650 mg Oral Q6H PRN Naty SUNSHINE BrookeNP      aspirin  81 mg Oral Daily Naty SUNSHINE BrookeNP      atorvastatin  20 mg Oral Daily Naty Mendy, SUNSHINENP      calcium carbonate  500 mg Oral Daily PRN Aj Durant MD      docusate sodium  100 mg Oral BID Naty SUNSHINE BrookeNP      famotidine  20 mg Oral Daily Naty JOSH Brooke      fluticasone  1 spray Each Nare Daily Naty JOSH Brooke      heparin (porcine)  5,000 Units Subcutaneous Q8H MAMTA Naty SUNSHINE BrookeNP      melatonin  3 mg Oral HS Naty JOSH Brooke      metoclopramide  10 mg Intravenous Q6H PRN Naty JOSH Brooke      mineral oil  1 enema Rectal Once Chantel Vila PA-C      multivitamin-minerals  1 tablet Oral Daily Naty JOSH Brooke      polyethylene glycol  17 g Oral Daily Naty JOSH Brooke       senna  2 tablet Oral BID Chantel Vila PA-C          Today, Patient Was Seen By: Chantel Vila PA-C    **Please Note: This note may have been constructed using a voice recognition system.**

## 2024-05-20 NOTE — PROGRESS NOTES
Patient:  JENNIFER REDDY    MRN:  [id number]    Aidin Request ID:  [id number]    Level of care reserved:  Skilled Nursing Facility    Partner Reserved:  [facility], [place] [id number] [phone]    Clinical needs requested:    Geography searched:  10 miles around [id number]    Start of Service:    Request sent:  9:51am EDT on 5/20/2024 by Pratima Marte    Partner reserved:  11:17am EDT on 5/20/2024 by Pratima Marte    Choice list shared:  9:58am EDT on 5/20/2024 by Pratima Marte

## 2024-05-20 NOTE — CASE MANAGEMENT
Case Management Assessment & Discharge Planning Note    Patient name Berta He S /S -01 MRN [id number]  : 1935 Date 2024       Current Admission Date: 2024  Current Admission Diagnosis:Constipation   Patient Active Problem List    Diagnosis Date Noted Date Diagnosed    Abnormal CT scan 2024     Constipation 2024     History of permanent cardiac pacemaker placement 2024     Liver nodule 2024     Fall 2024     Closed fracture of ramus of right pubis (HCC) 2024     Mobitz type 1 second degree AV block 2024     Cervical radiculopathy 2022     Lumbar degenerative disc disease 2022     Other hemorrhoids 2021     Gait instability 2021     Vitamin D deficiency 2021     Closed fracture of left foot 2020     Wears hearing aid 2020     Stage 3a chronic kidney disease ([facility]) 2019     Thrombocytopenia ([facility]) 2019     Essential hypertension 2019     GERD (gastroesophageal reflux disease) 2019     Postmenopausal HRT (hormone replacement therapy) 2019     Vitamin B12 deficiency 2019     COPD (chronic obstructive pulmonary disease) ([facility]) 2019     Pancreatic cyst 2019     S/P AVR (aortic valve replacement) 03/15/2019     Primary osteoarthritis involving multiple joints 03/15/2019     Other hyperlipidemia 03/15/2019     Allergic rhinitis 03/15/2019     Age-related osteoporosis without current pathological fracture 03/15/2019     Skin cancer 03/15/2019     Coronary artery disease involving native coronary artery of native heart 03/15/2019     Depression 2018     Late onset Alzheimer's disease without behavioral disturbance (HCC) 2018       LOS (days): 2  Geometric Mean LOS (GMLOS) (days): 2.6  Days to GMLOS:0.8     OBJECTIVE:  PATIENT READMITTED TO HOSPITAL  Risk of Unplanned Readmission Score: 13.35         Current admission status: Inpatient        Preferred Pharmacy:   [facility]/pharmacy #[id number] - [place] - [address]  [address] [address]  Phone: [phone] Fax: [phone]    [place], [place] - [address]  [address] [address]  Phone: [phone] Fax: [phone]    Primary Care Provider: Kim Dewey MD    Primary Insurance: [employer]  Secondary Insurance:     ASSESSMENT:  Active Health Care Proxies       Hillary Estrada [facility] Care Representative - Daughter   Primary Phone: [phone] (Mobile)  Home Phone: [phone]                           Readmission Root Cause  30 Day Readmission: Yes  Who directed you to return to the hospital?: Other (comment) (Facility)  Did you understand whom to contact if you had questions or problems?: Yes  Did you get your prescriptions before you left the hospital?: Yes  Were you able to get your prescriptions filled when you left the hospital?: Yes  Did you take your medications as prescribed?: Yes  Were you able to get to your follow-up appointments?: No  Reason:: Readmitted prior to appointment  Patient was readmitted due to: Complaints of constipation and vomiting x 1 per nursing staff. Patient has not had a bowel movement in 4 days. Suspect due to recent opioid use for pain.  Action Plan: Return to [facility] (rehab) once cleared    Patient Information  Admitted from:: Facility ([facility] (REHAB))  Mental Status: Alert  During Assessment patient was accompanied by: Daughter  Assessment information provided by:: Patient, Daughter  Support Systems: Family members, Daughter  What city do you [place] in[place]  Home entry access options. Select all that apply.: Stairs  Number of steps to enter home.: 3  Do the steps have railings?: Yes  Type of Current Residence: 3 story home  Upon entering residence, is there a bedroom on the main floor (no further steps)?: No  A bedroom is located on  the following floor levels of residence (select all that apply):: 2nd Floor  Upon entering residence, is there a bathroom on the main floor (no further steps)?: No  Indicate which floors of current residence have a bathroom (select all the apply):: 2nd Floor  Number of steps to 2nd floor from main floor: One Flight  Living Arrangements: Lives w/ Daughter  Is patient a ?: No    Activities of Daily Living Prior to Admission  Functional Status: Assistance  Completes ADLs independently?: Yes  Ambulates independently?: Yes  Does patient use assisted devices?: Yes  Assisted Devices (DME) used: Straight Cane  Does patient currently own DME?: Yes  What DME does the patient currently own?: Straight Cane  Does patient have a history of Outpatient Therapy (PT/OT)?: Yes  Does the patient have a history of Short-Term Rehab?: Yes (Admitted from [facility] (rehab))  Does patient have a history of HHC?: Yes  Does patient currently have HHC?: No         Patient Information Continued  Income Source: Pension/shelter  Does patient have prescription coverage?: Yes  Does patient receive dialysis treatments?: No  Does patient have a history of substance abuse?: No  Does patient have a history of Mental Health Diagnosis?: No         Means of Transportation  Means of Transport to Appts:: Family transport      Social Determinants of Health (SDOH)      Flowsheet Row Most Recent Value   Housing Stability    In the last 12 months, was there a time when you were not able to pay the mortgage or rent on time? N   In the past 12 months, how many times have you moved where you were living? 1   At any time in the past 12 months, were you homeless or living in a shelter (including now)? N   Transportation Needs    In the past 12 months, has lack of transportation kept you from medical appointments or from getting medications? no   In the past 12 months, has lack of transportation kept you from meetings, work, or from getting  things needed for daily living? No   Food Insecurity    Within the past 12 months, you worried that your food would run out before you got the money to buy more. Never true   Within the past 12 months, the food you bought just didn't last and you didn't have money to get more. Never true   Utilities    In the past 12 months has the electric, gas, oil, or water company threatened to shut off services in your home? No            DISCHARGE DETAILS:    Discharge planning discussed with:: Patient and daughter at bedside  Freedom of Choice: Yes  Comments - Freedom of Choice: Preference would be for return to [facility] for con't rehab at discharge.  CM contacted family/caregiver?: Yes  Were Treatment Team discharge recommendations reviewed with patient/caregiver?: Yes  Did patient/caregiver verbalize understanding of patient care needs?: Yes  Were patient/caregiver advised of the risks associated with not following Treatment Team discharge recommendations?: Yes    Contacts  Patient Contacts: Hillary Estrada  Relationship to Patient:: Family  Contact Method: In Person  Reason/Outcome: Discharge Planning    Requested Home Health Care         Is the patient interested in HHC at discharge?: No    DME Referral Provided  Referral made for DME?: No    Other Referral/Resources/Interventions Provided:  Interventions: Facility Return  Referral Comments: Patient was admitted from [facility] due to complaints of constipation and vomiting x 1 per nursing staff. Patient has not had a bowel movement in 4 days. Patient was evaluated by PT/OT and is recommended for con't rehabilitation services upon medical stability -- CM spoke with both patient and daughter at bedside to introduce role of CM and discuss same. Preference would be for return to [facility] once medically optimized -- CM sent referral back & confirmed with admissions staff that patient may return. Updated insurance authorization will be  "needed prior to return -- Facility made aware and will submit for insurance authorization. Daughter made aware of appeal rights as she verbalized patient was discharged \"too soon\" during the previous admission, causing subsequent re-hospitalization. Daughter signed copy of IMM and verbalizing understanding that she may appeal on patient's behalf if needed. CM will continue to follow.         Treatment Team Recommendation: Short Term Rehab, Facility Return  Discharge Destination Plan:: Facility Return, Short Term Rehab                                IMM Given (Date):: 05/20/24  IMM Given to:: Family        IMM reviewed with patient & daughter, patient & daughter agrees with discharge determination.                    "

## 2024-05-21 VITALS
OXYGEN SATURATION: 97 % | SYSTOLIC BLOOD PRESSURE: 144 MMHG | HEIGHT: 65 IN | HEART RATE: 91 BPM | TEMPERATURE: 97.7 F | DIASTOLIC BLOOD PRESSURE: 88 MMHG | RESPIRATION RATE: 18 BRPM | BODY MASS INDEX: 24.46 KG/M2

## 2024-05-21 PROCEDURE — 99238 HOSP IP/OBS DSCHRG MGMT 30/<: CPT | Performed by: PHYSICIAN ASSISTANT

## 2024-05-21 RX ORDER — POLYETHYLENE GLYCOL 3350 17 G/17G
17 POWDER, FOR SOLUTION ORAL DAILY
Start: 2024-05-22

## 2024-05-21 RX ORDER — SENNOSIDES 8.6 MG
17.2 TABLET ORAL 2 TIMES DAILY
Start: 2024-05-21

## 2024-05-21 RX ORDER — ACETAMINOPHEN 325 MG/1
975 TABLET ORAL 3 TIMES DAILY
Start: 2024-05-21 | End: 2024-05-28

## 2024-05-21 RX ORDER — DOCUSATE SODIUM 100 MG/1
100 CAPSULE, LIQUID FILLED ORAL 2 TIMES DAILY
Start: 2024-05-21

## 2024-05-21 RX ORDER — CALCIUM CARBONATE 500 MG/1
500 TABLET, CHEWABLE ORAL DAILY PRN
Start: 2024-05-21

## 2024-05-21 RX ADMIN — HEPARIN SODIUM 5000 UNITS: 5000 INJECTION INTRAVENOUS; SUBCUTANEOUS at 05:48

## 2024-05-21 RX ADMIN — ACETAMINOPHEN 650 MG: 325 TABLET, FILM COATED ORAL at 15:00

## 2024-05-21 RX ADMIN — MULTIPLE VITAMINS W/ MINERALS TAB 1 TABLET: TAB ORAL at 10:34

## 2024-05-21 RX ADMIN — SENNOSIDES 17.2 MG: 8.6 TABLET, FILM COATED ORAL at 10:34

## 2024-05-21 RX ADMIN — ATORVASTATIN CALCIUM 20 MG: 20 TABLET, FILM COATED ORAL at 10:34

## 2024-05-21 RX ADMIN — FLUTICASONE PROPIONATE 1 SPRAY: 50 SPRAY, METERED NASAL at 10:37

## 2024-05-21 RX ADMIN — POLYETHYLENE GLYCOL 3350 17 G: 17 POWDER, FOR SOLUTION ORAL at 10:34

## 2024-05-21 RX ADMIN — HEPARIN SODIUM 5000 UNITS: 5000 INJECTION INTRAVENOUS; SUBCUTANEOUS at 14:19

## 2024-05-21 RX ADMIN — DOCUSATE SODIUM 100 MG: 100 CAPSULE, LIQUID FILLED ORAL at 10:34

## 2024-05-21 RX ADMIN — FAMOTIDINE 20 MG: 20 TABLET, FILM COATED ORAL at 10:34

## 2024-05-21 RX ADMIN — ASPIRIN 81 MG 81 MG: 81 TABLET ORAL at 10:34

## 2024-05-21 NOTE — ASSESSMENT & PLAN NOTE
Blood pressure has been mildly elevated during the admission  Could consider addition of an antihypertensive agent, but will defer to outpatient provider

## 2024-05-21 NOTE — CASE MANAGEMENT
Case Management Discharge Planning Note    Patient name Berta He S /S -01 MRN [id number]  : 1935 Date 2024       Current Admission Date: 2024  Current Admission Diagnosis:Constipation   Patient Active Problem List    Diagnosis Date Noted Date Diagnosed    Urinary retention 2024     Abnormal CT scan 2024     Constipation 2024     History of permanent cardiac pacemaker placement 2024     Liver nodule 2024     Fall 2024     Closed fracture of ramus of right pubis (HCC) 2024     Mobitz type 1 second degree AV block 2024     Cervical radiculopathy 2022     Lumbar degenerative disc disease 2022     Other hemorrhoids 2021     Gait instability 2021     Vitamin D deficiency 2021     Closed fracture of left foot 2020     Wears hearing aid 2020     Stage 3a chronic kidney disease ([facility]) 2019     Thrombocytopenia ([facility]) 2019     Essential hypertension 2019     GERD (gastroesophageal reflux disease) 2019     Postmenopausal HRT (hormone replacement therapy) 2019     Vitamin B12 deficiency 2019     COPD (chronic obstructive pulmonary disease) ([facility]) 2019     Pancreatic cyst 2019     S/P AVR (aortic valve replacement) 03/15/2019     Primary osteoarthritis involving multiple joints 03/15/2019     Other hyperlipidemia 03/15/2019     Allergic rhinitis 03/15/2019     Age-related osteoporosis without current pathological fracture 03/15/2019     Skin cancer 03/15/2019     Coronary artery disease involving native coronary artery of native heart 03/15/2019     Depression 2018     Late onset Alzheimer's disease without behavioral disturbance ([facility]) 2018       LOS (days): 3  Geometric Mean LOS (GMLOS) (days): 2.6  Days to GMLOS:-0.2     OBJECTIVE:  Risk of Unplanned Readmission Score: 15.85         Current admission status: Inpatient   Preferred  Pharmacy:   [facility]/pharmacy #[id number] - [place] - [address]  [address] [address]  Phone: [phone] Fax: [phone]    [place], [place] - [address]  [address] [address]  Phone: [phone] Fax: [phone]    Primary Care Provider: Kim Dewey MD    Primary Insurance: [employer]  Secondary Insurance:     DISCHARGE DETAILS:    Discharge planning discussed with:: Patient and daughter at bedside                                     Other Referral/Resources/Interventions Provided:  Interventions: Short Term Rehab  Referral Comments: Patient is medically stable for discharge to [facility] today -- Patient will be ready ~2:45pm, per SLIM & bedside RN. Daughter at bedside confirmed she will transport via standard vehicle -- Bedside RN confirmed this is appropriate as patient is stand-by assist with RW. Facility made aware of arrival ~3pm -- They confirmed understanding of same. Daughter inquired about assistance with arrange care home placement after rehab -- CM reviewed [employer] for assistance with tours & information. Daughter onboard with referral -- CM placed referral via Aidin. Daughter very appreciative.         Treatment Team Recommendation: Short Term Rehab, Facility Return  Discharge Destination Plan:: Facility Return, Short Term Rehab                                              Accepting Facility Name, City & State : [facility]  Receiving Facility/Agency Phone Number: [phone]  Facility/Agency Fax Number: [phone]

## 2024-05-21 NOTE — ASSESSMENT & PLAN NOTE
Pt s/p mechanical fall  Pt with mildy displaced inferior pubic fracture  Pt appears comfortable at rest  P.T.: WBAT b/l LE  Cont oxycodone 2.5 mg 1 tab po q6 hours prn severe pain  Cont aggressive stool softners  Cont baby aspirin

## 2024-05-21 NOTE — ASSESSMENT & PLAN NOTE
Patient presented from rehab due to complaints of constipation and vomiting x 1 per nursing staff. Suspect 2/2 to recent opioid use for pain.   Daughter reported patient had not had a bowel movement for nearly a week  Imaging did not reveal any obstruction  Patient now with adequate bowel movement on current regimen

## 2024-05-21 NOTE — ASSESSMENT & PLAN NOTE
Pt feels quesy  Encouraged aggressive stool softners (notified staff)  Cont famotidine 20 mg 1 tab po daily

## 2024-05-21 NOTE — PLAN OF CARE
Problem: PAIN - ADULT  Goal: Verbalizes/displays adequate comfort level or baseline comfort level  Description: Interventions:  - Encourage patient to monitor pain and request assistance  - Assess pain using appropriate pain scale  - Administer analgesics based on type and severity of pain and evaluate response  - Implement non-pharmacological measures as appropriate and evaluate response  - Consider cultural and social influences on pain and pain management  - Notify physician/advanced practitioner if interventions unsuccessful or patient reports new pain  Outcome: Progressing     Problem: INFECTION - ADULT  Goal: Absence or prevention of progression during hospitalization  Description: INTERVENTIONS:  - Assess and monitor for signs and symptoms of infection  - Monitor lab/diagnostic results  - Monitor all insertion sites, i.e. indwelling lines, tubes, and drains  - Monitor endotracheal if appropriate and nasal secretions for changes in amount and color  - [place] appropriate cooling/warming therapies per order  - Administer medications as ordered  - Instruct and encourage patient and family to use good hand hygiene technique  - Identify and instruct in appropriate isolation precautions for identified infection/condition  Outcome: Progressing  Goal: Absence of fever/infection during neutropenic period  Description: INTERVENTIONS:  - Monitor WBC    Outcome: Progressing     Problem: SAFETY ADULT  Goal: Patient will remain free of falls  Description: INTERVENTIONS:  - Educate patient/family on patient safety including physical limitations  - Instruct patient to call for assistance with activity   - Consult OT/PT to assist with strengthening/mobility   - Keep Call bell within reach  - Keep bed low and locked with side rails adjusted as appropriate  - Keep care items and personal belongings within reach  - Initiate and maintain comfort rounds  - Make Fall Risk Sign visible to staff  - Apply yellow socks and bracelet  for high fall risk patients  - Consider moving patient to room near nurses station  Outcome: Progressing  Goal: Maintain or return to baseline ADL function  Description: INTERVENTIONS:  -  Assess patient's ability to carry out ADLs; assess patient's baseline for ADL function and identify physical deficits which impact ability to perform ADLs (bathing, care of mouth/teeth, toileting, grooming, dressing, etc.)  - Assess/evaluate cause of self-care deficits   - Assess range of motion  - Assess patient's mobility; develop plan if impaired  - Assess patient's need for assistive devices and provide as appropriate  - Encourage maximum independence but intervene and supervise when necessary  - Involve family in performance of ADLs  - Assess for home care needs following discharge   - Consider OT consult to assist with ADL evaluation and planning for discharge  - Provide patient education as appropriate  Outcome: Progressing  Goal: Maintains/Returns to pre admission functional level  Description: INTERVENTIONS:  - Perform AM-PAC 6 Click Basic Mobility/ Daily Activity assessment daily.  - Set and communicate daily mobility goal to care team and patient/family/caregiver.   - Collaborate with rehabilitation services on mobility goals if consulted  - Out of bed for toileting  - Record patient progress and toleration of activity level   Outcome: Progressing     Problem: DISCHARGE PLANNING  Goal: Discharge to home or other facility with appropriate resources  Description: INTERVENTIONS:  - Identify barriers to discharge w/patient and caregiver  - Arrange for needed discharge resources and transportation as appropriate  - Identify discharge learning needs (meds, wound care, etc.)  - Arrange for interpretive services to assist at discharge as needed  - Refer to Case Management Department for coordinating discharge planning if the patient needs post-hospital services based on physician/advanced practitioner order or complex needs  related to functional status, cognitive ability, or social support system  Outcome: Progressing     Problem: Knowledge Deficit  Goal: Patient/family/caregiver demonstrates understanding of disease process, treatment plan, medications, and discharge instructions  Description: Complete learning assessment and assess knowledge base.  Interventions:  - Provide teaching at level of understanding  - Provide teaching via preferred learning methods  Outcome: Progressing     Problem: Prexisting or High Potential for Compromised Skin Integrity  Goal: Skin integrity is maintained or improved  Description: INTERVENTIONS:  - Identify patients at risk for skin breakdown  - Assess and monitor skin integrity  - Assess and monitor nutrition and hydration status  - Monitor labs   - Assess for incontinence   - Turn and reposition patient  - Assist with mobility/ambulation  - Relieve pressure over bony prominences  - Avoid friction and shearing  - Provide appropriate hygiene as needed including keeping skin clean and dry  - Evaluate need for skin moisturizer/barrier cream  - Collaborate with interdisciplinary team   - Patient/family teaching  - Consider wound care consult   Outcome: Progressing     Problem: Potential for Falls  Goal: Patient will remain free of falls  Description: INTERVENTIONS:  - Educate patient/family on patient safety including physical limitations  - Instruct patient to call for assistance with activity   - Consult OT/PT to assist with strengthening/mobility   - Keep Call bell within reach  - Keep bed low and locked with side rails adjusted as appropriate  - Keep care items and personal belongings within reach  - Initiate and maintain comfort rounds  - Make Fall Risk Sign visible to staff  - Apply yellow socks and bracelet for high fall risk patients  - Consider moving patient to room near nurses station  Outcome: Progressing

## 2024-05-21 NOTE — DISCHARGE SUMMARY
"[facility]  Discharge- Berta Estrada 1935, 89 y.o. female MRN: [id number]  Unit/Bed#: S -01 Encounter: [id number]  Primary Care Provider: Kim Dewey MD   Date and time admitted to hospital: 5/17/2024 10:13 PM    * Constipation  Assessment & Plan  Patient presented from rehab due to complaints of constipation and vomiting x 1 per nursing staff. Suspect 2/2 to recent opioid use for pain.   Daughter reported patient had not had a bowel movement for nearly a week  Imaging did not reveal any obstruction  Patient now with adequate bowel movement on current regimen    Abnormal CT scan  Assessment & Plan  Multiple abnormal incidental findings noted below.  No plan for additional workup or intervention based on age and underlying dementia   CT A/P \"2.7 cm right upper lobe mass with cavitation/necrosis. This may represent infectious/inflammatory or malignant lesion 3.8 cm right lower lobe masslike lesion, infectious/inflammatory versus neoplastic.Indeterminate right upper lobe and right middle lobe peribronchovascular nodules.  Indeterminate right hepatic lobe lesion. Distended gallbladder. Dilated common bile duct. Circumferential wall thickening of the esophagus may represent esophagitis.  Incidental findings in the pancreas and thyroid gland     Urinary retention  Assessment & Plan  Likely due to constipation  Contreras catheter placed.  Voiding trial performed today    History of permanent cardiac pacemaker placement  Assessment & Plan  During recent hospitalization, patient was noted to have arrhythmia with EKG on that admission displaying Mobitz type 1 second degree AV block; therefore, Cardiology and EP were consulted.  S/P placement of single-chamber Medtronic PPM on 05/15/2024.  Local wound care as indicated to pacemaker insertion site.    Closed fracture of ramus of right pubis (HCC)  Assessment & Plan  Recent admission under Trauma service from 05/12/2024-05/16/2024 due " "to right inferior pubic ramus fracture 2/2 fall  Nonoperative management recommended by Orthopedic surgery.  Incidentally found on CAT scan here to have \"new nondisplaced fracture of the right superior pubic ramus\"  maintain weightbearing status as tolerated.  Continue multimodal analgesic regimen.  DVT prophylaxis.      Essential hypertension  Assessment & Plan  Blood pressure has been mildly elevated during the admission  Could consider addition of an antihypertensive agent, but will defer to outpatient provider    Late onset Alzheimer's disease without behavioral disturbance (HCC)  Assessment & Plan  Calm and cooperative      Medical Problems       Resolved Problems  Date Reviewed: 5/21/2024   None       Discharging Physician / Practitioner: Chantel Vila PA-C  PCP: Kim Dewey MD  Admission Date:   Admission Orders (From admission, onward)       Ordered        05/18/24 1508  INPATIENT ADMISSION  Once            05/18/24 0241  Place in Observation  Once                          Discharge Date: 05/21/24    Consultations During Hospital Stay:  none    Procedures Performed:   CT scan    Significant Findings / Test Results:   As above    Incidental Findings:   See CT scan results     Test Results Pending at Discharge (will require follow up):   none     Outpatient Tests Requested:      Complications:  none    Reason for Admission: Constipation    Hospital Course:   Berta Estrada is a 89 y.o. female patient recently admitted to the hospital with a fall causing a pubic rami fracture and discharged on low-dose narcotics to rehab, who originally presented to the hospital on 5/17/2024 due to constipation with associated vomiting.  Fortunately her CAT scan did not reveal any evidence of obstruction and she was able to successfully have a bowel movement with aggressive regimen.  She did also have urinary retention on admission which was again felt to be related to the constipation and required a Contreras catheter.  " "Contreras was able to be removed prior to discharge.  Numerous abnormal incidental findings were noted on her CAT scan which are concerning potentially for malignancy but given her advanced age and underlying dementia the family does not wish to pursue any additional workup.  Patient will return to rehab    Please see above list of diagnoses and related plan for additional information.     Condition at Discharge: fair    Discharge Day Visit / Exam:   Subjective: Patient has had good bowel movements and offers no complaints.    Vitals: Blood Pressure: 144/88 (05/21/24 0758)  Pulse: 91 (05/21/24 0758)  Temperature: 97.7 °F (36.5 °C) (05/21/24 0758)  Temp Source: Oral (05/18/24 0749)  Respirations: 18 (05/21/24 0758)  Height: 5' 5\" (165.1 cm) (05/18/24 0008)  SpO2: 97 % (05/21/24 0758)  Exam:   Physical Exam  Vitals reviewed.   Constitutional:       General: She is not in acute distress.     Appearance: Normal appearance. She is normal weight. She is not ill-appearing, toxic-appearing or diaphoretic.   Eyes:      General: No scleral icterus.        Right eye: No discharge.         Left eye: No discharge.      Conjunctiva/sclera: Conjunctivae normal.   Cardiovascular:      Rate and Rhythm: Normal rate and regular rhythm.      Heart sounds: No murmur heard.  Pulmonary:      Effort: No respiratory distress.      Breath sounds: No stridor. No wheezing, rhonchi or rales.   Abdominal:      General: There is no distension.      Tenderness: There is no abdominal tenderness. There is no guarding.      Hernia: No hernia is present.   Musculoskeletal:      Right lower leg: No edema.      Left lower leg: No edema.   Skin:     General: Skin is warm and dry.      Coloration: Skin is not jaundiced or pale.      Findings: No bruising, erythema, lesion or rash.   Neurological:      Mental Status: She is alert.      Comments: Awake alert   Psychiatric:      Comments: Pleasant and cooperative          Discussion with Family: Updated contact " person (daughter) at bedside.    Discharge instructions/Information to patient and family:   See after visit summary for information provided to patient and family.      Provisions for Follow-Up Care:  See after visit summary for information related to follow-up care and any pertinent home health orders.      Mobility at time of Discharge:   Basic Mobility Inpatient Raw Score: 15  JH-HLM Goal: 4: Move to chair/commode  JH-HLM Achieved: 6: Walk 10 steps or more  HLM Goal achieved. Continue to encourage appropriate mobility.     Disposition:   Short-term rehab    Planned Readmission: none     Discharge Statement:  I spent 35 minutes discharging the patient. This time was spent on the day of discharge. I had direct contact with the patient on the day of discharge. Greater than 50% of the total time was spent examining patient, answering all patient questions, arranging and discussing plan of care with patient as well as directly providing post-discharge instructions.  Additional time then spent on discharge activities.  Case was discussed with case management and nursing    Discharge Medications:  See after visit summary for reconciled discharge medications provided to patient and/or family.      **Please Note: This note may have been constructed using a voice recognition system**

## 2024-05-21 NOTE — ASSESSMENT & PLAN NOTE
Pt only oriented to self  Monitor sleep/wake cycle  Avoid deliriogenic agents  Redirect, reorient and provide distraction techniques  Monitor BM; encourage aggressive stool softeners  Assist with adls/iadls

## 2024-05-21 NOTE — PROGRESS NOTES
[facility]  [address]  [place] [address]  [facility]    Nursing Home Admission    NAME: Berta Estrada  AGE: 89 y.o. SEX: female [id number]    DATE OF ENCOUNTER: 5/17/2024    Assessment/Plan:   Patient’s care was coordinated with nursing facility staff. Recent vitals, labs and updated medications were reviewed on [facility] system of facility. Past Medical, surgical, social, medication and allergy history and patient’s previous records reviewed.     1. Closed fracture of ramus of right pubis with routine healing, subsequent encounter        2. Mobitz type 1 second degree AV block        3. Gastroesophageal reflux disease, unspecified whether esophagitis present        4. Late onset Alzheimer's disease without behavioral disturbance (HCC)        5. Other hyperlipidemia             Closed fracture of ramus of right pubis (HCC)  Pt s/p mechanical fall  Pt with mildy displaced inferior pubic fracture  Pt appears comfortable at rest  P.T.: WBAT b/l LE  Cont oxycodone 2.5 mg 1 tab po q6 hours prn severe pain  Cont aggressive stool softners  Cont baby aspirin    Mobitz type 1 second degree AV block  Pt had EKG upon admission that revealed Mobitz 1 2nd degree AV block  Pt s/p single chamber Medtronic PPM on 5/15/2024  F/u outpatient with E.P. cardiology    GERD (gastroesophageal reflux disease)  Pt feels quesy  Encouraged aggressive stool softners (notified staff)  Cont famotidine 20 mg 1 tab po daily    Late onset Alzheimer's disease without behavioral disturbance (HCC)  Pt only oriented to self  Monitor sleep/wake cycle  Avoid deliriogenic agents  Redirect, reorient and provide distraction techniques  Monitor BM; encourage aggressive stool softeners  Assist with adls/iadls    Other hyperlipidemia  Stable  Cont atorvastatin 20 mg 1 tab po QHS    Chief Complaint      Here for STR    HPI   Patient is a 89 y.o. female Alzheimer's Dementia, CAD,  Hyperlipidemia, Depression, HTN, GERD, Basal cell carcinoma, CKD3a, osteoporosis and COPD.    Pt admitted at [facility] from 5/13/2024-5/16/2024 for Rt inferior pubic ramus fracture. Pt had a mechanical fall which resulted in a mildly displaced fracture. She was evaluated by orthopedic who recommended a non operative approach. Pt deemed WBAT to b/l LE.    Incidentally, pt had finding of advanced heart block with 2-1 AV block/Wenkebach on tele. She was evaluated by cardiology and she had a dual-chamber PPM placed on 5/15/2024. Pt then discharged to [facility] for rehab on 5/16/2024.    Pt seen and examined. Pt with dementia and confused. Pt's family at bedside. Pt does not feel like eating her dinner. Her daughter states that pt has not had BM for 4 days. Informed staff to give aggressive stool softners. They voiced understanding. Pt with soft abdomen, nontender upon exam.       Past Medical History:   Diagnosis Date    Actinic keratosis     Basal cell carcinoma     Back     Cancer (HCC)     Coronary artery disease     Dementia (HCC)     Depression     Ear problems     HL (hearing loss)     Hyperlipidemia     Migraines     Ovarian cancer (HCC) 2004    s/p surgery. no chemo/RT    Phlebitis     R leg       Past Surgical History:   Procedure Laterality Date    ADENOIDECTOMY      APPENDECTOMY      CARDIAC ELECTROPHYSIOLOGY PROCEDURE N/A 5/15/2024    Procedure: Cardiac pacer implant;  Surgeon: Rufus Raines MD;  [facility];  Service: Cardiology    CATARACT EXTRACTION, BILATERAL      HYSTERECTOMY  2005    ovarian CA    KNEE SURGERY Right     SKIN BIOPSY      TONSILECTOMY AND ADNOIDECTOMY      TONSILLECTOMY         Social History     Tobacco Use   Smoking Status Former    Current packs/day: 0.25    Types: Cigarettes   Smokeless Tobacco Never   Tobacco Comments    until age 30          Family History   Problem Relation Age of Onset    Depression Mother     Anxiety disorder Mother     Alcohol abuse  Mother     Substance Abuse Mother     Asthma Mother     Diabetes Father 60    Brain cancer Son     Heart attack Sister     Coronary artery disease Sister     Mental illness Neg Hx         Allergies   Allergen Reactions    Codeine Hives    Morphine Other (See Comments)     Redness in face, swelling    Other      Seasonal    Propoxyphene        [facility]     Vital signs were reviewed in [facility]    A 12-point comprehensive review of symptoms was obtained.  Pertinent positives and negatives noted in HPI.   Review of Systems   Unable to perform ROS: Dementia   Constitutional:  Positive for appetite change.   Gastrointestinal:  Positive for constipation.   Positive for constipation as per pt's daughter at bedside    Physical Exam  Constitutional:       General: She is not in acute distress.     Appearance: She is not ill-appearing.   HENT:      Head: Normocephalic.   Cardiovascular:      Rate and Rhythm: Regular rhythm.      Heart sounds: Normal heart sounds.   Pulmonary:      Effort: No respiratory distress.      Breath sounds: No wheezing.   Abdominal:      General: Bowel sounds are normal. There is no distension.      Tenderness: There is no abdominal tenderness.   Neurological:      Mental Status: She is alert. Mental status is at baseline.   Psychiatric:         Mood and Affect: Mood normal.       Diagnostic Data     Recent labs and imaging studies were reviewed.    This note was electronically signed by Dr. Kelly [facility]

## 2024-05-21 NOTE — INCIDENTAL FINDINGS
The following findings require follow up:  Radiographic finding   Findings:        MEDIASTINUM AND ADAM: Circumferential wall thickening of the esophagus. No evidence of esophageal obstruction. Nonspecific subcentimeter short axis prevascular, pretracheal, and precarinal nodes.     CHEST WALL AND LOWER NECK: Incidental discovery of one or more thyroid nodule(s) measuring more than 1.5 cm and without suspicious features is noted in this patient who is above 35 years old; according to guidelines published in the February 2015 white   paper on incidental thyroid nodules in the Journal of the American College of Radiology (JACR), further characterization with thyroid ultrasound is recommended.     PANCREAS:  *  0.9 cm rounded, well-circumscribed homogeneously hypodense pancreatic body lesion. Hounsfield density is less than 10, consistent with simple cyst. For simple cyst(s) less than 1.5 cm, recommend follow-up every 2 years for 2 times. After 4 years, no   more follow-ups. Recommend next follow-up in 2 years. Preferred imaging modality: abdomen MRI and MRCP with and without IV contrast, or triple phase abdomen CT with IV contrast, or abdomen MRI and MRCP without IV contrast.  *  3.2 cm cyst arising from the pancreatic uncinate process. For simple cyst(s) 2 cm or greater recommend gastroenterology and/or surgical oncology consult. EUS may now be warranted.        IMPRESSION:     1.  2.7 cm right upper lobe mass with cavitation/necrosis. This may represent infectious/inflammatory or malignant lesion.  2.  3.8 cm right lower lobe masslike lesion, infectious/inflammatory versus neoplastic.  3.  Indeterminate right upper lobe and right middle lobe peribronchovascular nodules. Recommend attention on follow-up imaging.  4.  Indeterminate right hepatic lobe lesion. Distended gallbladder. Dilated common bile duct. Consider MRI/MRCP for further evaluation.  5.  Circumferential wall thickening of the esophagus may represent  esophagitis.  6.  Incidental findings in the pancreas and thyroid gland with recommendations as outlined above.  7.  New nondisplaced fracture of the right superior pubic ramus compared to 5/12/2024 CT. Known comminuted fracture of the right inferior pubic ramus.        You can discuss with your family doctor if you want to pursue any follow up testing

## 2024-05-21 NOTE — ASSESSMENT & PLAN NOTE
Pt had EKG upon admission that revealed Mobitz 1 2nd degree AV block  Pt s/p single chamber Medtronic PPM on 5/15/2024  F/u outpatient with E.P. cardiology

## 2024-05-21 NOTE — ASSESSMENT & PLAN NOTE
"Recent admission under Trauma service from 05/12/2024-05/16/2024 due to right inferior pubic ramus fracture 2/2 fall  Nonoperative management recommended by Orthopedic surgery.  Incidentally found on CAT scan here to have \"new nondisplaced fracture of the right superior pubic ramus\"  maintain weightbearing status as tolerated.  Continue multimodal analgesic regimen.  DVT prophylaxis.    "

## 2024-05-22 ENCOUNTER — TRANSITIONAL CARE MANAGEMENT (OUTPATIENT)
Dept: INTERNAL MEDICINE CLINIC | Facility: CLINIC | Age: 89
End: 2024-05-22

## 2024-05-22 NOTE — UTILIZATION REVIEW
NOTIFICATION OF ADMISSION DISCHARGE   This is a Notification of Discharge from [facility]. Please be advised that this patient has been discharge from our facility. Below you will find the admission and discharge date and time including the patient’s disposition.   UTILIZATION REVIEW CONTACT:  Isabel Montoya  Utilization   Network Utilization Review Department  Phone: [phone] x carefully listen to the prompts. All voicemails are confidential.  Email: [email or web address]     ADMISSION INFORMATION  PRESENTATION DATE: 5/17/2024 10:13 PM  OBERVATION ADMISSION DATE:   INPATIENT ADMISSION DATE: 5/18/24  3:08 PM   DISCHARGE DATE: 5/21/2024  3:33 PM   DISPOSITION:Released to SNF/TCU/SNU Facility    Network Utilization Review Department  ATTENTION: Please call with any questions or concerns to [phone] and carefully listen to the prompts so that you are directed to the right person. All voicemails are confidential.   For Discharge needs, contact Care Management DC Support Team at [phone] opt. 2  Send all requests for admission clinical reviews, approved or denied determinations and any other requests to dedicated fax number below belonging to the campus where the patient is receiving treatment. List of dedicated fax numbers for the Facilities:  FACILITY NAME UR FAX NUMBER   ADMISSION DENIALS (Administrative/Medical Necessity) [phone]   DISCHARGE SUPPORT TEAM ([facility]) [phone]   PARENT CHILD HEALTH (Maternity/NICU/Pediatrics) [phone]   [facility] [phone]   [facility] [phone]   [facility] [phone]   [facility] [phone]   [facility] [phone]   [facility] [phone]   [facility] [phone]   [facility]  [phone]   [facility] [phone]   [facility] [phone]   [facility] [phone]   [facility] [phone]

## 2024-05-24 ENCOUNTER — NURSING HOME VISIT (OUTPATIENT)
Dept: GERIATRICS | Facility: OTHER | Age: 89
End: 2024-05-24
Payer: COMMERCIAL
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
## 2024-05-24 DIAGNOSIS — I44.1 MOBITZ TYPE 1 SECOND DEGREE AV BLOCK: ICD-10-CM

## 2024-05-24 DIAGNOSIS — S32.591D CLOSED FRACTURE OF RAMUS OF RIGHT PUBIS WITH ROUTINE HEALING, SUBSEQUENT ENCOUNTER: ICD-10-CM

## 2024-05-24 DIAGNOSIS — K21.9 GASTROESOPHAGEAL REFLUX DISEASE, UNSPECIFIED WHETHER ESOPHAGITIS PRESENT: ICD-10-CM

## 2024-05-24 DIAGNOSIS — R26.81 GAIT INSTABILITY: ICD-10-CM

## 2024-05-24 DIAGNOSIS — K59.00 CONSTIPATION, UNSPECIFIED CONSTIPATION TYPE: ICD-10-CM

## 2024-05-24 DIAGNOSIS — G30.1 LATE ONSET ALZHEIMER'S DISEASE WITHOUT BEHAVIORAL DISTURBANCE (HCC): ICD-10-CM

## 2024-05-24 DIAGNOSIS — F02.80 LATE ONSET ALZHEIMER'S DISEASE WITHOUT BEHAVIORAL DISTURBANCE (HCC): ICD-10-CM

## 2024-05-24 PROCEDURE — 99310 SBSQ NF CARE HIGH MDM 45: CPT

## 2024-05-28 ENCOUNTER — NURSING HOME VISIT (OUTPATIENT)
Dept: PULMONOLOGY | Facility: CLINIC | Age: 89
End: 2024-05-28
Payer: COMMERCIAL

## 2024-05-28 VITALS
SYSTOLIC BLOOD PRESSURE: 124 MMHG | WEIGHT: 144 LBS | TEMPERATURE: 97.8 F | BODY MASS INDEX: 23.96 KG/M2 | HEART RATE: 86 BPM | RESPIRATION RATE: 18 BRPM | DIASTOLIC BLOOD PRESSURE: 74 MMHG | OXYGEN SATURATION: 94 %

## 2024-05-28 DIAGNOSIS — R91.8 MULTIPLE PULMONARY NODULES: ICD-10-CM

## 2024-05-28 DIAGNOSIS — R68.89 SUSPECTED MALIGNANT NEOPLASM OF LUNG: Primary | ICD-10-CM

## 2024-05-28 DIAGNOSIS — F02.80 LATE ONSET ALZHEIMER'S DISEASE WITHOUT BEHAVIORAL DISTURBANCE (HCC): ICD-10-CM

## 2024-05-28 DIAGNOSIS — G30.1 LATE ONSET ALZHEIMER'S DISEASE WITHOUT BEHAVIORAL DISTURBANCE (HCC): ICD-10-CM

## 2024-05-28 DIAGNOSIS — K76.89 LIVER NODULE: ICD-10-CM

## 2024-05-28 DIAGNOSIS — J44.9 COPD, MILD (HCC): ICD-10-CM

## 2024-05-28 PROCEDURE — 99305 1ST NF CARE MODERATE MDM 35: CPT | Performed by: INTERNAL MEDICINE

## 2024-05-28 RX ORDER — SACCHAROMYCES BOULARDII 250 MG
250 CAPSULE ORAL 2 TIMES DAILY
COMMUNITY

## 2024-05-28 NOTE — ASSESSMENT & PLAN NOTE
Alert and oriented to self at this time  Provide redirection, reorientation, distraction techniques  Fall Precautions  Assist with ADLs/IADLs  Avoid deliriogenic medications such as tramadol, benzodiazepines, anticholinergics, benadryl  Encourage Hydration/ Nutrition  Implement sleep hygiene, limit night time interuptions   Encourage participation in group activities when appropriate

## 2024-05-28 NOTE — ASSESSMENT & PLAN NOTE
Multifactorial: advanced age, hx of Alzheimer's disease, use of assistive ambulatory device  PT/OT at SNF  Fall precautions  Continue use of assistive ambulatory device, working on stability, mobility  Encourage adequate hydration/nutrition

## 2024-05-28 NOTE — PROGRESS NOTES
[facility]  [address] [address]  [phone]  FACILITY: [facility]  Code 31 (STR)        NAME: Berta Estrada  AGE: 89 y.o. SEX: female CODE STATUS: No CPR    DATE OF ENCOUNTER: 5/24/2024    Assessment and Plan     1. Fall, sequela  Assessment & Plan:  S/p mechanical fall on 5/12  With aid trying to ambulate from couch  -headstrike, -LOC; reported dizziness  R hip pain  R side closed fracture of ramus pubis noted on Xray   PT/OT at [facility]  Fall precautions  Encourage adequate hydration/nutrition  Used SPC at home, continue use of RW at [facility]  BMP ordered 5/29  2. Closed fracture of ramus of right pubis with routine healing, subsequent encounter  Assessment & Plan:  S/p mechanical fall on 5/12  Orthopedic surgery consulted; noted to be nonoperative at this time  Continue WBAT  Continue pain regimen: Acetaminophen 975 mg TID  Follow up with Trauma surgery  CBC ordered for 5/29  3. Late onset Alzheimer's disease without behavioral disturbance (HCC)  Assessment & Plan:  Alert and oriented to self at this time  Provide redirection, reorientation, distraction techniques  Fall Precautions  Assist with ADLs/IADLs  Avoid deliriogenic medications such as tramadol, benzodiazepines, anticholinergics, benadryl  Encourage Hydration/ Nutrition  Implement sleep hygiene, limit night time interuptions   Encourage participation in group activities when appropriate   4. Gait instability  Assessment & Plan:  Multifactorial: advanced age, hx of Alzheimer's disease, use of assistive ambulatory device  PT/OT at SNF  Fall precautions  Continue use of assistive ambulatory device, working on stability, mobility  Encourage adequate hydration/nutrition    5. Mobitz type 1 second degree AV block  Assessment & Plan:  EKG on 5/12 showing Mobitz 1, 2nd degree AV block  Cardiac pacemaker placed on 5/15  Heart rate and rhythm regular upon exam today  No complaints of CP, SOB, dizziness/lightheadedness  Continue ASA  81 mg once daily  Continue Atorvastatin 20 mg once daily  Follow up with Cardiology 6/3  6. Constipation, unspecified constipation type  Assessment & Plan:  S/p admission on 5/17 with complaints of constipation, vomiting x 1  Reported no bowel movement x 1 week  No obstruction noted on CT on 5/17  Continue Colace 100 mg BID  Continue Miralax 17 g packet once daily  Continue Bisacodyl 10 mg suppository if no bowel movement after 2 days  Continue Senokot 8.6 mg BID  Encourage adequate hydration  7. Gastroesophageal reflux disease, unspecified whether esophagitis present  Assessment & Plan:  Stable, no complaints at this time  Continue Famotidine 20 mg once daily       All medications and routine orders were reviewed and updated as needed.    Chief Complaint     STR follow up visit    Past Medical and Surgica History      Past Medical History:   Diagnosis Date    Actinic keratosis     Basal cell carcinoma     Back     Cancer (HCC)     Coronary artery disease     Dementia (HCC)     Depression     Ear problems     HL (hearing loss)     Hyperlipidemia     Migraines     Ovarian cancer (HCC) 2004    s/p surgery. no chemo/RT    Phlebitis     R leg     Past Surgical History:   Procedure Laterality Date    ADENOIDECTOMY      APPENDECTOMY      CARDIAC ELECTROPHYSIOLOGY PROCEDURE N/A 5/15/2024    Procedure: Cardiac pacer implant;  Surgeon: Rufus Raines MD;  [facility];  Service: Cardiology    CATARACT EXTRACTION, BILATERAL      HYSTERECTOMY  2005    ovarian CA    KNEE SURGERY Right     SKIN BIOPSY      TONSILECTOMY AND ADNOIDECTOMY      TONSILLECTOMY       Allergies   Allergen Reactions    Codeine Hives    Morphine Other (See Comments)     Redness in face, swelling    Other      Seasonal    Propoxyphene           History of Present Illness     Berta Estrada is a 89 year old female, she is a [facility] resident of [facility]  [facility] since 5/16/2024. Past Medical Hx including but not limited to alzheimer's  disease, gerd, falls, closed fracture of ramus of right pubis, mobitz type 1 second degree AV block, s/p pacemaker placement 5/2024 and is seen in collaboration with nursing for medical mgmt and LTC follow up.     She is status post fall on 5/12, twisting and landing on her right hip. She did not hit her head, did not lose consciousness and it was witnessed by the aide. She suffered a right pubis ramus fracture. She uses as assistive device at this time. She was brought to the hospital s/p admission to SNF after vomiting and being constipated. She had not had a bowel movement in a week. The Oxycodone was discontinued to decrease constipation and she is on a bowel regimen.     Seen and examined at bedside today. Patient  is a poor historian due to Alzheimer's disease. She is alert and oriented to self only upon exam, and at her baseline. She lives with her daughter and son in law. She attends [facility], Monday through Friday, 9am-4pm. She is calm and pleasant upon exam today.       Denies CP/SOB/N/V/D. Denies lightheadedness, dizziness, headaches, vision changes. Patient states they are eating well and staying hydrated. Denies any bowel or bladder issues. Per review of SNF records, Patient is eating 3 meals per day, consuming  %. Last documented BM 5/24/2024. No concerns from nursing at this time.            The patient's allergies, past medical, surgical, social and family history were reviewed and unchanged.    Review of Systems     Review of Systems   Unable to perform ROS: Other (Alzheimer's AAOx1)         Objective     Vitals:   Vitals:    05/24/24 0954   BP: 124/74   Pulse: 86   Resp: 18   Temp: 97.8 °F (36.6 °C)   SpO2: 94%         Physical Exam  Vitals and nursing note reviewed.   Constitutional:       Appearance: Normal appearance. She is normal weight.   HENT:      Head: Normocephalic.      Right Ear: External ear normal.      Left Ear: External ear normal.      Nose: Nose normal.       Mouth/Throat:      Mouth: Mucous membranes are moist.      Pharynx: Oropharynx is clear.   Eyes:      Pupils: Pupils are equal, round, and reactive to light.   Cardiovascular:      Rate and Rhythm: Normal rate and regular rhythm.      Pulses: Normal pulses.      Heart sounds: Normal heart sounds.   Pulmonary:      Effort: Pulmonary effort is normal.      Breath sounds: Normal breath sounds.   Abdominal:      General: Abdomen is flat. Bowel sounds are normal.      Palpations: Abdomen is soft.   Musculoskeletal:         General: Tenderness (R hip) and signs of injury (R hip fracture) present. Normal range of motion.      Cervical back: Normal range of motion.   Skin:     General: Skin is warm and dry.      Capillary Refill: Capillary refill takes less than 2 seconds.   Neurological:      Mental Status: She is alert. Mental status is at baseline. She is disoriented.      Motor: Weakness present.      Gait: Gait abnormal.      Comments: Baseline AAOx1, to self only   Psychiatric:         Mood and Affect: Mood normal.         Behavior: Behavior normal.         Pertinent Laboratory/Diagnostic Studies:     Reviewed in facility chart      Current Medications   Medications reviewed and updated see facility MAR for details.      Current Outpatient Medications:     acetaminophen (TYLENOL) 325 mg tablet, Take 3 tablets (975 mg total) by mouth 3 (three) times a day for 7 days, Disp: , Rfl:     aspirin 81 MG tablet, Take 81 mg by mouth daily, Disp: , Rfl:     atorvastatin (LIPITOR) 20 mg tablet, TAKE 1 TABLET BY MOUTH EVERY DAY, Disp: 90 tablet, Rfl: 1    calcium carbonate (TUMS) 500 mg chewable tablet, Chew 1 tablet (500 mg total) daily as needed for indigestion or heartburn, Disp: , Rfl:     chlorhexidine (PERIDEX) 0.12 % solution, RINSE 2 TIMES A DAY, Disp: , Rfl:     docusate sodium (COLACE) 100 mg capsule, Take 1 capsule (100 mg total) by mouth 2 (two) times a day, Disp: , Rfl:     famotidine (PEPCID) 20 mg tablet, TAKE 1  "TABLET BY MOUTH EVERY DAY, Disp: 90 tablet, Rfl: 1    fluticasone (FLONASE) 50 mcg/act nasal spray, SPRAY 1 SPRAY INTO EACH NOSTRIL EVERY DAY, Disp: 48 mL, Rfl: 1    melatonin 3 mg, Take 1 tablet (3 mg total) by mouth daily at bedtime, Disp: , Rfl:     Multiple Vitamins-Minerals (MULTIVITAMIN ADULT PO), Take 1 tablet by mouth daily, Disp: , Rfl:     polyethylene glycol (MIRALAX) 17 g packet, Take 17 g by mouth daily, Disp: , Rfl:     saccharomyces boulardii (FLORASTOR) 250 mg capsule, Take 250 mg by mouth 2 (two) times a day, Disp: , Rfl:     senna (SENOKOT) 8.6 mg, Take 2 tablets (17.2 mg total) by mouth 2 (two) times a day, Disp: , Rfl:      Please note:  Voice-recognition software may have been used in the preparation of this document.  Occasional wrong word or \"sound-alike\" substitutions may have occurred due to the inherent limitations of voice recognition software.  Interpretation should be guided by context.         JOSH Osorio  5/24/2024  10:26 AM    "

## 2024-05-28 NOTE — ASSESSMENT & PLAN NOTE
S/p admission on 5/17 with complaints of constipation, vomiting x 1  Reported no bowel movement x 1 week  No obstruction noted on CT on 5/17  Continue Colace 100 mg BID  Continue Miralax 17 g packet once daily  Continue Bisacodyl 10 mg suppository if no bowel movement after 2 days  Continue Senokot 8.6 mg BID  Encourage adequate hydration

## 2024-05-28 NOTE — PROGRESS NOTES
POS:  short term        Pulmonary Follow Up Note   Berta Estrada 89 y.o. female MRN: [id number]  5/28/2024      Outpatient pulmonologist: none    Assessment and Plan:    Multiple pulmonary nodules  These are malignant.  Biopsy declined by daughter give pt age and dementia.  Expect natural course of lung cancer to take hold. Transition to hospice when that is appropriate.    COPD, mild (HCC)  Mild obstruction on PFTs in 2011. No acute needs.    Liver nodule  Incidental finding; may be metastatic from lung  No intervention planned.    Late onset Alzheimer's disease without behavioral disturbance (HCC)  Off Namenda and Aricept due to side effects  Redirect  Regulate sleep/wake as able  Assist PRN    Suspected malignant neoplasm of lung  I suspect these multiple nodules, some of which are necrotic, represent malignancy.  Family declined biopsy.  They are likely to progress.  When needed, transition to hospice care.      Diagnoses and all orders for this visit:    Suspected malignant neoplasm of lung    Multiple pulmonary nodules    COPD, mild (HCC)    Liver nodule    Late onset Alzheimer's disease without behavioral disturbance (HCC)    Follow-up with pulmonary as needed.    History of Present Illness   HPI:  Berta Estrada is a 89 y.o. female PMH dementia, HTN, CKD, HLD, GERD who presented to the ER on 5/7/24 with constipation and vomiting. Required lerner for urinary retention. CT showed multiple nodules in lung concerning for cancer. Pt daughter declined biopsies due to age and dementia.     Found in the cafe, sitting in the wheelchair. Confused.  Asking for her daughter.    Recent admission 5/12-5/16 for fall with right inferior pubic ramis fracture. Found to have type 1 second degree AV block, PPM placed 5/15.     Review of Systems  Unable to obtain due to dementia    Historical Information   Past Medical History:   Diagnosis Date    Actinic keratosis     Basal cell carcinoma     Back     Cancer (HCC)      Coronary artery disease     Dementia (HCC)     Depression     Ear problems     HL (hearing loss)     Hyperlipidemia     Migraines     Ovarian cancer (HCC) 2004    s/p surgery. no chemo/RT    Phlebitis     R leg     Past Surgical History:   Procedure Laterality Date    ADENOIDECTOMY      APPENDECTOMY      CARDIAC ELECTROPHYSIOLOGY PROCEDURE N/A 5/15/2024    Procedure: Cardiac pacer implant;  Surgeon: Rufus Raines MD;  [facility];  Service: Cardiology    CATARACT EXTRACTION, BILATERAL      HYSTERECTOMY  2005    ovarian CA    KNEE SURGERY Right     SKIN BIOPSY      TONSILECTOMY AND ADNOIDECTOMY      TONSILLECTOMY       Family History   Problem Relation Age of Onset    Depression Mother     Anxiety disorder Mother     Alcohol abuse Mother     Substance Abuse Mother     Asthma Mother     Diabetes Father 60    Brain cancer Son     Heart attack Sister     Coronary artery disease Sister     Mental illness Neg Hx          Meds/Allergies     Current Outpatient Medications:     acetaminophen (TYLENOL) 325 mg tablet, Take 3 tablets (975 mg total) by mouth 3 (three) times a day for 7 days, Disp: , Rfl:     aspirin 81 MG tablet, Take 81 mg by mouth daily, Disp: , Rfl:     atorvastatin (LIPITOR) 20 mg tablet, TAKE 1 TABLET BY MOUTH EVERY DAY, Disp: 90 tablet, Rfl: 1    calcium carbonate (TUMS) 500 mg chewable tablet, Chew 1 tablet (500 mg total) daily as needed for indigestion or heartburn, Disp: , Rfl:     chlorhexidine (PERIDEX) 0.12 % solution, RINSE 2 TIMES A DAY, Disp: , Rfl:     docusate sodium (COLACE) 100 mg capsule, Take 1 capsule (100 mg total) by mouth 2 (two) times a day, Disp: , Rfl:     famotidine (PEPCID) 20 mg tablet, TAKE 1 TABLET BY MOUTH EVERY DAY, Disp: 90 tablet, Rfl: 1    fluticasone (FLONASE) 50 mcg/act nasal spray, SPRAY 1 SPRAY INTO EACH NOSTRIL EVERY DAY, Disp: 48 mL, Rfl: 1    melatonin 3 mg, Take 1 tablet (3 mg total) by mouth daily at bedtime, Disp: , Rfl:     Multiple  "Vitamins-Minerals (MULTIVITAMIN ADULT PO), Take 1 tablet by mouth daily, Disp: , Rfl:     polyethylene glycol (MIRALAX) 17 g packet, Take 17 g by mouth daily, Disp: , Rfl:     saccharomyces boulardii (FLORASTOR) 250 mg capsule, Take 250 mg by mouth 2 (two) times a day, Disp: , Rfl:     senna (SENOKOT) 8.6 mg, Take 2 tablets (17.2 mg total) by mouth 2 (two) times a day, Disp: , Rfl:   Allergies   Allergen Reactions    Codeine Hives    Morphine Other (See Comments)     Redness in face, swelling    Other      Seasonal    Propoxyphene        Vitals: 97.8; 128/66; 80bpm; 18; 94%      Physical Exam  GEN:WDWN, nad, comfortable  HEENT: NCAT, EOMI  CVS: regular  LUNGS: clear b/l bs  ABD: soft  EXT: No c/c/e  NEURO: No focal deficits  MS: Moving all extremities  SKIN: warm, dry  PSYCH: calm, cooperative    Labs: I have personally reviewed pertinent lab results.  Lab Results   Component Value Date    WBC 9.66 05/19/2024    HGB 13.0 05/19/2024    HCT 38.2 05/19/2024    MCV 95 05/19/2024     05/19/2024     Lab Results   Component Value Date    CALCIUM 8.5 05/19/2024    K 4.0 05/19/2024    CO2 26 05/19/2024     05/19/2024    BUN 16 05/19/2024    CREATININE 0.81 05/19/2024     No results found for: \"IGE\"  Lab Results   Component Value Date    ALT 27 05/19/2024    AST 25 05/19/2024    ALKPHOS 58 05/19/2024     Labs per my interpretation show normal renal function; normal cbc    Imaging and other studies: I have personally reviewed pertinent films in PACS  CT chest 5/24: 2.7 x 1.7 cm subpleural mass in peripheral RUL with central necrosis; 1 cm RUL nodule; 0.8 cm RML nodule; 2.2 x 3.8 cm masslike density in RLL; indeterminate right hepatic lobe lesion    EKG, Pathology, and Other Studies: I have personally reviewed pertinent reports.    ECHO 5/14/24: EF 80%; abnormal diastolic function; bioprosthetic AV; RV pressure 32 mm Hg    Ruth Caba D.O.  [facility]   "

## 2024-05-28 NOTE — ASSESSMENT & PLAN NOTE
These are malignant.  Biopsy declined by daughter give pt age and dementia.  Expect natural course of lung cancer to take hold. Transition to hospice when that is appropriate.

## 2024-05-28 NOTE — ASSESSMENT & PLAN NOTE
I suspect these multiple nodules, some of which are necrotic, represent malignancy.  Family declined biopsy.  They are likely to progress.  When needed, transition to hospice care.

## 2024-05-28 NOTE — ASSESSMENT & PLAN NOTE
EKG on 5/12 showing Mobitz 1, 2nd degree AV block  Cardiac pacemaker placed on 5/15  Heart rate and rhythm regular upon exam today  No complaints of CP, SOB, dizziness/lightheadedness  Continue ASA 81 mg once daily  Continue Atorvastatin 20 mg once daily  Follow up with Cardiology 6/3

## 2024-05-28 NOTE — ASSESSMENT & PLAN NOTE
S/p mechanical fall on 5/12  With aid trying to ambulate from couch  -headstrike, -LOC; reported dizziness  R hip pain  R side closed fracture of ramus pubis noted on Xray   PT/OT at SNF  Fall precautions  Encourage adequate hydration/nutrition  Used SPC at home, continue use of RW at SNF  BMP ordered 5/29

## 2024-05-28 NOTE — ASSESSMENT & PLAN NOTE
S/p mechanical fall on 5/12  Orthopedic surgery consulted; noted to be nonoperative at this time  Continue WBAT  Continue pain regimen: Acetaminophen 975 mg TID  Follow up with Trauma surgery  CBC ordered for 5/29

## 2024-05-30 ENCOUNTER — NURSING HOME VISIT (OUTPATIENT)
Dept: GERIATRICS | Facility: OTHER | Age: 89
End: 2024-05-30
Payer: COMMERCIAL

## 2024-05-30 VITALS
HEART RATE: 80 BPM | OXYGEN SATURATION: 94 % | DIASTOLIC BLOOD PRESSURE: 66 MMHG | BODY MASS INDEX: 23.96 KG/M2 | WEIGHT: 144 LBS | SYSTOLIC BLOOD PRESSURE: 128 MMHG | RESPIRATION RATE: 18 BRPM | TEMPERATURE: 97.8 F

## 2024-05-30 DIAGNOSIS — Z95.0 HISTORY OF PERMANENT CARDIAC PACEMAKER PLACEMENT: ICD-10-CM

## 2024-05-30 DIAGNOSIS — S32.591D CLOSED FRACTURE OF RAMUS OF RIGHT PUBIS WITH ROUTINE HEALING, SUBSEQUENT ENCOUNTER: Primary | ICD-10-CM
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
## 2024-05-30 DIAGNOSIS — K59.00 CONSTIPATION, UNSPECIFIED CONSTIPATION TYPE: ICD-10-CM

## 2024-05-30 DIAGNOSIS — F02.80 LATE ONSET ALZHEIMER'S DISEASE WITHOUT BEHAVIORAL DISTURBANCE (HCC): ICD-10-CM

## 2024-05-30 DIAGNOSIS — I44.1 MOBITZ TYPE 1 SECOND DEGREE AV BLOCK: ICD-10-CM

## 2024-05-30 DIAGNOSIS — R26.81 GAIT INSTABILITY: ICD-10-CM

## 2024-05-30 DIAGNOSIS — K21.9 GASTROESOPHAGEAL REFLUX DISEASE, UNSPECIFIED WHETHER ESOPHAGITIS PRESENT: ICD-10-CM

## 2024-05-30 DIAGNOSIS — G30.1 LATE ONSET ALZHEIMER'S DISEASE WITHOUT BEHAVIORAL DISTURBANCE (HCC): ICD-10-CM

## 2024-05-30 PROCEDURE — 99309 SBSQ NF CARE MODERATE MDM 30: CPT

## 2024-05-30 NOTE — ASSESSMENT & PLAN NOTE
Stable, no complaints at this time  Continue Famotidine 20 mg once daily  Tums as needed for indigestion with meals

## 2024-05-30 NOTE — ASSESSMENT & PLAN NOTE
Currently AAOx1-2, to self and place  Off Namenda and Aricept d/t side effects  Provide redirection, reorientation, distraction techniques  Fall Precautions  Assist with ADLs/IADLs  Avoid deliriogenic medications such as tramadol, benzodiazepines, anticholinergics, benadryl  Encourage Hydration/ Nutrition  Implement sleep hygiene, limit night time interuptions   Encourage participation in group activities when appropriate

## 2024-05-30 NOTE — ASSESSMENT & PLAN NOTE
S/p mechanical fall on 5/12  With aid trying to ambulate from couch  -headstrike, -LOC; reported dizziness  R hip pain  R side closed fracture of ramus pubis noted on Xray   PT/OT at SNF  Fall precautions  Encourage adequate hydration/nutrition  Used SPC at home, continue use of RW at SNF

## 2024-05-30 NOTE — ASSESSMENT & PLAN NOTE
S/p mechanical fall on 5/12  Orthopedic surgery consulted; noted to be nonoperative at this time  Continue WBAT  Continue pain regimen: Acetaminophen 975 mg TID  Follow up with Trauma surgery  CBC 5/29 showing no signs of infection, all WNL

## 2024-05-30 NOTE — ASSESSMENT & PLAN NOTE
Placed 5/15 d/t Mobitz type 1 second degree AV block on EKG  Single chamber [employer] PPM  Site assessment: -drainage, redness, signs of infection; slight swelling

## 2024-05-30 NOTE — ASSESSMENT & PLAN NOTE
S/p admission on 5/17 with complaints of constipation, vomiting x 1  Reported no bowel movement x 1 week  No obstruction noted on CT on 5/17  Continue Colace 100 mg BID  Continue Miralax 17 g packet once daily  Continue Bisacodyl 10 mg suppository if no bowel movement after 2 days  Continue Senokot 8.6 mg BID  Encourage adequate hydration  BM consistent almost daily per nursing records at this time

## 2024-05-30 NOTE — PROGRESS NOTES
[facility]  [address] [address]  [phone]  FACILITY: [facility]  Code 31 (STR)        NAME: Berta Estrada  AGE: 89 y.o. SEX: female CODE STATUS: No CPR    DATE OF ENCOUNTER: 5/30/2024    Assessment and Plan     1. Closed fracture of ramus of right pubis with routine healing, subsequent encounter  Assessment & Plan:  S/p mechanical fall on 5/12  Orthopedic surgery consulted; noted to be nonoperative at this time  Continue WBAT  Continue pain regimen: Acetaminophen 975 mg TID  Follow up with Trauma surgery  CBC 5/29 showing no signs of infection, all WNL  2. Fall, sequela  Assessment & Plan:  S/p mechanical fall on 5/12  With aid trying to ambulate from couch  -headstrike, -LOC; reported dizziness  R hip pain  R side closed fracture of ramus pubis noted on Xray   PT/OT at [facility]  Fall precautions  Encourage adequate hydration/nutrition  Used SPC at home, continue use of RW at SNF  3. Mobitz type 1 second degree AV block  Assessment & Plan:  EKG on 5/12 showing Mobitz 1, 2nd degree AV block  Cardiac pacemaker placed on 5/15  Heart rate and rhythm regular upon exam today  No complaints of CP, SOB, dizziness/lightheadedness  Continue ASA 81 mg once daily  Continue Atorvastatin 20 mg once daily  Follow up with Cardiology 6/3  4. Gait instability  Assessment & Plan:  Multifactorial: advanced age, hx of Alzheimer's disease, use of assistive ambulatory device  PT/OT at SNF  Fall precautions  Continue use of assistive ambulatory device, working on stability, mobility  Encourage adequate hydration/nutrition  5. Gastroesophageal reflux disease, unspecified whether esophagitis present  Assessment & Plan:  Stable, no complaints at this time  Continue Famotidine 20 mg once daily  Tums as needed for indigestion with meals  6. History of permanent cardiac pacemaker placement  Assessment & Plan:  Placed 5/15 d/t Mobitz type 1 second degree AV block on EKG  Single chamber Medtronic PPM  Site  assessment: -drainage, redness, signs of infection; slight swelling  7. Constipation, unspecified constipation type  Assessment & Plan:  S/p admission on 5/17 with complaints of constipation, vomiting x 1  Reported no bowel movement x 1 week  No obstruction noted on CT on 5/17  Continue Colace 100 mg BID  Continue Miralax 17 g packet once daily  Continue Bisacodyl 10 mg suppository if no bowel movement after 2 days  Continue Senokot 8.6 mg BID  Encourage adequate hydration  BM consistent almost daily per nursing records at this time  8. Late onset Alzheimer's disease without behavioral disturbance (HCC)  Assessment & Plan:  Currently AAOx1-2, to self and place  Off Namenda and Aricept d/t side effects  Provide redirection, reorientation, distraction techniques  Fall Precautions  Assist with ADLs/IADLs  Avoid deliriogenic medications such as tramadol, benzodiazepines, anticholinergics, benadryl  Encourage Hydration/ Nutrition  Implement sleep hygiene, limit night time interuptions   Encourage participation in group activities when appropriate       All medications and routine orders were reviewed and updated as needed.    Chief Complaint     STR follow up visit    Past Medical and Surgica History      Past Medical History:   Diagnosis Date    Actinic keratosis     Basal cell carcinoma     Back     Cancer (HCC)     Coronary artery disease     Dementia (HCC)     Depression     Ear problems     HL (hearing loss)     Hyperlipidemia     Migraines     Ovarian cancer (HCC) 2004    s/p surgery. no chemo/RT    Phlebitis     R leg     Past Surgical History:   Procedure Laterality Date    ADENOIDECTOMY      APPENDECTOMY      CARDIAC ELECTROPHYSIOLOGY PROCEDURE N/A 5/15/2024    Procedure: Cardiac pacer implant;  Surgeon: Rufus Raines MD;  [facility];  Service: Cardiology    CATARACT EXTRACTION, BILATERAL      HYSTERECTOMY  2005    ovarian CA    KNEE SURGERY Right     SKIN BIOPSY      TONSILECTOMY AND  ADNOIDECTOMY      TONSILLECTOMY       Allergies   Allergen Reactions    Codeine Hives    Morphine Other (See Comments)     Redness in face, swelling    Other      Seasonal    Propoxyphene           History of Present Illness     Berta Estrada is a 89 year old female, she is a STR resident of [facility] since 5/16/2024. Past Medical Hx including but not limited to alzheimer's disease, gerd, falls, closed fracture of ramus of right pubis, mobitz type 1 second degree AV block, s/p pacemaker placement 5/2024 and is seen in collaboration with nursing for medical mgmt and LTC follow up.     She is status post fall on 5/12, twisting and landing on her right hip. She did not hit her head, did not lose consciousness and it was witnessed by the aide. She suffered a right pubis ramus fracture. She uses as assistive device at this time. She was brought to the hospital s/p admission to SNF after vomiting and being constipated. She had not had a bowel movement in a week. The Oxycodone was discontinued to decrease constipation and she is on a bowel regimen.     Seen and examined at bedside today. Patient  is a poor historian due to Alzheimer's disease. She is alert and oriented to self and place only upon exam, and at her baseline. She lives with her daughter and son in law. She attends [facility] senior care, Monday through Friday, 9am-4pm. She is calm and pleasant upon exam today. She states she is eating well and enjoys the food. Uses a RW at home and at facility uses WC/RW with therapy.       Denies CP/SOB/N/V/D. Denies lightheadedness, dizziness, headaches, vision changes. Patient states they are eating well and staying hydrated. Denies any bowel or bladder issues. Per review of SNF records, Patient is eating 3 meals per day, consuming  %. Last documented BM 5/29/2024. No concerns from nursing at this time.            The patient's allergies, past medical, surgical, social and family history were reviewed and  unchanged.    Review of Systems     Review of Systems   Unable to perform ROS: Dementia         Objective     Vitals:   Vitals:    05/30/24 1254   BP: 128/66   Pulse: 80   Resp: 18   Temp: 97.8 °F (36.6 °C)   SpO2: 94%         Physical Exam  Vitals and nursing note reviewed.   Constitutional:       Appearance: Normal appearance. She is normal weight.   HENT:      Head: Normocephalic.      Right Ear: External ear normal.      Left Ear: External ear normal.      Nose: Nose normal.      Mouth/Throat:      Mouth: Mucous membranes are moist.      Pharynx: Oropharynx is clear.   Eyes:      Pupils: Pupils are equal, round, and reactive to light.   Cardiovascular:      Rate and Rhythm: Normal rate and regular rhythm.      Pulses: Normal pulses.      Heart sounds: Normal heart sounds.   Pulmonary:      Effort: Pulmonary effort is normal.      Breath sounds: Normal breath sounds.   Abdominal:      General: Abdomen is flat. Bowel sounds are normal.      Palpations: Abdomen is soft.   Musculoskeletal:      Cervical back: Normal range of motion.      Comments: ROM decreased d/t BLE weakness   Skin:     General: Skin is warm and dry.      Capillary Refill: Capillary refill takes less than 2 seconds.   Neurological:      Mental Status: She is alert. Mental status is at baseline.      Motor: Weakness (BLE) present.      Gait: Gait abnormal (use of AD).   Psychiatric:         Mood and Affect: Mood normal.         Behavior: Behavior normal.         Pertinent Laboratory/Diagnostic Studies:     Reviewed in facility chart      Current Medications   Medications reviewed and updated see facility MAR for details.      Current Outpatient Medications:     aspirin 81 MG tablet, Take 81 mg by mouth daily, Disp: , Rfl:     atorvastatin (LIPITOR) 20 mg tablet, TAKE 1 TABLET BY MOUTH EVERY DAY, Disp: 90 tablet, Rfl: 1    calcium carbonate (TUMS) 500 mg chewable tablet, Chew 1 tablet (500 mg total) daily as needed for indigestion or heartburn,  "Disp: , Rfl:     chlorhexidine (PERIDEX) 0.12 % solution, RINSE 2 TIMES A DAY, Disp: , Rfl:     docusate sodium (COLACE) 100 mg capsule, Take 1 capsule (100 mg total) by mouth 2 (two) times a day, Disp: , Rfl:     famotidine (PEPCID) 20 mg tablet, TAKE 1 TABLET BY MOUTH EVERY DAY, Disp: 90 tablet, Rfl: 1    fluticasone (FLONASE) 50 mcg/act nasal spray, SPRAY 1 SPRAY INTO EACH NOSTRIL EVERY DAY, Disp: 48 mL, Rfl: 1    melatonin 3 mg, Take 1 tablet (3 mg total) by mouth daily at bedtime, Disp: , Rfl:     Multiple Vitamins-Minerals (MULTIVITAMIN ADULT PO), Take 1 tablet by mouth daily, Disp: , Rfl:     polyethylene glycol (MIRALAX) 17 g packet, Take 17 g by mouth daily, Disp: , Rfl:     saccharomyces boulardii (FLORASTOR) 250 mg capsule, Take 250 mg by mouth 2 (two) times a day, Disp: , Rfl:     senna (SENOKOT) 8.6 mg, Take 2 tablets (17.2 mg total) by mouth 2 (two) times a day, Disp: , Rfl:      Please note:  Voice-recognition software may have been used in the preparation of this document.  Occasional wrong word or \"sound-alike\" substitutions may have occurred due to the inherent limitations of voice recognition software.  Interpretation should be guided by context.         JOSH Osorio  5/30/2024  1:01 PM    "

## 2024-06-03 ENCOUNTER — IN-CLINIC DEVICE VISIT (OUTPATIENT)
Dept: CARDIOLOGY CLINIC | Facility: CLINIC | Age: 89
End: 2024-06-03

## 2024-06-03 DIAGNOSIS — Z95.0 CARDIAC PACEMAKER: Primary | ICD-10-CM

## 2024-06-03 PROCEDURE — 99024 POSTOP FOLLOW-UP VISIT: CPT | Performed by: INTERNAL MEDICINE

## 2024-06-03 NOTE — PROGRESS NOTES
Results for orders placed or performed in visit on 06/03/24   Cardiac EP device report    Narrative    MDT SINGLE PM/ ACTIVE SYSTEM IS MRI CONDITIONAL  DEVICE INTERROGATED IN THE [facility] OFFICE: WOUND CHECK: INCISION CLEAN AND DRY WITH EDGES APPROXIMATED; STERI STRIPS REMOVED; WOUND CARE AND RESTRICTIONS REVIEWED WITH PATIENT (PDF ATTACHED). BATTERY VOLTAGE ADEQUATE (15.4 YRS).  4.2% (VVI 50PPM); ALL AVAILABLE LEAD PARAMETERS WITHIN NORMAL LIMITS. NO SIGNIFICANT HIGH RATE EPISODES. ASA 81MG ONLY; EF 80% (5/14/2024 ECHO); NO PROGRAMMING CHANGES MADE TO DEVICE PARAMETERS. NORMAL DEVICE FUNCTION.   ES

## 2024-06-05 ENCOUNTER — NURSING HOME VISIT (OUTPATIENT)
Dept: GERIATRICS | Facility: OTHER | Age: 89
End: 2024-06-05
Payer: COMMERCIAL

## 2024-06-05 VITALS
SYSTOLIC BLOOD PRESSURE: 132 MMHG | DIASTOLIC BLOOD PRESSURE: 68 MMHG | RESPIRATION RATE: 18 BRPM | TEMPERATURE: 97.8 F | HEART RATE: 76 BPM | OXYGEN SATURATION: 94 % | WEIGHT: 140 LBS | BODY MASS INDEX: 23.3 KG/M2

## 2024-06-05 DIAGNOSIS — G30.1 LATE ONSET ALZHEIMER'S DISEASE WITHOUT BEHAVIORAL DISTURBANCE (HCC): ICD-10-CM

## 2024-06-05 DIAGNOSIS — R26.81 GAIT INSTABILITY: ICD-10-CM

## 2024-06-05 DIAGNOSIS — K21.9 GASTROESOPHAGEAL REFLUX DISEASE, UNSPECIFIED WHETHER ESOPHAGITIS PRESENT: ICD-10-CM
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
## 2024-06-05 DIAGNOSIS — F02.80 LATE ONSET ALZHEIMER'S DISEASE WITHOUT BEHAVIORAL DISTURBANCE (HCC): ICD-10-CM

## 2024-06-05 DIAGNOSIS — I44.1 MOBITZ TYPE 1 SECOND DEGREE AV BLOCK: ICD-10-CM

## 2024-06-05 DIAGNOSIS — S32.591D CLOSED FRACTURE OF RAMUS OF RIGHT PUBIS WITH ROUTINE HEALING, SUBSEQUENT ENCOUNTER: Primary | ICD-10-CM

## 2024-06-05 DIAGNOSIS — Z95.0 HISTORY OF PERMANENT CARDIAC PACEMAKER PLACEMENT: ICD-10-CM

## 2024-06-05 PROCEDURE — 99309 SBSQ NF CARE MODERATE MDM 30: CPT

## 2024-06-05 RX ORDER — GLYCERIN .002; .002; .01 MG/MG; MG/MG; MG/MG
1 SOLUTION/ DROPS OPHTHALMIC 2 TIMES DAILY
COMMUNITY

## 2024-06-05 RX ORDER — BISACODYL 10 MG
10 SUPPOSITORY, RECTAL RECTAL AS NEEDED
COMMUNITY

## 2024-06-05 NOTE — ASSESSMENT & PLAN NOTE
Currently AAOx1, to self  Discontinued previously: Namenda and Aricept d/t side effects  Provide redirection, reorientation, distraction techniques  Fall Precautions  Assist with ADLs/IADLs  Avoid deliriogenic medications such as tramadol, benzodiazepines, anticholinergics, benadryl  Encourage Hydration/ Nutrition  Implement sleep hygiene, limit night time interuptions   Encourage participation in group activities when appropriate

## 2024-06-05 NOTE — ASSESSMENT & PLAN NOTE
EKG on 5/12 showing Mobitz 1, 2nd degree AV block  Cardiac pacemaker placed on 5/15  Heart rate and rhythm regular upon exam today  No complaints of CP, SOB, dizziness/lightheadedness  Continue ASA 81 mg once daily  Continue Atorvastatin 20 mg once daily  Ancillary Cardiology note from 6/3 stating that there is a normal device function at this time  Follow up 6/13

## 2024-06-05 NOTE — ASSESSMENT & PLAN NOTE
S/p mechanical fall on 5/12  Orthopedic surgery consulted; noted to be nonoperative at this time  Continue WBAT  Continue pain regimen: Acetaminophen 975 mg TID  Follow up with Trauma surgery  CBC 5/29 and 6/4; showing no signs of infection, all WNL  Ortho appointment yesterday cancelled  Reschedule at this time

## 2024-06-05 NOTE — ASSESSMENT & PLAN NOTE
Placed 5/15 d/t Mobitz type 1 second degree AV block on EKG  Single chamber Medtronic PPM  Site assessment: -drainage, redness, signs of infection; slight swelling  Device assessed remotely 6/3: normal function at this time  Follow up with Cardiology on 6/13

## 2024-06-05 NOTE — PROGRESS NOTES
[facility]  [address] [address]  [phone]  FACILITY: [facility]  Code 31 (STR)        NAME: Berta Estrada  AGE: 89 y.o. SEX: female CODE STATUS: No CPR    DATE OF ENCOUNTER: 6/5/2024    Assessment and Plan     1. Closed fracture of ramus of right pubis with routine healing, subsequent encounter  Assessment & Plan:  S/p mechanical fall on 5/12  Orthopedic surgery consulted; noted to be nonoperative at this time  Continue WBAT  Continue pain regimen: Acetaminophen 975 mg TID  Follow up with Trauma surgery  CBC 5/29 showing no signs of infection, all WNL  Ortho appointment yesterday cancelled  Reschedule at this time  2. Fall, sequela  Assessment & Plan:  S/p mechanical fall on 5/12  With aid trying to ambulate from couch  -headstrike, -LOC; reported dizziness  R hip pain  R side closed fracture of ramus pubis noted on Xray   PT/OT at [facility]  Fall precautions  Encourage adequate hydration/nutrition  Used SPC at home, continue use of RW at SNF  3. Mobitz type 1 second degree AV block  Assessment & Plan:  EKG on 5/12 showing Mobitz 1, 2nd degree AV block  Cardiac pacemaker placed on 5/15  Heart rate and rhythm regular upon exam today  No complaints of CP, SOB, dizziness/lightheadedness  Continue ASA 81 mg once daily  Continue Atorvastatin 20 mg once daily  Ancillary Cardiology note from 6/3 stating that there is a normal device function at this time  Follow up 6/13   4. Gait instability  Assessment & Plan:  Multifactorial: advanced age, hx of Alzheimer's disease, use of assistive ambulatory device  PT/OT at [facility]  Fall precautions  Continue use of assistive ambulatory device, working on stability, mobility  Encourage adequate hydration/nutrition  5. Gastroesophageal reflux disease, unspecified whether esophagitis present  Assessment & Plan:  Stable, no complaints at this time  Continue Famotidine 20 mg once daily  Tums as needed for indigestion with meals  6. History of permanent  cardiac pacemaker placement  Assessment & Plan:  Placed 5/15 d/t Mobitz type 1 second degree AV block on EKG  Single chamber Medtronic PPM  Site assessment: -drainage, redness, signs of infection; slight swelling  Device assessed remotely 6/3: normal function at this time  Follow up with Cardiology on 6/13  7. Late onset Alzheimer's disease without behavioral disturbance (HCC)  Assessment & Plan:  Currently AAOx1, to self  Discontinued previously: Namenda and Aricept d/t side effects  Provide redirection, reorientation, distraction techniques  Fall Precautions  Assist with ADLs/IADLs  Avoid deliriogenic medications such as tramadol, benzodiazepines, anticholinergics, benadryl  Encourage Hydration/ Nutrition  Implement sleep hygiene, limit night time interuptions   Encourage participation in group activities when appropriate       All medications and routine orders were reviewed and updated as needed.    Chief Complaint     STR follow up visit    Past Medical and Surgica History      Past Medical History:   Diagnosis Date    Actinic keratosis     Basal cell carcinoma     Back     Cancer (HCC)     Coronary artery disease     Dementia (HCC)     Depression     Ear problems     HL (hearing loss)     Hyperlipidemia     Migraines     Ovarian cancer (HCC) 2004    s/p surgery. no chemo/RT    Phlebitis     R leg     Past Surgical History:   Procedure Laterality Date    ADENOIDECTOMY      APPENDECTOMY      CARDIAC ELECTROPHYSIOLOGY PROCEDURE N/A 5/15/2024    Procedure: Cardiac pacer implant;  Surgeon: Rufus Raines MD;  [facility];  Service: Cardiology    CATARACT EXTRACTION, BILATERAL      HYSTERECTOMY  2005    ovarian CA    KNEE SURGERY Right     SKIN BIOPSY      TONSILECTOMY AND ADNOIDECTOMY      TONSILLECTOMY       Allergies   Allergen Reactions    Codeine Hives    Morphine Other (See Comments)     Redness in face, swelling    Other      Seasonal    Propoxyphene           History of Present Illness      Berta Estrada is a 89 year old female, she is a STR resident of [facility] since 5/16/2024. Past Medical Hx including but not limited to alzheimer's disease, gerd, falls, closed fracture of ramus of right pubis, mobitz type 1 second degree AV block, s/p pacemaker placement 5/2024 and is seen in collaboration with nursing for medical mgmt and LTC follow up.     She is status post fall on 5/12, twisting and landing on her right hip. She did not hit her head, did not lose consciousness and it was witnessed by the aide. She suffered a right pubis ramus fracture. She uses as assistive device at this time. She was brought to the hospital s/p admission to SNF after vomiting and being constipated. She had not had a bowel movement in a week. The Oxycodone was discontinued to decrease constipation and she is on a bowel regimen.     Seen and examined at bedside today. Patient  is a poor historian due to Alzheimer's disease. She is alert and oriented to self only upon exam today. She is currently in the activity room and talking with other residents. She is calm and pleasant. Eye drops are ordered at this time; she is having some tearing noted but does not state any pain. She lives with her daughter and son in law. She attends [facility], Monday through Friday, 9am-4pm. She is calm and pleasant upon exam today. She states she is eating well and enjoys the food. Uses a RW at home and at facility uses WC/RW with therapy.       Denies CP/SOB/N/V/D. Denies lightheadedness, dizziness, headaches, vision changes. Patient states they are eating well and staying hydrated. Denies any bowel or bladder issues. Per review of SNF records, Patient is eating 3 meals per day, consuming  %. Last documented BM 6/4/2024. No concerns from nursing at this time.            The patient's allergies, past medical, surgical, social and family history were reviewed and unchanged.    Review of Systems     Review of Systems    Unable to perform ROS: Dementia         Objective     Vitals:   Vitals:    06/05/24 1114   BP: 132/68   Pulse: 76   Resp: 18   Temp: 97.8 °F (36.6 °C)   SpO2: 94%         Physical Exam  Vitals and nursing note reviewed.   Constitutional:       Appearance: Normal appearance. She is normal weight.   HENT:      Head: Normocephalic.      Right Ear: External ear normal.      Left Ear: External ear normal.      Nose: Nose normal.      Mouth/Throat:      Mouth: Mucous membranes are moist.      Pharynx: Oropharynx is clear.   Eyes:      Pupils: Pupils are equal, round, and reactive to light.   Cardiovascular:      Rate and Rhythm: Normal rate and regular rhythm.      Pulses: Normal pulses.      Heart sounds: Normal heart sounds.   Pulmonary:      Effort: Pulmonary effort is normal.      Breath sounds: Normal breath sounds.   Abdominal:      General: Abdomen is flat. Bowel sounds are normal.      Palpations: Abdomen is soft.   Musculoskeletal:         General: Normal range of motion.      Cervical back: Normal range of motion.   Skin:     General: Skin is warm and dry.      Capillary Refill: Capillary refill takes less than 2 seconds.   Neurological:      Mental Status: She is alert. Mental status is at baseline. She is disoriented.      Motor: Weakness present.      Gait: Gait abnormal (use of assistive device for ambulation; wc/rw).      Comments: AAOx1, to self   Psychiatric:         Mood and Affect: Mood normal.         Behavior: Behavior normal.         Pertinent Laboratory/Diagnostic Studies:     Reviewed in facility chart      Current Medications   Medications reviewed and updated see facility MAR for details.      Current Outpatient Medications:     aspirin 81 MG tablet, Take 81 mg by mouth daily, Disp: , Rfl:     atorvastatin (LIPITOR) 20 mg tablet, TAKE 1 TABLET BY MOUTH EVERY DAY, Disp: 90 tablet, Rfl: 1    bisacodyl (DULCOLAX) 10 mg suppository, Insert 10 mg into the rectum as needed for constipation, Disp: ,  "Rfl:     calcium carbonate (TUMS) 500 mg chewable tablet, Chew 1 tablet (500 mg total) daily as needed for indigestion or heartburn, Disp: , Rfl:     chlorhexidine (PERIDEX) 0.12 % solution, RINSE 2 TIMES A DAY, Disp: , Rfl:     docusate sodium (COLACE) 100 mg capsule, Take 1 capsule (100 mg total) by mouth 2 (two) times a day, Disp: , Rfl:     famotidine (PEPCID) 20 mg tablet, TAKE 1 TABLET BY MOUTH EVERY DAY, Disp: 90 tablet, Rfl: 1    fluticasone (FLONASE) 50 mcg/act nasal spray, SPRAY 1 SPRAY INTO EACH NOSTRIL EVERY DAY, Disp: 48 mL, Rfl: 1    glycerin-hypromellose- (Artificial Tears) 0.2-0.2-1 % SOLN, Administer 1 drop to both eyes 2 (two) times a day, Disp: , Rfl:     melatonin 3 mg, Take 1 tablet (3 mg total) by mouth daily at bedtime, Disp: , Rfl:     Multiple Vitamins-Minerals (MULTIVITAMIN ADULT PO), Take 1 tablet by mouth daily, Disp: , Rfl:     polyethylene glycol (MIRALAX) 17 g packet, Take 17 g by mouth daily, Disp: , Rfl:     saccharomyces boulardii (FLORASTOR) 250 mg capsule, Take 250 mg by mouth 2 (two) times a day, Disp: , Rfl:     senna (SENOKOT) 8.6 mg, Take 2 tablets (17.2 mg total) by mouth 2 (two) times a day, Disp: , Rfl:      Please note:  Voice-recognition software may have been used in the preparation of this document.  Occasional wrong word or \"sound-alike\" substitutions may have occurred due to the inherent limitations of voice recognition software.  Interpretation should be guided by context.         JOSH Osorio  6/5/2024  11:29 AM    "

## 2024-06-10 ENCOUNTER — NURSING HOME VISIT (OUTPATIENT)
Dept: GERIATRICS | Facility: OTHER | Age: 89
End: 2024-06-10
Payer: COMMERCIAL

## 2024-06-10 VITALS
SYSTOLIC BLOOD PRESSURE: 132 MMHG | OXYGEN SATURATION: 99 % | DIASTOLIC BLOOD PRESSURE: 68 MMHG | TEMPERATURE: 97.3 F | HEART RATE: 81 BPM

## 2024-06-10 DIAGNOSIS — G30.1 LATE ONSET ALZHEIMER'S DISEASE WITHOUT BEHAVIORAL DISTURBANCE (HCC): ICD-10-CM

## 2024-06-10 DIAGNOSIS — R33.9 URINARY RETENTION: ICD-10-CM

## 2024-06-10 DIAGNOSIS — I10 ESSENTIAL HYPERTENSION: ICD-10-CM

## 2024-06-10 DIAGNOSIS — R26.81 GAIT INSTABILITY: ICD-10-CM

## 2024-06-10 DIAGNOSIS — F02.80 LATE ONSET ALZHEIMER'S DISEASE WITHOUT BEHAVIORAL DISTURBANCE (HCC): ICD-10-CM

## 2024-06-10 DIAGNOSIS — S32.591D CLOSED FRACTURE OF RAMUS OF RIGHT PUBIS WITH ROUTINE HEALING, SUBSEQUENT ENCOUNTER: Primary | ICD-10-CM

## 2024-06-10 DIAGNOSIS — K59.00 CONSTIPATION, UNSPECIFIED CONSTIPATION TYPE: ICD-10-CM

## 2024-06-10 PROCEDURE — 99309 SBSQ NF CARE MODERATE MDM 30: CPT

## 2024-06-10 NOTE — ASSESSMENT & PLAN NOTE
Multifactorial  Continue PT/OT  Maintain fall and safety precautions  Encourage appropriate DME use    following for discharge planning

## 2024-06-10 NOTE — ASSESSMENT & PLAN NOTE
S/p mechanical fall on 5/12  Orthopedic surgery consulted; noted to be nonoperative at this time  Continue WBAT  Continue pain regimen: Acetaminophen 975 mg TID  Follow up with Trauma surgery

## 2024-06-10 NOTE — ASSESSMENT & PLAN NOTE
Pleasantly confused; oriented to self   Namenda and Aricept were previously discontinued   No behaviors noted   Redirection, reorientation and distraction as appropriate   Fall/safety precautions  Supportive care, assistance with ADLs   Avoid deliriogenic medications   Encourage adequate hydration and nutrition   Maintain sleep/wake cycle

## 2024-06-10 NOTE — PROGRESS NOTES
[facility]  [address] [address]  [phone]  FACILITY: [facility]  Code 31 (STR)          NAME: Berta Estrada  AGE: 89 y.o. SEX: female CODE STATUS: No CPR    DATE OF ENCOUNTER: 6/10/2024    Assessment and Plan     1. Closed fracture of ramus of right pubis with routine healing, subsequent encounter  Assessment & Plan:  S/p mechanical fall on 5/12  Orthopedic surgery consulted; noted to be nonoperative at this time  Continue WBAT  Continue pain regimen: Acetaminophen 975 mg TID  Follow up with Trauma surgery    2. Urinary retention  Assessment & Plan:  Secondary to constipation   Required lerner catheter inpatient   Has since resolved, successful void trial inpatient   Monitor urinary output   3. Constipation, unspecified constipation type  Assessment & Plan:  S/p hospital admission with constipation no BM x 4 days   Improved with aggressive bowel regimen   Last BM on 6/8  Continue colace and senna BID   Continue miralax daily   Encourage po hydration   4. Gait instability  Assessment & Plan:  Multifactorial  Continue PT/OT  Maintain fall and safety precautions  Encourage appropriate DME use    following for discharge planning    5. Late onset Alzheimer's disease without behavioral disturbance (HCC)  Assessment & Plan:  Pleasantly confused; oriented to self   Namenda and Aricept were previously discontinued   No behaviors noted   Redirection, reorientation and distraction as appropriate   Fall/safety precautions  Supportive care, assistance with ADLs   Avoid deliriogenic medications   Encourage adequate hydration and nutrition   Maintain sleep/wake cycle     6. Essential hypertension  Assessment & Plan:  BP acceptable   Currently not on antihypertensives   Monitor BP       All medications and routine orders were reviewed and updated as needed.    Chief Complaint     STR follow up visit    Past Medical and Surgica History      Past Medical History:   Diagnosis  Date    Actinic keratosis     Basal cell carcinoma     Back     Cancer (HCC)     Coronary artery disease     Dementia (HCC)     Depression     Ear problems     HL (hearing loss)     Hyperlipidemia     Migraines     Ovarian cancer (HCC) 2004    s/p surgery. no chemo/RT    Phlebitis     R leg     Past Surgical History:   Procedure Laterality Date    ADENOIDECTOMY      APPENDECTOMY      CARDIAC ELECTROPHYSIOLOGY PROCEDURE N/A 5/15/2024    Procedure: Cardiac pacer implant;  Surgeon: Rufus Raines MD;  [facility];  Service: Cardiology    CATARACT EXTRACTION, BILATERAL      HYSTERECTOMY  2005    ovarian CA    KNEE SURGERY Right     SKIN BIOPSY      TONSILECTOMY AND ADNOIDECTOMY      TONSILLECTOMY       Allergies   Allergen Reactions    Codeine Hives    Morphine Other (See Comments)     Redness in face, swelling    Other      Seasonal    Propoxyphene           History of Present Illness     Patient is a 89 y.o. old female being seen at [facility] for follow up of acute and chronic medical conditions. She is seen and examined sitting in wheelchair without acute distress. She has history of dementia and is a poor historian. She did deny any chest pain or shortness of breath. She admits to confusion. No acute concerns from nursing staff.           The patient's allergies, past medical, surgical, social and family history were reviewed and unchanged.    Review of Systems     Review of Systems   Unable to perform ROS: Dementia         Objective     Vitals:   Vitals:    06/10/24 1555   BP: 132/68   Pulse: 81   Temp: (!) 97.3 °F (36.3 °C)   SpO2: 99%         Physical Exam  Vitals reviewed.   Constitutional:       General: She is not in acute distress.     Appearance: Normal appearance. She is not ill-appearing, toxic-appearing or diaphoretic.   HENT:      Head: Normocephalic and atraumatic.   Eyes:      Conjunctiva/sclera: Conjunctivae normal.   Cardiovascular:      Rate and Rhythm: Normal rate and regular rhythm.       Pulses: Normal pulses.      Heart sounds: Normal heart sounds. No murmur heard.  Pulmonary:      Effort: Pulmonary effort is normal. No respiratory distress.      Breath sounds: Normal breath sounds.   Abdominal:      General: Bowel sounds are normal. There is no distension.      Palpations: Abdomen is soft.      Tenderness: There is no abdominal tenderness.   Musculoskeletal:      Right lower leg: Edema present.      Left lower leg: Edema present.   Skin:     General: Skin is warm and dry.   Neurological:      General: No focal deficit present.      Mental Status: She is alert. She is confused.      Motor: Weakness present.      Gait: Gait abnormal.   Psychiatric:         Mood and Affect: Mood normal.         Speech: Speech normal.         Behavior: Behavior normal.         Thought Content: Thought content normal.         Cognition and Memory: Cognition is impaired. Memory is impaired.         Pertinent Laboratory/Diagnostic Studies:   Reviewed in facility chart-stable      Current Medications   Medications reviewed and updated see facility MAR for details.      Current Outpatient Medications:     aspirin 81 MG tablet, Take 81 mg by mouth daily, Disp: , Rfl:     atorvastatin (LIPITOR) 20 mg tablet, TAKE 1 TABLET BY MOUTH EVERY DAY, Disp: 90 tablet, Rfl: 1    bisacodyl (DULCOLAX) 10 mg suppository, Insert 10 mg into the rectum as needed for constipation, Disp: , Rfl:     calcium carbonate (TUMS) 500 mg chewable tablet, Chew 1 tablet (500 mg total) daily as needed for indigestion or heartburn, Disp: , Rfl:     chlorhexidine (PERIDEX) 0.12 % solution, RINSE 2 TIMES A DAY, Disp: , Rfl:     docusate sodium (COLACE) 100 mg capsule, Take 1 capsule (100 mg total) by mouth 2 (two) times a day, Disp: , Rfl:     famotidine (PEPCID) 20 mg tablet, TAKE 1 TABLET BY MOUTH EVERY DAY, Disp: 90 tablet, Rfl: 1    fluticasone (FLONASE) 50 mcg/act nasal spray, SPRAY 1 SPRAY INTO EACH NOSTRIL EVERY DAY, Disp: 48 mL, Rfl: 1     "glycerin-hypromellose- (Artificial Tears) 0.2-0.2-1 % SOLN, Administer 1 drop to both eyes 2 (two) times a day, Disp: , Rfl:     melatonin 3 mg, Take 1 tablet (3 mg total) by mouth daily at bedtime, Disp: , Rfl:     Multiple Vitamins-Minerals (MULTIVITAMIN ADULT PO), Take 1 tablet by mouth daily, Disp: , Rfl:     polyethylene glycol (MIRALAX) 17 g packet, Take 17 g by mouth daily, Disp: , Rfl:     saccharomyces boulardii (FLORASTOR) 250 mg capsule, Take 250 mg by mouth 2 (two) times a day, Disp: , Rfl:     senna (SENOKOT) 8.6 mg, Take 2 tablets (17.2 mg total) by mouth 2 (two) times a day, Disp: , Rfl:      Plan discussed with Dr. Oconnor. Dr. Oconnor noted agreement with assessment and plan.      Please note:  Voice-recognition software may have been used in the preparation of this document.  Occasional wrong word or \"sound-alike\" substitutions may have occurred due to the inherent limitations of voice recognition software.  Interpretation should be guided by context.         JOSH Saldivar        "

## 2024-06-10 NOTE — ASSESSMENT & PLAN NOTE
S/p hospital admission with constipation no BM x 4 days   Improved with aggressive bowel regimen   Last BM on 6/8  Continue colace and senna BID   Continue miralax daily   Encourage po hydration

## 2024-06-10 NOTE — ASSESSMENT & PLAN NOTE
Secondary to constipation   Required lerner catheter inpatient   Has since resolved, successful void trial inpatient   Monitor urinary output

## 2024-06-12 ENCOUNTER — NURSING HOME VISIT (OUTPATIENT)
Dept: GERIATRICS | Facility: OTHER | Age: 89
End: 2024-06-12
Payer: COMMERCIAL

## 2024-06-12 DIAGNOSIS — I44.1 MOBITZ TYPE 1 SECOND DEGREE AV BLOCK: ICD-10-CM

## 2024-06-12 DIAGNOSIS — S32.591D CLOSED FRACTURE OF RAMUS OF RIGHT PUBIS WITH ROUTINE HEALING, SUBSEQUENT ENCOUNTER: Primary | ICD-10-CM

## 2024-06-12 DIAGNOSIS — K59.00 CONSTIPATION, UNSPECIFIED CONSTIPATION TYPE: ICD-10-CM
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
## 2024-06-12 DIAGNOSIS — R68.89 SUSPECTED MALIGNANT NEOPLASM OF LUNG: ICD-10-CM

## 2024-06-12 DIAGNOSIS — F02.80 LATE ONSET ALZHEIMER'S DISEASE WITHOUT BEHAVIORAL DISTURBANCE (HCC): ICD-10-CM

## 2024-06-12 DIAGNOSIS — G30.1 LATE ONSET ALZHEIMER'S DISEASE WITHOUT BEHAVIORAL DISTURBANCE (HCC): ICD-10-CM

## 2024-06-12 DIAGNOSIS — I10 ESSENTIAL HYPERTENSION: ICD-10-CM

## 2024-06-12 PROCEDURE — 99316 NF DSCHRG MGMT 30 MIN+: CPT | Performed by: NURSE PRACTITIONER

## 2024-06-12 NOTE — PROGRESS NOTES
Progress Note - Electrophysiology-Cardiology (EP)   Berta Estrada 89 y.o. female MRN: [id number]  Unit/Bed#:  Encounter: [id number]           1. Mobitz type 1 second degree AV block  POCT ECG      2. Coronary artery disease involving native coronary artery of native heart without angina pectoris        3. Essential hypertension        4. Late onset Alzheimer's disease without behavioral disturbance (HCC)        5. S/P AVR (aortic valve replacement)        6. Other hyperlipidemia               Summary of my recommendation for the patient  Patient had a pacemaker placed for symptomatic bradycardia  All device parameters were stable  Routine follow-up as per device clinic    No other electrophysiologic intervention at this time  Follow-up with general cardiology          Clinical condition  Sick sinus syndrome  Symptomatic bradycardia  Fall with fracture   Late onset Alzheimer's disease without behavioral disturbance  Mixed hyperlipidemia  Stage IIIa chronic kidney disease          Assessment & Plan     Sick sinus syndrome  Symptomatic bradycardia  Post permanent pacemaker  All device parameters are stable      Fall with fracture   Late onset Alzheimer's disease without behavioral disturbance  Mixed hyperlipidemia  Stage IIIa chronic kidney disease  All as per primary team        History of Present Illness   HPI: Berta Estrada is a 89 y.o. year old female has been referred to me by PCP for the management of symptomatic bradycardia    Patient had a pacemaker placed and tolerated the procedure well  All device parameters are stable    The patient has significant medical illnesses which include  Sick sinus syndrome  Symptomatic bradycardia  Fall with fracture   Late onset Alzheimer's disease without behavioral disturbance  Mixed hyperlipidemia  Stage IIIa chronic kidney disease    Patient is not complaining of anginal-like chest pain or pressure, orthopnea, PND, leg swelling, palpitation, presyncope, syncope    She  lives in a nursing home    She and her daughter are visiting now  They were made aware of restrictions of the left arm, for the  6 weeks since time of implant      Historical Information   Past Medical History:   Diagnosis Date    Actinic keratosis     Basal cell carcinoma     Back     Cancer (HCC)     Coronary artery disease     Dementia (HCC)     Depression     Ear problems     HL (hearing loss)     Hyperlipidemia     Migraines     Ovarian cancer (HCC) 2004    s/p surgery. no chemo/RT    Phlebitis     R leg     Past Surgical History:   Procedure Laterality Date    ADENOIDECTOMY      APPENDECTOMY      CARDIAC ELECTROPHYSIOLOGY PROCEDURE N/A 5/15/2024    Procedure: Cardiac pacer implant;  Surgeon: Rufus Raines MD;  [facility];  Service: Cardiology    CATARACT EXTRACTION, BILATERAL      HYSTERECTOMY  2005    ovarian CA    KNEE SURGERY Right     SKIN BIOPSY      TONSILECTOMY AND ADNOIDECTOMY      TONSILLECTOMY       Social History     Substance and Sexual Activity   Alcohol Use Not Currently     Social History     Substance and Sexual Activity   Drug Use Never     Social History     Tobacco Use   Smoking Status Former    Current packs/day: 0.25    Types: Cigarettes   Smokeless Tobacco Never   Tobacco Comments    until age 30     Social History     Socioeconomic History    Marital status:      Spouse name: Not on file    Number of children: Not on file    Years of education: Not on file    Highest education level: Not on file   Occupational History    Not on file   Tobacco Use    Smoking status: Former     Current packs/day: 0.25     Types: Cigarettes    Smokeless tobacco: Never    Tobacco comments:     until age 30   Vaping Use    Vaping status: Never Used   Substance and Sexual Activity    Alcohol use: Not Currently    Drug use: Never    Sexual activity: Not Currently   Other Topics Concern    Not on file   Social History Narrative    Drinks coffee/tea- 2 daily half decaf     From [place]  "   Now lives with daughter    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, 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     Social Determinants of Health     Financial Resource Strain: Low Risk  (6/2/2023)    Overall Financial Resource Strain (CARDIA)     Difficulty of Paying Living Expenses: Not very hard   Food Insecurity: No Food Insecurity (5/20/2024)    Hunger Vital Sign     Worried About Running Out of Food in the Last Year: Never true     Ran Out of Food in the Last Year: Never true   Transportation Needs: No Transportation Needs (5/20/2024)    PRAPARE - Transportation     Lack of Transportation (Medical): No     Lack of Transportation (Non-Medical): No   Physical Activity: Not on file   Stress: Not on file   Social Connections: Not on file   Intimate Partner Violence: Not on file   Housing Stability: Low Risk  (5/20/2024)    Housing Stability Vital Sign     Unable to Pay for Housing in the Last Year: No     Number of Times Moved in the Last Year: 1     Homeless in the Last Year: No     .  Family History:  Family History   Problem Relation Age of Onset    Depression Mother     Anxiety disorder Mother     Alcohol abuse Mother     Substance Abuse Mother     Asthma Mother     Diabetes Father 60    Brain cancer Son     Heart attack Sister     Coronary artery disease Sister     Mental illness Neg Hx          Meds/Allergies      No current facility-administered medications for this visit.        Not in a hospital admission.    Allergies   Allergen Reactions    Codeine Hives    Morphine Other (See Comments)     Redness in face, swelling    Other      Seasonal    Propoxyphene            Objective   Vitals: Visit Vitals  /78 (BP Location: Right arm, Patient Position: Sitting, Cuff Size: Standard)   Pulse 84   Ht 5' 5\" (1.651 m)   Wt 63.9 kg (140 lb 12.8 oz)   SpO2 99%   BMI 23.43 kg/m²   OB Status Postmenopausal   Smoking Status Former   BSA 1.7 m²        Invasive Devices       None                     ROS  Review of Systems "   All other systems reviewed and are negative.  Described in my history of present illness        PHYSICAL EXAM  Physical Exam  Vitals reviewed.   Constitutional:       General: She is not in acute distress.     Appearance: Normal appearance. She is normal weight. She is not ill-appearing.      Comments: Patient walks with a walker   HENT:      Head: Normocephalic and atraumatic.      Right Ear: External ear normal.      Left Ear: External ear normal.      Nose: Nose normal.      Mouth/Throat:      Pharynx: Oropharynx is clear.   Eyes:      General: No scleral icterus.     Extraocular Movements: Extraocular movements intact.      Conjunctiva/sclera: Conjunctivae normal.      Pupils: Pupils are equal, round, and reactive to light.   Cardiovascular:      Rate and Rhythm: Normal rate and regular rhythm.      Pulses: Normal pulses.      Heart sounds: Normal heart sounds. No murmur heard.  Pulmonary:      Effort: Pulmonary effort is normal. No respiratory distress.      Breath sounds: Normal breath sounds. No wheezing.   Abdominal:      General: Bowel sounds are normal. There is no distension.      Palpations: Abdomen is soft.      Tenderness: There is no abdominal tenderness.   Musculoskeletal:         General: No swelling, tenderness or deformity.      Cervical back: Neck supple. No rigidity.   Skin:     Coloration: Skin is not jaundiced.      Findings: No bruising.      Comments: Device site looks well   Neurological:      Mental Status: She is alert. Mental status is at baseline.      Motor: No weakness.   Psychiatric:         Mood and Affect: Mood normal.         Behavior: Behavior normal.         Thought Content: Thought content normal.               LAB RESULTS:    CBC:  WBC   Date Value Ref Range Status   05/19/2024 9.66 4.31 - 10.16 Thousand/uL Final     Hemoglobin   Date Value Ref Range Status   05/19/2024 13.0 11.5 - 15.4 g/dL Final     Hematocrit   Date Value Ref Range Status   05/19/2024 38.2 34.8 - 46.1 %  Final     MCV   Date Value Ref Range Status   05/19/2024 95 82 - 98 fL Final     Platelets   Date Value Ref Range Status   05/19/2024 157 149 - 390 Thousands/uL Final     Comment:     Results verified by repeatManual Review of Smear Performed     RBC   Date Value Ref Range Status   05/19/2024 4.01 3.81 - 5.12 Million/uL Final     MCH   Date Value Ref Range Status   05/19/2024 32.4 26.8 - 34.3 pg Final     MCHC   Date Value Ref Range Status   05/19/2024 34.0 31.4 - 37.4 g/dL Final     RDW   Date Value Ref Range Status   05/19/2024 13.8 11.6 - 15.1 % Final     MPV   Date Value Ref Range Status   05/19/2024 9.6 8.9 - 12.7 fL Final     nRBC   Date Value Ref Range Status   05/19/2024 0 /100 WBCs Final       CMP:  Potassium   Date Value Ref Range Status   05/19/2024 4.0 3.5 - 5.3 mmol/L Final   07/17/2019 4.1 3.5 - 5.3 mmol/L Final     Chloride   Date Value Ref Range Status   05/19/2024 105 96 - 108 mmol/L Final   07/17/2019 108 98 - 110 mmol/L Final     CO2   Date Value Ref Range Status   05/19/2024 26 21 - 32 mmol/L Final   07/17/2019 28 20 - 32 mmol/L Final     BUN   Date Value Ref Range Status   05/19/2024 16 5 - 25 mg/dL Final   07/17/2019 16 7 - 25 mg/dL Final     Creatinine   Date Value Ref Range Status   05/19/2024 0.81 0.60 - 1.30 mg/dL Final     Comment:     Standardized to IDMS reference method     Calcium   Date Value Ref Range Status   05/19/2024 8.5 8.4 - 10.2 mg/dL Final   07/17/2019 9.1 8.6 - 10.4 mg/dL Final     AST   Date Value Ref Range Status   05/19/2024 25 13 - 39 U/L Final   07/17/2019 14 10 - 35 U/L Final     ALT   Date Value Ref Range Status   05/19/2024 27 7 - 52 U/L Final     Comment:     Specimen collection should occur prior to Sulfasalazine administration due to the potential for falsely depressed results.    07/17/2019 14 6 - 29 U/L Final     Alkaline Phosphatase   Date Value Ref Range Status   05/19/2024 58 34 - 104 U/L Final   07/17/2019 79 33 - 130 U/L Final     eGFR   Date Value Ref  Range Status   05/19/2024 64 ml/min/1.73sq m Final        Magnesium:   Magnesium   Date Value Ref Range Status   05/17/2024 2.0 1.9 - 2.7 mg/dL Final        A1C:  Hemoglobin A1C   Date Value Ref Range Status   11/15/2011 5.3  Final        TSH:  TSH 3RD GENERATON   Date Value Ref Range Status   05/31/2023 2.589 0.450 - 4.500 uIU/mL Final     Comment:     The recommended reference ranges for TSH during pregnancy are as follows:   First trimester 0.1 to 2.5 uIU/mL   Second trimester  0.2 to 3.0 uIU/mL   Third trimester 0.3 to 3.0 uIU/m    Note: Normal ranges may not apply to patients who are transgender, non-binary, or whose legal sex, sex at birth, and gender identity differ.  Adult TSH (3rd generation) reference range follows the recommended guidelines of the American [employer] Association, January, 2020.        PT/INR:  Protime   Date Value Ref Range Status   05/17/2024 14.0 11.6 - 14.5 seconds Final     INR   Date Value Ref Range Status   05/17/2024 1.02 0.84 - 1.19 Final       Lipid Panel:  Cholesterol   Date Value Ref Range Status   05/31/2023 139 See Comment mg/dL Final     Comment:     Cholesterol:         Pediatric <18 Years        Desirable          <170 mg/dL      Borderline High    170-199 mg/dL      High               >=200 mg/dL        Adult >=18 Years            Desirable        <200 mg/dL      Borderline High  200-239 mg/dL      High             >239 mg/dL       Total Cholesterol   Date Value Ref Range Status   07/17/2019 144 <200 mg/dL Final     Triglycerides   Date Value Ref Range Status   05/31/2023 93 See Comment mg/dL Final     Comment:     Triglyceride:     0-9Y            <75mg/dL     10Y-17Y         <90 mg/dL       >=18Y     Normal          <150 mg/dL     Borderline High 150-199 mg/dL     High            200-499 mg/dL        Very High       >499 mg/dL    Specimen collection should occur prior to Metamizole administration due to the potential for falsely depressed results.   07/17/2019 86 <150  "mg/dL Final     HDL   Date Value Ref Range Status   2019 50 (L) >50 mg/dL Final     HDL, Direct   Date Value Ref Range Status   2023 56 >=50 mg/dL Final     Non-HDL-Chol (CHOL-HDL)   Date Value Ref Range Status   2023 83 mg/dl Final       Troponin:  No results found for: \"TROPONINI\"      Imaging:    [id number]        STARR:  No results found for this or any previous visit.      Echocardiogram:  Results for orders placed during the hospital encounter of 24    Echo complete w/ contrast if indicated    Interpretation Summary    Left Ventricle: Left ventricular cavity size is normal. Wall thickness is moderately increased. The left ventricular ejection fraction is 80%. Systolic function is hyperdynamic. Wall motion is normal. Diastolic function is abnormal.    IVS: There is sigmoid appearance of the septum.    Right Ventricle: Systolic function is normal.    Aortic Valve: There is a bioprosthetic valve. There is mild regurgitation. The aortic valve has no significant stenosis. The aortic valve mean gradient is 28 mmHg. The dimensionless velocity index is 0.28.  Mean gradients have been elevated for several years. The aortic valve velocity is increased in the setting of stenosis and increased flow.    Mitral Valve: There is mild thickening. There is moderate calcification. There is moderate annular calcification. There is mild to moderate regurgitation.    Tricuspid Valve: There is mild regurgitation. The estimated right ventricular systolic pressure is 32.00 mmHg.      Stress Test:   No results found for this or any previous visit.      Cardiac Catheterization:  No results found for this or any previous visit.      HOLTER MONITOR: 24 HOUR/48 HOUR MONITORS  No results found for this or any previous visit.      AMB Extended Holter Monitor: Zio XT/AT or BioTel  No results found for this or any previous visit.        DEVICE CHECK:     Results for orders placed or performed in visit on 24 "   Cardiac EP device report    Narrative    MDT SINGLE PM/ ACTIVE SYSTEM IS MRI CONDITIONAL  DEVICE INTERROGATED IN THE [place] OFFICE: WOUND CHECK: INCISION CLEAN AND DRY WITH EDGES APPROXIMATED; STERI STRIPS REMOVED; WOUND CARE AND RESTRICTIONS REVIEWED WITH PATIENT (PDF ATTACHED). BATTERY VOLTAGE ADEQUATE (15.4 YRS).  4.2% (VVI 50PPM); ALL AVAILABLE LEAD PARAMETERS WITHIN NORMAL LIMITS. NO SIGNIFICANT HIGH RATE EPISODES. ASA 81MG ONLY; EF 80% (5/14/2024 ECHO); NO PROGRAMMING CHANGES MADE TO DEVICE PARAMETERS. NORMAL DEVICE FUNCTION.   ES              Code Status: [unfilled]  Advance Directive and Living Will: Yes    Power of : Yes  POLST:      Counseling / Coordination of Care  Follow Up with general cardiology    Rufus Raines MD

## 2024-06-13 ENCOUNTER — OFFICE VISIT (OUTPATIENT)
Dept: CARDIOLOGY CLINIC | Facility: CLINIC | Age: 89
End: 2024-06-13
Payer: COMMERCIAL

## 2024-06-13 VITALS
DIASTOLIC BLOOD PRESSURE: 66 MMHG | OXYGEN SATURATION: 97 % | BODY MASS INDEX: 23.96 KG/M2 | HEART RATE: 72 BPM | RESPIRATION RATE: 18 BRPM | TEMPERATURE: 97.8 F | WEIGHT: 144 LBS | SYSTOLIC BLOOD PRESSURE: 136 MMHG

## 2024-06-13 VITALS
OXYGEN SATURATION: 99 % | SYSTOLIC BLOOD PRESSURE: 126 MMHG | HEIGHT: 65 IN | DIASTOLIC BLOOD PRESSURE: 78 MMHG | HEART RATE: 84 BPM | WEIGHT: 140.8 LBS | BODY MASS INDEX: 23.46 KG/M2

## 2024-06-13 DIAGNOSIS — Z95.2 S/P AVR (AORTIC VALVE REPLACEMENT): ICD-10-CM

## 2024-06-13 DIAGNOSIS — I10 ESSENTIAL HYPERTENSION: ICD-10-CM

## 2024-06-13 DIAGNOSIS — E78.49 OTHER HYPERLIPIDEMIA: ICD-10-CM

## 2024-06-13 DIAGNOSIS — F02.80 LATE ONSET ALZHEIMER'S DISEASE WITHOUT BEHAVIORAL DISTURBANCE (HCC): ICD-10-CM

## 2024-06-13 DIAGNOSIS — I25.10 CORONARY ARTERY DISEASE INVOLVING NATIVE CORONARY ARTERY OF NATIVE HEART WITHOUT ANGINA PECTORIS: ICD-10-CM

## 2024-06-13 DIAGNOSIS — I44.1 MOBITZ TYPE 1 SECOND DEGREE AV BLOCK: Primary | ICD-10-CM

## 2024-06-13 DIAGNOSIS — G30.1 LATE ONSET ALZHEIMER'S DISEASE WITHOUT BEHAVIORAL DISTURBANCE (HCC): ICD-10-CM

## 2024-06-13 PROCEDURE — 93000 ELECTROCARDIOGRAM COMPLETE: CPT | Performed by: INTERNAL MEDICINE

## 2024-06-13 PROCEDURE — 99213 OFFICE O/P EST LOW 20 MIN: CPT | Performed by: INTERNAL MEDICINE

## 2024-06-13 NOTE — ASSESSMENT & PLAN NOTE
S/p mechanical fall on 5/12/2024  Orthopedics recommended conservative/nonoperative management  WBAT  Tylenol around-the-clock  PT/OT

## 2024-06-13 NOTE — ASSESSMENT & PLAN NOTE
Multiple incidental findings noted on CAT scan of abdomen during constipation admission  Evaluated by pulmonology at [facility] who notes multiple nodules some necrotic represent malignancy  Family had declined biopsy due to age and Alzheimer's diagnosis  Would benefit from palliative care and transition to hospice if and when appropriate

## 2024-06-13 NOTE — PROGRESS NOTES
[facility]  [address] [address]  [phone]  DISCHARGE SUMMARY  FACILITY: [facility]  Code 31    NAME: Berta Estrada  AGE: 89 y.o. SEX: female   CODE STATUS: No CPR    DATE OF ADMISSION: 5/21/2024   DATE OF DISCHARGE: 6/13/2024   DISCHARGE DISPOSITION: Stable for discharge to home with family support and home health PT/OT/SN services.       Reason for Admission: Patient was admitted to [facility] for rehabilitation after hospitalization for mechanical fall s/p right pubic fracture, Mobitz type I AV block.    Past Medical and Surgical History:   Past Medical History:   Diagnosis Date    Actinic keratosis     Basal cell carcinoma     Back     Cancer (HCC)     Coronary artery disease     Dementia (HCC)     Depression     Ear problems     HL (hearing loss)     Hyperlipidemia     Migraines     Ovarian cancer (HCC) 2004    s/p surgery. no chemo/RT    Phlebitis     R leg      Past Surgical History:   Procedure Laterality Date    ADENOIDECTOMY      APPENDECTOMY      CARDIAC ELECTROPHYSIOLOGY PROCEDURE N/A 5/15/2024    Procedure: Cardiac pacer implant;  Surgeon: Rufus Raines MD;  Location: [facility];  Service: Cardiology    CATARACT EXTRACTION, BILATERAL      HYSTERECTOMY  2005    ovarian CA    KNEE SURGERY Right     SKIN BIOPSY      TONSILECTOMY AND ADNOIDECTOMY      TONSILLECTOMY         Course of stay:   Berta Estrada is a 89 y.o. female admitted to [facility] following hospital stay for Fall s/p Right hip fracure and AV block Mobitz type 1. Patient has a past medical Hx including but not limited to Alzhimer's disease, HLD, CAD, HTN, CKD3, S/P AVR . Patient is seen in collaboration with nursing for medical mgmt and discharge visit.     Patient initially presented to ED s/p fall after losing her balance from standing up from a chair.  ED workup revealed right hip fracture.  Hospital course was complicated by AV block Mobitz type I  which was thought to contribute to her fall.  Orthopedics evaluated pelvic/hip fracture and recommended conservative nonoperative management.  She is weightbearing as tolerated to bilateral lower extremities.  Cardiology was consulted due to arrhythmia.  Patient did have a pacemaker placed during this admission.  She was recommended discharge to short-term rehab.    Upon admission to rehab patient was unfortunately sent right back to the hospital same day of admission to rehab due to constipation with associated vomiting.  CT scan without obstruction and successfully had bowel movement with aggressive regimen.  Patient had associated urinary retention due to this constipation and did require a Contreras catheter which was removed prior to readmission to rehab.  During the second hospitalization there were numerous abnormal incidental findings on her CAT scan some concerning for potential malignancy however given her advanced age and underlying Alzheimer's family did not wish to pursue any additional workup.The rest of patient's stay at rehab was unremarkable.  She was followed by [facility] pulmonology who noted known malignant pulmonary nodules with suspected malignant neoplasm of the lung.  Noted that family had declined biopsy and that these nodules are likely to progress and when appropriate/needed patient should transition to hospice care.  Patient's labs remained unremarkable, constipation resolved with bowel regimen.  Patient had follow-up with cardiology 6/3/2024 noted cardiac function stable and device working properly.  She has follow-up on 6/13/2024.  At baseline patient lives with her daughter and son in law. She attends [employer], Monday through Friday, 9am-4pm.     Seen and examined at bedside today. Patient is a poor historian due to Alzheimer's.  Patient is pleasant and cooperative clear speech but not goal oriented.  Patient denies any pain on exam.  Patient is shuffling herself and wheeling herself in  the wheelchair.  Patient denies CP/SOB/N/V/D. Denies lightheadedness, dizziness, headaches, vision changes.         ROS:  Review of Systems   Unable to perform ROS: Dementia       PHYSICAL EXAM:  VITALS:   Vitals:    06/12/24 1546   BP: 136/66   Pulse: 72   Resp: 18   Temp: 97.8 °F (36.6 °C)   SpO2: 97%        Physical Exam  Vitals and nursing note reviewed.   Constitutional:       General: She is not in acute distress.     Appearance: Normal appearance.   HENT:      Head: Normocephalic and atraumatic.      Nose: No congestion or rhinorrhea.      Mouth/Throat:      Mouth: Mucous membranes are moist.   Eyes:      General: No scleral icterus.     Conjunctiva/sclera: Conjunctivae normal.      Pupils: Pupils are equal, round, and reactive to light.   Cardiovascular:      Rate and Rhythm: Normal rate and regular rhythm.      Pulses: Normal pulses.      Heart sounds: Normal heart sounds. No murmur heard.  Pulmonary:      Effort: Pulmonary effort is normal. No respiratory distress.      Breath sounds: Normal breath sounds. No wheezing, rhonchi or rales.   Abdominal:      General: Bowel sounds are normal. There is no distension.      Palpations: Abdomen is soft. There is no mass.      Tenderness: There is no abdominal tenderness.      Hernia: No hernia is present.   Musculoskeletal:         General: Swelling (b/l lower legs mild) present. No tenderness.   Lymphadenopathy:      Cervical: No cervical adenopathy.   Skin:     General: Skin is warm and dry.      Coloration: Skin is not pale.      Findings: No rash.   Neurological:      General: No focal deficit present.      Mental Status: She is alert. Mental status is at baseline. She is disoriented.      Motor: Weakness present.      Gait: Gait abnormal.   Psychiatric:         Mood and Affect: Mood normal.         Behavior: Behavior normal.         Admission Diagnoses:   1. Closed fracture of ramus of right pubis with routine healing, subsequent encounter  Assessment &  Plan:  S/p mechanical fall on 5/12/2024  Orthopedics recommended conservative/nonoperative management  WBAT  Tylenol around-the-clock  PT/OT  2. Fall, sequela  Assessment & Plan:  Presented to ED on 5/12/2024 s/p mechanical fall while trying to ambulate from couch  Denied head strike loss of consciousness  Patient did complain of right hip pain and noted with right-sided close fracture of the pubic ramus on x-ray  Completed PT OT  Stable for discharge  3. Mobitz type 1 second degree AV block  Assessment & Plan:  Thought to be contributed to patient's fall  Noted on EKG on 5/12/2024  S/p PPM on 5/15/2024  Heart rate regular and controlled on exam  Continue aspirin and statin  Outpatient follow-up with cardiology 6/13/2024  4. Constipation, unspecified constipation type  Assessment & Plan:  Likely in setting of opioid use s/p pubic fracture  Improved with aggressive bowel regimen  Having more consistent bowel movements  Continue Colace and senna twice daily  Continue MiraLAX daily  Hold for loose stools  Encourage p.o. hydration and fiber  5. Late onset Alzheimer's disease without behavioral disturbance (HCC)  Assessment & Plan:  Pleasant and cooperative  Alert to self, verbal but not goal oriented  Namenda and Aricept discontinued prior  Delirium precautions  Provide redirection, reorientation, distraction techniques  Fall Precautions  Assist with ADLs/IADLs  Avoid deliriogenic medications such as tramadol, benzodiazepines, anticholinergics, benadryl  Encourage Hydration/ Nutrition  Implement sleep hygiene, limit night time interuptions   Encourage participation in group activities when appropriate     6. Essential hypertension  Assessment & Plan:  BP acceptable  Denies any cardiopulmonary symptoms  Not currently on antihypertensives  Outpatient follow-up with PCP as scheduled  7. Suspected malignant neoplasm of lung  Assessment & Plan:  Multiple incidental findings noted on CAT scan of abdomen during constipation  admission  Evaluated by pulmonology at [facility] who notes multiple nodules some necrotic represent malignancy  Family had declined biopsy due to age and Alzheimer's diagnosis  Would benefit from palliative care and transition to hospice if and when appropriate       Follow-up Recommendations:    Outpatient Follow up with PCP in the next 2 weeks  Home Health PT/OT/SN services     Labs and testing performed during stay:    Reviewed in chart    Discharge Medications: See discharge medication list which was reviewed and signed.      Current Outpatient Medications:     aspirin 81 MG tablet, Take 81 mg by mouth daily, Disp: , Rfl:     atorvastatin (LIPITOR) 20 mg tablet, TAKE 1 TABLET BY MOUTH EVERY DAY, Disp: 90 tablet, Rfl: 1    bisacodyl (DULCOLAX) 10 mg suppository, Insert 10 mg into the rectum as needed for constipation, Disp: , Rfl:     calcium carbonate (TUMS) 500 mg chewable tablet, Chew 1 tablet (500 mg total) daily as needed for indigestion or heartburn, Disp: , Rfl:     chlorhexidine (PERIDEX) 0.12 % solution, RINSE 2 TIMES A DAY, Disp: , Rfl:     docusate sodium (COLACE) 100 mg capsule, Take 1 capsule (100 mg total) by mouth 2 (two) times a day, Disp: , Rfl:     famotidine (PEPCID) 20 mg tablet, TAKE 1 TABLET BY MOUTH EVERY DAY, Disp: 90 tablet, Rfl: 1    fluticasone (FLONASE) 50 mcg/act nasal spray, SPRAY 1 SPRAY INTO EACH NOSTRIL EVERY DAY, Disp: 48 mL, Rfl: 1    glycerin-hypromellose- (Artificial Tears) 0.2-0.2-1 % SOLN, Administer 1 drop to both eyes 2 (two) times a day, Disp: , Rfl:     melatonin 3 mg, Take 1 tablet (3 mg total) by mouth daily at bedtime, Disp: , Rfl:     Multiple Vitamins-Minerals (MULTIVITAMIN ADULT PO), Take 1 tablet by mouth daily, Disp: , Rfl:     polyethylene glycol (MIRALAX) 17 g packet, Take 17 g by mouth daily, Disp: , Rfl:     saccharomyces boulardii (FLORASTOR) 250 mg capsule, Take 250 mg by mouth 2 (two) times a day, Disp: , Rfl:     senna (SENOKOT) 8.6 mg, Take 2 tablets  "(17.2 mg total) by mouth 2 (two) times a day, Disp: , Rfl:      Discussion with patient/family and further instructions:  -Fall precautions  -Aspiration precautions  -Bleeding precautions  -Monitor for signs/symptoms of infection  -Medication list was reviewed and signed  -DME form was completed    Status at time of discharge: Stable      Billing based on time. Time spent on unit, 40 minutes. Time spent counseling pt on debility/condition, 30 minutes.      Please note:  Voice-recognition software may have been used in the preparation of this document.  Occasional wrong word or \"sound-alike\" substitutions may have occurred due to the inherent limitations of voice recognition software.  Interpretation should be guided by context.        JOSH Jacob  6/13/2024   "

## 2024-06-13 NOTE — ASSESSMENT & PLAN NOTE
Likely in setting of opioid use s/p pubic fracture  Improved with aggressive bowel regimen  Having more consistent bowel movements  Continue Colace and senna twice daily  Continue MiraLAX daily  Hold for loose stools  Encourage p.o. hydration and fiber

## 2024-06-13 NOTE — ASSESSMENT & PLAN NOTE
Thought to be contributed to patient's fall  Noted on EKG on 5/12/2024  S/p PPM on 5/15/2024  Heart rate regular and controlled on exam  Continue aspirin and statin  Outpatient follow-up with cardiology 6/13/2024

## 2024-06-13 NOTE — LETTER
June 13, 2024     Kim Dewey MD  [address] [facility]  [address] [place] [id number]    Patient: Berta Estrada   YOB: 1935   Date of Visit: 6/13/2024       Dear Dr. Dewey:    Thank you for referring Berta Estrada to me for evaluation. Below are my notes for this consultation.    If you have questions, please do not hesitate to call me. I look forward to following your patient along with you.         Sincerely,        Rufus Raines MD        CC: Berta Estrada  MD Rufus Johnson MD  6/13/2024  1:13 PM  Sign when Signing Visit  Progress Note - Electrophysiology-Cardiology (EP)   Berta Estrada 89 y.o. female MRN: [id number]  Unit/Bed#:  Encounter: [id number]           1. Mobitz type 1 second degree AV block  POCT ECG      2. Coronary artery disease involving native coronary artery of native heart without angina pectoris        3. Essential hypertension        4. Late onset Alzheimer's disease without behavioral disturbance (HCC)        5. S/P AVR (aortic valve replacement)        6. Other hyperlipidemia               Summary of my recommendation for the patient  Patient had a pacemaker placed for symptomatic bradycardia  All device parameters were stable  Routine follow-up as per device clinic    No other electrophysiologic intervention at this time  Follow-up with general cardiology          Clinical condition  Sick sinus syndrome  Symptomatic bradycardia  Fall with fracture   Late onset Alzheimer's disease without behavioral disturbance  Mixed hyperlipidemia  Stage IIIa chronic kidney disease          Assessment & Plan    Sick sinus syndrome  Symptomatic bradycardia  Post permanent pacemaker  All device parameters are stable      Fall with fracture   Late onset Alzheimer's disease without behavioral disturbance  Mixed hyperlipidemia  Stage IIIa chronic kidney disease  All as per primary team        History of Present Illness  HPI: Berta Estrada is a 89 y.o. year old  female has been referred to me by PCP for the management of symptomatic bradycardia    Patient had a pacemaker placed and tolerated the procedure well  All device parameters are stable    The patient has significant medical illnesses which include  Sick sinus syndrome  Symptomatic bradycardia  Fall with fracture   Late onset Alzheimer's disease without behavioral disturbance  Mixed hyperlipidemia  Stage IIIa chronic kidney disease    Patient is not complaining of anginal-like chest pain or pressure, orthopnea, PND, leg swelling, palpitation, presyncope, syncope    She lives in a nursing home    She and her daughter are visiting now  They were made aware of restrictions of the left arm, for the  6 weeks since time of implant      Historical Information  Past Medical History:   Diagnosis Date   • Actinic keratosis    • Basal cell carcinoma     Back    • Cancer (HCC)    • Coronary artery disease    • Dementia (HCC)    • Depression    • Ear problems    • HL (hearing loss)    • Hyperlipidemia    • Migraines    • Ovarian cancer (HCC) 2004    s/p surgery. no chemo/RT   • Phlebitis     R leg     Past Surgical History:   Procedure Laterality Date   • ADENOIDECTOMY     • APPENDECTOMY     • CARDIAC ELECTROPHYSIOLOGY PROCEDURE N/A 5/15/2024    Procedure: Cardiac pacer implant;  Surgeon: Rufus Raines MD;  [facility];  Service: Cardiology   • CATARACT EXTRACTION, BILATERAL     • HYSTERECTOMY  2005    ovarian CA   • KNEE SURGERY Right    • SKIN BIOPSY     • TONSILECTOMY AND ADNOIDECTOMY     • TONSILLECTOMY       Social History     Substance and Sexual Activity   Alcohol Use Not Currently     Social History     Substance and Sexual Activity   Drug Use Never     Social History     Tobacco Use   Smoking Status Former   • Current packs/day: 0.25   • Types: Cigarettes   Smokeless Tobacco Never   Tobacco Comments    until age 30     Social History     Socioeconomic History   • Marital status:      Spouse name:  Not on file   • Number of children: Not on file   • Years of education: Not on file   • Highest education level: Not on file   Occupational History   • Not on file   Tobacco Use   • Smoking status: Former     Current packs/day: 0.25     Types: Cigarettes   • Smokeless tobacco: Never   • Tobacco comments:     until age 30   Vaping Use   • Vaping status: Never Used   Substance and Sexual Activity   • Alcohol use: Not Currently   • Drug use: Never   • Sexual activity: Not Currently   Other Topics Concern   • Not on file   Social History Narrative    Drinks coffee/tea- 2 daily half decaf     From [place], [place]    Now lives with daughter    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, 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     Social Determinants of Health     Financial Resource Strain: Low Risk  (6/2/2023)    Overall Financial Resource Strain (CARDIA)    • Difficulty of Paying Living Expenses: Not very hard   Food Insecurity: No Food Insecurity (5/20/2024)    Hunger Vital Sign    • Worried About Running Out of Food in the Last Year: Never true    • Ran Out of Food in the Last Year: Never true   Transportation Needs: No Transportation Needs (5/20/2024)    PRAPARE - Transportation    • Lack of Transportation (Medical): No    • Lack of Transportation (Non-Medical): No   Physical Activity: Not on file   Stress: Not on file   Social Connections: Not on file   Intimate Partner Violence: Not on file   Housing Stability: Low Risk  (5/20/2024)    Housing Stability Vital Sign    • Unable to Pay for Housing in the Last Year: No    • Number of Times Moved in the Last Year: 1    • Homeless in the Last Year: No     .  Family History:  Family History   Problem Relation Age of Onset   • Depression Mother    • Anxiety disorder Mother    • Alcohol abuse Mother    • Substance Abuse Mother    • Asthma Mother    • Diabetes Father 60   • Brain cancer Son    • Heart attack Sister    • Coronary artery disease Sister    • Mental illness Neg Hx   "        Meds/Allergies     No current facility-administered medications for this visit.        Not in a hospital admission.    Allergies   Allergen Reactions   • Codeine Hives   • Morphine Other (See Comments)     Redness in face, swelling   • Other      Seasonal   • Propoxyphene            Objective  Vitals: Visit Vitals  /78 (BP Location: Right arm, Patient Position: Sitting, Cuff Size: Standard)   Pulse 84   Ht 5' 5\" (1.651 m)   Wt 63.9 kg (140 lb 12.8 oz)   SpO2 99%   BMI 23.43 kg/m²   OB Status Postmenopausal   Smoking Status Former   BSA 1.7 m²        Invasive Devices       None                     ROS  Review of Systems   All other systems reviewed and are negative.  Described in my history of present illness        PHYSICAL EXAM  Physical Exam  Vitals reviewed.   Constitutional:       General: She is not in acute distress.     Appearance: Normal appearance. She is normal weight. She is not ill-appearing.      Comments: Patient walks with a walker   HENT:      Head: Normocephalic and atraumatic.      Right Ear: External ear normal.      Left Ear: External ear normal.      Nose: Nose normal.      Mouth/Throat:      Pharynx: Oropharynx is clear.   Eyes:      General: No scleral icterus.     Extraocular Movements: Extraocular movements intact.      Conjunctiva/sclera: Conjunctivae normal.      Pupils: Pupils are equal, round, and reactive to light.   Cardiovascular:      Rate and Rhythm: Normal rate and regular rhythm.      Pulses: Normal pulses.      Heart sounds: Normal heart sounds. No murmur heard.  Pulmonary:      Effort: Pulmonary effort is normal. No respiratory distress.      Breath sounds: Normal breath sounds. No wheezing.   Abdominal:      General: Bowel sounds are normal. There is no distension.      Palpations: Abdomen is soft.      Tenderness: There is no abdominal tenderness.   Musculoskeletal:         General: No swelling, tenderness or deformity.      Cervical back: Neck supple. No " rigidity.   Skin:     Coloration: Skin is not jaundiced.      Findings: No bruising.      Comments: Device site looks well   Neurological:      Mental Status: She is alert. Mental status is at baseline.      Motor: No weakness.   Psychiatric:         Mood and Affect: Mood normal.         Behavior: Behavior normal.         Thought Content: Thought content normal.               LAB RESULTS:    CBC:  WBC   Date Value Ref Range Status   05/19/2024 9.66 4.31 - 10.16 Thousand/uL Final     Hemoglobin   Date Value Ref Range Status   05/19/2024 13.0 11.5 - 15.4 g/dL Final     Hematocrit   Date Value Ref Range Status   05/19/2024 38.2 34.8 - 46.1 % Final     MCV   Date Value Ref Range Status   05/19/2024 95 82 - 98 fL Final     Platelets   Date Value Ref Range Status   05/19/2024 157 149 - 390 Thousands/uL Final     Comment:     Results verified by repeatManual Review of Smear Performed     RBC   Date Value Ref Range Status   05/19/2024 4.01 3.81 - 5.12 Million/uL Final     MCH   Date Value Ref Range Status   05/19/2024 32.4 26.8 - 34.3 pg Final     MCHC   Date Value Ref Range Status   05/19/2024 34.0 31.4 - 37.4 g/dL Final     RDW   Date Value Ref Range Status   05/19/2024 13.8 11.6 - 15.1 % Final     MPV   Date Value Ref Range Status   05/19/2024 9.6 8.9 - 12.7 fL Final     nRBC   Date Value Ref Range Status   05/19/2024 0 /100 WBCs Final       CMP:  Potassium   Date Value Ref Range Status   05/19/2024 4.0 3.5 - 5.3 mmol/L Final   07/17/2019 4.1 3.5 - 5.3 mmol/L Final     Chloride   Date Value Ref Range Status   05/19/2024 105 96 - 108 mmol/L Final   07/17/2019 108 98 - 110 mmol/L Final     CO2   Date Value Ref Range Status   05/19/2024 26 21 - 32 mmol/L Final   07/17/2019 28 20 - 32 mmol/L Final     BUN   Date Value Ref Range Status   05/19/2024 16 5 - 25 mg/dL Final   07/17/2019 16 7 - 25 mg/dL Final     Creatinine   Date Value Ref Range Status   05/19/2024 0.81 0.60 - 1.30 mg/dL Final     Comment:     Standardized to  IDMS reference method     Calcium   Date Value Ref Range Status   05/19/2024 8.5 8.4 - 10.2 mg/dL Final   07/17/2019 9.1 8.6 - 10.4 mg/dL Final     AST   Date Value Ref Range Status   05/19/2024 25 13 - 39 U/L Final   07/17/2019 14 10 - 35 U/L Final     ALT   Date Value Ref Range Status   05/19/2024 27 7 - 52 U/L Final     Comment:     Specimen collection should occur prior to Sulfasalazine administration due to the potential for falsely depressed results.    07/17/2019 14 6 - 29 U/L Final     Alkaline Phosphatase   Date Value Ref Range Status   05/19/2024 58 34 - 104 U/L Final   07/17/2019 79 33 - 130 U/L Final     eGFR   Date Value Ref Range Status   05/19/2024 64 ml/min/1.73sq m Final        Magnesium:   Magnesium   Date Value Ref Range Status   05/17/2024 2.0 1.9 - 2.7 mg/dL Final        A1C:  Hemoglobin A1C   Date Value Ref Range Status   11/15/2011 5.3  Final        TSH:  TSH 3RD GENERATON   Date Value Ref Range Status   05/31/2023 2.589 0.450 - 4.500 uIU/mL Final     Comment:     The recommended reference ranges for TSH during pregnancy are as follows:   First trimester 0.1 to 2.5 uIU/mL   Second trimester  0.2 to 3.0 uIU/mL   Third trimester 0.3 to 3.0 uIU/m    Note: Normal ranges may not apply to patients who are transgender, non-binary, or whose legal sex, sex at birth, and gender identity differ.  Adult TSH (3rd generation) reference range follows the recommended guidelines of the American [employer] Association, January, 2020.        PT/INR:  Protime   Date Value Ref Range Status   05/17/2024 14.0 11.6 - 14.5 seconds Final     INR   Date Value Ref Range Status   05/17/2024 1.02 0.84 - 1.19 Final       Lipid Panel:  Cholesterol   Date Value Ref Range Status   05/31/2023 139 See Comment mg/dL Final     Comment:     Cholesterol:         Pediatric <18 Years        Desirable          <170 mg/dL      Borderline High    170-199 mg/dL      High               >=200 mg/dL        Adult >=18 Years            Desirable   "      <200 mg/dL      Borderline High  200-239 mg/dL      High             >239 mg/dL       Total Cholesterol   Date Value Ref Range Status   2019 144 <200 mg/dL Final     Triglycerides   Date Value Ref Range Status   2023 93 See Comment mg/dL Final     Comment:     Triglyceride:     0-9Y            <75mg/dL     10Y-17Y         <90 mg/dL       >=18Y     Normal          <150 mg/dL     Borderline High 150-199 mg/dL     High            200-499 mg/dL        Very High       >499 mg/dL    Specimen collection should occur prior to Metamizole administration due to the potential for falsely depressed results.   2019 86 <150 mg/dL Final     HDL   Date Value Ref Range Status   2019 50 (L) >50 mg/dL Final     HDL, Direct   Date Value Ref Range Status   2023 56 >=50 mg/dL Final     Non-HDL-Chol (CHOL-HDL)   Date Value Ref Range Status   2023 83 mg/dl Final       Troponin:  No results found for: \"TROPONINI\"      Imaging:    [id number]        STARR:  No results found for this or any previous visit.      Echocardiogram:  Results for orders placed during the hospital encounter of 24    Echo complete w/ contrast if indicated    Interpretation Summary  •  Left Ventricle: Left ventricular cavity size is normal. Wall thickness is moderately increased. The left ventricular ejection fraction is 80%. Systolic function is hyperdynamic. Wall motion is normal. Diastolic function is abnormal.  •  IVS: There is sigmoid appearance of the septum.  •  Right Ventricle: Systolic function is normal.  •  Aortic Valve: There is a bioprosthetic valve. There is mild regurgitation. The aortic valve has no significant stenosis. The aortic valve mean gradient is 28 mmHg. The dimensionless velocity index is 0.28.  Mean gradients have been elevated for several years. The aortic valve velocity is increased in the setting of stenosis and increased flow.  •  Mitral Valve: There is mild thickening. There is moderate " calcification. There is moderate annular calcification. There is mild to moderate regurgitation.  •  Tricuspid Valve: There is mild regurgitation. The estimated right ventricular systolic pressure is 32.00 mmHg.      Stress Test:   No results found for this or any previous visit.      Cardiac Catheterization:  No results found for this or any previous visit.      HOLTER MONITOR: 24 HOUR/48 HOUR MONITORS  No results found for this or any previous visit.      AMB Extended Holter Monitor: Zio XT/AT or BioTel  No results found for this or any previous visit.        DEVICE CHECK:     Results for orders placed or performed in visit on 06/03/24   Cardiac EP device report    Narrative    MDT SINGLE PM/ ACTIVE SYSTEM IS MRI CONDITIONAL  DEVICE INTERROGATED IN THE [facility] OFFICE: WOUND CHECK: INCISION CLEAN AND DRY WITH EDGES APPROXIMATED; STERI STRIPS REMOVED; WOUND CARE AND RESTRICTIONS REVIEWED WITH PATIENT (PDF ATTACHED). BATTERY VOLTAGE ADEQUATE (15.4 YRS).  4.2% (VVI 50PPM); ALL AVAILABLE LEAD PARAMETERS WITHIN NORMAL LIMITS. NO SIGNIFICANT HIGH RATE EPISODES. ASA 81MG ONLY; EF 80% (5/14/2024 ECHO); NO PROGRAMMING CHANGES MADE TO DEVICE PARAMETERS. NORMAL DEVICE FUNCTION.   ES              Code Status: [unfilled]  Advance Directive and Living Will: Yes    Power of : Yes  POLST:      Counseling / Coordination of Care  Follow Up with general cardiology    Rufus Raines MD

## 2024-06-13 NOTE — ASSESSMENT & PLAN NOTE
Pleasant and cooperative  Alert to self, verbal but not goal oriented  Namenda and Aricept discontinued prior  Delirium precautions  Provide redirection, reorientation, distraction techniques  Fall Precautions  Assist with ADLs/IADLs  Avoid deliriogenic medications such as tramadol, benzodiazepines, anticholinergics, benadryl  Encourage Hydration/ Nutrition  Implement sleep hygiene, limit night time interuptions   Encourage participation in group activities when appropriate

## 2024-06-13 NOTE — PATIENT INSTRUCTIONS
- No need to schedule follow ups with electrophysiology department, but our device department will continue to manage your device checks    - You can continue follow up care with general cardiology, Dr. Adams

## 2024-06-13 NOTE — ASSESSMENT & PLAN NOTE
BP acceptable  Denies any cardiopulmonary symptoms  Not currently on antihypertensives  Outpatient follow-up with PCP as scheduled

## 2024-06-13 NOTE — ASSESSMENT & PLAN NOTE
Presented to ED on 5/12/2024 s/p mechanical fall while trying to ambulate from couch  Denied head strike loss of consciousness  Patient did complain of right hip pain and noted with right-sided close fracture of the pubic ramus on x-ray  Completed PT OT  Stable for discharge

## 2024-06-19 ENCOUNTER — OFFICE VISIT (OUTPATIENT)
Dept: INTERNAL MEDICINE CLINIC | Facility: CLINIC | Age: 89
End: 2024-06-19
Payer: COMMERCIAL

## 2024-06-19 VITALS
WEIGHT: 140 LBS | HEART RATE: 69 BPM | DIASTOLIC BLOOD PRESSURE: 66 MMHG | BODY MASS INDEX: 23.32 KG/M2 | OXYGEN SATURATION: 96 % | HEIGHT: 65 IN | SYSTOLIC BLOOD PRESSURE: 120 MMHG | TEMPERATURE: 96 F

## 2024-06-19 DIAGNOSIS — R91.8 MULTIPLE PULMONARY NODULES: ICD-10-CM

## 2024-06-19 DIAGNOSIS — S32.591D CLOSED FRACTURE OF RAMUS OF RIGHT PUBIS WITH ROUTINE HEALING, SUBSEQUENT ENCOUNTER: ICD-10-CM

## 2024-06-19 DIAGNOSIS — F02.80 LATE ONSET ALZHEIMER'S DISEASE WITHOUT BEHAVIORAL DISTURBANCE (HCC): ICD-10-CM

## 2024-06-19 DIAGNOSIS — Z00.00 MEDICARE ANNUAL WELLNESS VISIT, SUBSEQUENT: ICD-10-CM

## 2024-06-19 DIAGNOSIS — G30.1 LATE ONSET ALZHEIMER'S DISEASE WITHOUT BEHAVIORAL DISTURBANCE (HCC): ICD-10-CM

## 2024-06-19 DIAGNOSIS — Z95.0 HISTORY OF PERMANENT CARDIAC PACEMAKER PLACEMENT: ICD-10-CM

## 2024-06-19 DIAGNOSIS — I10 ESSENTIAL HYPERTENSION: ICD-10-CM

## 2024-06-19 DIAGNOSIS — K59.00 CONSTIPATION, UNSPECIFIED CONSTIPATION TYPE: ICD-10-CM

## 2024-06-19 PROCEDURE — 99214 OFFICE O/P EST MOD 30 MIN: CPT | Performed by: NURSE PRACTITIONER

## 2024-06-19 PROCEDURE — G0439 PPPS, SUBSEQ VISIT: HCPCS | Performed by: NURSE PRACTITIONER

## 2024-06-19 NOTE — PROGRESS NOTES
Ambulatory Visit  Name: Berta Estrada      : 1935      MRN: [id number]  Encounter Provider: JOSH Gustafson  Encounter Date: 2024   Encounter department: [facility]    Assessment & Plan   1. Medicare annual wellness visit, subsequent  2. Constipation, unspecified constipation type  Assessment & Plan:  Continue miralax as needed.   3. Essential hypertension  Assessment & Plan:  Normal BP.   4. Multiple pulmonary nodules  Assessment & Plan:  Biopsy declined by daughter given patient's age and dementia.   5. Late onset Alzheimer's disease without behavioral disturbance (HCC)  Assessment & Plan:  Will be rejoining senior care during the day.   Also has an aide daily for 1-2 hours.   6. Closed fracture of ramus of right pubis with routine healing, subsequent encounter  Assessment & Plan:  Completed rehab. Continue home exercises.   7. History of permanent cardiac pacemaker placement  Assessment & Plan:  Continue device checks.        Preventive health issues were discussed with patient, and age appropriate screening tests were ordered as noted in patient's After Visit Summary. Personalized health advice and appropriate referrals for health education or preventive services given if needed, as noted in patient's After Visit Summary.    History of Present Illness     Berta is here today for hospital/rehab follow up  She was hospitalized about a month ago for a right pubic ramus fracture secondary to fall. She was discharged to rehab and returned to the hospital on  with complaints of constipation. She was eventually discharged back to rehab and now is home.     Daughter is taking care of her, they have an aide that comes daily for 1-2 hours.     Her appetite is less. Still with constipation, taking miralax.   She has been drinking ensure.     She denies any pain.        Patient Care Team:  Kim Dewey MD as PCP - General (Internal Medicine)    Review of  Systems   Constitutional:  Negative for activity change, appetite change and fatigue.   Respiratory:  Negative for cough and shortness of breath.    Cardiovascular:  Negative for chest pain.   Gastrointestinal:  Positive for constipation. Negative for abdominal pain and diarrhea.   Musculoskeletal:  Positive for arthralgias.   Neurological:  Negative for dizziness, light-headedness and headaches.     Medical History Reviewed by provider this encounter:       Annual Wellness Visit Questionnaire   Berta is here for her Subsequent Wellness visit. Last Medicare Wellness visit information reviewed, patient interviewed and updates made to the record today.      Health Risk Assessment:   Patient rates overall health as fair. Patient feels that their physical health rating is slightly worse. Patient is dissatisfied with their life. Eyesight was rated as slightly worse. Hearing was rated as slightly worse. Patient feels that their emotional and mental health rating is slightly worse. Patients states they are never, rarely angry. Patient states they are never, rarely unusually tired/fatigued. Pain experienced in the last 7 days has been none. Patient states that she has experienced no weight loss or gain in last 6 months.     Depression Screening:   PHQ-9 Score: 9      Fall Risk Screening:   In the past year, patient has experienced: history of falling in past year    Number of falls: 1  Injured during fall?: Yes    Feels unsteady when standing or walking?: Yes    Worried about falling?: Yes      Urinary Incontinence Screening:   Patient has leaked urine accidently in the last six months.     Home Safety:  Patient has trouble with stairs inside or outside of their home. Patient has working smoke alarms and has working carbon monoxide detector. Home safety hazards include: none.     Nutrition:   Current diet is Regular.     Medications:   Patient is currently taking over-the-counter supplements. OTC medications include: see  medication list. Patient is not able to manage medications.     Activities of Daily Living (ADLs)/Instrumental Activities of Daily Living (IADLs):   Walk and transfer into and out of bed and chair?: No  Dress and groom yourself?: No    Bathe or shower yourself?: No    Feed yourself? No  Do your laundry/housekeeping?: No  Manage your money, pay your bills and track your expenses?: No  Make your own meals?: No    Do your own shopping?: No    Previous Hospitalizations:   Any hospitalizations or ED visits within the last 12 months?: Yes    How many hospitalizations have you had in the last year?: 1-2    Advance Care Planning:   Living will: Yes    Durable POA for healthcare: Yes    Advanced directive: Yes      PREVENTIVE SCREENINGS      Cardiovascular Screening:    General: Screening Not Indicated and History Lipid Disorder      Diabetes Screening:     General: Screening Current      Colorectal Cancer Screening:     General: Screening Not Indicated      Breast Cancer Screening:     General: Screening Not Indicated      Cervical Cancer Screening:    General: Screening Not Indicated      Osteoporosis Screening:    General: Screening Not Indicated and History Osteoporosis      Abdominal Aortic Aneurysm (AAA) Screening:        General: Screening Not Indicated      Lung Cancer Screening:     General: Screening Not Indicated      Hepatitis C Screening:    General: Screening Not Indicated    Screening, Brief Intervention, and Referral to Treatment (SBIRT)    Screening  Typical number of drinks in a day: 0  Typical number of drinks in a week: 0  Interpretation: Low risk drinking behavior.    AUDIT-C Screenin) How often did you have a drink containing alcohol in the past year? never  2) How many drinks did you have on a typical day when you were drinking in the past year? 0  3) How often did you have 6 or more drinks on one occasion in the past year? never    AUDIT-C Score: 0  Interpretation: Score 0-2 (female): Negative  "screen for alcohol misuse    Single Item Drug Screening:  How often have you used an illegal drug (including marijuana) or a prescription medication for non-medical reasons in the past year? never    Single Item Drug Screen Score: 0  Interpretation: Negative screen for possible drug use disorder    Social Determinants of Health     Financial Resource Strain: Low Risk  (6/2/2023)    Overall Financial Resource Strain (CARDIA)     Difficulty of Paying Living Expenses: Not very hard   Food Insecurity: No Food Insecurity (6/19/2024)    Hunger Vital Sign     Worried About Running Out of Food in the Last Year: Never true     Ran Out of Food in the Last Year: Never true   Transportation Needs: No Transportation Needs (6/19/2024)    PRAPARE - Transportation     Lack of Transportation (Medical): No     Lack of Transportation (Non-Medical): No   Housing Stability: Low Risk  (6/19/2024)    Housing Stability Vital Sign     Unable to Pay for Housing in the Last Year: No     Number of Times Moved in the Last Year: 1     Homeless in the Last Year: No   Utilities: Not At Risk (6/19/2024)    [facility] Utilities     Threatened with loss of utilities: No     No results found.    Objective     /66   Pulse 69   Temp (!) 96 °F (35.6 °C)   Ht 5' 5\" (1.651 m)   Wt 63.5 kg (140 lb)   SpO2 96%   BMI 23.30 kg/m²     Physical Exam  Vitals reviewed.   Constitutional:       Appearance: Normal appearance.   HENT:      Head: Normocephalic and atraumatic.   Eyes:      Conjunctiva/sclera: Conjunctivae normal.   Cardiovascular:      Rate and Rhythm: Normal rate and regular rhythm.      Heart sounds: Normal heart sounds.   Pulmonary:      Effort: Pulmonary effort is normal.      Breath sounds: Normal breath sounds.   Musculoskeletal:         General: Normal range of motion.      Right lower leg: No edema.      Left lower leg: No edema.   Neurological:      Mental Status: She is alert. Mental status is at baseline.   Psychiatric:         Mood and " Affect: Mood normal.         Behavior: Behavior normal.       Administrative Statements

## 2024-06-19 NOTE — PROGRESS NOTES
"Ambulatory Visit  Name: Berta Estrada      : 1935      MRN: [id number]  Encounter Provider: JOSH Gustafson  Encounter Date: 2024   Encounter department: [facility]    Assessment & Plan   1. Constipation, unspecified constipation type  2. Essential hypertension  3. Multiple pulmonary nodules  4. Late onset Alzheimer's disease without behavioral disturbance (HCC)  5. Closed fracture of ramus of right pubis with routine healing, subsequent encounter  6. History of permanent cardiac pacemaker placement       History of Present Illness   {Disappearing Hyperlinks I Encounters * My Last Note * Since Last Visit * History :34521}  Berta is here today for hospital/rehab follow up  She was hospitalized about a month ago for a right pubic ramus fracture secondary to fall. She was discharged to rehab and returned to the hospital on  with complaints of constipation. She was eventually discharged back to rehab and now is home.           Review of Systems  {Select to Display PM (Optional):00450}  Objective   {Disappearing Hyperlinks   Review Vitals * Enter New Vitals * Results Review * Labs * Imaging * Cardiology * Procedures * Lung Cancer Screening :20315}  /66   Pulse 69   Temp (!) 96 °F (35.6 °C)   Ht 5' 5\" (1.651 m)   Wt 63.5 kg (140 lb)   SpO2 96%   BMI 23.30 kg/m²     Physical Exam  Administrative Statements {Disappearing Hyperlinks I  Level of Service * PCMH/PCSP:71504}  {Time Spent Statement (Optional):05575}        "

## 2024-06-19 NOTE — PATIENT INSTRUCTIONS
Medicare Preventive Visit Patient Instructions  Thank you for completing your Welcome to Medicare Visit or Medicare Annual Wellness Visit today. Your next wellness visit will be due in one year (6/20/2025).  The screening/preventive services that you may require over the next 5-10 years are detailed below. Some tests may not apply to you based off risk factors and/or age. Screening tests ordered at today's visit but not completed yet may show as past due. Also, please note that scanned in results may not display below.  Preventive Screenings:  Service Recommendations Previous Testing/Comments   Colorectal Cancer Screening  * Colonoscopy    * Fecal Occult Blood Test (FOBT)/Fecal Immunochemical Test (FIT)  * Fecal DNA/Cologuard Test  * Flexible Sigmoidoscopy Age: 45-75 years old   Colonoscopy: every 10 years (may be performed more frequently if at higher risk)  OR  FOBT/FIT: every 1 year  OR  Cologuard: every 3 years  OR  Sigmoidoscopy: every 5 years  Screening may be recommended earlier than age 45 if at higher risk for colorectal cancer. Also, an individualized decision between you and your healthcare provider will decide whether screening between the ages of 76-85 would be appropriate. Colonoscopy: Not on file  FOBT/FIT: Not on file  Cologuard: Not on file  Sigmoidoscopy: Not on file    Screening Not Indicated     Breast Cancer Screening Age: 40+ years old  Frequency: every 1-2 years  Not required if history of left and right mastectomy Mammogram: Not on file        Cervical Cancer Screening Between the ages of 21-29, pap smear recommended once every 3 years.   Between the ages of 30-65, can perform pap smear with HPV co-testing every 5 years.   Recommendations may differ for women with a history of total hysterectomy, cervical cancer, or abnormal pap smears in past. Pap Smear: Not on file    Screening Not Indicated   Hepatitis C Screening Once for adults born between 1945 and 1965  More frequently in patients at  high risk for Hepatitis C Hep C Antibody: Not on file        Diabetes Screening 1-2 times per year if you're at risk for diabetes or have pre-diabetes Fasting glucose: 100 mg/dL (5/31/2023)  A1C: No results in last 5 years (No results in last 5 years)  Screening Current   Cholesterol Screening Once every 5 years if you don't have a lipid disorder. May order more often based on risk factors. Lipid panel: 05/31/2023    Screening Not Indicated  History Lipid Disorder     Other Preventive Screenings Covered by Medicare:  Abdominal Aortic Aneurysm (AAA) Screening: covered once if your at risk. You're considered to be at risk if you have a family history of AAA.  Lung Cancer Screening: covers low dose CT scan once per year if you meet all of the following conditions: (1) Age 55-77; (2) No signs or symptoms of lung cancer; (3) Current smoker or have quit smoking within the last 15 years; (4) You have a tobacco smoking history of at least 20 pack years (packs per day multiplied by number of years you smoked); (5) You get a written order from a healthcare provider.  Glaucoma Screening: covered annually if you're considered high risk: (1) You have diabetes OR (2) Family history of glaucoma OR (3)  aged 50 and older OR (4)  American aged 65 and older  Osteoporosis Screening: covered every 2 years if you meet one of the following conditions: (1) You're estrogen deficient and at risk for osteoporosis based off medical history and other findings; (2) Have a vertebral abnormality; (3) On glucocorticoid therapy for more than 3 months; (4) Have primary hyperparathyroidism; (5) On osteoporosis medications and need to assess response to drug therapy.   Last bone density test (DXA Scan): 05/09/2008.  HIV Screening: covered annually if you're between the age of 15-65. Also covered annually if you are younger than 15 and older than 65 with risk factors for HIV infection. For pregnant patients, it is covered up to  3 times per pregnancy.    Immunizations:  Immunization Recommendations   Influenza Vaccine Annual influenza vaccination during flu season is recommended for all persons aged >= 6 months who do not have contraindications   Pneumococcal Vaccine   * Pneumococcal conjugate vaccine = PCV13 (Prevnar 13), PCV15 (Vaxneuvance), PCV20 (Prevnar 20)  * Pneumococcal polysaccharide vaccine = PPSV23 (Pneumovax) Adults 19-65 yo with certain risk factors or if 65+ yo  If never received any pneumonia vaccine: recommend Prevnar 20 (PCV20)  Give PCV20 if previously received 1 dose of PCV13 or PPSV23   Hepatitis B Vaccine 3 dose series if at intermediate or high risk (ex: diabetes, end stage renal disease, liver disease)   Respiratory syncytial virus (RSV) Vaccine - COVERED BY MEDICARE PART D  * RSVPreF3 (Arexvy) CDC recommends that adults 60 years of age and older may receive a single dose of RSV vaccine using shared clinical decision-making (SCDM)   Tetanus (Td) Vaccine - COST NOT COVERED BY MEDICARE PART B Following completion of primary series, a booster dose should be given every 10 years to maintain immunity against tetanus. Td may also be given as tetanus wound prophylaxis.   Tdap Vaccine - COST NOT COVERED BY MEDICARE PART B Recommended at least once for all adults. For pregnant patients, recommended with each pregnancy.   Shingles Vaccine (Shingrix) - COST NOT COVERED BY MEDICARE PART B  2 shot series recommended in those 19 years and older who have or will have weakened immune systems or those 50 years and older     Health Maintenance Due:  There are no preventive care reminders to display for this patient.  Immunizations Due:      Topic Date Due   • Influenza Vaccine (Season Ended) 09/01/2024     Advance Directives   What are advance directives?  Advance directives are legal documents that state your wishes and plans for medical care. These plans are made ahead of time in case you lose your ability to make decisions for  yourself. Advance directives can apply to any medical decision, such as the treatments you want, and if you want to donate organs.   What are the types of advance directives?  There are many types of advance directives, and each state has rules about how to use them. You may choose a combination of any of the following:  Living will:  This is a written record of the treatment you want. You can also choose which treatments you do not want, which to limit, and which to stop at a certain time. This includes surgery, medicine, IV fluid, and tube feedings.   Durable power of  for healthcare (DPAHC):  This is a written record that states who you want to make healthcare choices for you when you are unable to make them for yourself. This person, called a proxy, is usually a family member or a friend. You may choose more than 1 proxy.  Do not resuscitate (DNR) order:  A DNR order is used in case your heart stops beating or you stop breathing. It is a request not to have certain forms of treatment, such as CPR. A DNR order may be included in other types of advance directives.  Medical directive:  This covers the care that you want if you are in a coma, near death, or unable to make decisions for yourself. You can list the treatments you want for each condition. Treatment may include pain medicine, surgery, blood transfusions, dialysis, IV or tube feedings, and a ventilator (breathing machine).  Values history:  This document has questions about your views, beliefs, and how you feel and think about life. This information can help others choose the care that you would choose.  Why are advance directives important?  An advance directive helps you control your care. Although spoken wishes may be used, it is better to have your wishes written down. Spoken wishes can be misunderstood, or not followed. Treatments may be given even if you do not want them. An advance directive may make it easier for your family to make  difficult choices about your care.   Fall Prevention    Fall prevention  includes ways to make your home and other areas safer. It also includes ways you can move more carefully to prevent a fall. Health conditions that cause changes in your blood pressure, vision, or muscle strength and coordination may increase your risk for falls. Medicines may also increase your risk for falls if they make you dizzy, weak, or sleepy.   Fall prevention tips:   Stand or sit up slowly.    Use assistive devices as directed.    Wear shoes that fit well and have soles that .    Wear a personal alarm.    Stay active.    Manage your medical conditions.    Home Safety Tips:  Add items to prevent falls in the bathroom.    Keep paths clear.    Install bright lights in your home.    Keep items you use often on shelves within reach.    Paint or place reflective tape on the edges of your stairs.    Urinary Incontinence   Urinary incontinence (UI)  is when you lose control of your bladder. UI develops because your bladder cannot store or empty urine properly. The 3 most common types of UI are stress incontinence, urge incontinence, or both.  Medicines:   May be given to help strengthen your bladder control. Report any side effects of medication to your healthcare provider.  Do pelvic muscle exercises often:  Your pelvic muscles help you stop urinating. Squeeze these muscles tight for 5 seconds, then relax for 5 seconds. Gradually work up to squeezing for 10 seconds. Do 3 sets of 15 repetitions a day, or as directed. This will help strengthen your pelvic muscles and improve bladder control.  Train your bladder:  Go to the bathroom at set times, such as every 2 hours, even if you do not feel the urge to go. You can also try to hold your urine when you feel the urge to go. For example, hold your urine for 5 minutes when you feel the urge to go. As that becomes easier, hold your urine for 10 minutes.   Self-care:   Keep a UI record.  Write  down how often you leak urine and how much you leak. Make a note of what you were doing when you leaked urine.  Drink liquids as directed. You may need to limit the amount of liquid you drink to help control your urine leakage. Do not drink any liquid right before you go to bed. Limit or do not have drinks that contain caffeine or alcohol.   Prevent constipation.  Eat a variety of high-fiber foods. Good examples are high-fiber cereals, beans, vegetables, and whole-grain breads. Walking is the best way to trigger your intestines to have a bowel movement.  Exercise regularly and maintain a healthy weight.  Weight loss and exercise will decrease pressure on your bladder and help you control your leakage.   Use a catheter as directed  to help empty your bladder. A catheter is a tiny, plastic tube that is put into your bladder to drain your urine.   Go to behavior therapy as directed.  Behavior therapy may be used to help you learn to control your urge to urinate.     © Copyright [employer] 2018 Information is for End User's use only and may not be sold, redistributed or otherwise used for commercial purposes. All illustrations and images included in CareNotes® are the copyrighted property of A.D.A.M., Inc. or [employer]

## 2024-07-23 DIAGNOSIS — E78.49 OTHER HYPERLIPIDEMIA: ICD-10-CM

## 2024-07-23 RX ORDER — ATORVASTATIN CALCIUM 20 MG/1
TABLET, FILM COATED ORAL
Qty: 100 TABLET | Refills: 0 | Status: SHIPPED | OUTPATIENT
Start: 2024-07-23

## 2024-08-13 ENCOUNTER — TELEPHONE (OUTPATIENT)
Age: 89
End: 2024-08-13

## 2024-08-13 DIAGNOSIS — M51.36 LUMBAR DEGENERATIVE DISC DISEASE: ICD-10-CM

## 2024-08-13 DIAGNOSIS — M54.12 CERVICAL RADICULOPATHY: Primary | ICD-10-CM

## 2024-08-13 RX ORDER — GABAPENTIN 100 MG/1
100 CAPSULE ORAL 2 TIMES DAILY PRN
Qty: 90 CAPSULE | Refills: 0 | Status: SHIPPED | OUTPATIENT
Start: 2024-08-13

## 2024-08-13 NOTE — TELEPHONE ENCOUNTER
Patient fell in May, the pain is getting worse in her back. Would like to continue with the xray and the gabapentin.

## 2024-08-13 NOTE — TELEPHONE ENCOUNTER
Please call patient.  Ask if she fell recently. When did the pain get worse?  I would x-ray neck and back again since it may be the cancer spreading - lung nodules suspected to be cancer but no biopsied. Ok to order x-rays?    I can give her gabapentin or a muscle relaxant for the pain.

## 2024-08-13 NOTE — TELEPHONE ENCOUNTER
Pt's daughter Hillary requested a provider referral for a specialist for spinal disc. for pt.         Hillary stated the pain in pt's neck and lower spine is getting worse. Therefore, Hillary wants to know if meds should be increased?         Please contact Hillary and advise as she is very concerned.       Thank you for your assistance.

## 2024-08-15 ENCOUNTER — TELEPHONE (OUTPATIENT)
Age: 89
End: 2024-08-15

## 2024-08-15 NOTE — TELEPHONE ENCOUNTER
Patient daughter stating that patient was given gabapentin for pain. However patient still has been taking tylenol 1000mg 4x daily or every 4-6 hrs . I stated that patient should not be exceeding more than 3000mg of tylenol per day unless under doctor orders.    she needs a letter faxed to [facility]  stating that they can give patient tylenol PRN for back pain. Please put patient can take 1000 mg every 6 hrs and the not exceed recommendation per day.  Please fax this letter to  [phone] and call idris once completed.  Juani Alicia

## 2024-08-15 NOTE — TELEPHONE ENCOUNTER
Please call daughter.    Maximum dose of Tylenol is 3 grams a day, so 1000 mg every 8 hours only.  I will only write for that, not every 6  hrs.

## 2024-08-16 NOTE — TELEPHONE ENCOUNTER
Pt daughter called again, reviewed message in chart and relayed.  Daughter is asking for provider to fax letter to [facility] stating patient to have dose of tylenol at 2 pm 2 (two) 500mg tablets.  [facility] and [facility] to give her tylenol.     Fax [phone]

## 2024-08-16 NOTE — TELEPHONE ENCOUNTER
Please call daughter and clarify.  With the 1000 mg dose at 2 pm, is she just taking 1000 mg in the morning and evening? No more than that?    Also, if we can increase gabapentin dose if needed.

## 2024-08-16 NOTE — TELEPHONE ENCOUNTER
Pt daughter returned call.  Patient is taking tylenol 1000 mg three times a day.  She also takes gabapentin 100mg morning and evening.  The only dose of tylenol she gets while she is at the [facility] is the 2pm dose, the other doses family gives her at home.        (Connection interruption during documentation FYI) finished note when computer restarted

## 2024-09-05 ENCOUNTER — IN-CLINIC DEVICE VISIT (OUTPATIENT)
Dept: CARDIOLOGY CLINIC | Facility: CLINIC | Age: 89
End: 2024-09-05
Payer: COMMERCIAL

## 2024-09-05 DIAGNOSIS — Z95.0 CARDIAC PACEMAKER: Primary | ICD-10-CM

## 2024-09-05 PROCEDURE — 93279 PRGRMG DEV EVAL PM/LDLS PM: CPT | Performed by: INTERNAL MEDICINE

## 2024-09-05 NOTE — PROGRESS NOTES
Results for orders placed or performed in visit on 09/05/24   Cardiac EP device report    Narrative    MDT SINGLE PM/ ACTIVE SYSTEM IS MRI CONDITIONAL  DEVICE INTERROGATED IN THE [facility] OFFICE: BATTERY VOLTAGE ADEQUATE (15.1 YR).  8.1% (VVI 50 PPM). ALL LEAD PARAMETERS WITHIN NORMAL LIMITS/STABLE. NO SIGNIFICANT HIGH RATE EPISODES. NO PROGRAMMING CHANGES MADE TO DEVICE PARAMETERS. NORMAL DEVICE FUNCTION.  ES

## 2024-09-10 ENCOUNTER — APPOINTMENT (EMERGENCY)
Dept: CT IMAGING | Facility: HOSPITAL | Age: 89
DRG: 690 | End: 2024-09-10
Payer: COMMERCIAL

## 2024-09-10 ENCOUNTER — OFFICE VISIT (OUTPATIENT)
Dept: INTERNAL MEDICINE CLINIC | Facility: CLINIC | Age: 89
End: 2024-09-10
Payer: COMMERCIAL

## 2024-09-10 ENCOUNTER — HOSPITAL ENCOUNTER (INPATIENT)
Facility: HOSPITAL | Age: 89
LOS: 7 days | Discharge: NURSING, REHAB OR LONG-TERM CARE | DRG: 690 | End: 2024-09-17
Attending: EMERGENCY MEDICINE | Admitting: INTERNAL MEDICINE
Payer: COMMERCIAL

## 2024-09-10 VITALS
DIASTOLIC BLOOD PRESSURE: 78 MMHG | HEART RATE: 83 BPM | OXYGEN SATURATION: 98 % | RESPIRATION RATE: 16 BRPM | TEMPERATURE: 98.3 F | SYSTOLIC BLOOD PRESSURE: 120 MMHG
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
## 2024-09-10 DIAGNOSIS — R53.1 WEAKNESS: ICD-10-CM

## 2024-09-10 DIAGNOSIS — N39.0 UTI (URINARY TRACT INFECTION): ICD-10-CM

## 2024-09-10 DIAGNOSIS — F33.41 RECURRENT MAJOR DEPRESSIVE DISORDER, IN PARTIAL REMISSION (HCC): ICD-10-CM

## 2024-09-10 DIAGNOSIS — G30.1 LATE ONSET ALZHEIMER'S DEMENTIA WITH AGITATION, UNSPECIFIED DEMENTIA SEVERITY (HCC): ICD-10-CM

## 2024-09-10 DIAGNOSIS — R42 DIZZINESS: ICD-10-CM

## 2024-09-10 DIAGNOSIS — F02.811 LATE ONSET ALZHEIMER'S DEMENTIA WITH AGITATION, UNSPECIFIED DEMENTIA SEVERITY (HCC): ICD-10-CM

## 2024-09-10 DIAGNOSIS — R42 VERTIGO: Primary | ICD-10-CM

## 2024-09-10 PROBLEM — F02.80 ALZHEIMER'S DEMENTIA, LATE ONSET (HCC): Status: ACTIVE | Noted: 2024-09-10

## 2024-09-10 LAB
ALBUMIN SERPL BCG-MCNC: 4 G/DL (ref 3.5–5)
ALP SERPL-CCNC: 79 U/L (ref 34–104)
ALT SERPL W P-5'-P-CCNC: 8 U/L (ref 7–52)
ANION GAP SERPL CALCULATED.3IONS-SCNC: 7 MMOL/L (ref 4–13)
AST SERPL W P-5'-P-CCNC: 13 U/L (ref 13–39)
BACTERIA UR QL AUTO: ABNORMAL /HPF
BASOPHILS # BLD AUTO: 0.04 THOUSANDS/ÂΜL (ref 0–0.1)
BASOPHILS NFR BLD AUTO: 1 % (ref 0–1)
BILIRUB SERPL-MCNC: 0.5 MG/DL (ref 0.2–1)
BILIRUB UR QL STRIP: NEGATIVE
BUN SERPL-MCNC: 23 MG/DL (ref 5–25)
CALCIUM SERPL-MCNC: 8.9 MG/DL (ref 8.4–10.2)
CARDIAC TROPONIN I PNL SERPL HS: 4 NG/L
CHLORIDE SERPL-SCNC: 104 MMOL/L (ref 96–108)
CLARITY UR: CLEAR
CO2 SERPL-SCNC: 29 MMOL/L (ref 21–32)
COLOR UR: ABNORMAL
CREAT SERPL-MCNC: 1.11 MG/DL (ref 0.6–1.3)
EOSINOPHIL # BLD AUTO: 0.11 THOUSAND/ÂΜL (ref 0–0.61)
EOSINOPHIL NFR BLD AUTO: 2 % (ref 0–6)
ERYTHROCYTE [DISTWIDTH] IN BLOOD BY AUTOMATED COUNT: 14.8 % (ref 11.6–15.1)
GFR SERPL CREATININE-BSD FRML MDRD: 44 ML/MIN/1.73SQ M
GLUCOSE SERPL-MCNC: 118 MG/DL (ref 65–140)
GLUCOSE UR STRIP-MCNC: NEGATIVE MG/DL
HCT VFR BLD AUTO: 37.5 % (ref 34.8–46.1)
HGB BLD-MCNC: 12.5 G/DL (ref 11.5–15.4)
HGB UR QL STRIP.AUTO: NEGATIVE
HYALINE CASTS #/AREA URNS LPF: ABNORMAL /LPF
IMM GRANULOCYTES # BLD AUTO: 0.02 THOUSAND/UL (ref 0–0.2)
IMM GRANULOCYTES NFR BLD AUTO: 0 % (ref 0–2)
KETONES UR STRIP-MCNC: NEGATIVE MG/DL
LEUKOCYTE ESTERASE UR QL STRIP: ABNORMAL
LYMPHOCYTES # BLD AUTO: 2.32 THOUSANDS/ÂΜL (ref 0.6–4.47)
LYMPHOCYTES NFR BLD AUTO: 32 % (ref 14–44)
MAGNESIUM SERPL-MCNC: 2.1 MG/DL (ref 1.9–2.7)
MCH RBC QN AUTO: 32.1 PG (ref 26.8–34.3)
MCHC RBC AUTO-ENTMCNC: 33.3 G/DL (ref 31.4–37.4)
MCV RBC AUTO: 96 FL (ref 82–98)
MONOCYTES # BLD AUTO: 0.61 THOUSAND/ÂΜL (ref 0.17–1.22)
MONOCYTES NFR BLD AUTO: 8 % (ref 4–12)
NEUTROPHILS # BLD AUTO: 4.17 THOUSANDS/ÂΜL (ref 1.85–7.62)
NEUTS SEG NFR BLD AUTO: 57 % (ref 43–75)
NITRITE UR QL STRIP: NEGATIVE
NON-SQ EPI CELLS URNS QL MICRO: ABNORMAL /HPF
NRBC BLD AUTO-RTO: 0 /100 WBCS
PH UR STRIP.AUTO: 7 [PH]
PLATELET # BLD AUTO: 125 THOUSANDS/UL (ref 149–390)
PLATELET BLD QL SMEAR: ABNORMAL
PMV BLD AUTO: 9.9 FL (ref 8.9–12.7)
POTASSIUM SERPL-SCNC: 4.2 MMOL/L (ref 3.5–5.3)
PROT SERPL-MCNC: 6.3 G/DL (ref 6.4–8.4)
PROT UR STRIP-MCNC: NEGATIVE MG/DL
RBC # BLD AUTO: 3.89 MILLION/UL (ref 3.81–5.12)
RBC #/AREA URNS AUTO: ABNORMAL /HPF
RBC MORPH BLD: NORMAL
SODIUM SERPL-SCNC: 140 MMOL/L (ref 135–147)
SP GR UR STRIP.AUTO: 1.02 (ref 1–1.03)
UROBILINOGEN UR STRIP-ACNC: <2 MG/DL
WBC # BLD AUTO: 7.27 THOUSAND/UL (ref 4.31–10.16)
WBC #/AREA URNS AUTO: ABNORMAL /HPF

## 2024-09-10 PROCEDURE — 99213 OFFICE O/P EST LOW 20 MIN: CPT | Performed by: NURSE PRACTITIONER

## 2024-09-10 PROCEDURE — 70498 CT ANGIOGRAPHY NECK: CPT

## 2024-09-10 PROCEDURE — 85025 COMPLETE CBC W/AUTO DIFF WBC: CPT | Performed by: EMERGENCY MEDICINE

## 2024-09-10 PROCEDURE — G2211 COMPLEX E/M VISIT ADD ON: HCPCS | Performed by: NURSE PRACTITIONER

## 2024-09-10 PROCEDURE — 81001 URINALYSIS AUTO W/SCOPE: CPT | Performed by: EMERGENCY MEDICINE

## 2024-09-10 PROCEDURE — 93005 ELECTROCARDIOGRAM TRACING: CPT

## 2024-09-10 PROCEDURE — 87186 SC STD MICRODIL/AGAR DIL: CPT | Performed by: EMERGENCY MEDICINE

## 2024-09-10 PROCEDURE — 96366 THER/PROPH/DIAG IV INF ADDON: CPT

## 2024-09-10 PROCEDURE — 70496 CT ANGIOGRAPHY HEAD: CPT

## 2024-09-10 PROCEDURE — 99223 1ST HOSP IP/OBS HIGH 75: CPT | Performed by: INTERNAL MEDICINE

## 2024-09-10 PROCEDURE — 84484 ASSAY OF TROPONIN QUANT: CPT | Performed by: EMERGENCY MEDICINE

## 2024-09-10 PROCEDURE — 96365 THER/PROPH/DIAG IV INF INIT: CPT

## 2024-09-10 PROCEDURE — 83735 ASSAY OF MAGNESIUM: CPT | Performed by: EMERGENCY MEDICINE

## 2024-09-10 PROCEDURE — 87077 CULTURE AEROBIC IDENTIFY: CPT | Performed by: EMERGENCY MEDICINE

## 2024-09-10 PROCEDURE — 99285 EMERGENCY DEPT VISIT HI MDM: CPT

## 2024-09-10 PROCEDURE — 87086 URINE CULTURE/COLONY COUNT: CPT | Performed by: EMERGENCY MEDICINE

## 2024-09-10 PROCEDURE — 36415 COLL VENOUS BLD VENIPUNCTURE: CPT | Performed by: EMERGENCY MEDICINE

## 2024-09-10 PROCEDURE — 96367 TX/PROPH/DG ADDL SEQ IV INF: CPT

## 2024-09-10 PROCEDURE — 99285 EMERGENCY DEPT VISIT HI MDM: CPT | Performed by: EMERGENCY MEDICINE

## 2024-09-10 PROCEDURE — 80053 COMPREHEN METABOLIC PANEL: CPT | Performed by: EMERGENCY MEDICINE

## 2024-09-10 RX ORDER — ACETAMINOPHEN 500 MG
500 TABLET ORAL EVERY 6 HOURS PRN
COMMUNITY

## 2024-09-10 RX ORDER — POLYETHYLENE GLYCOL 3350 17 G/17G
17 POWDER, FOR SOLUTION ORAL DAILY
Status: DISCONTINUED | OUTPATIENT
Start: 2024-09-11 | End: 2024-09-17 | Stop reason: HOSPADM

## 2024-09-10 RX ORDER — ACETAMINOPHEN 325 MG/1
650 TABLET ORAL EVERY 8 HOURS SCHEDULED
Status: DISCONTINUED | OUTPATIENT
Start: 2024-09-11 | End: 2024-09-11

## 2024-09-10 RX ORDER — ENOXAPARIN SODIUM 100 MG/ML
40 INJECTION SUBCUTANEOUS DAILY
Status: DISCONTINUED | OUTPATIENT
Start: 2024-09-11 | End: 2024-09-17 | Stop reason: HOSPADM

## 2024-09-10 RX ORDER — ASPIRIN 81 MG/1
81 TABLET, CHEWABLE ORAL DAILY
Status: DISCONTINUED | OUTPATIENT
Start: 2024-09-11 | End: 2024-09-17 | Stop reason: HOSPADM

## 2024-09-10 RX ORDER — FLUTICASONE PROPIONATE 50 MCG
1 SPRAY, SUSPENSION (ML) NASAL DAILY
Status: DISCONTINUED | OUTPATIENT
Start: 2024-09-11 | End: 2024-09-17 | Stop reason: HOSPADM

## 2024-09-10 RX ORDER — MECLIZINE HCL 12.5 MG 12.5 MG/1
12.5 TABLET ORAL EVERY 12 HOURS PRN
Status: DISCONTINUED | OUTPATIENT
Start: 2024-09-10 | End: 2024-09-10

## 2024-09-10 RX ORDER — BISACODYL 10 MG
10 SUPPOSITORY, RECTAL RECTAL DAILY PRN
Status: DISCONTINUED | OUTPATIENT
Start: 2024-09-10 | End: 2024-09-17 | Stop reason: HOSPADM

## 2024-09-10 RX ORDER — WATER 10 ML/10ML
INJECTION INTRAMUSCULAR; INTRAVENOUS; SUBCUTANEOUS
Status: COMPLETED
Start: 2024-09-10 | End: 2024-09-10

## 2024-09-10 RX ORDER — LANOLIN ALCOHOL/MO/W.PET/CERES
3 CREAM (GRAM) TOPICAL
Status: DISCONTINUED | OUTPATIENT
Start: 2024-09-10 | End: 2024-09-11

## 2024-09-10 RX ORDER — GABAPENTIN 100 MG/1
100 CAPSULE ORAL 2 TIMES DAILY PRN
Status: DISCONTINUED | OUTPATIENT
Start: 2024-09-11 | End: 2024-09-10

## 2024-09-10 RX ORDER — DOCUSATE SODIUM 100 MG/1
100 CAPSULE, LIQUID FILLED ORAL 2 TIMES DAILY
Status: DISCONTINUED | OUTPATIENT
Start: 2024-09-11 | End: 2024-09-17 | Stop reason: HOSPADM

## 2024-09-10 RX ORDER — SENNOSIDES 8.6 MG
17.2 TABLET ORAL 2 TIMES DAILY
Status: DISCONTINUED | OUTPATIENT
Start: 2024-09-11 | End: 2024-09-17 | Stop reason: HOSPADM

## 2024-09-10 RX ORDER — ATORVASTATIN CALCIUM 20 MG/1
20 TABLET, FILM COATED ORAL DAILY
Status: DISCONTINUED | OUTPATIENT
Start: 2024-09-11 | End: 2024-09-17 | Stop reason: HOSPADM

## 2024-09-10 RX ORDER — FAMOTIDINE 20 MG/1
20 TABLET, FILM COATED ORAL DAILY
Status: DISCONTINUED | OUTPATIENT
Start: 2024-09-11 | End: 2024-09-17 | Stop reason: HOSPADM

## 2024-09-10 RX ORDER — OLANZAPINE 10 MG/2ML
2.5 INJECTION, POWDER, FOR SOLUTION INTRAMUSCULAR ONCE
Status: COMPLETED | OUTPATIENT
Start: 2024-09-10 | End: 2024-09-10

## 2024-09-10 RX ORDER — GABAPENTIN 100 MG/1
100 CAPSULE ORAL 2 TIMES DAILY
Status: DISCONTINUED | OUTPATIENT
Start: 2024-09-11 | End: 2024-09-17 | Stop reason: HOSPADM

## 2024-09-10 RX ORDER — SODIUM CHLORIDE, SODIUM GLUCONATE, SODIUM ACETATE, POTASSIUM CHLORIDE, MAGNESIUM CHLORIDE, SODIUM PHOSPHATE, DIBASIC, AND POTASSIUM PHOSPHATE .53; .5; .37; .037; .03; .012; .00082 G/100ML; G/100ML; G/100ML; G/100ML; G/100ML; G/100ML; G/100ML
75 INJECTION, SOLUTION INTRAVENOUS CONTINUOUS
Status: DISPENSED | OUTPATIENT
Start: 2024-09-10 | End: 2024-09-11

## 2024-09-10 RX ORDER — OLANZAPINE 5 MG/1
2.5 TABLET, ORALLY DISINTEGRATING ORAL
Status: DISCONTINUED | OUTPATIENT
Start: 2024-09-10 | End: 2024-09-11

## 2024-09-10 RX ADMIN — SODIUM CHLORIDE, SODIUM GLUCONATE, SODIUM ACETATE, POTASSIUM CHLORIDE, MAGNESIUM CHLORIDE, SODIUM PHOSPHATE, DIBASIC, AND POTASSIUM PHOSPHATE 75 ML/HR: .53; .5; .37; .037; .03; .012; .00082 INJECTION, SOLUTION INTRAVENOUS at 22:57

## 2024-09-10 RX ADMIN — IOHEXOL 85 ML: 350 INJECTION, SOLUTION INTRAVENOUS at 19:58

## 2024-09-10 RX ADMIN — SODIUM CHLORIDE, SODIUM LACTATE, POTASSIUM CHLORIDE, AND CALCIUM CHLORIDE 1000 ML: .6; .31; .03; .02 INJECTION, SOLUTION INTRAVENOUS at 19:12

## 2024-09-10 RX ADMIN — OLANZAPINE 2.5 MG: 10 INJECTION, POWDER, FOR SOLUTION INTRAMUSCULAR at 22:17

## 2024-09-10 RX ADMIN — WATER: 1 INJECTION INTRAMUSCULAR; INTRAVENOUS; SUBCUTANEOUS at 22:24

## 2024-09-10 RX ADMIN — CEFTRIAXONE SODIUM 2000 MG: 10 INJECTION, POWDER, FOR SOLUTION INTRAVENOUS at 20:56

## 2024-09-10 NOTE — PROGRESS NOTES
Ambulatory Visit  Name: Berta Estrada      : 1935      MRN: [id number]  Encounter Provider: JOSH Gustafson  Encounter Date: 9/10/2024   Encounter department: [facility] INTERNAL MEDICINE    Assessment & Plan  Fall, initial encounter  Offered outpatient work up including xray, labs, and urinalysis but daughter does not feel safe taking her home due to difficulty ambulating. She is agreeable to ER evaluation.   Orders:    Transfer to other facility    Dizziness    Orders:    Transfer to other facility    Weakness    Orders:    Transfer to other facility       History of Present Illness     Berta is here today after a fall.  She fell last night. Slid down off her bed.   She lives with her daughter. Her daughter heard a thump.  She doesn't feel there was LOC. She did not hit her head.   However immediately after she became dizzy. She went to bed and woke up ok.  She went to the senior center and her dizziness got progressively worse. They called the daughter to come pick her up.   The daughter is worried about bringing her home because she is so weak and dizzy she doesn't feel she will be able to get her in the house.   She tried to walk but her legs gave out a few times.   She has been taking tylenol and gabapentin.              Review of Systems   Constitutional:  Negative for activity change, appetite change, fatigue and fever.   HENT:  Positive for congestion and sore throat.    Respiratory:  Negative for shortness of breath.    Cardiovascular:  Negative for chest pain, palpitations and leg swelling.   Gastrointestinal:  Negative for abdominal pain.   Genitourinary:  Negative for difficulty urinating and dysuria.   Musculoskeletal:  Positive for arthralgias.   Neurological:  Positive for dizziness, weakness, light-headedness and headaches.           Objective     /78 (BP Location: Left arm, Patient Position: Sitting, Cuff Size: Standard)   Pulse 83   Temp 98.3 °F (36.8 °C)  (Temporal)   Resp 16   SpO2 98%     Physical Exam  Vitals reviewed.   Constitutional:       Appearance: Normal appearance.   HENT:      Head: Normocephalic and atraumatic.   Eyes:      Conjunctiva/sclera: Conjunctivae normal.   Cardiovascular:      Rate and Rhythm: Normal rate and regular rhythm.      Heart sounds: Normal heart sounds.   Pulmonary:      Effort: Pulmonary effort is normal.      Breath sounds: Normal breath sounds.   Skin:     General: Skin is warm and dry.   Neurological:      Mental Status: She is alert. Mental status is at baseline.   Psychiatric:         Mood and Affect: Mood normal.         Behavior: Behavior normal.

## 2024-09-10 NOTE — ASSESSMENT & PLAN NOTE
Offered outpatient work up including xray, labs, and urinalysis but daughter does not feel safe taking her home due to difficulty ambulating. She is agreeable to ER evaluation.   Orders:    Transfer to other facility

## 2024-09-10 NOTE — ED PROVIDER NOTES
1. Vertigo    2. UTI (urinary tract infection)      ED Disposition       ED Disposition   Admit    Condition   Stable    Date/Time   Tue Sep 10, 2024  8:32 PM    Comment   --             Assessment & Plan       Medical Decision Making  This is an 89-year-old female presenting to the ED today for complaint of vertigo.  She fell out of bed earlier today, did not hit her head, did not lose consciousness per the daughter who is at the bedside.  Patient does not have any other complaints at this point in time.  She was at her senior  center, and her symptoms were so bad that she could not get up or go to the bathroom, she is normally able to walk without difficulty.  On exam she does not have any focal weakness, but laying her down does exacerbate her vertiginous symptoms.  Her exam otherwise is unremarkable.  It is nonfocal.  However she is noncompliant with some of the exam, as she is unable to follow complex directions.  Her differential diagnosis includes: Posterior circulation stroke versus bleed versus urinary tract infection versus metabolic encephalopathy.  Her CBC shows a slight thrombocytopenia, which she has had previously, metabolic panel is for the most part unremarkable.  Her CT of the head and neck shows no significant acute abnormality.  She has previous cavitary lung lesion, which is stable from previous.  She also has hemangioma.  Her urine does show evidence for urinary tract infection.  I do think that patient is at risk for potential stroke, and for that reason I think she needs an MRI.  Patient may improve significantly with just treatment of her urinary tract infection, I have ordered Rocephin.  For this reason of asked the patient to be hospitalized onto the hospitalist service for further management.    Amount and/or Complexity of Data Reviewed  Labs: ordered.  Radiology: ordered.    Risk  Prescription drug management.  Decision regarding hospitalization.                       Medications    acetaminophen (TYLENOL) tablet 650 mg (has no administration in time range)   aspirin chewable tablet 81 mg (has no administration in time range)   atorvastatin (LIPITOR) tablet 20 mg (has no administration in time range)   bisacodyl (DULCOLAX) rectal suppository 10 mg (has no administration in time range)   docusate sodium (COLACE) capsule 100 mg (has no administration in time range)   famotidine (PEPCID) tablet 20 mg (has no administration in time range)   fluticasone (FLONASE) 50 mcg/act nasal spray 1 spray (has no administration in time range)   melatonin tablet 3 mg (has no administration in time range)   polyethylene glycol (MIRALAX) packet 17 g (has no administration in time range)   senna (SENOKOT) tablet 17.2 mg (has no administration in time range)   ceftriaxone (ROCEPHIN) 2 g/50 mL in dextrose IVPB (has no administration in time range)   multi-electrolyte (PLASMALYTE-A/ISOLYTE-S PH 7.4) IV solution (has no administration in time range)   enoxaparin (LOVENOX) subcutaneous injection 40 mg (has no administration in time range)   OLANZapine (ZyPREXA ZYDIS) dispersible tablet 2.5 mg (has no administration in time range)   gabapentin (NEURONTIN) capsule 100 mg (has no administration in time range)   lactated ringers bolus 1,000 mL (0 mL Intravenous Stopped 9/10/24 2056)   iohexol (OMNIPAQUE) 350 MG/ML injection (MULTI-DOSE) 85 mL (85 mL Intravenous Given 9/10/24 1958)   ceftriaxone (ROCEPHIN) 2 g/50 mL in dextrose IVPB (0 mg Intravenous Stopped 9/10/24 2138)   OLANZapine (ZyPREXA) IM injection 2.5 mg (2.5 mg Intramuscular Given 9/10/24 2217)   sterile water injection **ADS Override Pull** (  Given 9/10/24 2224)       History of Present Illness       This is an 89-year-old female patient with a relevant past medical history of sinus dementia, aortic valve repair, CAD, hypertension, hyperlipidemia, CKD stage III, presenting to the ED today for complaint of a fall, as well as vertigo.  Patient states that she  felt vertiginous earlier today, and fell out of bed.  Her daughter, who is at bedside, provides much of the history as patient is demented, and is unable to provide us with a reliable history.  Patient does not have any pain anywhere at this point in time.  She, according to the daughter, fell, and has had continued symptoms of vertigo.  She went to her [facility]  center, and she was unable to get up, go to the bathroom, or do anything while there, for that reason they became concerned.  Patient is usually ambulatory.  Upon arrival to the ED patient complains of vertigo, but otherwise does not have any complaints.          Review of Systems   Unable to perform ROS: Dementia   Constitutional:  Negative for activity change, chills and fever.   HENT:  Negative for congestion and rhinorrhea.    Eyes:  Negative for photophobia and visual disturbance.   Respiratory:  Negative for cough, chest tightness and shortness of breath.    Cardiovascular:  Negative for chest pain and leg swelling.   Gastrointestinal:  Negative for abdominal distention, nausea and vomiting.   Genitourinary:  Negative for dysuria and frequency.   Musculoskeletal:  Negative for back pain and neck stiffness.   Skin:  Negative for rash and wound.   Neurological:  Positive for dizziness. Negative for weakness.   Psychiatric/Behavioral:  Negative for agitation and suicidal ideas.            Objective     ED Triage Vitals [09/10/24 1805]   Temperature Pulse Blood Pressure Respirations SpO2 Patient Position - Orthostatic VS   97.7 °F (36.5 °C) (!) 51 116/61 18 97 % Sitting      Temp Source Heart Rate Source BP Location FiO2 (%) Pain Score    Oral Monitor Left arm -- --        Physical Exam  Vitals and nursing note reviewed.   Constitutional:       General: She is not in acute distress.     Appearance: Normal appearance. She is normal weight. She is not ill-appearing or toxic-appearing.   HENT:      Head: Normocephalic and atraumatic.      Right  Ear: External ear normal.      Left Ear: External ear normal.      Nose: Nose normal. No congestion or rhinorrhea.      Mouth/Throat:      Mouth: Mucous membranes are moist.      Pharynx: Oropharynx is clear. No oropharyngeal exudate.   Eyes:      General: No scleral icterus.     Conjunctiva/sclera: Conjunctivae normal.   Cardiovascular:      Rate and Rhythm: Normal rate and regular rhythm.      Pulses: Normal pulses.      Heart sounds: Normal heart sounds. No murmur heard.     No gallop.   Pulmonary:      Effort: Pulmonary effort is normal. No respiratory distress.      Breath sounds: Normal breath sounds. No stridor. No wheezing or rhonchi.   Abdominal:      General: Abdomen is flat. Bowel sounds are normal. There is no distension.      Palpations: Abdomen is soft. There is no mass.      Tenderness: There is no abdominal tenderness.   Musculoskeletal:         General: No swelling or tenderness. Normal range of motion.      Cervical back: Normal range of motion and neck supple. No tenderness.      Right lower leg: No edema.      Left lower leg: No edema.   Skin:     General: Skin is warm and dry.      Capillary Refill: Capillary refill takes less than 2 seconds.      Coloration: Skin is not jaundiced.      Findings: No bruising.   Neurological:      General: No focal deficit present.      Mental Status: She is alert. Mental status is at baseline. She is disoriented.      Sensory: No sensory deficit.      Motor: No weakness.   Psychiatric:         Mood and Affect: Mood normal.         Behavior: Behavior normal.         Thought Content: Thought content normal.         Judgment: Judgment normal.         Labs Reviewed   CBC AND DIFFERENTIAL - Abnormal; Notable for the following components:       Result Value    Platelets 125 (*)     All other components within normal limits    Narrative:     This is an appended report.  These results have been appended to a previously verified report.   COMPREHENSIVE METABOLIC PANEL -  Abnormal; Notable for the following components:    Total Protein 6.3 (*)     All other components within normal limits    Narrative:     [employer] Kid[employer] guidelines for Chronic Kidney Disease (CKD):     Stage 1 with normal or high GFR (GFR > 90 mL/min/1.73 square meters)    Stage 2 Mild CKD (GFR = 60-89 mL/min/1.73 square meters)    Stage 3A Moderate CKD (GFR = 45-59 mL/min/1.73 square meters)    Stage 3B Moderate CKD (GFR = 30-44 mL/min/1.73 square meters)    Stage 4 Severe CKD (GFR = 15-29 mL/min/1.73 square meters)    Stage 5 End Stage CKD (GFR <15 mL/min/1.73 square meters)  Note: GFR calculation is accurate only with a steady state creatinine   UA W REFLEX TO MICROSCOPIC WITH REFLEX TO CULTURE - Abnormal; Notable for the following components:    Leukocytes, UA Large (*)     All other components within normal limits   URINE MICROSCOPIC - Abnormal; Notable for the following components:    WBC, UA Innumerable (*)     Hyaline Casts, UA 0-3 (*)     All other components within normal limits   SMEAR REVIEW(PHLEBS DO NOT ORDER) - Abnormal; Notable for the following components:    Platelet Estimate Borderline (*)     All other components within normal limits   MAGNESIUM - Normal   HS TROPONIN I 0HR - Normal   URINE CULTURE   PLATELET COUNT     CTA head and neck with and without contrast   Final Interpretation by Lurdes Cade MD (09/10 2045)      CT Brain:      No acute intracranial CT abnormality.   Chronic microangiopathic change.         CT Angiography:      Patent major vessels of the [place] without high-grade stenosis.   No significant stenosis in the cervical carotid or vertebral arteries.         Other:      2.6 cm right upper lobe subpleural cavitating pulmonary lesion and 0.9 cm right   upper lobe nodule noted in the partially imaged lungs are stable from CT   5/17/2024.      0.8 cm lucent lesion with thin trabeculae within the C6 vertebral body likely   representing hemangioma.  Correlate with prior outside imaging for stability.                        Workstation performed: [id number]             Procedures       Amr Hemalatha Osorio MD  09/10/24 9045

## 2024-09-11 PROBLEM — R41.0 DELIRIUM: Status: ACTIVE | Noted: 2024-09-11

## 2024-09-11 PROBLEM — E46 MALNUTRITION (HCC): Status: ACTIVE | Noted: 2024-09-11

## 2024-09-11 PROBLEM — M54.9 BACK PAIN: Status: ACTIVE | Noted: 2024-09-11

## 2024-09-11 PROBLEM — R41.82 ALTERED MENTAL STATUS: Status: ACTIVE | Noted: 2024-09-11

## 2024-09-11 PROBLEM — R26.2 AMBULATORY DYSFUNCTION: Status: ACTIVE | Noted: 2024-09-11

## 2024-09-11 PROBLEM — Z79.899 ENCOUNTER FOR MEDICATION REVIEW: Status: ACTIVE | Noted: 2024-09-11

## 2024-09-11 LAB
ANION GAP SERPL CALCULATED.3IONS-SCNC: 9 MMOL/L (ref 4–13)
ATRIAL RATE: 85 BPM
BASOPHILS # BLD AUTO: 0.05 THOUSANDS/ÂΜL (ref 0–0.1)
BASOPHILS NFR BLD AUTO: 1 % (ref 0–1)
BUN SERPL-MCNC: 20 MG/DL (ref 5–25)
CALCIUM SERPL-MCNC: 9 MG/DL (ref 8.4–10.2)
CHLORIDE SERPL-SCNC: 109 MMOL/L (ref 96–108)
CO2 SERPL-SCNC: 27 MMOL/L (ref 21–32)
CREAT SERPL-MCNC: 0.94 MG/DL (ref 0.6–1.3)
EOSINOPHIL # BLD AUTO: 0.1 THOUSAND/ÂΜL (ref 0–0.61)
EOSINOPHIL NFR BLD AUTO: 1 % (ref 0–6)
ERYTHROCYTE [DISTWIDTH] IN BLOOD BY AUTOMATED COUNT: 14.6 % (ref 11.6–15.1)
GFR SERPL CREATININE-BSD FRML MDRD: 53 ML/MIN/1.73SQ M
GLUCOSE SERPL-MCNC: 136 MG/DL (ref 65–140)
HCT VFR BLD AUTO: 40.4 % (ref 34.8–46.1)
HGB BLD-MCNC: 13.5 G/DL (ref 11.5–15.4)
IMM GRANULOCYTES # BLD AUTO: 0.03 THOUSAND/UL (ref 0–0.2)
IMM GRANULOCYTES NFR BLD AUTO: 0 % (ref 0–2)
LYMPHOCYTES # BLD AUTO: 1.49 THOUSANDS/ÂΜL (ref 0.6–4.47)
LYMPHOCYTES NFR BLD AUTO: 17 % (ref 14–44)
MCH RBC QN AUTO: 32.2 PG (ref 26.8–34.3)
MCHC RBC AUTO-ENTMCNC: 33.4 G/DL (ref 31.4–37.4)
MCV RBC AUTO: 96 FL (ref 82–98)
MONOCYTES # BLD AUTO: 0.7 THOUSAND/ÂΜL (ref 0.17–1.22)
MONOCYTES NFR BLD AUTO: 8 % (ref 4–12)
NEUTROPHILS # BLD AUTO: 6.6 THOUSANDS/ÂΜL (ref 1.85–7.62)
NEUTS SEG NFR BLD AUTO: 73 % (ref 43–75)
NRBC BLD AUTO-RTO: 0 /100 WBCS
P AXIS: 59 DEGREES
PLATELET # BLD AUTO: 106 THOUSANDS/UL (ref 149–390)
PMV BLD AUTO: 9.5 FL (ref 8.9–12.7)
POTASSIUM SERPL-SCNC: 3.5 MMOL/L (ref 3.5–5.3)
PR INTERVAL: 282 MS
QRS AXIS: 54 DEGREES
QRSD INTERVAL: 82 MS
QT INTERVAL: 432 MS
QTC INTERVAL: 393 MS
RBC # BLD AUTO: 4.19 MILLION/UL (ref 3.81–5.12)
SODIUM SERPL-SCNC: 145 MMOL/L (ref 135–147)
T WAVE AXIS: 70 DEGREES
VENTRICULAR RATE: 50 BPM
WBC # BLD AUTO: 8.97 THOUSAND/UL (ref 4.31–10.16)

## 2024-09-11 PROCEDURE — 97163 PT EVAL HIGH COMPLEX 45 MIN: CPT

## 2024-09-11 PROCEDURE — 99223 1ST HOSP IP/OBS HIGH 75: CPT | Performed by: FAMILY MEDICINE

## 2024-09-11 PROCEDURE — 80048 BASIC METABOLIC PNL TOTAL CA: CPT

## 2024-09-11 PROCEDURE — 85025 COMPLETE CBC W/AUTO DIFF WBC: CPT

## 2024-09-11 PROCEDURE — 36415 COLL VENOUS BLD VENIPUNCTURE: CPT

## 2024-09-11 PROCEDURE — 97167 OT EVAL HIGH COMPLEX 60 MIN: CPT

## 2024-09-11 PROCEDURE — 93010 ELECTROCARDIOGRAM REPORT: CPT | Performed by: INTERNAL MEDICINE

## 2024-09-11 RX ORDER — LIDOCAINE 50 MG/G
1 PATCH TOPICAL DAILY
Status: DISCONTINUED | OUTPATIENT
Start: 2024-09-11 | End: 2024-09-17 | Stop reason: HOSPADM

## 2024-09-11 RX ORDER — ACETAMINOPHEN 325 MG/1
975 TABLET ORAL EVERY 8 HOURS SCHEDULED
Status: DISCONTINUED | OUTPATIENT
Start: 2024-09-11 | End: 2024-09-11

## 2024-09-11 RX ORDER — OLANZAPINE 10 MG/2ML
2.5 INJECTION, POWDER, FOR SOLUTION INTRAMUSCULAR EVERY 6 HOURS PRN
Status: DISCONTINUED | OUTPATIENT
Start: 2024-09-11 | End: 2024-09-17 | Stop reason: HOSPADM

## 2024-09-11 RX ORDER — SODIUM CHLORIDE, SODIUM GLUCONATE, SODIUM ACETATE, POTASSIUM CHLORIDE, MAGNESIUM CHLORIDE, SODIUM PHOSPHATE, DIBASIC, AND POTASSIUM PHOSPHATE .53; .5; .37; .037; .03; .012; .00082 G/100ML; G/100ML; G/100ML; G/100ML; G/100ML; G/100ML; G/100ML
75 INJECTION, SOLUTION INTRAVENOUS CONTINUOUS
Status: DISPENSED | OUTPATIENT
Start: 2024-09-11 | End: 2024-09-11

## 2024-09-11 RX ORDER — LANOLIN ALCOHOL/MO/W.PET/CERES
3 CREAM (GRAM) TOPICAL
Status: DISCONTINUED | OUTPATIENT
Start: 2024-09-11 | End: 2024-09-17 | Stop reason: HOSPADM

## 2024-09-11 RX ORDER — WATER 10 ML/10ML
INJECTION INTRAMUSCULAR; INTRAVENOUS; SUBCUTANEOUS
Status: COMPLETED
Start: 2024-09-11 | End: 2024-09-11

## 2024-09-11 RX ORDER — DULOXETIN HYDROCHLORIDE 20 MG/1
20 CAPSULE, DELAYED RELEASE ORAL DAILY
Status: DISCONTINUED | OUTPATIENT
Start: 2024-09-11 | End: 2024-09-17 | Stop reason: HOSPADM

## 2024-09-11 RX ORDER — ACETAMINOPHEN 325 MG/1
975 TABLET ORAL 3 TIMES DAILY
Status: DISCONTINUED | OUTPATIENT
Start: 2024-09-11 | End: 2024-09-17 | Stop reason: HOSPADM

## 2024-09-11 RX ADMIN — ACETAMINOPHEN 975 MG: 325 TABLET ORAL at 16:13

## 2024-09-11 RX ADMIN — ACETAMINOPHEN 975 MG: 325 TABLET ORAL at 21:53

## 2024-09-11 RX ADMIN — OLANZAPINE 2.5 MG: 10 INJECTION, POWDER, FOR SOLUTION INTRAMUSCULAR at 03:23

## 2024-09-11 RX ADMIN — FLUTICASONE PROPIONATE 1 SPRAY: 50 SPRAY, METERED NASAL at 09:49

## 2024-09-11 RX ADMIN — DOCUSATE SODIUM 100 MG: 100 CAPSULE, LIQUID FILLED ORAL at 18:59

## 2024-09-11 RX ADMIN — FAMOTIDINE 20 MG: 20 TABLET ORAL at 09:49

## 2024-09-11 RX ADMIN — GABAPENTIN 100 MG: 100 CAPSULE ORAL at 18:59

## 2024-09-11 RX ADMIN — ASPIRIN 81 MG 81 MG: 81 TABLET ORAL at 09:49

## 2024-09-11 RX ADMIN — WATER: 1 INJECTION INTRAMUSCULAR; INTRAVENOUS; SUBCUTANEOUS at 03:23

## 2024-09-11 RX ADMIN — SENNOSIDES 17.2 MG: 8.6 TABLET, FILM COATED ORAL at 18:59

## 2024-09-11 RX ADMIN — SENNOSIDES 17.2 MG: 8.6 TABLET, FILM COATED ORAL at 09:49

## 2024-09-11 RX ADMIN — LIDOCAINE 1 PATCH: 700 PATCH TOPICAL at 11:49

## 2024-09-11 RX ADMIN — POLYETHYLENE GLYCOL 3350 17 G: 17 POWDER, FOR SOLUTION ORAL at 09:49

## 2024-09-11 RX ADMIN — DULOXETINE HYDROCHLORIDE 20 MG: 20 CAPSULE, DELAYED RELEASE ORAL at 16:00

## 2024-09-11 RX ADMIN — ACETAMINOPHEN 650 MG: 325 TABLET ORAL at 09:49

## 2024-09-11 RX ADMIN — ENOXAPARIN SODIUM 40 MG: 40 INJECTION SUBCUTANEOUS at 09:49

## 2024-09-11 RX ADMIN — ATORVASTATIN CALCIUM 20 MG: 40 TABLET, FILM COATED ORAL at 09:49

## 2024-09-11 RX ADMIN — SODIUM CHLORIDE, SODIUM GLUCONATE, SODIUM ACETATE, POTASSIUM CHLORIDE, MAGNESIUM CHLORIDE, SODIUM PHOSPHATE, DIBASIC, AND POTASSIUM PHOSPHATE 75 ML/HR: .53; .5; .37; .037; .03; .012; .00082 INJECTION, SOLUTION INTRAVENOUS at 09:48

## 2024-09-11 RX ADMIN — CEFTRIAXONE SODIUM 2000 MG: 10 INJECTION, POWDER, FOR SOLUTION INTRAVENOUS at 21:53

## 2024-09-11 RX ADMIN — OLANZAPINE 2.5 MG: 10 INJECTION, POWDER, FOR SOLUTION INTRAMUSCULAR at 23:04

## 2024-09-11 RX ADMIN — DOCUSATE SODIUM 100 MG: 100 CAPSULE, LIQUID FILLED ORAL at 09:49

## 2024-09-11 RX ADMIN — Medication 3 MG: at 21:53

## 2024-09-11 RX ADMIN — GABAPENTIN 100 MG: 100 CAPSULE ORAL at 09:49

## 2024-09-11 NOTE — OCCUPATIONAL THERAPY NOTE
Occupational Therapy Evaluation     Patient Name: Berta Estrada  Today's Date: 9/11/2024  Problem List  Principal Problem:    Altered mental status  Active Problems:    Other hyperlipidemia    GERD (gastroesophageal reflux disease)    Constipation    History of permanent cardiac pacemaker placement    Suspected malignant neoplasm of lung    UTI (urinary tract infection)    Alzheimer's dementia, late onset (HCC)    Encounter for medication review    Delirium    Back pain    Ambulatory dysfunction    Past Medical History  Past Medical History:   Diagnosis Date    Actinic keratosis     Basal cell carcinoma     Back     Cancer (HCC)     Coronary artery disease     Dementia (HCC)     Depression     Ear problems     HL (hearing loss)     Hyperlipidemia     Migraines     Ovarian cancer (HCC) 2004    s/p surgery. no chemo/RT    Phlebitis     R leg     Past Surgical History  Past Surgical History:   Procedure Laterality Date    ADENOIDECTOMY      APPENDECTOMY      CARDIAC ELECTROPHYSIOLOGY PROCEDURE N/A 5/15/2024    Procedure: Cardiac pacer implant;  Surgeon: Rufus Raines MD;  [facility];  Service: Cardiology    CATARACT EXTRACTION, BILATERAL      HYSTERECTOMY  2005    ovarian CA    KNEE SURGERY Right     SKIN BIOPSY      TONSILECTOMY AND ADNOIDECTOMY      TONSILLECTOMY           09/11/24 0835 09/11/24 0845   Vital Signs   Pulse 60 67   Heart Rate Source  --  Monitor   Respirations  --  16   Blood Pressure (!) 64/46  (OT was with Patient. Patient stood and reported increased dizziness.) 112/64   MAP (mmHg) (!) 52 82   BP Location Right arm Right arm   BP Method Automatic Automatic   Patient Position - Orthostatic VS Sitting Sitting          09/11/24 0849   OT Last Visit   OT Visit Date 09/11/24   Note Type   Note type Evaluation   Pain Assessment   Pain Assessment Tool 0-10   Pain Score No Pain   Restrictions/Precautions   Weight Bearing Precautions Per Order No   Other Precautions  "Cognitive;1:1;Multiple lines;Fall Risk;Restraints  (virtual 1:1, posey waist restraint, B wrist restraint)   Home Living   Type of Home House   Home Layout Two level;Able to live on main level with bedroom/bathroom  (3 JOSE (family (A) on stairs), FFSU)   Bathroom Shower/Tub Tub/shower unit   Bathroom Toilet Standard   Bathroom Equipment Tub transfer bench   Bathroom Accessibility Accessible   Home Equipment Walker;Cane   Additional Comments per EMR pt uses SPC at baseline   Prior Function   Level of [place] Needs assistance with ADLs   Lives With Daughter  (+ MONA)   Receives Help From Family;Home health  ([facility] for 3hrs/guy, 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 Imaging (-) for acute injury. PMH: alzheimers dementia, HTN, HLD, CKD, symptomatic bradycardia and suspected malignant neoplasm of lung.   Subjective   Subjective \"Well I think I need to pee\" Pt HYPERVERBAL and highly tangential - nonsensical throughout   ADL   Eating Assistance 3  Moderate Assistance   Grooming Assistance 2  Maximal Assistance   UB Bathing Assistance 3  Moderate Assistance   LB Bathing Assistance 2  Maximal Assistance   UB Dressing Assistance 2  Maximal Assistance   UB Dressing " Deficit Increased time to complete;Thread RUE;Thread LUE;Pull around back  (doff/donning gown - pt attempting to reach for gown and poor proprioception noted as pt grasping at air and at therapist instead of gown)   LB Dressing Assistance 1  Total Assistance   LB Dressing Deficit Don/doff L sock;Don/doff R sock   Toileting Assistance  1  Total Assistance   Toileting Deficit Perineal hygiene  (incontinent of urine upon standing)   Bed Mobility   Sit to Supine 2  Maximal assistance   Additional items Assist x 2;Increased time required;Verbal cues;LE management   Transfers   Sit to Stand 3  Moderate assistance   Additional items Assist x 2;Increased time required;Verbal cues   Stand to Sit 3  Moderate assistance   Additional items Assist x 2;Increased time required;Verbal cues   Stand pivot 2  Maximal assistance   Additional items Assist x 2;Increased time required;Verbal cues  (legs buckling mid transfer and requiring max -total A for returning to EOB)   Additional Comments inconsistent performance with sit >< stand poor upright stature   Functional Mobility   Functional Mobility 2  Maximal assistance   Additional Comments Ax2, 2-3 steps to chair - requiring cuing for which direction to step, cues for hands on RW and for maintaining standing as pt attempting to sit mid way through transfer  (s/p sitting in chair pt's BP reading at 64/46)   Additional items Rolling walker   Balance   Static Sitting Poor   Dynamic Sitting Poor -   Static Standing Zero   Ambulatory Zero   Activity Tolerance   Activity Tolerance Patient limited by fatigue;Treatment limited secondary to agitation;Treatment limited secondary to medical complications (Comment)  (limited by cogntion and low BP)   Medical Staff Made Aware PT Zaid TEJEDA Assessment   RUE Assessment WFL   LUE Assessment   LUE Assessment WFL   Hand Function   Gross Motor Coordination Functional   Fine Motor Coordination Impaired   Hand Function Comments POOR hand eye  coordination, pt reaching for unseen objects and poor coordination at times   Cognition   Overall Cognitive Status Impaired   Arousal/Participation Alert;Cooperative   Attention Attends with cues to redirect   Orientation Level Oriented to person;Disoriented to place;Disoriented to situation;Disoriented to time   Memory Decreased short term memory;Decreased recall of recent events;Decreased recall of precautions   Following Commands Follows one step commands with increased time or repetition   Comments pleasantly confused, highly tangential and poor insight into situation. Questionably hallucinating?   Assessment   Limitation Decreased ADL status;Decreased Safe judgement during ADL;Decreased cognition;Decreased endurance;Decreased self-care trans;Decreased high-level ADLs  (impaired balance, fxnl mobility, act micaela, fxnl reach, standing micaela, strength, attention to task, direction following, safety awareness, insight, pacing, problem solving, learning new tasks, initiation of conversation, speech, response time)   Prognosis Good   Assessment Pt is a 89 y.o. female seen for OT evaluation s/p [facility] due to AMS + dizziness + fall. Diagnosed with Altered mental status. Personal and env factors supporting pt at time of IE include supportive daughter + MONA and FFSU. 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 Performance deficits that affect the pt’s occupational performance can be seen above. Due to pt's current functional limitations and medical complications pt is functioning below baseline. Pt would benefit from continued skilled OT treatment in order to maximize safety, independence and overall performance with ADLs, functional mobility, functional transfers, and cognition in order to achieve highest level of function.   Goals   Patient Goals none stated, will cont to assess   LTG Time  Frame 10-14   Long Term Goal see goals listed below   Plan   Treatment Interventions ADL retraining;Functional transfer training;Endurance training;Patient/family training;Cognitive reorientation;Equipment evaluation/education;Compensatory technique education;Energy conservation;Activityengagement   Goal Expiration Date 09/21/24   OT Treatment Day 0   OT Frequency 3-5x/wk   Discharge Recommendation   Rehab Resource Intensity Level, OT II (Moderate Resource Intensity)   AM-PAC Daily Activity Inpatient   Lower Body Dressing 1   Bathing 2   Toileting 1   Upper Body Dressing 2   Grooming 3   Eating 3   Daily Activity Raw Score 12   Daily Activity Standardized Score (Calc for Raw Score >=11) 30.6   AM-PAC Applied Cognition Inpatient   Following a Speech/Presentation 2   Understanding Ordinary Conversation 2   Taking Medications 1   Remembering Where Things Are Placed or Put Away 1   Remembering List of 4-5 Errands 1   Taking Care of Complicated Tasks 1   Applied Cognition Raw Score 8   Applied Cognition Standardized Score 19.32   End of Consult   Patient Position at End of Consult Supine;All needs within reach  (on stretcher)     GOALS:      -Patient will perform grooming tasks sitting EOB with overall Mod I in order to increase overall independence    -Patient will be Supervision with UB dressing using AE and AD as needed in order to increase (I) with ADLs    -Patient will be Supervision with UB bathing using AE and AD as needed in order to increase (I) with ADLs    -Patient will be Mod A  with LB dressing with use of AE and AD as needed in order to increase (I) with ADLs    -Patient will be Mod A  with LB bathing with use of AE and AD as needed in order to increase (I) with ADLs    -Patient will complete toileting w/ Mod A  w/ G hygiene/thoroughness in order to reduce caregiver burden    -Patient will demonstrate Min A x 1 with bed mobility for ability to manage own comfort and initiate OOB tasks.     -Patient will  perform functional transfers with Min A x 1 to/from all surfaces using DME as needed in order to increase (I) with functional tasks    -Patient will be Min A x 1 with functional mobility to/from BSC for increased independence with toileting tasks    -Patient will tolerate therapeutic activities for greater than 30 min, in order to increase tolerance for functional activities.     -Patient will engage in ongoing cognitive assessment in order to assist with safe discharge planning/recommendations.    -Patient will follow single-step instructions with no VC in order to safely complete functional tasks     -Patient will attend to functional task for 5 min without VC for attention/redirection           The patient's raw score on the -PAC Daily Activity Inpatient Short Form is 12. A raw score of less than 19 suggests the patient may benefit from discharge to post-acute rehabilitation services. HOWEVER please refer to the recommendation of the Occupational Therapist for safe discharge planning.    This session, pt required and most appropriately benefited from skilled OT/PT co-eval due to significant regression from functional baseline, decreased activity tolerance, and unpredictable medical and/or functional status. OT and PT goals were addressed separately as seen in documentation.     Temi Lopez MS, OTR/L

## 2024-09-11 NOTE — ASSESSMENT & PLAN NOTE
-Patient is high risk of delirium due to existing Alzheimer's dementia, UTI, constipation, uncontrolled pain, and hospital setting.  -Noted to have visual hallucinations during the encounter  -Patient was combative overnight and was placed in restraints, currently on waist restraint  -Continue delirium precautions  -maintain normal sleep/wake cycle.  Administer 3 mg melatonin daily at bedtime.  -minimize overnight interruptions, group overnight vitals/labs/nursing checks as possible  -dim lights, close blinds and turn off tv to minimize stimulation and encourage sleep environment in evenings  -ensure that pain is well controlled.  Patient currently takes 2500 mg Tylenol daily.  Can increase this to 1000 mg Tylenol TID.  Use lidocaine patch on lower back to relieve pain.   -monitor for fecal and urinary retention which may precipitate delirium. See constipation.  -encourage early mobilization and ambulation  -provide frequent reorientation and redirection  -encourage family and friends at the bedside to help calm patient if anxious  -avoid medications which may precipitate or worsen delirium such as tramadol, benzodiazepine, anticholinergics, and antihistaminics  -encourage hydration and nutrition , assist with feeding if needed  -redirect unwanted behaviors as first line, avoid physical restraints.   - Can give Zyprexa 2.5 mg IM every 8 hours as needed for acute agitation.  - Start Cymbalta 20 mg daily.  - Obtain TSH and Vitamin B-12 level.

## 2024-09-11 NOTE — CONSULTS
Consultation - Geriatric Medicine   Berta Estrada 89 y.o. female MRN: [id number]  Unit/Bed#: ED-36 Encounter: [id number]      Assessment & Plan     Malnutrition (HCC)  Assessment & Plan  Per daughter patient lost significant weight recently and she has decreased appetite  We will add protein supplements to her regimen  Consult nutrition  Encourage p.o. intake and continue to monitor weights    Ambulatory dysfunction  Assessment & Plan  Patient uses a cane for ambulation at baseline  She presents to the hospital after recent fall out of bed where she hit her lower back on the floor.  Monitor orthostatic vital signs  Encourage p.o. hydration  Avoid hypotension and hypoglycemia     Back pain  Assessment & Plan  Pt has low back pain secondary to degenerative disc disease and recently hit her lower back on the floor when sliding out of bed. The pain is likely exacerbating agitation and delirium.   Can apply lidocaine patch as needed to affected area.  Continue gabapentin 100 mg twice daily.  Increase Tylenol to 975 mg 3 times daily  Continue PT OT    Delirium  Assessment & Plan  -Patient is high risk of delirium due to existing Alzheimer's dementia, UTI, constipation, uncontrolled pain, and hospital setting.  -Noted to have visual hallucinations during the encounter  -Patient was combative overnight and was placed in restraints, currently on waist restraint  -Continue delirium precautions  -maintain normal sleep/wake cycle.  Consider 3 mg melatonin daily at bedtime.  -minimize overnight interruptions, group overnight vitals/labs/nursing checks as possible  -dim lights, close blinds and turn off tv to minimize stimulation and encourage sleep environment in evenings  -ensure that pain is well controlled.  Patient currently takes 2500 mg Tylenol daily.  Can increase this to 1000 mg Tylenol TID.  Use lidocaine patch on lower back to relieve pain.   -monitor for fecal and urinary retention which may precipitate delirium.  "See constipation.  -encourage early mobilization and ambulation  -provide frequent reorientation and redirection  -encourage family and friends at the bedside to help calm patient if anxious  -avoid medications which may precipitate or worsen delirium such as tramadol, benzodiazepine, anticholinergics, and antihistaminics  -encourage hydration and nutrition , assist with feeding if needed  -redirect unwanted behaviors as first line, avoid physical restraints.   - Can give Zyprexa 2.5 mg IM every 8 hours as needed for acute agitation.  - Start Cymbalta 20 mg daily.  - Obtain TSH and Vitamin B-12 level.    Encounter for medication review  Assessment & Plan  Medication List as obtained from patient's daughter:    -Tylenol 1000 mg a.m. and p.m., 500 mg at midday  -Aspirin 81 mg p.o.  -Atorvastatin 20 mg  -Famotidine 20 mg  -Gabapentin 100 mg twice daily  -MiraLAX-taken every 3 to 4 days as needed  -Senna- taken every 3 to 4 days as needed  -Claritin as needed    Alzheimer's dementia, late onset (HCC)  Assessment & Plan  Pt has Alzheimer's dementia with behavioral disturbance and is normally alert and oriented x1 to self but not place or time at baseline.    Currently alert and oriented x 0.  CT head shows moderate to severe chronic microangiopathic change.  Patient needs assistance with all ADLs and IADLs.  She developed an acute worsening of confusion about x2 days ago. See \"delirium\"  Continue sleep-wake cycle  Encourage family to visit  [place] as needed and provide supportive care  Check B12 and TSH    UTI (urinary tract infection)  Assessment & Plan  UA on admission showed large leukocytes.  She presented with altered mental status and agitation worse than her baseline state.   Patient is being treated with Rocephin IV.  Managed per primary team.    Constipation  Assessment & Plan  Daughter states that the patient has not had a bowel movement in about 3 days and has chronic constipation.  She has received senna, " MiraLAX, Dulcolax suppository, and Colace here. Continue to monitor and give MiraLax as needed.    Stage 3a chronic kidney disease (HCC)  Assessment & Plan  Lab Results   Component Value Date    EGFR 53 09/11/2024    EGFR 44 09/10/2024    EGFR 64 05/19/2024    CREATININE 0.94 09/11/2024    CREATININE 1.11 09/10/2024    CREATININE 0.81 05/19/2024       Creatinine stable.  Will avoid nephrotoxic medication.  Encourage po hydration.  Will monitor BMP.          History of Present Illness   Physician Requesting Consult: Deven Correa MD  Reason for Consult / Principal Problem: acute encephalopathy  Hx and PE limited by: decreased cognitive status  Additional history obtained from: Daughter      HPI: Berta Estrada is a 89 y.o. year old female with baseline dementia who presents with acutely worsened confusion and a fall out of bed.  Patient is alert and oriented x 0 and is not able to give a history.  When asked her name she told me she does not have a last name and is unable to state the location and date.    History obtained by daughter at bedside:  The patient lives with her daughter at home for the past 5 years and has home health aides come to the house twice daily with the patient going to a senior center for  daily.  She needs assistance with all ADLs and IADLs for the past 2 to 3 years.  Patient has a bed on the first floor with a mattress low to the ground.    Daughter states that she found her mother half out of bed after sliding out of bed on Monday evening.  She hit her lower back on the floor and complains of lower back pain.  She denies loss of consciousness or head strike, however the fall was unwitnessed.  She helped her mother up and she complained of some dizziness when walking.  She seemed fine in the morning on Tuesday with normal mental status and no dizziness.  On Tuesday afternoon she began having increased dizziness and weakness when walking, which prompted her visit to the  ED.  Daughter additionally notes that her mother has been more confused over the past 2 days.  She is less conversational, more confused, and more agitated than normal currently.  Patient has been attempting to get out of bed with limited success with redirection.      Inpatient consult to Gerontology  Consult performed by: Wanda Hodges MD  Consult ordered by: Mel Levi MD          Review of Systems   Unable to perform ROS: Mental status change       Historical Information   Past Medical History:   Diagnosis Date    Actinic keratosis     Basal cell carcinoma     Back     Cancer (HCC)     Coronary artery disease     Dementia (HCC)     Depression     Ear problems     HL (hearing loss)     Hyperlipidemia     Migraines     Ovarian cancer (HCC) 2004    s/p surgery. no chemo/RT    Phlebitis     R leg     Past Surgical History:   Procedure Laterality Date    ADENOIDECTOMY      APPENDECTOMY      CARDIAC ELECTROPHYSIOLOGY PROCEDURE N/A 5/15/2024    Procedure: Cardiac pacer implant;  Surgeon: Rufus Raines MD;  [facility];  Service: Cardiology    CATARACT EXTRACTION, BILATERAL      HYSTERECTOMY  2005    ovarian CA    KNEE SURGERY Right     SKIN BIOPSY      TONSILECTOMY AND ADNOIDECTOMY      TONSILLECTOMY       Social History   Social History     Substance and Sexual Activity   Alcohol Use Not Currently     Social History     Substance and Sexual Activity   Drug Use Never     Social History     Tobacco Use   Smoking Status Former    Current packs/day: 0.25    Types: Cigarettes   Smokeless Tobacco Never   Tobacco Comments    until age 30     Family History:   Family History   Problem Relation Age of Onset    Depression Mother     Anxiety disorder Mother     Alcohol abuse Mother     Substance Abuse Mother     Asthma Mother     Diabetes Father 60    Heart attack Sister     Coronary artery disease Sister     Brain cancer Son     Mental illness Neg Hx        Meds/Allergies   current meds:   Current  Facility-Administered Medications:     acetaminophen (TYLENOL) tablet 975 mg, TID    aspirin chewable tablet 81 mg, Daily    atorvastatin (LIPITOR) tablet 20 mg, Daily    bisacodyl (DULCOLAX) rectal suppository 10 mg, Daily PRN    ceftriaxone (ROCEPHIN) 2 g/50 mL in dextrose IVPB, Q24H    docusate sodium (COLACE) capsule 100 mg, BID    DULoxetine (CYMBALTA) delayed release capsule 20 mg, Daily    enoxaparin (LOVENOX) subcutaneous injection 40 mg, Daily    famotidine (PEPCID) tablet 20 mg, Daily    fluticasone (FLONASE) 50 mcg/act nasal spray 1 spray, Daily    gabapentin (NEURONTIN) capsule 100 mg, BID    lidocaine (LIDODERM) 5 % patch 1 patch, Daily    melatonin tablet 3 mg, HS    multi-electrolyte (PLASMALYTE-A/ISOLYTE-S PH 7.4) IV solution, Continuous, Last Rate: 75 mL/hr (09/11/24 0948)    OLANZapine (ZyPREXA) IM injection 2.5 mg, Q6H PRN    polyethylene glycol (MIRALAX) packet 17 g, Daily    senna (SENOKOT) tablet 17.2 mg, BID    Allergies   Allergen Reactions    Codeine Hives    Morphine Other (See Comments)     Redness in face, swelling    Other      Seasonal    Propoxyphene        Objective     Intake/Output Summary (Last 24 hours) at 9/11/2024 1433  Last data filed at 9/11/2024 0918  Gross per 24 hour   Intake 1876.25 ml   Output 30 ml   Net 1846.25 ml     Invasive Devices       Peripheral Intravenous Line  Duration             Peripheral IV 09/10/24 Proximal;Right;Ventral (anterior) Forearm <1 day                    Physical Exam  Vitals and nursing note reviewed.   Constitutional:       Comments: Frail looking   HENT:      Head: Normocephalic.      Ears:      Comments: Sun'aq     Nose: Nose normal.      Mouth/Throat:      Mouth: Mucous membranes are dry.   Eyes:      Extraocular Movements: Extraocular movements intact.   Cardiovascular:      Rate and Rhythm: Normal rate and regular rhythm.      Pulses: Normal pulses.      Heart sounds: Normal heart sounds.   Pulmonary:      Effort: Pulmonary effort is normal.       Breath sounds: Normal breath sounds. No wheezing, rhonchi or rales.   Abdominal:      General: Abdomen is flat.      Palpations: Abdomen is soft.   Musculoskeletal:         General: Normal range of motion.      Cervical back: Normal range of motion and neck supple.      Right lower leg: Edema present.      Left lower leg: Edema present.   Skin:     General: Skin is warm and dry.      Capillary Refill: Capillary refill takes less than 2 seconds.   Neurological:      Mental Status: She is alert. She is disoriented.      Motor: Weakness present.      Gait: Gait abnormal.      Comments: A/O x 0   Psychiatric:      Comments: Combative intermittently  Restless at the time of encounter and experiencing visual hallucinations         Lab Results:   I have personally reviewed pertinent lab results including the following:  - CBC, BMP, UA    I have personally reviewed the following imaging study reports in PACS:  - CT Head      Therapies:   PT: pending  OT: pending    VTE Prophylaxis: Enoxaparin (Lovenox)    Code Status: Level 3 - DNAR and DNI  Advance Directive and Living Will: Yes    Power of : Yes  POLST:      Family and Social Support: daughter  Living Arrangements: Lives w/ Children  Support Systems: Daughter  Assistance Needed: ADL's  Type of Current Residence: Private residence  Current Home Care Services: No      Thank you for allowing me to participate in your patients' care. Please do not hesitate to call with any additional questions.  Wanda Hodges MD

## 2024-09-11 NOTE — ASSESSMENT & PLAN NOTE
"Pt has Alzheimer's dementia with behavioral disturbance and is normally alert and oriented x1 to self but not place or time at baseline.    Currently alert and oriented x 0.  CT head shows moderate to severe chronic microangiopathic change.  Patient needs assistance with all ADLs and IADLs.  She developed an acute worsening of confusion about x2 days ago. See \"delirium\"  Continue sleep-wake cycle  Encourage family to visit  [place] as needed and provide supportive care  Check B12 and TSH  "

## 2024-09-11 NOTE — ASSESSMENT & PLAN NOTE
Lab Results   Component Value Date    EGFR 53 09/11/2024    EGFR 44 09/10/2024    EGFR 64 05/19/2024    CREATININE 0.94 09/11/2024    CREATININE 1.11 09/10/2024    CREATININE 0.81 05/19/2024

## 2024-09-11 NOTE — ASSESSMENT & PLAN NOTE
Pt has low back pain secondary to degenerative disc disease and recently hit her lower back on the floor when sliding out of bed. The pain is likely exacerbating agitation and delirium.   Can apply lidocaine patch as needed to affected area.  Continue gabapentin 100 mg twice daily.  Increase Tylenol to 975 mg 3 times daily  Continue PT OT

## 2024-09-11 NOTE — ED NOTES
Patient jeana noted to be dirty with urine. Cleaned Patient and new linens placed - bedpan utilized. Water given but Patient refusing food at this time. Restraints released and skin checked. Turned and positioned. Comfort and safety measures provided.      Rosi Read RN  09/11/24 [id number]

## 2024-09-11 NOTE — ASSESSMENT & PLAN NOTE
UA on admission showed large leukocytes.  She presented with altered mental status and agitation worse than her baseline state.   Patient is being treated with Rocephin IV.  Managed per primary team.

## 2024-09-11 NOTE — H&P
H&P - Hospitalist   Name: Berta Estrada 89 y.o. female I MRN: [id number]  Unit/Bed#: ED-36 I Date of Admission: 9/10/2024   Date of Service: 9/10/2024 I Hospital Day: 0     Assessment & Plan  UTI (urinary tract infection)  Patient presents to the ED on 9/10 due to altered mental status and dizziness per daughter.  Most of the history was obtained over the phone by daughter Hillary.  She reports the patient fell out of bed earlier today otherwise did not hit her head or lose consciousness.  Denied having any complaints of point tenderness.  At baseline patient is usually able to ambulate without difficulty.  In the ED upon examination patient did not elicit any focal tenderness or weakness.  Denies having chest pain, shortness of breath, suprapubic tenderness or dysuria.    Patient was initially pleasant and cooperative but quickly became combative and aggressive soon after daughter left the ED per nursing staff.  Patient was pulling IV lines and kicking the staff so unfortunately had to be put on soft restraints with one-time dose of IM Zyprexa 2.5 mg with virtual/one-to-one observation.   Her CBC with slight thrombocytopenia platelets 125, BMP, mag and tropes within normal limits  UA positive for leukocytosis and bacteriuria, cultures pending  9/10 CTA head and neck no acute intracranial CT abnormalities with patent vessels  In the ED patient received 1 L fluid bolus and Rocephin  Per daughter patient is AO x 1 at baseline    Plan:  Continue Rocephin  Delirium precautions  Fall precautions  Urinary retention protocol  PT/OT evaluation  Trend temperatures, monitor vitals   CBC and BMP in the a.m.  As needed Tylenol  Alzheimer's dementia, late onset (HCC)  Previously on Aricept and Namenda which has been discontinued  Fall/safety precautions  Maintain sleep/wake cycle  Delirium precautions  Fall precautions  Avoid medications such as tramadol, benzodiazepines, anticholinergics, benadryl  Unfortunately in the ED  patient became very combative pulling out IV lines eventually requiring soft restraints and 1:1/virtual observation  Spoke to the daughter over the phone-she said that the patient can occasionally became combative usually only oriented to self.  For now ordered Zyprexa 2.5 mg every 4 hours as needed max 6 doses in 24 hours.  Use cautiously if unable to redirect patient  Other hyperlipidemia  Continue patient on Lipitor 20 mg daily  GERD (gastroesophageal reflux disease)  Continue patient on Pepcid 20 mg daily  Constipation  Patient has a history of chronic constipation    Continue Senokot, Colace and MiraLAX daily  Encourage p.o. hydration  History of permanent cardiac pacemaker placement  Follows with cardiology outpatient  For symptomatic bradycardia, sick sinus syndrome  Suspected malignant neoplasm of lung  Seen by pulmonology on 5/28/2024  Likely malignant in nature but biopsy declined by daughter given age and dementia  Expect natural course of lung cancer to take hold and transition to hospice when appropriate-daughter in agreement    History of Present Illness   Berta Estrada is a 89 y.o. female who presents with to the ED on 9/20 due to altered mental status and dizziness per daughter.  Patient has a past medical history of Alzheimer's dementia, hypertension, hyperlipidemia, CKD stage III, GERD, chronic constipation and symptomatic bradycardia with permanent pacemaker.  Per daughter who provided majority of history said that the patient fell out of bed earlier today otherwise did not hit her head or lose consciousness.  Patient denied having any complaints of point tenderness and is able to ambulate without difficulty at baseline.  In the ED upon examination patient did not elicit any focal tenderness on weakness denied having any chest pain, shortness of breath, suprapubic tenderness or dysuria.  Although in the ED patient initially present became combative later on after her daughter left the ED where  she was pulling IV lines, and combative with the nursing staff and had to be put on soft restraints and administered one-time dose of IM Zyprexa 2.5 mg.  In the ED patient received a dose of Rocephin and 1 L fluid bolus.   Per daughter at baseline patient is AO x 1    Review of Systems   Reason unable to perform ROS: Unable to perform ROS given patient's baseline dementia.         Objective      Temp:  [97.7 °F (36.5 °C)-98.3 °F (36.8 °C)] 97.7 °F (36.5 °C)  HR:  [51-83] 77  Resp:  [16-18] 17  BP: (116-124)/(61-78) 124/70  O2 Device: None (Room air)              I/O last 24 hours:  In: 1100 [IV Piggyback:1100]  Out: -   Lines/Drains/Airways       Active Status       None                  Physical Exam  Vitals and nursing note reviewed.   Constitutional:       General: She is not in acute distress.     Appearance: She is well-developed.   HENT:      Head: Normocephalic and atraumatic.   Eyes:      Conjunctiva/sclera: Conjunctivae normal.   Cardiovascular:      Rate and Rhythm: Normal rate and regular rhythm.      Heart sounds: No murmur heard.  Pulmonary:      Effort: Pulmonary effort is normal. No respiratory distress.      Breath sounds: Normal breath sounds.   Abdominal:      Palpations: Abdomen is soft.      Tenderness: There is no abdominal tenderness.   Musculoskeletal:         General: No swelling.      Cervical back: Neck supple.   Skin:     General: Skin is warm and dry.   Neurological:      Mental Status: She is alert. She is disoriented.   Psychiatric:      Comments: Patient was physically combative unable to be redirected          Lab Results: I have reviewed the following results: CBC/BMP:   .     09/10/24  1913   WBC 7.27   HGB 12.5   HCT 37.5   *   SODIUM 140   K 4.2      CO2 29   BUN 23   CREATININE 1.11   GLUC 118   MG 2.1    , Creatinine Clearance: Estimated Creatinine Clearance: 30.9 mL/min (by C-G formula based on SCr of 1.11 mg/dL)., LFTs:   .     09/10/24  1913   AST 13   ALT 8  "  ALB 4.0   TBILI 0.50   ALKPHOS 79    , Lactic Acid: No new results in last 24 hours. , Urinalysis:   Lab Results   Component Value Date    COLORU Light Yellow 09/10/2024    CLARITYU Clear 09/10/2024    SPECGRAV 1.016 09/10/2024    PHUR 7.0 09/10/2024    LEUKOCYTESUR Large (A) 09/10/2024    NITRITE Negative 09/10/2024    GLUCOSEU Negative 09/10/2024    KETONESU Negative 09/10/2024    BILIRUBINUR Negative 09/10/2024    BLOODU Negative 09/10/2024   , Urine Culture: No results found for: \"URINECX\"  Imaging Review:   Other Studies:   VTE Prophylaxis: Sequential compression device (Venodyne)  and Enoxaparin (Lovenox)      "

## 2024-09-11 NOTE — ASSESSMENT & PLAN NOTE
Pmhx of alzheimer's dementia, with behavior issues. Currently, no overt reversible objective data contributing to her presentation. Likely multifactorial, poor po intake, sundowning, back pain, ?UTI. Attempt to call daughter at PM, unable to reach, to establish better timeline, baseline and clinical course. Will reattempts tomorrow. Per documentation, seems like acute onset. Appreciate geriatric's input, and will continue to monitor and provide supportive care.   - PT/OT

## 2024-09-11 NOTE — ASSESSMENT & PLAN NOTE
Patient presents to the ED on 9/10 due to altered mental status and dizziness per daughter.  Most of the history was obtained over the phone by daughter Hillary.  She reports the patient fell out of bed earlier today otherwise did not hit her head or lose consciousness.  Denied having any complaints of point tenderness.  At baseline patient is usually able to ambulate without difficulty.  In the ED upon examination patient did not elicit any focal tenderness or weakness.  Denies having chest pain, shortness of breath, suprapubic tenderness or dysuria.    Patient was initially pleasant and cooperative but quickly became combative and aggressive soon after daughter left the ED per nursing staff.  Patient was pulling IV lines and kicking the staff so unfortunately had to be put on soft restraints with one-time dose of IM Zyprexa 2.5 mg with virtual/one-to-one observation.   Her CBC with slight thrombocytopenia platelets 125, BMP, mag and tropes within normal limits  UA positive for leukocytosis and bacteriuria, cultures pending  9/10 CTA head and neck no acute intracranial CT abnormalities with patent vessels  In the ED patient received 1 L fluid bolus and Rocephin  Per daughter patient is AO x 1 at baseline    Plan:  Continue Rocephin  Delirium precautions  Fall precautions  Urinary retention protocol  PT/OT evaluation  Trend temperatures, monitor vitals   CBC and BMP in the a.m.  As needed Tylenol

## 2024-09-11 NOTE — ASSESSMENT & PLAN NOTE
Previously on Aricept and Namenda which has been discontinued  Fall/safety precautions  Maintain sleep/wake cycle  Delirium precautions  Fall precautions  Avoid medications such as tramadol, benzodiazepines, anticholinergics, benadryl  Unfortunately in the ED patient became very combative pulling out IV lines eventually requiring soft restraints and 1:1/virtual observation  Spoke to the daughter over the phone-she said that the patient can occasionally became combative usually only oriented to self.  For now ordered Zyprexa 2.5 mg every 4 hours as needed max 6 doses in 24 hours.  Use cautiously if unable to redirect patient

## 2024-09-11 NOTE — ASSESSMENT & PLAN NOTE
Patient has a history of chronic constipation    Continue Senokot, Colace and MiraLAX daily  Encourage p.o. hydration

## 2024-09-11 NOTE — ASSESSMENT & PLAN NOTE
Seen by pulmonology on 5/28/2024  Likely malignant in nature but biopsy declined by daughter given age and dementia  Expect natural course of lung cancer to take hold and transition to hospice when appropriate-daughter in agreement

## 2024-09-11 NOTE — ASSESSMENT & PLAN NOTE
Pmhx: No significant history of UTI, culture on file 5/12/24: shows contaminant  UA positive for leukocytosis, occasional bacteria, hyaline cast  Urine culture pending    Low suspicion for ongoing urinary track infection. No other overt reversible causes of her mentation. Will continue to treat till return of urinary culture.     Plan:  Continue Rocephin  Delirium precautions  Fall precautions  Urinary retention protocol  PT/OT evaluation  Trend temperatures, monitor vitals

## 2024-09-11 NOTE — ASSESSMENT & PLAN NOTE
Medication List as obtained from patient's daughter:    -Tylenol 1000 mg a.m. and p.m., 500 mg at midday  -Aspirin 81 mg p.o.  -Atorvastatin 20 mg  -Famotidine 20 mg  -Gabapentin 100 mg twice daily  -MiraLAX-taken every 3 to 4 days as needed  -Senna- taken every 3 to 4 days as needed  -Claritin as needed

## 2024-09-11 NOTE — ED NOTES
"Patient repositioned and skin checked at this time at the application sites of the soft restraints. Patient currently not agitated or being aggressive towards staff but still attempting to leave bed to \"check for her purse at the hotel\" and grabbing at EKG wires to pull them off. Patient not redirectable at this time. Virtual monitor still in place.     Ana Lorenzo RN  09/11/24 0216    "

## 2024-09-11 NOTE — ASSESSMENT & PLAN NOTE
Per daughter patient lost significant weight recently and she has decreased appetite  We will add protein supplements to her regimen  Consult nutrition  Encourage p.o. intake and continue to monitor weights

## 2024-09-11 NOTE — UTILIZATION REVIEW
Initial Clinical Review    Admission: Date/Time/Statement:   Admission Orders (From admission, onward)       Ordered        09/10/24 2150  INPATIENT ADMISSION  Once                          Orders Placed This Encounter   Procedures    INPATIENT ADMISSION     Standing Status:   Standing     Number of Occurrences:   1     Order Specific Question:   Level of Care     Answer:   Med Surg [16]     Order Specific Question:   Estimated length of stay     Answer:   More than 2 Midnights     Order Specific Question:   Certification     Answer:   I certify that inpatient services are medically necessary for this patient for a duration of greater than two midnights. See H&P and MD Progress Notes for additional information about the patient's course of treatment.     ED Arrival Information       Expected   9/10/2024     Arrival   9/10/2024 17:59    Acuity   Urgent              Means of arrival   Wheelchair    Escorted by   Family Member    Service   Hospitalist    Admission type   Emergency              Arrival complaint   Fall (last night) / -HS +ASA / Dizzy / Leg Weakness             Chief Complaint   Patient presents with    Fall     Patient states dizziness, fell last night out of bed. Denies HS, slid down off bed. Patient was okay this morning, did normal ADLs and then began to become dizzy.        Initial Presentation: 89 y.o. female to ED via WC from home  Present to ED with altered mental status and dizziness per daughter. Per daughter who provided majority of history said that the patient fell out of bed earlier today otherwise did not hit her head or lose consciousness. Patient denied having any complaints of point tenderness and is able to ambulate without difficulty at baseline. Per daughter at baseline patient is AO x 1   PMHX: Alzheimer's dementia, hypertension, hyperlipidemia, CKD stage III, GERD, chronic constipation and symptomatic bradycardia with permanent pacemaker.    Admitted to [facility] with DX: Altered mental  status   on exam: josé miguel ; hypertensive; UA positive for leukocytosis and bacteriuria   PLAN: cont ivf abx; cont ivf; recd ivf bolus 1L x1; monitor labs; tele monitoring; f/u orthostatics; f/u urine cx; PT/ OT eval - tx      Date: 9/11/24       Day 2   combactive over night, per nursing did not sleep much, calmer now when seen in the morning. Patient's daughter reports mental status very close to baseline right now. Very confused but less agitated.   Plan: cont ivf abx; cont ivf; monitor labs; tele monitoring; f/u orthostatics; f/u urine cx; PT/ OT eval - tx      ED Triage Vitals   Temperature Pulse Respirations Blood Pressure SpO2 Pain Score   09/10/24 1805 09/10/24 1805 09/10/24 1805 09/10/24 1805 09/10/24 1805 09/11/24 0700   97.7 °F (36.5 °C) (!) 51 18 116/61 97 % No Pain     Vital Signs (last 3 days)       Date/Time Temp Pulse Resp BP MAP (mmHg) SpO2 O2 Device Patient Position - Orthostatic VS [place] Coma Scale Score Pain    09/11/24 1500 -- 96 16 156/74 107 97 % None (Room air) Lying -- --    09/11/24 1430 -- 95 16 132/99 109 97 % None (Room air) Lying -- No Pain    09/11/24 1400 -- 94 16 133/89 108 97 % None (Room air) Lying -- --    09/11/24 1300 -- 71 16 122/81 98 98 % None (Room air) Lying -- --    09/11/24 1230 -- 91 16 113/77 91 98 % None (Room air) Lying -- --    09/11/24 1200 -- 78 16 113/66 77 98 % None (Room air) Lying -- --    09/11/24 1100 -- 90 16 125/72 86 98 % None (Room air) Lying -- 6    09/11/24 1030 -- 72 16 119/58 84 98 % None (Room air) Lying -- --    09/11/24 1000 -- 100 16 157/84 111 98 % None (Room air) Lying -- --    09/11/24 0950 -- 78 16 97/56 74 98 % None (Room air) Lying -- --    09/11/24 0929 -- -- -- -- -- -- None (Room air) -- -- --    09/11/24 0900 -- 65 16 127/69 90 98 % None (Room air) Lying -- --    09/11/24 0849 -- -- -- -- -- -- -- -- -- No Pain    09/11/24 0845 -- 67 16 112/64 82 -- -- Sitting -- --    09/11/24 0835 -- 60 -- 64/46 52 -- -- Sitting -- --    09/11/24 0730  -- -- -- -- -- -- -- -- 14 No Pain    09/11/24 0700 97.6 °F (36.4 °C) 83 16 151/60 87 98 % None (Room air) Lying -- No Pain    09/11/24 0600 -- 101 16 146/74 99 98 % None (Room air) Lying -- --    09/11/24 0500 -- 95 -- -- 119 97 % None (Room air) Lying -- --    09/11/24 0300 -- 118 17 186/125 -- 98 % None (Room air) -- -- --    09/11/24 0200 -- 93 17 148/94 114 98 % None (Room air) Lying -- --    09/11/24 0150 -- -- -- -- -- -- -- -- 13 --    09/11/24 0100 -- 96 17 134/77 98 97 % None (Room air) Lying -- --    09/10/24 2300 -- 77 17 124/70 93 98 % None (Room air) Sitting -- --    09/10/24 1957 -- -- -- -- -- -- -- -- 15 --    09/10/24 1805 97.7 °F (36.5 °C) 51 18 116/61 77 97 % None (Room air) Sitting -- --              Pertinent Labs/Diagnostic Test Results:   Radiology:  CTA head and neck with and without contrast   Final Interpretation by Lurdes Cade MD (09/10 2045)      CT Brain:      No acute intracranial CT abnormality.   Chronic microangiopathic change.         CT Angiography:      Patent major vessels of the [place] without high-grade stenosis.   No significant stenosis in the cervical carotid or vertebral arteries.         Other:      2.6 cm right upper lobe subpleural cavitating pulmonary lesion and 0.9 cm right   upper lobe nodule noted in the partially imaged lungs are stable from CT   5/17/2024.      0.8 cm lucent lesion with thin trabeculae within the C6 vertebral body likely   representing hemangioma. Correlate with prior outside imaging for stability.                        Workstation performed: [id number]           Cardiology:  ECG 12 lead   Final Result by Edilson Jewell MD (09/11 0928)   Ventricular-paced rhythm   Confirmed by Edilson Jewell ([id number]) on 9/11/2024 9:28:06 AM          Results from last 7 days   Lab Units 09/11/24  0453 09/10/24  1913   WBC Thousand/uL 8.97 7.27   HEMOGLOBIN g/dL 13.5 12.5   HEMATOCRIT % 40.4 37.5   PLATELETS Thousands/uL 106* 125*   TOTAL NEUT ABS  Thousands/µL 6.60 4.17        Results from last 7 days   Lab Units 09/11/24  0453 09/10/24  1913   SODIUM mmol/L 145 140   POTASSIUM mmol/L 3.5 4.2   CHLORIDE mmol/L 109* 104   CO2 mmol/L 27 29   ANION GAP mmol/L 9 7   BUN mg/dL 20 23   CREATININE mg/dL 0.94 1.11   EGFR ml/min/1.73sq m 53 44   CALCIUM mg/dL 9.0 8.9   MAGNESIUM mg/dL  --  2.1     Results from last 7 days   Lab Units 09/10/24  1913   AST U/L 13   ALT U/L 8   ALK PHOS U/L 79   TOTAL PROTEIN g/dL 6.3*   ALBUMIN g/dL 4.0   TOTAL BILIRUBIN mg/dL 0.50        Results from last 7 days   Lab Units 09/11/24  0453 09/10/24  1913   GLUCOSE RANDOM mg/dL 136 118        Results from last 7 days   Lab Units 09/10/24  1913   HS TNI 0HR ng/L 4           Results from last 7 days   Lab Units 09/10/24  2010   CLARITY UA  Clear   COLOR UA  Light Yellow   SPEC GRAV UA  1.016   PH UA  7.0   GLUCOSE UA mg/dl Negative   KETONES UA mg/dl Negative   BLOOD UA  Negative   PROTEIN UA mg/dl Negative   NITRITE UA  Negative   BILIRUBIN UA  Negative   UROBILINOGEN UA (BE) mg/dl <2.0   LEUKOCYTES UA  Large*   WBC UA /hpf Innumerable*   RBC UA /hpf 1-2   BACTERIA UA /hpf Occasional   EPITHELIAL CELLS WET PREP /hpf Occasional          ED Treatment-Medication Administration from 09/10/2024 1759 to 09/11/2024 1559         Date/Time Order Dose Route Action     09/10/2024 1912 lactated ringers bolus 1,000 mL 1,000 mL Intravenous New Bag     09/10/2024 1958 iohexol (OMNIPAQUE) 350 MG/ML injection (MULTI-DOSE) 85 mL 85 mL Intravenous Given     09/10/2024 2056 ceftriaxone (ROCEPHIN) 2 g/50 mL in dextrose IVPB 2,000 mg Intravenous New Bag     09/10/2024 2217 OLANZapine (ZyPREXA) IM injection 2.5 mg 2.5 mg Intramuscular Given     09/10/2024 2224 sterile water injection **ADS Override Pull** --  Given     09/11/2024 0949 acetaminophen (TYLENOL) tablet 650 mg 650 mg Oral Given     09/11/2024 0949 aspirin chewable tablet 81 mg 81 mg Oral Given     09/11/2024 0949 atorvastatin (LIPITOR) tablet 20 mg  20 mg Oral Given     09/11/2024 0949 docusate sodium (COLACE) capsule 100 mg 100 mg Oral Given     09/11/2024 0949 famotidine (PEPCID) tablet 20 mg 20 mg Oral Given     09/11/2024 0949 fluticasone (FLONASE) 50 mcg/act nasal spray 1 spray 1 spray Each Nare Given     09/11/2024 0949 polyethylene glycol (MIRALAX) packet 17 g 17 g Oral Given     09/11/2024 0949 senna (SENOKOT) tablet 17.2 mg 17.2 mg Oral Given     09/10/2024 2257 multi-electrolyte (PLASMALYTE-A/ISOLYTE-S PH 7.4) IV solution 75 mL/hr Intravenous New Bag     09/11/2024 0949 enoxaparin (LOVENOX) subcutaneous injection 40 mg 40 mg Subcutaneous Given     09/11/2024 0949 gabapentin (NEURONTIN) capsule 100 mg 100 mg Oral Given     09/11/2024 0323 OLANZapine (ZyPREXA) IM injection 2.5 mg 2.5 mg Intramuscular Given     09/11/2024 0323 sterile water injection **ADS Override Pull** --  Given     09/11/2024 0948 multi-electrolyte (PLASMALYTE-A/ISOLYTE-S PH 7.4) IV solution 75 mL/hr Intravenous New Bag     09/11/2024 1149 lidocaine (LIDODERM) 5 % patch 1 patch 1 patch Topical Medication Applied            Past Medical History:   Diagnosis Date    Actinic keratosis     Basal cell carcinoma     Back     Cancer (HCC)     Coronary artery disease     Dementia (HCC)     Depression     Ear problems     HL (hearing loss)     Hyperlipidemia     Migraines     Ovarian cancer (HCC) 2004    s/p surgery. no chemo/RT    Phlebitis     R leg     Present on Admission:   Other hyperlipidemia   GERD (gastroesophageal reflux disease)   Constipation   Suspected malignant neoplasm of lung   History of permanent cardiac pacemaker placement   Stage 3a chronic kidney disease (HCC)      Admitting Diagnosis: Vertigo [R42]  UTI (urinary tract infection) [N39.0]  Late onset Alzheimer's dementia with agitation, unspecified dementia severity ([facility]) [G30.1, F02.811]    Age/Sex: 89 y.o. female    Admission Orders: SCDs; I/O; daily wts; orthostatics; tele monitoring; regular diet    Scheduled  Medications:  acetaminophen, 975 mg, Oral, TID  aspirin, 81 mg, Oral, Daily  atorvastatin, 20 mg, Oral, Daily  cefTRIAXone, 2,000 mg, Intravenous, Q24H  docusate sodium, 100 mg, Oral, BID  DULoxetine, 20 mg, Oral, Daily  enoxaparin, 40 mg, Subcutaneous, Daily  famotidine, 20 mg, Oral, Daily  fluticasone, 1 spray, Each Nare, Daily  gabapentin, 100 mg, Oral, BID  lidocaine, 1 patch, Topical, Daily  melatonin, 3 mg, Oral, HS  polyethylene glycol, 17 g, Oral, Daily  senna, 17.2 mg, Oral, BID    lactated ringers bolus 1,000 mL  Dose: 1,000 mL  Freq: Once Route: IV  Last Dose: Stopped (09/10/24 2056)  Start: 09/10/24 1915 End: 09/10/24 2056      Continuous IV Infusions:  multi-electrolyte, 75 mL/hr, Intravenous, Continuous      PRN Meds:  bisacodyl, 10 mg, Rectal, Daily PRN  OLANZapine, 2.5 mg, Intramuscular, Q6H PRN  (9/10 recd x1)  (9/11 recd x1 so far today)         IP CONSULT TO GERONTOLOGY    Network Utilization Review Department  ATTENTION: Please call with any questions or concerns to [phone] and carefully listen to the prompts so that you are directed to the right person. All voicemails are confidential.   For Discharge needs, contact Care Management DC Support Team at [phone] opt. 2  Send all requests for admission clinical reviews, approved or denied determinations and any other requests to dedicated fax number below belonging to the [facility] where the patient is receiving treatment. List of dedicated fax numbers for the Facilities:  FACILITY NAME UR FAX NUMBER   ADMISSION DENIALS (Administrative/Medical Necessity) [phone]   DISCHARGE SUPPORT TEAM (NETWORK) [phone]   PARENT CHILD HEALTH (Maternity/NICU/Pediatrics) [phone]   [facility] [phone]   [facility] [phone]   [facility] [phone]   [facility] [phone]   [facility] [phone]   [facility]  [facility] [phone]   [facility] [phone]   [facility] [phone]   [facility] [phone]   [facility] [phone]   [facility] [phone]   [facility] [phone]

## 2024-09-11 NOTE — ASSESSMENT & PLAN NOTE
Daughter states that the patient has not had a bowel movement in about 3 days and has chronic constipation.  She has received senna, MiraLAX, Dulcolax suppository, and Colace here. Continue to monitor and give MiraLax as needed.

## 2024-09-11 NOTE — PROGRESS NOTES
Progress Note - Hospitalist   Name: Berta Estrada 89 y.o. female I MRN: [id number]  Unit/Bed#: ED-36 I Date of Admission: 9/10/2024   Date of Service: 9/11/2024 I Hospital Day: 1    Assessment & Plan  Altered mental status  Pmhx of alzheimer's dementia, with behavior issues. Currently, no overt reversible objective data contributing to her presentation. Likely multifactorial, poor po intake, sundowning, back pain, ?UTI. Attempt to call daughter at PM, unable to reach, to establish better timeline, baseline and clinical course. Will reattempts tomorrow. Per documentation, seems like acute onset. Appreciate geriatric's input, and will continue to monitor and provide supportive care.   - PT/OT  Alzheimer's dementia, late onset (HCC)  Previously on Aricept and Namenda which has been discontinued  Fall/safety precautions  Maintain sleep/wake cycle  Delirium precautions  Fall precautions  Avoid medications such as tramadol, benzodiazepines, anticholinergics, benadryl  Per family member: daughter patient can occasionally became combative usually only oriented to self.  Appreciate Geriatric input  Ensure pain control, tylenol 975 mg TID, lidocaine patch   Normal sleep wake cycle - melatonin 3 mg qHS  Assist with feeding as needed  Zyprexa 2.5 mg IM q8H PRN for acute agitation  Start Cymbalta 20 mg daily   UTI (urinary tract infection)  Pmhx: No significant history of UTI, culture on file 5/12/24: shows contaminant  UA positive for leukocytosis, occasional bacteria, hyaline cast  Urine culture pending    Low suspicion for ongoing urinary track infection. No other overt reversible causes of her mentation. Will continue to treat till return of urinary culture.     Plan:  Continue Rocephin  Delirium precautions  Fall precautions  Urinary retention protocol  PT/OT evaluation  Trend temperatures, monitor vitals   Other hyperlipidemia  Continue patient on Lipitor 20 mg daily  GERD (gastroesophageal reflux disease)  Continue  patient on Pepcid 20 mg daily  Constipation  Patient has a history of chronic constipation    Continue Senokot, Colace and MiraLAX daily  Encourage p.o. hydration  History of permanent cardiac pacemaker placement  Follows with cardiology outpatient for symptomatic bradycardia, sick sinus syndrome    Plan  - Telemetry and continue to monitor clinically  Suspected malignant neoplasm of lung  Seen by pulmonology on 2024  Likely malignant in nature but biopsy declined by daughter given age and dementia  Expect natural course of lung cancer to take hold and transition to hospice when appropriate-daughter in agreement  Stage 3a chronic kidney disease (HCC)  Lab Results   Component Value Date    EGFR 53 2024    EGFR 44 09/10/2024    EGFR 64 2024    CREATININE 0.94 2024    CREATININE 1.11 09/10/2024    CREATININE 0.81 2024     Encounter for medication review    Delirium    Back pain  C/o of lower back pain    Plan:   Tylenol 975 TID  Lidocaine patch  Ambulatory dysfunction  PT/OT evaluation    Malnutrition (HCC)  Malnutrition Findings:                                 BMI Findings:           There is no height or weight on file to calculate BMI.     Current Length of Stay: 1 day(s)  Current Patient Status: Inpatient   Code Status: Level 3 - DNAR and DNI    Discharge Plan: No My Sticky Note on file.  Subjective:   Overnight:  combactive over night, per nursing did not sleep much, calmer now when seen in the morning      Complaints:  Aphasic, does not comprehend questions, responding in word salad .         Diet: Diet Regular; Regular House   Bowel regimen:       VTE Pharmacologic Prophylaxis:   High Risk (Score >/= 5) - Pharmacological DVT Prophylaxis Ordered: enoxaparin (Lovenox). Sequential Compression Devices Ordered.    Objective:    Vitals:   Temp (24hrs), Av °F (36.7 °C), Min:97.7 °F (36.5 °C), Max:98.3 °F (36.8 °C)    Temp:  [97.7 °F (36.5 °C)-98.3 °F (36.8 °C)] 97.7 °F (36.5 °C)  HR:   [] 101  Resp:  [16-18] 16  BP: (116-186)/() 146/74  SpO2:  [97 %-98 %] 98 %  Input and Output Summary (last 24 hours):     Intake/Output Summary (Last 24 hours) at 9/11/2024 0637  Last data filed at 9/10/2024 2138  Gross per 24 hour   Intake 1100 ml   Output --   Net 1100 ml     Physical Exam:   Physical Exam  Constitutional:       General: She is not in acute distress.     Comments: Alert, tracks stimuli across room, no orientation, aphasic with language comprehension, ?word salad   HENT:      Head: Normocephalic and atraumatic.   Cardiovascular:      Rate and Rhythm: Normal rate and regular rhythm.      Heart sounds: Normal heart sounds.   Pulmonary:      Effort: Pulmonary effort is normal.   Abdominal:      Palpations: Abdomen is soft.      Tenderness: There is no abdominal tenderness.      Comments: No guarding, point tenderness   Musculoskeletal:      Comments: Non tender over ribs, upper and lower extremities, and over cervical spine   Skin:     General: Skin is warm.        Additional Data:   Labs:  Results from last 7 days   Lab Units 09/11/24  0453   WBC Thousand/uL 8.97   HEMOGLOBIN g/dL 13.5   HEMATOCRIT % 40.4   PLATELETS Thousands/uL 106*   SEGS PCT % 73   LYMPHO PCT % 17   MONO PCT % 8   EOS PCT % 1     Results from last 7 days   Lab Units 09/11/24  0453 09/10/24  1913   SODIUM mmol/L 145 140   POTASSIUM mmol/L 3.5 4.2   CHLORIDE mmol/L 109* 104   CO2 mmol/L 27 29   BUN mg/dL 20 23   CREATININE mg/dL 0.94 1.11   ANION GAP mmol/L 9 7   CALCIUM mg/dL 9.0 8.9   ALBUMIN g/dL  --  4.0   TOTAL BILIRUBIN mg/dL  --  0.50   ALK PHOS U/L  --  79   ALT U/L  --  8   AST U/L  --  13   GLUCOSE RANDOM mg/dL 136 118                       Recent Cultures (last 7 days):         Imaging: CTA head and neck with and without contrast  Narrative: CTA NECK AND BRAIN WITH AND WITHOUT CONTRAST    INDICATION: vertigo    COMPARISON:   None.    TECHNIQUE:  Routine CT imaging of the Brain without contrast.Post contrast  imaging was performed after administration of iodinated contrast through the neck and brain. Post contrast axial 0.625 mm images timed to opacify the arterial system.  3D   rendering was performed on an independent workstation.   MIP reconstructions performed. Coronal and sagittal reconstructions were performed of the non contrast portion of the brain.    Radiation dose length product (DLP) for this visit:  2176 mGy-cm .  This examination, like all CT scans performed in the [facility], was performed utilizing techniques to minimize radiation dose exposure, including the use of iterative   reconstruction and automated exposure control.    IV Contrast:  85 mL of iohexol (OMNIPAQUE)    IMAGE QUALITY:   Diagnostic    FINDINGS:  NONCONTRAST BRAIN  PARENCHYMA:  Cerebral volume loss. Decreased attenuation is noted in periventricular and subcortical white matter demonstrating an appearance that is statistically most likely to represent moderate to severe microangiopathic change.    No CT signs of acute infarction.  No intracranial mass, mass effect or midline shift.  No acute parenchymal hemorrhage.    VENTRICLES AND EXTRA-AXIAL SPACES:Normal for the patient's age.    VISUALIZED ORBITS: Normal.    PARANASAL SINUSES: Normal.    CTA NECK  ARCH AND GREAT VESSELS:  Atherosclerotic changes. Great vessel origins are patent without severe stenosis. There is mild atherosclerotic narrowing at the origin and proximal left subclavian artery.  RIGHT VERTEBRAL ARTERY: Patent origin. Extracranial segments are patent.  LEFT VERTEBRAL ARTERY: Mild atherosclerotic stenosis at the origin.  Patent extracranial segments.  RIGHT CAROTID: Partially calcified atherosclerotic plaque primarily involving the carotid bifurcation. There is mild (less than 50%) ICA stenosis.  LEFT CAROTID: Partially calcified atherosclerotic plaque primarily involving the carotid bifurcation. There is mild (less than 50%) ICA stenosis.  NASCET criteria  was used to determine the degree of internal carotid artery diameter stenosis.    CTA BRAIN:  INTERNAL CAROTID ARTERIES: Atherosclerotic changes involving bilateral intracranial internal carotid arteries. No significant stenosis.  ANTERIOR CIRCULATION:  No significant stenosis.  MIDDLE CEREBRAL ARTERY CIRCULATION: No significant stenosis.  DISTAL VERTEBRAL ARTERIES: No significant stenosis.   Patent right PICA origin. Left PICA origin is not well seen with suggested common origin AICA/PICA.  BASILAR ARTERY:No significant stenosis.  POSTERIOR CEREBRAL ARTERIES: No high-grade stenosis.    Hypoplastic right and small left P1 segments with persistent fetal origin of posterior cerebral arteries (normal anatomic variant.  VENOUS STRUCTURES:  Normal.    NON VASCULAR ANATOMY  BONY STRUCTURES:  No acute osseous abnormality. 0.8 cm lucent lesion with thin trabeculae within the C6 vertebral body seen on series 603 image 64.    SOFT TISSUES OF THE NECK: Multinodular thyroid. Correlate with prior thyroid work-up/ultrasound    THORACIC INLET: Cavitating lesion in the subpleural right upper lobe measuring 2.6 x 1.9 x 2.3 cm is grossly stable from CT 5/17/2024 (series 304 image 20). 0.9 cm right upper lobe nodule is also stable (series 305 image 7)  Impression: CT Brain:    No acute intracranial CT abnormality.  Chronic microangiopathic change.    CT Angiography:    Patent major vessels of the [place] without high-grade stenosis.  No significant stenosis in the cervical carotid or vertebral arteries.    Other:    2.6 cm right upper lobe subpleural cavitating pulmonary lesion and 0.9 cm right  upper lobe nodule noted in the partially imaged lungs are stable from CT  5/17/2024.    0.8 cm lucent lesion with thin trabeculae within the C6 vertebral body likely  representing hemangioma. Correlate with prior outside imaging for stability.    Workstation performed: [id number]      Mobility:      -[facility] Goal NOT achieved. Continue  with multidisciplinary rounding and encourage appropriate mobility to improve upon -[facility] goals.    Last 24 Hours Medication List:   Current Facility-Administered Medications   Medication Dose Route Frequency Provider Last Rate    acetaminophen  650 mg Oral Q8H Counts include 234 beds at the [facility] Mary Rangel MD      aspirin  81 mg Oral Daily Mary Rangel MD      atorvastatin  20 mg Oral Daily Mary Rangel MD      bisacodyl  10 mg Rectal Daily PRN Mary Rangel MD      cefTRIAXone  2,000 mg Intravenous Q24H Mary Rangel MD      docusate sodium  100 mg Oral BID Mary Rangel MD      enoxaparin  40 mg Subcutaneous Daily Mary Rangel MD      famotidine  20 mg Oral Daily Mary Rangel MD      fluticasone  1 spray Each Nare Daily Mary Rangel MD      gabapentin  100 mg Oral BID Mary Rangel MD      melatonin  3 mg Oral HS Mary Rangel MD      multi-electrolyte  75 mL/hr Intravenous Continuous Mary Rangel MD 75 mL/hr (09/10/24 1092)    OLANZapine  2.5 mg Intramuscular Q6H PRN Mary Rangel MD      polyethylene glycol  17 g Oral Daily Mary Rangel MD      senna  17.2 mg Oral BID Mary Rangel MD         Lines/Drains:  Invasive Devices       Peripheral Intravenous Line  Duration             Peripheral IV 09/10/24 Proximal;Right;Ventral (anterior) Forearm <1 day                      Telemetry:  Telemetry Orders (From admission, onward)               24 Hour Telemetry Monitoring  Continuous x 24 Hours (Telem)        Question:  Reason for 24 Hour Telemetry  Answer:  TIA/Suspected CVA/ Confirmed CVA                     Telemetry Reviewed: Normal Sinus Rhythm  Indication for Continued Telemetry Use: No indication for continued use. Will discontinue.              Patient Centered Rounds: I evaluated the patient without nursing staff present due to availbility  Discussions with Specialists or Other Care Team Provider: Geriatrics  Education and Discussions with Family / Patient: Attempted to update  (daughter) via phone. Left voicemail.      Today, Patient Was Seen  By: Mel Levi MD    **Please Note: This note may have been constructed using a voice recognition system.**

## 2024-09-11 NOTE — PLAN OF CARE
Problem: OCCUPATIONAL THERAPY ADULT  Goal: Performs self-care activities at highest level of function for planned discharge setting.  See evaluation for individualized goals.  Description: Treatment Interventions: ADL retraining, Functional transfer training, Endurance training, Patient/family training, Cognitive reorientation, Equipment evaluation/education, Compensatory technique education, Energy conservation, Activityengagement          See flowsheet documentation for full assessment, interventions and recommendations.   Note: Limitation: Decreased ADL status, Decreased Safe judgement during ADL, Decreased cognition, Decreased endurance, Decreased self-care trans, Decreased high-level ADLs (impaired balance, fxnl mobility, act micaela, fxnl reach, standing micaela, strength, attention to task, direction following, safety awareness, insight, pacing, problem solving, learning new tasks, initiation of conversation, speech, response time)  Prognosis: Good  Assessment: Pt is a 89 y.o. female seen for OT evaluation s/p admission to [facility] on 9/10/2024 due to AMS + dizziness + fall. Diagnosed with Altered mental status. Personal and env factors supporting pt at time of IE include supportive daughter + [facility]A and [employer]. 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 Performance deficits that affect the pt’s occupational performance can be seen above. Due to pt's current functional limitations and medical complications pt is functioning below baseline. Pt would benefit from continued skilled OT treatment in order to maximize safety, independence and overall performance with ADLs, functional mobility, functional transfers, and cognition in order to achieve highest level of function.     Rehab Resource Intensity Level, OT: II (Moderate Resource Intensity)

## 2024-09-11 NOTE — ASSESSMENT & PLAN NOTE
Patient uses a cane for ambulation at baseline  She presents to the hospital after recent fall out of bed where she hit her lower back on the floor.  Monitor orthostatic vital signs  Encourage p.o. hydration  Avoid hypotension and hypoglycemia

## 2024-09-11 NOTE — ED NOTES
Patient ripped out IV and attempted to throw bed pan at staff. Patient now grabbing and hitting staff. 2 point soft restraints also ordered for patient. Virtual monitor also in place per provider order.     Ana Lorenzo RN  09/10/24 6328

## 2024-09-11 NOTE — ASSESSMENT & PLAN NOTE
Follows with cardiology outpatient for symptomatic bradycardia, sick sinus syndrome    Plan  - Telemetry and continue to monitor clinically

## 2024-09-11 NOTE — ASSESSMENT & PLAN NOTE
Previously on Aricept and Namenda which has been discontinued  Fall/safety precautions  Maintain sleep/wake cycle  Delirium precautions  Fall precautions  Avoid medications such as tramadol, benzodiazepines, anticholinergics, benadryl  Per family member: daughter patient can occasionally became combative usually only oriented to self.  Appreciate Geriatric input  Ensure pain control, tylenol 975 mg TID, lidocaine patch   Normal sleep wake cycle - melatonin 3 mg qHS  Assist with feeding as needed  Zyprexa 2.5 mg IM q8H PRN for acute agitation  Start Cymbalta 20 mg daily

## 2024-09-12 PROBLEM — E46 MALNUTRITION (HCC): Status: RESOLVED | Noted: 2024-09-11 | Resolved: 2024-09-12

## 2024-09-12 LAB
ANION GAP SERPL CALCULATED.3IONS-SCNC: 9 MMOL/L (ref 4–13)
BUN SERPL-MCNC: 23 MG/DL (ref 5–25)
CALCIUM SERPL-MCNC: 9 MG/DL (ref 8.4–10.2)
CHLORIDE SERPL-SCNC: 106 MMOL/L (ref 96–108)
CO2 SERPL-SCNC: 28 MMOL/L (ref 21–32)
CREAT SERPL-MCNC: 0.93 MG/DL (ref 0.6–1.3)
ERYTHROCYTE [DISTWIDTH] IN BLOOD BY AUTOMATED COUNT: 14.9 % (ref 11.6–15.1)
GFR SERPL CREATININE-BSD FRML MDRD: 54 ML/MIN/1.73SQ M
GLUCOSE SERPL-MCNC: 113 MG/DL (ref 65–140)
HCT VFR BLD AUTO: 40.6 % (ref 34.8–46.1)
HGB BLD-MCNC: 13.7 G/DL (ref 11.5–15.4)
MCH RBC QN AUTO: 32.4 PG (ref 26.8–34.3)
MCHC RBC AUTO-ENTMCNC: 33.7 G/DL (ref 31.4–37.4)
MCV RBC AUTO: 96 FL (ref 82–98)
PLATELET # BLD AUTO: 115 THOUSANDS/UL (ref 149–390)
PMV BLD AUTO: 9.9 FL (ref 8.9–12.7)
POTASSIUM SERPL-SCNC: 4 MMOL/L (ref 3.5–5.3)
RBC # BLD AUTO: 4.23 MILLION/UL (ref 3.81–5.12)
SODIUM SERPL-SCNC: 143 MMOL/L (ref 135–147)
TSH SERPL DL<=0.05 MIU/L-ACNC: 1.91 UIU/ML (ref 0.45–4.5)
VIT B12 SERPL-MCNC: 497 PG/ML (ref 180–914)
WBC # BLD AUTO: 12.39 THOUSAND/UL (ref 4.31–10.16)

## 2024-09-12 PROCEDURE — 99232 SBSQ HOSP IP/OBS MODERATE 35: CPT | Performed by: INTERNAL MEDICINE

## 2024-09-12 PROCEDURE — 99232 SBSQ HOSP IP/OBS MODERATE 35: CPT | Performed by: FAMILY MEDICINE

## 2024-09-12 PROCEDURE — 80048 BASIC METABOLIC PNL TOTAL CA: CPT

## 2024-09-12 PROCEDURE — 84443 ASSAY THYROID STIM HORMONE: CPT

## 2024-09-12 PROCEDURE — 82607 VITAMIN B-12: CPT

## 2024-09-12 PROCEDURE — 85027 COMPLETE CBC AUTOMATED: CPT

## 2024-09-12 RX ADMIN — ATORVASTATIN CALCIUM 20 MG: 40 TABLET, FILM COATED ORAL at 09:39

## 2024-09-12 RX ADMIN — GABAPENTIN 100 MG: 100 CAPSULE ORAL at 09:39

## 2024-09-12 RX ADMIN — FLUTICASONE PROPIONATE 1 SPRAY: 50 SPRAY, METERED NASAL at 11:46

## 2024-09-12 RX ADMIN — ACETAMINOPHEN 975 MG: 325 TABLET ORAL at 20:25

## 2024-09-12 RX ADMIN — Medication 3 MG: at 20:26

## 2024-09-12 RX ADMIN — POLYETHYLENE GLYCOL 3350 17 G: 17 POWDER, FOR SOLUTION ORAL at 09:39

## 2024-09-12 RX ADMIN — DULOXETINE HYDROCHLORIDE 20 MG: 20 CAPSULE, DELAYED RELEASE ORAL at 11:46

## 2024-09-12 RX ADMIN — ENOXAPARIN SODIUM 40 MG: 40 INJECTION SUBCUTANEOUS at 09:38

## 2024-09-12 RX ADMIN — CEFTRIAXONE SODIUM 1000 MG: 10 INJECTION, POWDER, FOR SOLUTION INTRAVENOUS at 20:26

## 2024-09-12 RX ADMIN — ASPIRIN 81 MG 81 MG: 81 TABLET ORAL at 09:40

## 2024-09-12 RX ADMIN — ACETAMINOPHEN 975 MG: 325 TABLET ORAL at 09:40

## 2024-09-12 RX ADMIN — GABAPENTIN 100 MG: 100 CAPSULE ORAL at 17:34

## 2024-09-12 RX ADMIN — ACETAMINOPHEN 975 MG: 325 TABLET ORAL at 17:34

## 2024-09-12 RX ADMIN — FAMOTIDINE 20 MG: 20 TABLET ORAL at 09:39

## 2024-09-12 RX ADMIN — LIDOCAINE 1 PATCH: 700 PATCH TOPICAL at 09:38

## 2024-09-12 NOTE — PROGRESS NOTES
"Progress Note - Geriatric Medicine   Name: Berta Estrada 89 y.o. female I MRN: [id number]  Unit/Bed#: S -[facility] I Date of Admission: 9/10/2024   Date of Service: 9/12/2024 I Hospital Day: 2     Assessment & Plan  Altered mental status  See delirium assessment   Stage 3a chronic kidney disease (HCC)  Lab Results   Component Value Date    EGFR 53 09/11/2024    EGFR 44 09/10/2024    EGFR 64 05/19/2024    CREATININE 0.94 09/11/2024    CREATININE 1.11 09/10/2024    CREATININE 0.81 05/19/2024       Creatinine stable.  Will avoid nephrotoxic medication.  Encourage po hydration.  Will monitor BMP.   Constipation  Daughter states that the patient has not had a bowel movement in about 3 days and has chronic constipation.  She has received senna, MiraLAX, Dulcolax suppository, and Colace here. Continue to monitor and give MiraLax as needed.  Suspected malignant neoplasm of lung    UTI (urinary tract infection)  UA on admission showed large leukocytes.  She presented with altered mental status and agitation worse than her baseline state.   Patient is being treated with Rocephin IV.  Managed per primary team.  Alzheimer's dementia, late onset (HCC)  Pt has Alzheimer's dementia with behavioral disturbance and is normally alert and oriented x1 to self but not place or time at baseline.    Currently alert and oriented x 0.  CT head shows moderate to severe chronic microangiopathic change.  Patient needs assistance with all ADLs and IADLs.  She developed an acute worsening of confusion about x2 days ago. See \"delirium\"  Continue sleep-wake cycle  Encourage family to visit  [place] as needed and provide supportive care  Check B12 and TSH  Back pain  Pt has low back pain secondary to degenerative disc disease and recently hit her lower back on the floor when sliding out of bed. The pain is likely exacerbating agitation and delirium.   Can apply lidocaine patch as needed to affected area.  Continue gabapentin 100 mg twice " daily.  Increase Tylenol to 975 mg 3 times daily  Continue PT OT  Ambulatory dysfunction  Patient uses a cane for ambulation at baseline  She presents to the hospital after recent fall out of bed where she hit her lower back on the floor.  Monitor orthostatic vital signs  Encourage p.o. hydration  Avoid hypotension and hypoglycemia     Subjective:   Patient seen in follow-up today and seems to be doing better than yesterday.  She was alert and oriented to person and was aware that she is in the hospital.  She was not oriented to time, which is normal for her.    The patient's daughter was in the room, who told me that the patient had some difficulty sleeping last night, but has been drinking her protein shakes and having improved behaviors from admission with decreased agitation and hyperactivity.  Patient does still complain of dizziness occasionally when sitting up.  She denies any pain currently.    Review of Systems   Unable to perform ROS: Dementia   Musculoskeletal:  Positive for arthralgias.         Objective:     Vitals: Blood pressure 117/72, pulse 81, temperature 99.9 °F (37.7 °C), temperature source Oral, resp. rate 18, SpO2 95%.,There is no height or weight on file to calculate BMI.      Intake/Output Summary (Last 24 hours) at 9/12/2024 1415  Last data filed at 9/12/2024 0940  Gross per 24 hour   Intake 240 ml   Output --   Net 240 ml       Current Medications: Reviewed    Physical Exam:   Physical Exam  Vitals and nursing note reviewed.   Constitutional:       Appearance: Normal appearance.   HENT:      Head: Normocephalic.      Nose: Nose normal.      Mouth/Throat:      Mouth: Mucous membranes are dry.   Eyes:      Extraocular Movements: Extraocular movements intact.   Cardiovascular:      Rate and Rhythm: Normal rate and regular rhythm.      Pulses: Normal pulses.      Heart sounds: Normal heart sounds. No murmur heard.  Pulmonary:      Effort: Pulmonary effort is normal.      Breath sounds: Normal  breath sounds. No wheezing, rhonchi or rales.   Abdominal:      General: Abdomen is flat.   Musculoskeletal:         General: Normal range of motion.      Cervical back: Normal range of motion.   Skin:     General: Skin is warm and dry.      Capillary Refill: Capillary refill takes less than 2 seconds.   Neurological:      General: No focal deficit present.      Mental Status: She is alert. Mental status is at baseline. She is disoriented.      Comments: A/O x 1-2.  Alert and oriented to person, is aware that she is in a hospital, disoriented to time.   Psychiatric:         Mood and Affect: Mood normal.          Invasive Devices       Peripheral Intravenous Line  Duration             Peripheral IV 09/10/24 Proximal;Right;Ventral (anterior) Forearm 1 day                    Lab, Imaging and other studies: Reviewed radiology reports from this admission including: CT head.

## 2024-09-12 NOTE — ASSESSMENT & PLAN NOTE
Pmhx of alzheimer's dementia, with behavior issues. Likely multifactorial, poor po intake, sundowning, back pain, and UTI   - appreciate Geriatric's input  - delirium precautions  - pt/ot  - continue to treat UTI, see problem UTI

## 2024-09-12 NOTE — CASE MANAGEMENT
DCS received task from CM to submit SNF auth, however, therapy notes are required to be within 48 hours of requested SOC date. At this time, updated PT notes are needed prior to submitting auth request to insurance. CM advised to request updated therapy notes and re-task DCS once requested therapy notes are in chart.     CM notified:  juliette harris

## 2024-09-12 NOTE — PLAN OF CARE

## 2024-09-12 NOTE — PROGRESS NOTES
Progress Note - Hospitalist   Name: Berta Estrada 89 y.o. female I MRN: [id number]  Unit/Bed#: S -01 I Date of Admission: 9/10/2024   Date of Service: 9/12/2024 I Hospital Day: 2    Assessment & Plan  Altered mental status  Pmhx of alzheimer's dementia, with behavior issues. Likely multifactorial, poor po intake, sundowning, back pain, and UTI   - appreciate Geriatric's input  - delirium precautions  - pt/ot  - continue to treat UTI, see problem UTI  UTI (urinary tract infection)  Pmhx: No significant history of UTI, culture on file 5/12/24: shows contaminant  UA positive for leukocytosis, occasional bacteria, hyaline cast  Urine culture growing gram negative rods,likely ecoli    Will continue course of rocephin pending susceptibility     Plan:  Continue Rocephin 1g daily  Delirium precautions  Fall precautions  Urinary retention protocol  PT/OT evaluation  Trend temperatures, monitor vitals   Alzheimer's dementia, late onset (HCC)  Previously on Aricept and Namenda which has been discontinued  Fall/safety precautions  Maintain sleep/wake cycle  Delirium precautions  Fall precautions  Avoid medications such as tramadol, benzodiazepines, anticholinergics, benadryl  Per family member: daughter patient can occasionally became combative usually only oriented to self.  Appreciate Geriatric input  Ensure pain control, tylenol 975 mg TID, lidocaine patch   Normal sleep wake cycle - melatonin 3 mg qHS  Assist with feeding as needed  Zyprexa 2.5 mg IM q8H PRN for acute agitation  Start Cymbalta 20 mg daily   Other hyperlipidemia  Continue patient on Lipitor 20 mg daily  GERD (gastroesophageal reflux disease)  Continue patient on Pepcid 20 mg daily  Constipation  Patient has a history of chronic constipation    Continue Senokot, Colace and MiraLAX daily  Encourage p.o. hydration  History of permanent cardiac pacemaker placement  Follows with cardiology outpatient for symptomatic bradycardia, sick sinus  syndrome    Plan  - Telemetry and continue to monitor clinically  Suspected malignant neoplasm of lung  Seen by pulmonology on 2024  Likely malignant in nature but biopsy declined by daughter given age and dementia  Expect natural course of lung cancer to take hold and transition to hospice when appropriate-daughter in agreement  Stage 3a chronic kidney disease (HCC)  Lab Results   Component Value Date    EGFR 53 2024    EGFR 44 09/10/2024    EGFR 64 2024    CREATININE 0.94 2024    CREATININE 1.11 09/10/2024    CREATININE 0.81 2024     Back pain  C/o of lower back pain    Plan:   Tylenol 975 TID  Lidocaine patch  Ambulatory dysfunction  PT/OT evaluation    Current Length of Stay: 2 day(s)  Current Patient Status: Inpatient   Code Status: Level 3 - DNAR and DNI      Discharge Plan: Disposition:  Expected date of discharge: pending authorization  Dispo destination: Placement  Indwelling drains/lines: na  DME needs: na  HH needs: na  F/U appts: PCP  Preferred pharmacy: facility  Meds: na  Transportation: facility    Subjective:   Overnight: No acute events over night     Complaints: Aphasic      Diet: Diet Regular; Regular House   Bowel regimen:       VTE Pharmacologic Prophylaxis:   High Risk (Score >/= 5) - Pharmacological DVT Prophylaxis Ordered: enoxaparin (Lovenox). Sequential Compression Devices Ordered.    Objective:    Vitals:   Temp (24hrs), Av.8 °F (36.6 °C), Min:97.6 °F (36.4 °C), Max:98 °F (36.7 °C)    Temp:  [97.6 °F (36.4 °C)-98 °F (36.7 °C)] 98 °F (36.7 °C)  HR:  [] 86  Resp:  [16] 16  BP: ()/() 150/83  SpO2:  [97 %-98 %] 98 %  Input and Output Summary (last 24 hours):     Intake/Output Summary (Last 24 hours) at 2024 0638  Last data filed at 2024 0918  Gross per 24 hour   Intake 776.25 ml   Output 30 ml   Net 746.25 ml     Physical Exam:   Physical Exam  Constitutional:       General: She is not in acute distress.     Comments: Alert, tracks  stimuli across room, no orientation, aphasic with language comprehension   HENT:      Head: Normocephalic and atraumatic.   Cardiovascular:      Rate and Rhythm: Normal rate and regular rhythm.      Heart sounds: Normal heart sounds.   Pulmonary:      Effort: Pulmonary effort is normal.   Abdominal:      Palpations: Abdomen is soft.      Tenderness: There is no abdominal tenderness.      Comments: No guarding, point tenderness   Skin:     General: Skin is warm.        Additional Data:   Labs:  Results from last 7 days   Lab Units 09/11/24  0453   WBC Thousand/uL 8.97   HEMOGLOBIN g/dL 13.5   HEMATOCRIT % 40.4   PLATELETS Thousands/uL 106*   SEGS PCT % 73   LYMPHO PCT % 17   MONO PCT % 8   EOS PCT % 1     Results from last 7 days   Lab Units 09/11/24  0453 09/10/24  1913   SODIUM mmol/L 145 140   POTASSIUM mmol/L 3.5 4.2   CHLORIDE mmol/L 109* 104   CO2 mmol/L 27 29   BUN mg/dL 20 23   CREATININE mg/dL 0.94 1.11   ANION GAP mmol/L 9 7   CALCIUM mg/dL 9.0 8.9   ALBUMIN g/dL  --  4.0   TOTAL BILIRUBIN mg/dL  --  0.50   ALK PHOS U/L  --  79   ALT U/L  --  8   AST U/L  --  13   GLUCOSE RANDOM mg/dL 136 118                       Recent Cultures (last 7 days):  I have reviewed the following results: CBC, BMP        Imaging:   Imaging Review: I have reviewed all the imaging on file  Other Studies: none  CTA head and neck with and without contrast  Narrative: CTA NECK AND BRAIN WITH AND WITHOUT CONTRAST    INDICATION: vertigo    COMPARISON:   None.    TECHNIQUE:  Routine CT imaging of the Brain without contrast.Post contrast imaging was performed after administration of iodinated contrast through the neck and brain. Post contrast axial 0.625 mm images timed to opacify the arterial system.  3D   rendering was performed on an independent workstation.   MIP reconstructions performed. Coronal and sagittal reconstructions were performed of the non contrast portion of the brain.    Radiation dose length product (DLP) for this  visit:  2176 mGy-cm .  This examination, like all CT scans performed in the [facility], was performed utilizing techniques to minimize radiation dose exposure, including the use of iterative   reconstruction and automated exposure control.    IV Contrast:  85 mL of iohexol (OMNIPAQUE)    IMAGE QUALITY:   Diagnostic    FINDINGS:  NONCONTRAST BRAIN  PARENCHYMA:  Cerebral volume loss. Decreased attenuation is noted in periventricular and subcortical white matter demonstrating an appearance that is statistically most likely to represent moderate to severe microangiopathic change.    No CT signs of acute infarction.  No intracranial mass, mass effect or midline shift.  No acute parenchymal hemorrhage.    VENTRICLES AND EXTRA-AXIAL SPACES:Normal for the patient's age.    VISUALIZED ORBITS: Normal.    PARANASAL SINUSES: Normal.    CTA NECK  ARCH AND GREAT VESSELS:  Atherosclerotic changes. Great vessel origins are patent without severe stenosis. There is mild atherosclerotic narrowing at the origin and proximal left subclavian artery.  RIGHT VERTEBRAL ARTERY: Patent origin. Extracranial segments are patent.  LEFT VERTEBRAL ARTERY: Mild atherosclerotic stenosis at the origin.  Patent extracranial segments.  RIGHT CAROTID: Partially calcified atherosclerotic plaque primarily involving the carotid bifurcation. There is mild (less than 50%) ICA stenosis.  LEFT CAROTID: Partially calcified atherosclerotic plaque primarily involving the carotid bifurcation. There is mild (less than 50%) ICA stenosis.  NASCET criteria was used to determine the degree of internal carotid artery diameter stenosis.    CTA BRAIN:  INTERNAL CAROTID ARTERIES: Atherosclerotic changes involving bilateral intracranial internal carotid arteries. No significant stenosis.  ANTERIOR CIRCULATION:  No significant stenosis.  MIDDLE CEREBRAL ARTERY CIRCULATION: No significant stenosis.  DISTAL VERTEBRAL ARTERIES: No significant stenosis.   Patent  right PICA origin. Left PICA origin is not well seen with suggested common origin AICA/PICA.  BASILAR ARTERY:No significant stenosis.  POSTERIOR CEREBRAL ARTERIES: No high-grade stenosis.    Hypoplastic right and small left P1 segments with persistent fetal origin of posterior cerebral arteries (normal anatomic variant.  VENOUS STRUCTURES:  Normal.    NON VASCULAR ANATOMY  BONY STRUCTURES:  No acute osseous abnormality. 0.8 cm lucent lesion with thin trabeculae within the C6 vertebral body seen on series 603 image 64.    SOFT TISSUES OF THE NECK: Multinodular thyroid. Correlate with prior thyroid work-up/ultrasound    THORACIC INLET: Cavitating lesion in the subpleural right upper lobe measuring 2.6 x 1.9 x 2.3 cm is grossly stable from CT 5/17/2024 (series 304 image 20). 0.9 cm right upper lobe nodule is also stable (series 305 image 7)  Impression: CT Brain:    No acute intracranial CT abnormality.  Chronic microangiopathic change.    CT Angiography:    Patent major vessels of the [place] without high-grade stenosis.  No significant stenosis in the cervical carotid or vertebral arteries.    Other:    2.6 cm right upper lobe subpleural cavitating pulmonary lesion and 0.9 cm right  upper lobe nodule noted in the partially imaged lungs are stable from CT  5/17/2024.    0.8 cm lucent lesion with thin trabeculae within the C6 vertebral body likely  representing hemangioma. Correlate with prior outside imaging for stability.    Workstation performed: [id number]      Mobility:   Basic Mobility Inpatient Raw Score: 8  -HL Goal: 3: Sit at edge of bed  JH-HLM Achieved: 4: Move to chair/commode  JH-HLM Goal achieved. Continue to encourage appropriate mobility.    Last 24 Hours Medication List:   Current Facility-Administered Medications   Medication Dose Route Frequency Provider Last Rate    acetaminophen  975 mg Oral TID Wanda Hodges MD      aspirin  81 mg Oral Daily Mary Rangel MD      atorvastatin  20 mg Oral  Daily Mary Rangel MD      bisacodyl  10 mg Rectal Daily PRN Mary Rangel MD      cefTRIAXone  2,000 mg Intravenous Q24H Mary Rangel MD 2,000 mg (24)    docusate sodium  100 mg Oral BID Mary Rangel MD      DULoxetine  20 mg Oral Daily Wanda Hodges MD      enoxaparin  40 mg Subcutaneous Daily Mary Rangel MD      famotidine  20 mg Oral Daily Mary Rangel MD      fluticasone  1 spray Each Nare Daily Mary Rangel MD      gabapentin  100 mg Oral BID Mary Rangel MD      lidocaine  1 patch Topical Daily Danyell Ngo MD      melatonin  3 mg Oral HS Wanda Hodges MD      OLANZapine  2.5 mg Intramuscular Q6H PRN Mary Rangel MD      polyethylene glycol  17 g Oral Daily Mary Rangel MD      senna  17.2 mg Oral BID Mary Rangel MD         Lines/Drains:  Invasive Devices       Peripheral Intravenous Line  Duration             Peripheral IV 09/10/24 Proximal;Right;Ventral (anterior) Forearm 1 day                      Telemetry:  Telemetry Orders (From admission, onward)               24 Hour Telemetry Monitoring  Continuous x 24 Hours (Telem)           Question:  Reason for 24 Hour Telemetry  Answer:  TIA/Suspected CVA/ Confirmed CVA                     Telemetry Reviewed:  Sinus josé miguel  Indication for Continued Telemetry Use: No indication for continued use. Will discontinue.              Patient Centered Rounds: I performed bedside rounds with nursing staff today.  Discussions with Specialists or Other Care Team Provider: Nursing  Education and Discussions with Family / Patient: Updated  (daughter) at bedside.     Today, Patient Was Seen By: Mel Levi MD  Administrative Statements   Today, Patient Was Seen By: Mel Levi MD  I have spent a total time of 35 minutes in caring for this patient on the day of the visit/encounter including Diagnostic results, Patient and family education, Impressions, Counseling / Coordination of care, Documenting in the medical record, Reviewing / ordering  tests, medicine, procedures  , Obtaining or reviewing history  , and Communicating with other healthcare professionals .    **Please Note: This note may have been constructed using a voice recognition system.**

## 2024-09-12 NOTE — ASSESSMENT & PLAN NOTE
Pmhx: No significant history of UTI, culture on file 5/12/24: shows contaminant  UA positive for leukocytosis, occasional bacteria, hyaline cast  Urine culture growing gram negative rods,likely ecoli    Will continue course of rocephin pending susceptibility     Plan:  Continue Rocephin 1g daily  Delirium precautions  Fall precautions  Urinary retention protocol  PT/OT evaluation  Trend temperatures, monitor vitals

## 2024-09-12 NOTE — CASE MANAGEMENT
Case Management Assessment & Discharge Planning Note    Patient name Berta FUCHS /S -01 MRN [id number]  : 1935 Date 2024       Current Admission Date: 9/10/2024  Current Admission Diagnosis:Altered mental status   Patient Active Problem List    Diagnosis Date Noted Date Diagnosed    Altered mental status 2024     Back pain 2024     Ambulatory dysfunction 2024     UTI (urinary tract infection) 09/10/2024     Alzheimer's dementia, late onset (HCC) 09/10/2024     Multiple pulmonary nodules 2024     Suspected malignant neoplasm of lung 2024     Urinary retention 2024     Abnormal CT scan 2024     Constipation 2024     History of permanent cardiac pacemaker placement 2024     Liver nodule 2024     Fall 2024     Closed fracture of ramus of right pubis (HCC) 2024     Mobitz type 1 second degree AV block 2024     Cervical radiculopathy 2022     Lumbar degenerative disc disease 2022     Other hemorrhoids 2021     Gait instability 2021     Vitamin D deficiency 2021     Closed fracture of left foot 2020     Wears hearing aid 2020     Stage 3a chronic kidney disease (HCC) 2019     Thrombocytopenia ([facility]) 2019     Essential hypertension 2019     GERD (gastroesophageal reflux disease) 2019     Postmenopausal HRT (hormone replacement therapy) 2019     Vitamin B12 deficiency 2019     COPD, mild (HCC) 2019     Pancreatic cyst 2019     S/P AVR (aortic valve replacement) 03/15/2019     Primary osteoarthritis involving multiple joints 03/15/2019     Other hyperlipidemia 03/15/2019     Allergic rhinitis 03/15/2019     Age-related osteoporosis without current pathological fracture 03/15/2019     Skin cancer 03/15/2019     Coronary artery disease involving native coronary artery of native heart 03/15/2019     Depression  04/27/2018     Late onset Alzheimer's disease without behavioral disturbance (HCC) 04/27/2018       LOS (days): 2  Geometric Mean LOS (GMLOS) (days): 2.9  Days to GMLOS:1.2     OBJECTIVE:    Risk of Unplanned Readmission Score: 19.58         Current admission status: Inpatient       Preferred Pharmacy:   [facility]/pharmacy #[id number] - [place], [place] - [address]  [address] [address]  Phone: [phone] Fax: [phone]    [place], [place] - [address]  [address] [address]  Phone: [phone] Fax: [phone]    Primary Care Provider: Kim Dewey MD    Primary Insurance: [employer]  Secondary Insurance:     ASSESSMENT:  Active Health Care Proxies       [facility] Representative - Daughter   Primary Phone: [phone] (Mobile)  Home Phone: [phone]                           Readmission Root Cause  30 Day Readmission: No    Patient Information  Admitted from:: Home  Mental Status: Confused  During Assessment patient was accompanied by: Daughter  Assessment information provided by:: Daughter  Primary Caregiver: Family  Caregiver's Name:: Hillary Estrada  Caregiver's Relationship to Patient:: Family Member  Caregiver's Telephone Number:: [phone]  Support Systems: Daughter  [place]idence[place]  What [facility] do you live in?: [place]  Living Arrangements: Lives w/ Daughter    Activities of Daily Living Prior to Admission  Functional Status: Total dependent  Completes ADLs independently?: No  Level of ADL dependence: Total Dependent  Ambulates independently?: No  Level of ambulatory dependence: Assistance  Does patient use assisted devices?: Yes  Assisted Devices (DME) used: Wheelchair, Shower Chair  Does patient currently own DME?: Yes  What DME does the patient currently own?: Shower Chair, Wheelchair  Does patient have a history of Outpatient Therapy (PT/OT)?: No  Does the patient have a  history of Short-Term Rehab?: Yes ([facility])         Patient Information Continued  Income Source: SSI/SSD  Does patient have prescription coverage?: Yes  Does patient receive dialysis treatments?: No  Does patient have a history of substance abuse?: No  Does patient have a history of Mental Health Diagnosis?: No         Means of Transportation  Means of Transport to Appts:: Family transport      Social Determinants of Health (SDOH)      Flowsheet Row Most Recent Value   Housing Stability    In the last 12 months, was there a time when you were not able to pay the mortgage or rent on time? N   In the past 12 months, how many times have you moved where you were living? 0   At any time in the past 12 months, were you homeless or living in a shelter (including now)? N   Transportation Needs    In the past 12 months, has lack of transportation kept you from medical appointments or from getting medications? no   In the past 12 months, has lack of transportation kept you from meetings, work, or from getting things needed for daily living? No   Food Insecurity    Within the past 12 months, you worried that your food would run out before you got the money to buy more. Never true   Within the past 12 months, the food you bought just didn't last and you didn't have money to get more. Never true   Utilities    In the past 12 months has the electric, gas, oil, or water company threatened to shut off services in your home? No            DISCHARGE DETAILS:    Discharge planning discussed with:: Patients daughter at bedside           Were Treatment Team discharge recommendations reviewed with patient/caregiver?: Yes  Did patient/caregiver verbalize understanding of patient care needs?: Yes  Were patient/caregiver advised of the risks associated with not following Treatment Team discharge recommendations?: Yes    Contacts  Patient Contacts: Hillary Estrada  Relationship to Patient:: Family  Contact Method: In  Person  Reason/Outcome: Continuity of Care, Discharge Planning, Referral    Other Referral/Resources/Interventions Provided:  Referral Comments: [facility] referrals submitted in Aidin    CM met w/ Pt and her daughter Hillary at bedside. Hillary states that she is the Pts primary caregiver at home. Pt has visting Aides that come in 1 hour/day to help her get bathed and dressed for the day. [facility].  Pt requires assistance w/ all ADLS and uses a WC at baseline.  Pt is rec'd for STR. Pts family wants STR to LTC placement. [place] is first choice, but family agreeable to blanket referrals.  [place] [facility] reserved in Aidin.

## 2024-09-13 LAB
ANION GAP SERPL CALCULATED.3IONS-SCNC: 7 MMOL/L (ref 4–13)
BACTERIA UR CULT: ABNORMAL
BUN SERPL-MCNC: 28 MG/DL (ref 5–25)
CALCIUM SERPL-MCNC: 8.8 MG/DL (ref 8.4–10.2)
CHLORIDE SERPL-SCNC: 107 MMOL/L (ref 96–108)
CO2 SERPL-SCNC: 28 MMOL/L (ref 21–32)
CREAT SERPL-MCNC: 0.86 MG/DL (ref 0.6–1.3)
ERYTHROCYTE [DISTWIDTH] IN BLOOD BY AUTOMATED COUNT: 14.9 % (ref 11.6–15.1)
GFR SERPL CREATININE-BSD FRML MDRD: 60 ML/MIN/1.73SQ M
GLUCOSE SERPL-MCNC: 115 MG/DL (ref 65–140)
GLUCOSE SERPL-MCNC: 160 MG/DL (ref 65–140)
HCT VFR BLD AUTO: 37.5 % (ref 34.8–46.1)
HGB BLD-MCNC: 12.4 G/DL (ref 11.5–15.4)
MCH RBC QN AUTO: 31.7 PG (ref 26.8–34.3)
MCHC RBC AUTO-ENTMCNC: 33.1 G/DL (ref 31.4–37.4)
MCV RBC AUTO: 96 FL (ref 82–98)
PLATELET # BLD AUTO: 98 THOUSANDS/UL (ref 149–390)
PMV BLD AUTO: 9.9 FL (ref 8.9–12.7)
POTASSIUM SERPL-SCNC: 4 MMOL/L (ref 3.5–5.3)
RBC # BLD AUTO: 3.91 MILLION/UL (ref 3.81–5.12)
SODIUM SERPL-SCNC: 142 MMOL/L (ref 135–147)
WBC # BLD AUTO: 10.56 THOUSAND/UL (ref 4.31–10.16)

## 2024-09-13 PROCEDURE — 99232 SBSQ HOSP IP/OBS MODERATE 35: CPT | Performed by: INTERNAL MEDICINE

## 2024-09-13 PROCEDURE — 97530 THERAPEUTIC ACTIVITIES: CPT

## 2024-09-13 PROCEDURE — 80048 BASIC METABOLIC PNL TOTAL CA: CPT

## 2024-09-13 PROCEDURE — 97116 GAIT TRAINING THERAPY: CPT

## 2024-09-13 PROCEDURE — 97535 SELF CARE MNGMENT TRAINING: CPT

## 2024-09-13 PROCEDURE — 85027 COMPLETE CBC AUTOMATED: CPT

## 2024-09-13 PROCEDURE — 99232 SBSQ HOSP IP/OBS MODERATE 35: CPT | Performed by: FAMILY MEDICINE

## 2024-09-13 PROCEDURE — 82948 REAGENT STRIP/BLOOD GLUCOSE: CPT

## 2024-09-13 RX ORDER — CEPHALEXIN 500 MG/1
500 CAPSULE ORAL EVERY 12 HOURS SCHEDULED
Status: COMPLETED | OUTPATIENT
Start: 2024-09-14 | End: 2024-09-14

## 2024-09-13 RX ADMIN — ACETAMINOPHEN 975 MG: 325 TABLET ORAL at 09:37

## 2024-09-13 RX ADMIN — DULOXETINE HYDROCHLORIDE 20 MG: 20 CAPSULE, DELAYED RELEASE ORAL at 09:43

## 2024-09-13 RX ADMIN — Medication 3 MG: at 21:07

## 2024-09-13 RX ADMIN — DOCUSATE SODIUM 100 MG: 100 CAPSULE, LIQUID FILLED ORAL at 09:37

## 2024-09-13 RX ADMIN — SENNOSIDES 17.2 MG: 8.6 TABLET, FILM COATED ORAL at 17:29

## 2024-09-13 RX ADMIN — ATORVASTATIN CALCIUM 20 MG: 40 TABLET, FILM COATED ORAL at 09:37

## 2024-09-13 RX ADMIN — GABAPENTIN 100 MG: 100 CAPSULE ORAL at 17:30

## 2024-09-13 RX ADMIN — GABAPENTIN 100 MG: 100 CAPSULE ORAL at 09:38

## 2024-09-13 RX ADMIN — FAMOTIDINE 20 MG: 20 TABLET ORAL at 09:37

## 2024-09-13 RX ADMIN — CEFTRIAXONE SODIUM 1000 MG: 10 INJECTION, POWDER, FOR SOLUTION INTRAVENOUS at 21:08

## 2024-09-13 RX ADMIN — LIDOCAINE 1 PATCH: 700 PATCH TOPICAL at 09:44

## 2024-09-13 RX ADMIN — ACETAMINOPHEN 975 MG: 325 TABLET ORAL at 17:29

## 2024-09-13 RX ADMIN — ENOXAPARIN SODIUM 40 MG: 40 INJECTION SUBCUTANEOUS at 09:37

## 2024-09-13 RX ADMIN — ASPIRIN 81 MG 81 MG: 81 TABLET ORAL at 09:37

## 2024-09-13 RX ADMIN — SENNOSIDES 17.2 MG: 8.6 TABLET, FILM COATED ORAL at 09:37

## 2024-09-13 RX ADMIN — POLYETHYLENE GLYCOL 3350 17 G: 17 POWDER, FOR SOLUTION ORAL at 09:38

## 2024-09-13 NOTE — PLAN OF CARE
Problem: OCCUPATIONAL THERAPY ADULT  Goal: Performs self-care activities at highest level of function for planned discharge setting.  See evaluation for individualized goals.  Description: Treatment Interventions: ADL retraining, Functional transfer training, Endurance training, Patient/family training, Cognitive reorientation, Equipment evaluation/education, Compensatory technique education, Energy conservation, Activityengagement          See flowsheet documentation for full assessment, interventions and recommendations.   Outcome: Progressing  Note: Limitation: Decreased ADL status, Decreased Safe judgement during ADL, Decreased cognition, Decreased endurance, Decreased self-care trans, Decreased high-level ADLs (impaired balance, fxnl mobility, act micaela, fxnl reach, standing micaela, strength, attention to task, direction following, safety awareness, insight, pacing, problem solving, learning new tasks, initiation of conversation, speech, response time)  Prognosis: Good  Assessment: Pt is seen for OT treatment session including interventions to: increase independence with self care tasks, improve functional transfers with fall prevention strategies, increase activity tolerance and endurance.  Compared to previous session, pt demonstrating significant improvements in all areas.  Pt now able to complete self care and ambulate with rw with Ax1.  Pt continues to be very confused, and continues to function significantly below her baseline.  From an OT standpoint, continue to recommend level II rehab resources upon d/c.  Recommendation: Geriatric Consult  Rehab Resource Intensity Level, OT: II (Moderate Resource Intensity)

## 2024-09-13 NOTE — PROGRESS NOTES
Progress Note - Hospitalist   Name: Berta Estrada 89 y.o. female I MRN: [id number]  Unit/Bed#: S -01 I Date of Admission: 9/10/2024   Date of Service: 9/13/2024 I Hospital Day: 3    Assessment & Plan  Altered mental status  Pmhx of alzheimer's dementia, with behavior issues. Likely multifactorial, poor po intake, sundowning, back pain, and UTI   - appreciate Geriatric's input  - delirium precautions  - pt/ot  - continue to treat UTI, see problem UTI  UTI (urinary tract infection)  Pmhx: No significant history of UTI, culture on file 5/12/24: shows contaminant  UA positive for leukocytosis, occasional bacteria, hyaline cast  Urine culture growing gram negative rods,likely ecoli    Will continue course of rocephin pending susceptibility     Plan:  Continue Rocephin 1g, d4 of abx, continue till 9/14  Delirium precautions  Fall precautions  Urinary retention protocol  PT/OT evaluation  Trend temperatures, monitor vitals   Alzheimer's dementia, late onset (HCC)  Previously on Aricept and Namenda which has been discontinued  Fall/safety precautions  Maintain sleep/wake cycle  Delirium precautions  Fall precautions  Avoid medications such as tramadol, benzodiazepines, anticholinergics, benadryl  Per family member: daughter patient can occasionally became combative usually only oriented to self.  Appreciate Geriatric input  Ensure pain control, tylenol 975 mg TID, lidocaine patch   Normal sleep wake cycle - melatonin 3 mg qHS  Assist with feeding as needed  Zyprexa 2.5 mg IM q8H PRN for acute agitation  Start Cymbalta 20 mg daily   Other hyperlipidemia  Continue patient on Lipitor 20 mg daily  GERD (gastroesophageal reflux disease)  Continue patient on Pepcid 20 mg daily  Constipation  Patient has a history of chronic constipation    Continue Senokot, Colace and MiraLAX daily  Encourage p.o. hydration  History of permanent cardiac pacemaker placement  Follows with cardiology outpatient for symptomatic bradycardia,  sick sinus syndrome    Plan  - Continue to monitor clinically.  Suspected malignant neoplasm of lung  Seen by pulmonology on 2024  Likely malignant in nature but biopsy declined by daughter given age and dementia  Expect natural course of lung cancer to take hold and transition to hospice when appropriate-daughter in agreement  Stage 3a chronic kidney disease (HCC)  Lab Results   Component Value Date    EGFR 60 2024    EGFR 54 2024    EGFR 53 2024    CREATININE 0.86 2024    CREATININE 0.93 2024    CREATININE 0.94 2024     Back pain  C/o of lower back pain    Plan:   Tylenol 975 TID  Lidocaine patch  Ambulatory dysfunction  PT/OT evaluation        Current Length of Stay: 3 day(s)  Current Patient Status: Inpatient   Code Status: Level 3 - DNAR and DNI      Discharge Plan: Discharge Plan: Disposition:  Expected date of discharge: pending PT/ot documentation  [place]  Indwelling drains/lines: na  DME needs: na  HH needs: na  F/U appts: PCP  Preferred pharmacy: facility  Meds: na  Transportation: facility    Subjective:   Overnight: No acute events over night     Complaints: aphasic with language comprehension      Diet: Diet Regular; Regular House   Bowel regimen:   Last BM Date: 24   VTE Pharmacologic Prophylaxis:   High Risk (Score >/= 5) - Pharmacological DVT Prophylaxis Ordered: enoxaparin (Lovenox). Sequential Compression Devices Ordered.    Objective:    Vitals:   Temp (24hrs), Av.3 °F (36.8 °C), Min:97.8 °F (36.6 °C), Max:99 °F (37.2 °C)    Temp:  [97.8 °F (36.6 °C)-99 °F (37.2 °C)] 97.8 °F (36.6 °C)  HR:  [] 75  BP: (117-144)/(72-90) 130/74  SpO2:  [95 %-98 %] 98 %  Input and Output Summary (last 24 hours):     Intake/Output Summary (Last 24 hours) at 2024 8662  Last data filed at 2024 1737  Gross per 24 hour   Intake 360 ml   Output --   Net 360 ml     Physical Exam:   Physical Exam  Constitutional:       General: She  is not in acute distress.     Comments: Alert, tracks stimuli across room, no orientation, aphasic with language comprehension   HENT:      Head: Normocephalic and atraumatic.   Cardiovascular:      Rate and Rhythm: Normal rate and regular rhythm.      Heart sounds: Normal heart sounds.   Pulmonary:      Effort: Pulmonary effort is normal.   Abdominal:      Palpations: Abdomen is soft.      Tenderness: There is no abdominal tenderness.      Comments: No guarding, point tenderness   Skin:     General: Skin is warm.        Additional Data:   Labs:  Results from last 7 days   Lab Units 09/13/24  0533 09/12/24  1539 09/11/24  0453   WBC Thousand/uL 10.56*   < > 8.97   HEMOGLOBIN g/dL 12.4   < > 13.5   HEMATOCRIT % 37.5   < > 40.4   PLATELETS Thousands/uL 98*   < > 106*   SEGS PCT %  --   --  73   LYMPHO PCT %  --   --  17   MONO PCT %  --   --  8   EOS PCT %  --   --  1    < > = values in this interval not displayed.     Results from last 7 days   Lab Units 09/13/24  0533 09/11/24  0453 09/10/24  1913   SODIUM mmol/L 142   < > 140   POTASSIUM mmol/L 4.0   < > 4.2   CHLORIDE mmol/L 107   < > 104   CO2 mmol/L 28   < > 29   BUN mg/dL 28*   < > 23   CREATININE mg/dL 0.86   < > 1.11   ANION GAP mmol/L 7   < > 7   CALCIUM mg/dL 8.8   < > 8.9   ALBUMIN g/dL  --   --  4.0   TOTAL BILIRUBIN mg/dL  --   --  0.50   ALK PHOS U/L  --   --  79   ALT U/L  --   --  8   AST U/L  --   --  13   GLUCOSE RANDOM mg/dL 115   < > 118    < > = values in this interval not displayed.                       Recent Cultures (last 7 days):  I have reviewed the following results: CBC, BMP, and all the lab results till date  Results from last 7 days   Lab Units 09/10/24  2010   URINE CULTURE  >100,000 cfu/ml Escherichia coli*       Imaging:   Imaging Review: I have reviewed all the imaging on file  Other Studies: I have reviewed all other studies till date  CTA head and neck with and without contrast  Narrative: CTA NECK AND BRAIN WITH AND WITHOUT  CONTRAST    INDICATION: vertigo    COMPARISON:   None.    TECHNIQUE:  Routine CT imaging of the Brain without contrast.Post contrast imaging was performed after administration of iodinated contrast through the neck and brain. Post contrast axial 0.625 mm images timed to opacify the arterial system.  3D   rendering was performed on an independent workstation.   MIP reconstructions performed. Coronal and sagittal reconstructions were performed of the non contrast portion of the brain.    Radiation dose length product (DLP) for this visit:  2176 mGy-cm .  This examination, like all CT scans performed in the [facility], was performed utilizing techniques to minimize radiation dose exposure, including the use of iterative   reconstruction and automated exposure control.    IV Contrast:  85 mL of iohexol (OMNIPAQUE)    IMAGE QUALITY:   Diagnostic    FINDINGS:  NONCONTRAST BRAIN  PARENCHYMA:  Cerebral volume loss. Decreased attenuation is noted in periventricular and subcortical white matter demonstrating an appearance that is statistically most likely to represent moderate to severe microangiopathic change.    No CT signs of acute infarction.  No intracranial mass, mass effect or midline shift.  No acute parenchymal hemorrhage.    VENTRICLES AND EXTRA-AXIAL SPACES:Normal for the patient's age.    VISUALIZED ORBITS: Normal.    PARANASAL SINUSES: Normal.    CTA NECK  ARCH AND GREAT VESSELS:  Atherosclerotic changes. Great vessel origins are patent without severe stenosis. There is mild atherosclerotic narrowing at the origin and proximal left subclavian artery.  RIGHT VERTEBRAL ARTERY: Patent origin. Extracranial segments are patent.  LEFT VERTEBRAL ARTERY: Mild atherosclerotic stenosis at the origin.  Patent extracranial segments.  RIGHT CAROTID: Partially calcified atherosclerotic plaque primarily involving the carotid bifurcation. There is mild (less than 50%) ICA stenosis.  LEFT CAROTID: Partially  calcified atherosclerotic plaque primarily involving the carotid bifurcation. There is mild (less than 50%) ICA stenosis.  NASCET criteria was used to determine the degree of internal carotid artery diameter stenosis.    CTA BRAIN:  INTERNAL CAROTID ARTERIES: Atherosclerotic changes involving bilateral intracranial internal carotid arteries. No significant stenosis.  ANTERIOR CIRCULATION:  No significant stenosis.  MIDDLE CEREBRAL ARTERY CIRCULATION: No significant stenosis.  DISTAL VERTEBRAL ARTERIES: No significant stenosis.   Patent right PICA origin. Left PICA origin is not well seen with suggested common origin AICA/PICA.  BASILAR ARTERY:No significant stenosis.  POSTERIOR CEREBRAL ARTERIES: No high-grade stenosis.    Hypoplastic right and small left P1 segments with persistent fetal origin of posterior cerebral arteries (normal anatomic variant.  VENOUS STRUCTURES:  Normal.    NON VASCULAR ANATOMY  BONY STRUCTURES:  No acute osseous abnormality. 0.8 cm lucent lesion with thin trabeculae within the C6 vertebral body seen on series 603 image 64.    SOFT TISSUES OF THE NECK: Multinodular thyroid. Correlate with prior thyroid work-up/ultrasound    THORACIC INLET: Cavitating lesion in the subpleural right upper lobe measuring 2.6 x 1.9 x 2.3 cm is grossly stable from CT 5/17/2024 (series 304 image 20). 0.9 cm right upper lobe nodule is also stable (series 305 image 7)  Impression: CT Brain:    No acute intracranial CT abnormality.  Chronic microangiopathic change.    CT Angiography:    Patent major vessels of the [place] without high-grade stenosis.  No significant stenosis in the cervical carotid or vertebral arteries.    Other:    2.6 cm right upper lobe subpleural cavitating pulmonary lesion and 0.9 cm right  upper lobe nodule noted in the partially imaged lungs are stable from CT  5/17/2024.    0.8 cm lucent lesion with thin trabeculae within the C6 vertebral body likely  representing hemangioma.  Correlate with prior outside imaging for stability.    Workstation performed: [id number]      Mobility:   Basic Mobility Inpatient Raw Score: 8  JH-HLM Goal: 3: Sit at edge of bed  JH-HLM Achieved: 2: Bed activities/Dependent transfer  JH-HLM Goal NOT achieved. Continue with multidisciplinary rounding and encourage appropriate mobility to improve upon JH-HLM goals.    Last 24 Hours Medication List:   Current Facility-Administered Medications   Medication Dose Route Frequency Provider Last Rate    acetaminophen  975 mg Oral TID Wanda Hodges MD      aspirin  81 mg Oral Daily Mary Rangel MD      atorvastatin  20 mg Oral Daily Mary Rangel MD      bisacodyl  10 mg Rectal Daily PRN Mary Rangel MD      cefTRIAXone  1,000 mg Intravenous Q24H Mel Levi MD 1,000 mg (09/12/24 2026)    docusate sodium  100 mg Oral BID Mary Rangel MD      DULoxetine  20 mg Oral Daily Wanda Hodges MD      enoxaparin  40 mg Subcutaneous Daily Mary Ragnel MD      famotidine  20 mg Oral Daily Mary Rangel MD      fluticasone  1 spray Each Nare Daily Mary Rangel MD      gabapentin  100 mg Oral BID Mary Rangel MD      lidocaine  1 patch Topical Daily Danyell Ngo MD      melatonin  3 mg Oral HS Wanda Hodges MD      OLANZapine  2.5 mg Intramuscular Q6H PRN Mary Rangle MD      polyethylene glycol  17 g Oral Daily Mary Rangel MD      senna  17.2 mg Oral BID Mary Rangel MD         Lines/Drains:  Invasive Devices       Peripheral Intravenous Line  Duration             Peripheral IV 09/10/24 Proximal;Right;Ventral (anterior) Forearm 2 days                          Patient Centered Rounds: I performed bedside rounds with nursing staff today.  Discussions with Specialists or Other Care Team Provider: Nursing  Education and Discussions with Family / Patient: Will update patient's point of contact if given permission     Today, Patient Was Seen By: Mel Levi MD  Administrative Statements   Today, Patient Was Seen By: Mel Levi MD  I have  spent a total time of 35 minutes in caring for this patient on the day of the visit/encounter including Diagnostic results, Patient and family education, Impressions, Counseling / Coordination of care, Documenting in the medical record, Reviewing / ordering tests, medicine, procedures  , Obtaining or reviewing history  , and Communicating with other healthcare professionals .    **Please Note: This note may have been constructed using a voice recognition system.**

## 2024-09-13 NOTE — ASSESSMENT & PLAN NOTE
Lab Results   Component Value Date    EGFR 60 09/13/2024    EGFR 54 09/12/2024    EGFR 53 09/11/2024    CREATININE 0.86 09/13/2024    CREATININE 0.93 09/12/2024    CREATININE 0.94 09/11/2024

## 2024-09-13 NOTE — ASSESSMENT & PLAN NOTE
Lab Results   Component Value Date    EGFR 60 09/13/2024    EGFR 54 09/12/2024    EGFR 53 09/11/2024    CREATININE 0.86 09/13/2024    CREATININE 0.93 09/12/2024    CREATININE 0.94 09/11/2024       Creatinine stable.  Will avoid nephrotoxic medication.  Encourage po hydration.  Will monitor BMP.

## 2024-09-13 NOTE — OCCUPATIONAL THERAPY NOTE
Occupational Therapy Progress Note     Patient Name: Berta Estrada  Today's Date: 9/13/2024  Problem List  Principal Problem:    Altered mental status  Active Problems:    Other hyperlipidemia    GERD (gastroesophageal reflux disease)    Stage 3a chronic kidney disease (HCC)    Constipation    History of permanent cardiac pacemaker placement    Suspected malignant neoplasm of lung    UTI (urinary tract infection)    Alzheimer's dementia, late onset (HCC)    Back pain    Ambulatory dysfunction       09/13/24 1421   OT Last Visit   OT Visit Date 09/13/24   Note Type   Note Type Treatment for insurance authorization   Pain Assessment   Pain Assessment Tool 0-10   Pain Score No Pain   Restrictions/Precautions   Weight Bearing Precautions Per Order No   Other Precautions Cognitive;Chair Alarm;Bed Alarm;Impulsive;Fall Risk   Lifestyle   Autonomy PTA pt living with daughter in 2SH with [facility], pt requiring (A) with ADLs and IADLs, (+)falls, (-)drives, use of SPC at baseline   Reciprocal Relationships supportive daughter   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  Washes face with poor thoroughness   LB Dressing Assistance 3  Moderate Assistance   LB Dressing Comments Doffs and dons b/l socks 2x due incontinence.  Requires significantly inc time and cues for sequencing, but able to physically complete.  Also requires redirection from attempting to wash her legs with dry socks, and cues for which end of the sock to open.  Requires assist to thread b/l LE into underwear and pull over hips   Toileting Assistance  4  Minimal Assistance   Toileting Comments Poor thoroughness   Transfers   Sit to  "Stand 4  Minimal assistance   Additional items Assist x 1;Increased time required;Verbal cues   Stand to Sit 3  Moderate assistance   Additional items Assist x 1;Increased time required;Impulsive;Verbal cues   Toilet transfer 4  Minimal assistance   Additional items Assist x 1;Increased time required;Standard toilet;Verbal cues  (gb on R)   Additional Comments use of rw.  Cues for safety.  1x impulsively attempts to sit prior to being close enough to chair, requiring modA to recover   Functional Mobility   Functional Mobility 4  Minimal assistance   Additional Comments use of rw with cues for safety and to avoid large obstacles.  mildly unsteady throughout   Subjective   Subjective \"Are you worried I might bite you or shoot you?\" \"Look at that man lying in the bed with his feet up\"   Cognition   Overall Cognitive Status Impaired   Arousal/Participation Alert;Cooperative   Attention Attends with cues to redirect   Orientation Level Oriented to person;Disoriented to place;Disoriented to time;Disoriented to situation   Memory Decreased recall of precautions;Decreased recall of recent events;Decreased short term memory;Decreased recall of biographical information   Following Commands Follows one step commands with increased time or repetition   Comments Pt is very pleasantly confused.  At times impulsive and requires frequent cues for safety.  Has difficulty with sequencing basic ADL tasks, as well as functional uses for items.  At times appears to have visual hallucinations   Activity Tolerance   Activity Tolerance Patient tolerated treatment well   Medical Staff Made Aware PT Nia   Assessment   Assessment Pt is seen for OT treatment session including interventions to: increase independence with self care tasks, improve functional transfers with fall prevention strategies, increase activity tolerance and endurance.  Compared to previous session, pt demonstrating significant improvements in all areas.  Pt now able to " complete self care and ambulate with rw with Ax1.  Pt continues to be very confused, and continues to function significantly below her baseline.  From an OT standpoint, continue to recommend level II rehab resources upon d/c.   Plan   Treatment Interventions ADL retraining;Functional transfer training;Endurance training;Cognitive reorientation;Patient/family training;Equipment evaluation/education;Compensatory technique education;Continued evaluation;Activityengagement   Goal Expiration Date 09/23/24   OT Treatment Day 1   OT Frequency 3-5x/wk   Discharge Recommendation   Recommendation Geriatric Consult   Rehab Resource Intensity Level, OT II (Moderate Resource Intensity)   AM-PAC Daily Activity Inpatient   Lower Body Dressing 2   Bathing 2   Toileting 3   Upper Body Dressing 3   Grooming 3   Eating 3   Daily Activity Raw Score 16   Daily Activity Standardized Score (Calc for Raw Score >=11) 35.96   AM-PAC Applied Cognition Inpatient   Following a Speech/Presentation 2   Understanding Ordinary Conversation 2   Taking Medications 1   Remembering Where Things Are Placed or Put Away 1   Remembering List of 4-5 Errands 1   Taking Care of Complicated Tasks 1   Applied Cognition Raw Score 8   Applied Cognition Standardized Score 19.32       Puja Khan, DEB, OTR/L, CSRS  [place] License [id number]  [place] License [id number]

## 2024-09-13 NOTE — ASSESSMENT & PLAN NOTE
Pmhx: No significant history of UTI, culture on file 5/12/24: shows contaminant  UA positive for leukocytosis, occasional bacteria, hyaline cast  Urine culture growing gram negative rods,likely ecoli    Will continue course of rocephin pending susceptibility     Plan:  Continue Rocephin 1g, d4 of abx, continue till 9/14  Delirium precautions  Fall precautions  Urinary retention protocol  PT/OT evaluation  Trend temperatures, monitor vitals

## 2024-09-13 NOTE — CASE MANAGEMENT
[facility] received request for authorization from Care Manager.  Authorization request submitted for: SNF  Facility Name: [facility]  NPI:[id number]  Facility MD: Raisa English    NPI: [id number]  Authorization initiated by contacting insurance:  [employer]  Via: [employer] Portal   Clinicals submitted via [employer] attachment   Pending Reference #:[id number]     Care Manager notified: Chantel Millan      Updates to authorization status will be noted in chart. Please reach out to CM for updates on any clinical information.

## 2024-09-13 NOTE — PLAN OF CARE
Problem: PHYSICAL THERAPY ADULT  Goal: Performs mobility at highest level of function for planned discharge setting.  See evaluation for individualized goals.  Description: Treatment/Interventions: Functional transfer training, LE strengthening/ROM, Elevations, Therapeutic exercise, Cognitive reorientation, Patient/family training, Equipment eval/education, Bed mobility, Gait training, Spoke to nursing, OT  Equipment Recommended:  (TBD)       See flowsheet documentation for full assessment, interventions and recommendations.  Outcome: Progressing  Note: Prognosis: Fair  Problem List: Decreased strength, Decreased range of motion, Decreased endurance, Impaired balance, Decreased mobility, Decreased cognition, Impaired judgement, Decreased safety awareness, Decreased skin integrity, Pain  Assessment: Pt seen for PT treatment 9/13/2024 with focus on: transfer and gait training. Pt presents seated OOB in recliner chair, is agreeable to participate in session. Pt participates in therapeutic activity and gait training, with intervention focusing on goal of improved independence and safety with transfers and in room ambulation. During session, pt remains highly impulsive and unsafe d/t poor insight to deficits and ongoing cognitive impairments. Pt requires MIN-MODA for STS transfers from multiple surfaces t/o session, variable MIN-MODA for ambulation of 18ft x2 trials with RW -- L foot drag, intermittent lateral LOB, and questionable R visual impairment d/t difficulty navigating around objects. During static and dynamic functional standing tasks, pt needing variable MOD-MAXA to maintain balance w/ and w/o UE support on RW respectively. At end of session pt was left seated OOB in recliner chair with alarm engaged and needs in reach, care returned to RN. Overall pt displays significant improvement as compared to previous session, however continues to perform below their baseline level of functional mobility due to  weakness, gait  deviations, impaired balance, impaired cognition/insight/safety. Pt continues to most appropriately benefit from Level II (moderate PT intensity) resources upon d/c from the acute care setting. Continue skilled PT POC as able and appropriate in order to progress towards therapy goals and maximize independence with functional mobility. Next session, plan for intervention of additional gait and strengthening as able and appropriate.        Rehab Resource Intensity Level, PT: II (Moderate Resource Intensity)    See flowsheet documentation for full assessment.

## 2024-09-13 NOTE — PHYSICAL THERAPY NOTE
PHYSICAL THERAPY TREATMENT  DATE: 09/13/24  TIME: 4599-3675    NAME:  Berta Estrada  AGE:   89 y.o.  Mrn:   [id number]  Length Of Stay: 3    ADMIT DX:  Vertigo [R42]  UTI (urinary tract infection) [N39.0]  Late onset Alzheimer's dementia with agitation, unspecified dementia severity (HCC) [G30.1, F02.811]    Past Medical History:   Diagnosis Date    Actinic keratosis     Basal cell carcinoma     Back     Cancer (HCC)     Coronary artery disease     Dementia (HCC)     Depression     Ear problems     HL (hearing loss)     Hyperlipidemia     Migraines     Ovarian cancer (HCC) 2004    s/p surgery. no chemo/RT    Phlebitis     R leg     Past Surgical History:   Procedure Laterality Date    ADENOIDECTOMY      APPENDECTOMY      CARDIAC ELECTROPHYSIOLOGY PROCEDURE N/A 5/15/2024    Procedure: Cardiac pacer implant;  Surgeon: Rufus Raines MD;  [facility];  Service: Cardiology    CATARACT EXTRACTION, BILATERAL      HYSTERECTOMY  2005    ovarian CA    KNEE SURGERY Right     SKIN BIOPSY      TONSILECTOMY AND ADNOIDECTOMY      TONSILLECTOMY         Performed at least 2 patient identifiers during session: Name, ID bracelet, and Epic photo     09/13/24 1425   PT Last Visit   PT Visit Date 09/13/24   Note Type   Note Type Treatment for insurance authorization   Pain Assessment   Pain Assessment Tool Pritchard-Baker FACES   Pritchard-Baker FACES Pain Rating 4   Pain Location/Orientation Location: Buttocks   Pain Onset/Description Frequency: Intermittent;Descriptor: Sore   Effect of Pain on Daily Activities limits comfort   Patient's Stated Pain Goal No pain   Hospital Pain Intervention(s) Repositioned;Ambulation/increased activity;Emotional support   Multiple Pain Sites No   Restrictions/Precautions   Weight Bearing Precautions Per Order No   Other Precautions Cognitive;Impulsive;Chair Alarm;Bed Alarm;Fall Risk;Pain  (hallucinations; highly impulsive)   General   Chart Reviewed Yes   Additional Pertinent History No  "significant change from previous session.   Response to Previous Treatment Patient unable to report, no changes reported from family or staff   Family/Caregiver Present No   Cognition   Overall Cognitive Status Impaired   Arousal/Participation Cooperative   Attention Attends with cues to redirect   Orientation Level Oriented to person;Disoriented to place;Disoriented to time;Disoriented to situation   Memory Decreased short term memory;Decreased recall of recent events;Decreased recall of precautions;Decreased long term memory   Following Commands Follows one step commands with increased time or repetition   Subjective   Subjective \"This is the most beautiful view.\" pt looking out the window   Bed Mobility   Additional Comments Bed mobility not completed on this date as pt presents and was left seated OOB in recliner chair with alarm engaged at end of session.   Transfers   Sit to Stand 3  Moderate assistance   Additional items Assist x 1;Armrests;Increased time required;Verbal cues  (RW)   Stand to Sit 3  Moderate assistance   Additional items Assist x 1;Armrests;Impulsive;Verbal cues  (RW)   Toilet transfer 4  Minimal assistance   Additional items Assist x 1;Verbal cues;Standard toilet;Impulsive;Increased time required  (impulsive to initiate, increased time to complete; RW + R GB)   Additional Comments Pt needing high degree of cues for hand placement for safe transfers and optimal mechanics. Pt needing increased assistance and cues for safety as pt highly impulsive with all transfers. While in supported standing, pt needing MODA + RW support due to lateral instability episodes, variable MOD-MAXA for sustained balance without UE support.   Ambulation/Elevation   Gait pattern L Foot drag;Improper Weight shift;Decreased foot clearance;Decreased L stance;Short stride;Decreased toe off;Decreased heel strike   Gait Assistance 4  Minimal assist  (variable MIN to MODA, primarily ARIANA)   Additional items Assist x " 1;Verbal cues;Tactile cues   Assistive Device Rolling walker   Distance 18ft x2   Stair Management Assistance Not tested   Ambulation/Elevation Additional Comments Pt needing verbal and visual cues for Rw management and proximity, avoidance of large obstacles in room (bed and linen cart) -- questionable R sided visual impairment?   Balance   Static Sitting Fair +   Dynamic Sitting Fair   Static Standing Poor  (w/ RW)   Dynamic Standing Poor  (w/ RW)   Ambulatory Poor  (w/ RW)   Endurance Deficit   Endurance Deficit Yes   Activity Tolerance   Activity Tolerance Patient limited by fatigue;Patient limited by pain;Other (Comment)  (impaired cognition)   Medical Staff Made Aware Spoke with CM, OT, RN   Assessment   Prognosis Fair   Problem List Decreased strength;Decreased range of motion;Decreased endurance;Impaired balance;Decreased mobility;Decreased cognition;Impaired judgement;Decreased safety awareness;Decreased skin integrity;Pain   Assessment Pt seen for PT treatment 9/13/2024 with focus on: transfer and gait training. Pt presents seated OOB in recliner chair, is agreeable to participate in session. Pt participates in therapeutic activity and gait training, with intervention focusing on goal of improved independence and safety with transfers and in room ambulation. During session, pt remains highly impulsive and unsafe d/t poor insight to deficits and ongoing cognitive impairments. Pt requires MIN-MODA for STS transfers from multiple surfaces t/o session, variable MIN-MODA for ambulation of 18ft x2 trials with RW -- L foot drag, intermittent lateral LOB, and questionable R visual impairment d/t difficulty navigating around objects. During static and dynamic functional standing tasks, pt needing variable MOD-MAXA to maintain balance w/ and w/o UE support on RW respectively. At end of session pt was left seated OOB in recliner chair with alarm engaged and needs in reach, care returned to RN. Overall pt displays  significant improvement as compared to previous session, however continues to perform below their baseline level of functional mobility due to weakness, gait  deviations, impaired balance, impaired cognition/insight/safety. Pt continues to most appropriately benefit from Level II (moderate PT intensity) resources upon d/c from the acute care setting. Continue skilled PT POC as able and appropriate in order to progress towards therapy goals and maximize independence with functional mobility. Next session, plan for intervention of additional gait and strengthening as able and appropriate.   Goals   Patient Goals no rehab goals stated on this date   STG Expiration Date 09/25/24   Short Term Goal #1 In 14 days pt will be able to: 1. Demonstrate ability to perform all aspects of bed mobility with Alexia to improve functional safety.  2. Perform functional transfers with RW and minAx1 to facilitate safe return to previous living environment.  3.  Ambulate at least 50 ft with RW and minAx1 with stable vitals to improve safety with household distances and reduce fall risk.  4. Improve LE strength grades by 1 to increase ease of functional mobility with transfers and gait. 5. Pt will demonstrate improved balance by one grade in order to decrease risk of falls. 6. Climb at least 3 steps with unilateral HR and Alexia to simulate entrance to home.   PT Treatment Day 1   Plan   Treatment/Interventions Functional transfer training;LE strengthening/ROM;Elevations;Therapeutic exercise;Endurance training;Cognitive reorientation;Patient/family training;Equipment eval/education;Bed mobility;Gait training;Compensatory technique education;Spoke to case management   Progress Progressing toward goals   PT Frequency 3-5x/wk   Discharge Recommendation   Rehab Resource Intensity Level, PT II (Moderate Resource Intensity)   AM-PAC Basic Mobility Inpatient   Turning in Flat Bed Without Bedrails 3   Lying on Back to Sitting on Edge of Flat  Bed Without Bedrails 2   Moving Bed to Chair 2   Standing Up From Chair Using Arms 2   Walk in Room 2   Climb 3-5 Stairs With Railing 1   Basic Mobility Inpatient Raw Score 12   Basic Mobility Standardized Score 32.23   Turning Head Towards Sound 3   Follow Simple Instructions 3   Low Function Basic Mobility Raw Score  18   Low Function Basic Mobility Standardized Score  29.25   [facility] Highest Level Of Mobility   -[facility] Goal 4: Move to chair/commode   JH-HLM Achieved 7: Walk 25 feet or more   Education   Education Provided Mobility training;Assistive device   Patient Reinforcement needed   End of Consult   Patient Position at End of Consult Bedside chair;Bed/Chair alarm activated;All needs within reach     This session, pt required and most appropriately benefited from partial skilled PT/OT co-treat due to cognitive-communication impairments, cognitive-behavioral deficits, significant regression from baseline level of mobility, continuous vitals monitoring, decreased activity tolerance, and unpredictable medical and/or functional status. PT and OT goals were addressed separately as seen in documentation.    Based on patient's [facility] Highest Level of Mobility scores today, patient currently has a goal of -[facility] Levels: 7: WALK 25 FEET OR MORE, to be completed with RN staffing each shift, in order to improve overall activity tolerance and mobility, combat hospital related deconditioning, and maximize outcomes for d/c from the acute care setting.     The patient's AM-PAC Basic Mobility Inpatient Short Form Raw Score is 12. A Raw score of less than or equal to 16 suggests the patient may benefit from discharge to post-acute rehabilitation services. Please also refer to the recommendation of the Physical Therapist for safe discharge planning.        Riri Garcia PT, DPT   Available via [employer]  NPI # [id number]  [place] License - [id number]  9/13/2024

## 2024-09-13 NOTE — PLAN OF CARE
Problem: PHYSICAL THERAPY ADULT  Goal: Performs mobility at highest level of function for planned discharge setting.  See evaluation for individualized goals.  Description: Treatment/Interventions: Functional transfer training, LE strengthening/ROM, Elevations, Therapeutic exercise, Cognitive reorientation, Patient/family training, Equipment eval/education, Bed mobility, Gait training, Spoke to nursing, OT  Equipment Recommended:  (TBD)       See flowsheet documentation for full assessment, interventions and recommendations.  Note: Prognosis: Poor  Problem List: Decreased strength, Decreased endurance, Impaired balance, Decreased mobility, Decreased cognition, Impaired judgement, Decreased safety awareness  Assessment: Pt is a 89 y.o. female seen for PT evaluation s/p admit to [facility] on 9/10/2024. Pt was admitted with a primary dx of: AMS, fall.  PT now consulted for assessment of mobility and d/c needs. Pt with Up and OOB as tolerated orders.  Pts current comorbidities and personal factors effecting treatment include: CAD, dementia, depression, history of falls, late onset alzheimer's disease without behavioral disturbance, CAD, ovarian cancer. Pts current clinical presentation is Unstable/Unpredictable (high complexity) due to Ongoing medical management for primary dx, Decreased activity tolerance compared to baseline, Fall risk, Increased assistance needed from caregiver at current time, Ongoing telemetry monitoring, Cog status, Diagnostic imaging pending. Prior to admission, pt was independent with use of SC. Upon evaluation, pt currently is requiring MaxA x2 for bed mobility; ModA x2 for transfers and MaxA x2 for ambulation 4 ft w/ RW. Pt presents at PT eval functioning below baseline and currently w/ overall mobility deficits 2* to: BLE weakness, impaired balance, gait deviations, decreased activity tolerance compared to baseline, decreased functional mobility tolerance compared to baseline,  decreased safety awareness, impaired judgement, fall risk, decreased cognition. Pt currently at a fall risk 2* to impairments listed above.  Pt will continue to benefit from skilled acute PT interventions to address stated impairments; to maximize functional mobility; for ongoing pt/ family training; and DME needs. At conclusion of PT session pt returned BTB, all needs in reach, and RN notified of session findings/recommendations  posey and BL wrist restraints replaced, virtual 1:1 present, with phone and call bell within reach. Pt denies any further questions at this time. Recommend Level II (Moderate Resource Intensity)  upon hospital D/C.        Rehab Resource Intensity Level, PT: II (Moderate Resource Intensity)    See flowsheet documentation for full assessment.

## 2024-09-13 NOTE — ASSESSMENT & PLAN NOTE
Follows with cardiology outpatient for symptomatic bradycardia, sick sinus syndrome    Plan  - Continue to monitor clinically.

## 2024-09-13 NOTE — ASSESSMENT & PLAN NOTE
"Pt has Alzheimer's dementia with behavioral disturbance and is normally alert and oriented x1 to self but not place or time at baseline.  Currently alert and oriented x 1.  CT head shows moderate to severe chronic microangiopathic change.  Patient needs assistance with all ADLs and IADLs.  She developed an acute worsening of confusion about x2 days ago. See \"delirium\"  Continue sleep-wake cycle  Encourage family to visit  [place] as needed and provide supportive care  Check B12 and TSH  "

## 2024-09-13 NOTE — PROGRESS NOTES
Progress Note - Geriatric Medicine   Name: Berta Estrada 89 y.o. female I MRN: [id number]  Unit/Bed#: S -01 I Date of Admission: 9/10/2024   Date of Service: 9/13/2024 I Hospital Day: 3     Assessment & Plan  Altered mental status  -Pleasantly confused  -Patient is high risk of delirium due to dementia at baseline, UTI, pain, constipation, change in environment  - delirium precautions  -maintain normal sleep/wake cycle  -minimize overnight interruptions, group overnight vitals/labs/nursing checks as possible  -dim lights, close blinds and turn off tv to minimize stimulation and encourage sleep environment in evenings  -ensure that pain is well controlled continue Tylenol 975mg Q8H scheduled , gabapentin 100 mg 2 times a day, lidocaine patch to lower back  -monitor for fecal and urinary retention which may precipitate delirium  -encourage early mobilization and ambulation  -provide frequent reorientation and redirection  -encourage family and friends at the bedside to help calm patient if anxious  -avoid medications which may precipitate or worsen delirium such as tramadol, benzodiazepine, anticholinergics, and antihistaminics  -encourage hydration and nutrition , assist with feeding if needed  -redirect unwanted behaviors as first line, avoid physical restraints.   -Continue Cymbalta 20 mg daily  -Zyprexa 2.5 mg every 8 hours as needed only for severe agitation, monitor QTc  Stage 3a chronic kidney disease (HCC)  Lab Results   Component Value Date    EGFR 60 09/13/2024    EGFR 54 09/12/2024    EGFR 53 09/11/2024    CREATININE 0.86 09/13/2024    CREATININE 0.93 09/12/2024    CREATININE 0.94 09/11/2024       Creatinine stable.  Will avoid nephrotoxic medication.  Encourage po hydration.  Will monitor BMP.   Constipation  Daughter states that the patient has not had a bowel movement in about 3 days and has chronic constipation.  She has received senna, MiraLAX, Dulcolax suppository, and Colace here. Continue  "to monitor and give MiraLax as needed.  Suspected malignant neoplasm of lung    UTI (urinary tract infection)  UA on admission showed large leukocytes.  She presented with altered mental status and agitation worse than her baseline state.   Patient is being treated with Rocephin IV.  Managed per primary team.  Alzheimer's dementia, late onset (HCC)  Pt has Alzheimer's dementia with behavioral disturbance and is normally alert and oriented x1 to self but not place or time at baseline.  Currently alert and oriented x 1.  CT head shows moderate to severe chronic microangiopathic change.  Patient needs assistance with all ADLs and IADLs.  She developed an acute worsening of confusion about x2 days ago. See \"delirium\"  Continue sleep-wake cycle  Encourage family to visit  [place] as needed and provide supportive care  Check B12 and TSH  Back pain  Pt has low back pain secondary to degenerative disc disease and recently hit her lower back on the floor when sliding out of bed. The pain is likely exacerbating agitation and delirium.   Can apply lidocaine patch as needed to affected area.  Continue gabapentin 100 mg twice daily.  Increase Tylenol to 975 mg 3 times daily  Continue PT OT  Ambulatory dysfunction  Patient uses a cane for ambulation at baseline  She presents to the hospital after recent fall out of bed where she hit her lower back on the floor.  Monitor orthostatic vital signs  Encourage p.o. hydration  Avoid hypotension and hypoglycemia   Other hyperlipidemia    GERD (gastroesophageal reflux disease)    History of permanent cardiac pacemaker placement      Subjective:   Patient was seen in follow-up today and was pleasantly confused.  She was alert and oriented x 1, which is at baseline.  Patient denies any pain currently and states she is sleeping well however history is limited due to patient's baseline dementia.    Review of Systems   Unable to perform ROS: Dementia   Musculoskeletal:  Negative for " arthralgias.   Psychiatric/Behavioral:  Positive for confusion. Negative for agitation.          Objective:     Vitals: Blood pressure 104/54, pulse 83, temperature 98.2 °F (36.8 °C), resp. rate 18, SpO2 97%.,There is no height or weight on file to calculate BMI.      Intake/Output Summary (Last 24 hours) at 9/13/2024 1405  Last data filed at 9/13/2024 1053  Gross per 24 hour   Intake 240 ml   Output --   Net 240 ml       Current Medications: Reviewed    Physical Exam:   Physical Exam  Vitals and nursing note reviewed.   Constitutional:       Appearance: Normal appearance.   HENT:      Head: Normocephalic.      Nose: Nose normal.      Mouth/Throat:      Mouth: Mucous membranes are dry.   Eyes:      Extraocular Movements: Extraocular movements intact.      Conjunctiva/sclera: Conjunctivae normal.   Cardiovascular:      Rate and Rhythm: Normal rate and regular rhythm.      Pulses: Normal pulses.      Heart sounds: Normal heart sounds. No murmur heard.  Pulmonary:      Effort: Pulmonary effort is normal.      Breath sounds: Normal breath sounds. No wheezing.   Abdominal:      General: Abdomen is flat. There is no distension.      Palpations: Abdomen is soft.      Tenderness: There is no abdominal tenderness.   Musculoskeletal:         General: Normal range of motion.      Cervical back: Normal range of motion and neck supple.   Skin:     General: Skin is warm and dry.      Capillary Refill: Capillary refill takes less than 2 seconds.   Neurological:      General: No focal deficit present.      Mental Status: She is alert. Mental status is at baseline.      Motor: Weakness present.      Comments: A/O x 1   Psychiatric:         Mood and Affect: Mood normal.          Invasive Devices       Peripheral Intravenous Line  Duration             Peripheral IV 09/10/24 Proximal;Right;Ventral (anterior) Forearm 2 days                    Lab, Imaging and other studies: Reviewed radiology reports from this admission including: CT  head.

## 2024-09-13 NOTE — CASE MANAGEMENT
Case Management Discharge Planning Note    Patient name Berta He S /S -01 MRN [id number]  : 1935 Date 2024       Current Admission Date: 9/10/2024  Current Admission Diagnosis:Altered mental status   Patient Active Problem List    Diagnosis Date Noted Date Diagnosed    Altered mental status 2024     Back pain 2024     Ambulatory dysfunction 2024     UTI (urinary tract infection) 09/10/2024     Alzheimer's dementia, late onset (HCC) 09/10/2024     Multiple pulmonary nodules 2024     Suspected malignant neoplasm of lung 2024     Urinary retention 2024     Abnormal CT scan 2024     Constipation 2024     History of permanent cardiac pacemaker placement 2024     Liver nodule 2024     Fall 2024     Closed fracture of ramus of right pubis (HCC) 2024     Mobitz type 1 second degree AV block 2024     Cervical radiculopathy 2022     Lumbar degenerative disc disease 2022     Other hemorrhoids 2021     Gait instability 2021     Vitamin D deficiency 2021     Closed fracture of left foot 2020     Wears hearing aid 2020     Stage 3a chronic kidney disease (HCC) 2019     Thrombocytopenia ([facility]) 2019     Essential hypertension 2019     GERD (gastroesophageal reflux disease) 2019     Postmenopausal HRT (hormone replacement therapy) 2019     Vitamin B12 deficiency 2019     COPD, mild (HCC) 2019     Pancreatic cyst 2019     S/P AVR (aortic valve replacement) 03/15/2019     Primary osteoarthritis involving multiple joints 03/15/2019     Other hyperlipidemia 03/15/2019     Allergic rhinitis 03/15/2019     Age-related osteoporosis without current pathological fracture 03/15/2019     Skin cancer 03/15/2019     Coronary artery disease involving native coronary artery of native heart 03/15/2019     Depression 2018     Late  onset Alzheimer's disease without behavioral disturbance (HCC) 04/27/2018       LOS (days): 3  Geometric Mean LOS (GMLOS) (days): 2.9  Days to GMLOS:0.1     OBJECTIVE:  Risk of Unplanned Readmission Score: 22.03         Current admission status: Inpatient   Preferred Pharmacy:   [facility]/pharmacy #[id number] [place], [place] - [address] [address]  Phone: [phone] Fax: [phone]    [place] [address]  Phone: [phone] Fax: [phone]    Primary Care Provider: Kim Dewey MD    Primary Insurance: [employer]  Secondary Insurance:     DISCHARGE DETAILS:            Additional Comments: Per MAGI, Pt is medically ready for discharge. Insurance auth tasked to CM DC Support.

## 2024-09-13 NOTE — PHYSICAL THERAPY NOTE
Physical Therapy Evaluation    Patient's Name: Berta Estrada    Admitting Diagnosis  Vertigo [R42]  UTI (urinary tract infection) [N39.0]  Late onset Alzheimer's dementia with agitation, unspecified dementia severity (HCC) [G30.1, F02.811]    Problem List  Patient Active Problem List   Diagnosis    Depression    Late onset Alzheimer's disease without behavioral disturbance (HCC)    S/P AVR (aortic valve replacement)    Primary osteoarthritis involving multiple joints    Other hyperlipidemia    Allergic rhinitis    Age-related osteoporosis without current pathological fracture    Skin cancer    Coronary artery disease involving native coronary artery of native heart    Essential hypertension    GERD (gastroesophageal reflux disease)    Postmenopausal HRT (hormone replacement therapy)    Vitamin B12 deficiency    COPD, mild (HCC)    Pancreatic cyst    Stage 3a chronic kidney disease (HCC)    Thrombocytopenia (HCC)    Wears hearing aid    Closed fracture of left foot    Gait instability    Vitamin D deficiency    Other hemorrhoids    Cervical radiculopathy    Lumbar degenerative disc disease    Fall    Closed fracture of ramus of right pubis (HCC)    Mobitz type 1 second degree AV block    Constipation    History of permanent cardiac pacemaker placement    Liver nodule    Abnormal CT scan    Urinary retention    Multiple pulmonary nodules    Suspected malignant neoplasm of lung    UTI (urinary tract infection)    Alzheimer's dementia, late onset (HCC)    Altered mental status    Back pain    Ambulatory dysfunction       Past Medical History  Past Medical History:   Diagnosis Date    Actinic keratosis     Basal cell carcinoma     Back     Cancer (HCC)     Coronary artery disease     Dementia (HCC)     Depression     Ear problems     HL (hearing loss)     Hyperlipidemia     Migraines     Ovarian cancer (HCC) 2004    s/p surgery. no chemo/RT    Phlebitis     R leg       Past Surgical History  Past Surgical History:    Procedure Laterality Date    ADENOIDECTOMY      APPENDECTOMY      CARDIAC ELECTROPHYSIOLOGY PROCEDURE N/A 5/15/2024    Procedure: Cardiac pacer implant;  Surgeon: Rufus Raines MD;  Location: [facility];  Service: Cardiology    CATARACT EXTRACTION, BILATERAL      HYSTERECTOMY  2005    ovarian CA    KNEE SURGERY Right     SKIN BIOPSY      TONSILECTOMY AND ADNOIDECTOMY      TONSILLECTOMY            09/11/24 0855   PT Last Visit   PT Visit Date 09/11/24   Note Type   Note type Evaluation   Pain Assessment   Pain Assessment Tool FLACC   Pain Rating: FLACC (Rest) - Face 0   Pain Rating: FLACC (Rest) - Legs 0   Pain Rating: FLACC (Rest) - Activity 0   Pain Rating: FLACC (Rest) - Cry 0   Pain Rating: FLACC (Rest) - Consolability 0   Score: FLACC (Rest) 0   Pain Rating: FLACC (Activity) - Face 0   Pain Rating: FLACC (Activity) - Legs 0   Pain Rating: FLACC (Activity) - Activity 0   Pain Rating: FLACC (Activity) - Cry 0   Pain Rating: FLACC (Activity) - Consolability 0   Score: FLACC (Activity) 0   Restrictions/Precautions   Weight Bearing Precautions Per Order No   Other Precautions Cognitive;1:1;Chair Alarm;Fall Risk;Bed Alarm;Multiple lines;Telemetry;Restraints  (virtual 1:1, posey, BL wrist restraints)   Home Living   Type of Home House   Home Layout Two level;Performs ADLs on one level;Stairs to enter with rails  (3 JOSE-family assist on stairs, FFSU)   Bathroom Shower/Tub Tub/shower unit   Bathroom Toilet Standard   Bathroom Equipment Tub transfer bench   Home Equipment Walker;Cane   Prior Function   Lives With Daughter  (and MONA)   Receives Help From Family;Home health  ([facility] for 3hrs/sunday, [facility] senior care M-F 9am-4pm)   IADLs Family/Friend/Other provides transportation;Family/Friend/Other provides meals;Family/Friend/Other provides medication management   Falls in the last 6 months 1 to 4   Comments PLOF and home set up taken from EMR as pt is POOR hisotrian at this time. per EMR, pt is Kary with SPC at  "baseline   General   Family/Caregiver Present No   Cognition   Orientation Level Disoriented to place;Disoriented to time;Disoriented to situation  (responds appropriately to name)   Following Commands Follows one step commands with increased time or repetition   Comments pt ID by wristband, name and    Subjective   Subjective pt pleaseant and agreeable to OOB mobility   RLE Assessment   RLE Assessment X  (3+/5)   LLE Assessment   LLE Assessment X  (3+/5)   Bed Mobility   Sit to Supine 2  Maximal assistance   Additional items Assist x 2;Increased time required;LE management  (trunk management)   Transfers   Sit to Stand 3  Moderate assistance   Additional items Assist x 2;Verbal cues;Impulsive   Stand to Sit 2  Maximal assistance   Additional items Assist x 2;Impulsive;Verbal cues   Stand pivot 2  Maximal assistance   Additional items Assist x 2;Increased time required;Verbal cues   Additional Comments pt performs x2 STS from various surface heights. pt requires variable mod to maxAx2 due to LOB and inability to maintain upright with support of RW   Ambulation/Elevation   Gait pattern Decreased foot clearance;Short stride;Foward flexed   Gait Assistance 2  Maximal assist   Additional items Assist x 2;Verbal cues;Tactile cues   Assistive Device Rolling walker   Distance 4'x1   Ambulation/Elevation Additional Comments pt grossly unsteady stating \"i'm dizzy, i'm dizzy) once seated BP of  64/46 however pt engaging, speaking and responds to questions. once returned to bed BP WFL   Balance   Static Sitting Poor   Dynamic Sitting Poor -   Static Standing Zero   Dynamic Standing   (zero)   Ambulatory Zero   Activity Tolerance   Activity Tolerance Patient limited by fatigue;Treatment limited secondary to medical complications (Comment)  (dizziness and cognition)   Medical Staff Made Aware care coordinated with OT 2/2 pt medical complexity and comorbidites   Nurse Made Aware RN pre/post   Assessment   Prognosis Poor "   Problem List Decreased strength;Decreased endurance;Impaired balance;Decreased mobility;Decreased cognition;Impaired judgement;Decreased safety awareness   Assessment Pt is a 89 y.o. female seen for PT evaluation s/p admit to [facility] on 9/10/2024. Pt was admitted with a primary dx of: AMS, fall.  PT now consulted for assessment of mobility and d/c needs. Pt with Up and OOB as tolerated orders.  Pts current comorbidities and personal factors effecting treatment include: CAD, dementia, depression, history of falls, late onset alzheimer's disease without behavioral disturbance, CAD, ovarian cancer. Pts current clinical presentation is Unstable/Unpredictable (high complexity) due to Ongoing medical management for primary dx, Decreased activity tolerance compared to baseline, Fall risk, Increased assistance needed from caregiver at current time, Ongoing telemetry monitoring, Cog status, Diagnostic imaging pending. Prior to admission, pt was independent with use of SC. Upon evaluation, pt currently is requiring MaxA x2 for bed mobility; ModA x2 for transfers and MaxA x2 for ambulation 4 ft w/ RW. Pt presents at PT eval functioning below baseline and currently w/ overall mobility deficits 2* to: BLE weakness, impaired balance, gait deviations, decreased activity tolerance compared to baseline, decreased functional mobility tolerance compared to baseline, decreased safety awareness, impaired judgement, fall risk, decreased cognition. Pt currently at a fall risk 2* to impairments listed above.  Pt will continue to benefit from skilled acute PT interventions to address stated impairments; to maximize functional mobility; for ongoing pt/ family training; and DME needs. At conclusion of PT session pt returned BTB, all needs in reach, and RN notified of session findings/recommendations  posey and BLAINE wrist restraints replaced, virtual 1:1 present, with phone and call bell within reach. Pt denies any further questions  at this time. Recommend Level II (Moderate Resource Intensity)  upon hospital D/C.   Goals   Patient Goals no direct therapy goals were stated by pt today   STG Expiration Date 09/25/24   Short Term Goal #1 In 14 days pt will be able to: 1. Demonstrate ability to perform all aspects of bed mobility with Alexia to improve functional safety.  2. Perform functional transfers with RW and minAx1 to facilitate safe return to previous living environment.  3.  Ambulate at least 50 ft with RW and minAx1 with stable vitals to improve safety with household distances and reduce fall risk.  4. Improve LE strength grades by 1 to increase ease of functional mobility with transfers and gait. 5. Pt will demonstrate improved balance by one grade in order to decrease risk of falls. 6. Climb at least 3 steps with unilateral HR and Alexia to simulate entrance to home.   PT Treatment Day 0   Plan   Treatment/Interventions Functional transfer training;LE strengthening/ROM;Elevations;Therapeutic exercise;Cognitive reorientation;Patient/family training;Equipment eval/education;Bed mobility;Gait training;Spoke to nursing;OT   PT Frequency 3-5x/wk   Discharge Recommendation   Rehab Resource Intensity Level, PT II (Moderate Resource Intensity)   Equipment Recommended   (TBD)   AM-PAC Basic Mobility Inpatient   Turning in Flat Bed Without Bedrails 2   Lying on Back to Sitting on Edge of Flat Bed Without Bedrails 2   Moving Bed to Chair 1   Standing Up From Chair Using Arms 1   Walk in Room 1   Climb 3-5 Stairs With Railing 1   Basic Mobility Inpatient Raw Score 8   Turning Head Towards Sound 3   Follow Simple Instructions 2   Low Function Basic Mobility Raw Score  13   Low Function Basic Mobility Standardized Score  20.14   [facility] Highest Level Of Mobility   -HL Goal 3: Sit at edge of bed   -HLM Achieved 4: Move to chair/commode   End of Consult   Patient Position at End of Consult Supine;All needs within reach   The patient's AM-PAC  Basic Mobility Inpatient Short Form Raw Score is 8. A Raw score of less than or equal to 16 suggests the patient may benefit from discharge to post-acute rehabilitation services. Please also refer to the recommendation of the Physical Therapist for safe discharge planning.    Gerard Santos PT

## 2024-09-14 PROBLEM — R65.10 SIRS (SYSTEMIC INFLAMMATORY RESPONSE SYNDROME) (HCC): Status: ACTIVE | Noted: 2024-09-14

## 2024-09-14 LAB
ANION GAP SERPL CALCULATED.3IONS-SCNC: 8 MMOL/L (ref 4–13)
BUN SERPL-MCNC: 32 MG/DL (ref 5–25)
CALCIUM SERPL-MCNC: 8.3 MG/DL (ref 8.4–10.2)
CHLORIDE SERPL-SCNC: 108 MMOL/L (ref 96–108)
CO2 SERPL-SCNC: 25 MMOL/L (ref 21–32)
CREAT SERPL-MCNC: 0.82 MG/DL (ref 0.6–1.3)
ERYTHROCYTE [DISTWIDTH] IN BLOOD BY AUTOMATED COUNT: 14.8 % (ref 11.6–15.1)
GFR SERPL CREATININE-BSD FRML MDRD: 63 ML/MIN/1.73SQ M
GLUCOSE SERPL-MCNC: 115 MG/DL (ref 65–140)
HCT VFR BLD AUTO: 35.3 % (ref 34.8–46.1)
HGB BLD-MCNC: 11.6 G/DL (ref 11.5–15.4)
MCH RBC QN AUTO: 31.7 PG (ref 26.8–34.3)
MCHC RBC AUTO-ENTMCNC: 32.9 G/DL (ref 31.4–37.4)
MCV RBC AUTO: 96 FL (ref 82–98)
PLATELET # BLD AUTO: 102 THOUSANDS/UL (ref 149–390)
PMV BLD AUTO: 10.2 FL (ref 8.9–12.7)
POTASSIUM SERPL-SCNC: 3.5 MMOL/L (ref 3.5–5.3)
RBC # BLD AUTO: 3.66 MILLION/UL (ref 3.81–5.12)
SODIUM SERPL-SCNC: 141 MMOL/L (ref 135–147)
WBC # BLD AUTO: 8.55 THOUSAND/UL (ref 4.31–10.16)

## 2024-09-14 PROCEDURE — 80048 BASIC METABOLIC PNL TOTAL CA: CPT

## 2024-09-14 PROCEDURE — 85027 COMPLETE CBC AUTOMATED: CPT

## 2024-09-14 RX ORDER — WATER 10 ML/10ML
INJECTION INTRAMUSCULAR; INTRAVENOUS; SUBCUTANEOUS
Status: COMPLETED
Start: 2024-09-14 | End: 2024-09-14

## 2024-09-14 RX ADMIN — CEPHALEXIN 500 MG: 500 CAPSULE ORAL at 09:02

## 2024-09-14 RX ADMIN — ACETAMINOPHEN 975 MG: 325 TABLET ORAL at 16:06

## 2024-09-14 RX ADMIN — GABAPENTIN 100 MG: 100 CAPSULE ORAL at 09:01

## 2024-09-14 RX ADMIN — OLANZAPINE 2.5 MG: 10 INJECTION, POWDER, FOR SOLUTION INTRAMUSCULAR at 18:18

## 2024-09-14 RX ADMIN — WATER: 1 INJECTION INTRAMUSCULAR; INTRAVENOUS; SUBCUTANEOUS at 19:14

## 2024-09-14 RX ADMIN — ATORVASTATIN CALCIUM 20 MG: 40 TABLET, FILM COATED ORAL at 09:01

## 2024-09-14 RX ADMIN — POLYETHYLENE GLYCOL 3350 17 G: 17 POWDER, FOR SOLUTION ORAL at 09:01

## 2024-09-14 RX ADMIN — LIDOCAINE 1 PATCH: 700 PATCH TOPICAL at 09:01

## 2024-09-14 RX ADMIN — ACETAMINOPHEN 975 MG: 325 TABLET ORAL at 09:01

## 2024-09-14 RX ADMIN — DULOXETINE HYDROCHLORIDE 20 MG: 20 CAPSULE, DELAYED RELEASE ORAL at 09:06

## 2024-09-14 RX ADMIN — SENNOSIDES 17.2 MG: 8.6 TABLET, FILM COATED ORAL at 17:07

## 2024-09-14 RX ADMIN — ENOXAPARIN SODIUM 40 MG: 40 INJECTION SUBCUTANEOUS at 09:01

## 2024-09-14 RX ADMIN — SENNOSIDES 17.2 MG: 8.6 TABLET, FILM COATED ORAL at 09:01

## 2024-09-14 RX ADMIN — DOCUSATE SODIUM 100 MG: 100 CAPSULE, LIQUID FILLED ORAL at 17:07

## 2024-09-14 RX ADMIN — DOCUSATE SODIUM 100 MG: 100 CAPSULE, LIQUID FILLED ORAL at 09:02

## 2024-09-14 RX ADMIN — GABAPENTIN 100 MG: 100 CAPSULE ORAL at 17:07

## 2024-09-14 RX ADMIN — ASPIRIN 81 MG 81 MG: 81 TABLET ORAL at 09:02

## 2024-09-14 RX ADMIN — FLUTICASONE PROPIONATE 1 SPRAY: 50 SPRAY, METERED NASAL at 09:06

## 2024-09-14 RX ADMIN — CEPHALEXIN 500 MG: 500 CAPSULE ORAL at 20:32

## 2024-09-14 RX ADMIN — Medication 3 MG: at 20:32

## 2024-09-14 RX ADMIN — FAMOTIDINE 20 MG: 20 TABLET ORAL at 09:02

## 2024-09-14 RX ADMIN — ACETAMINOPHEN 975 MG: 325 TABLET ORAL at 20:32

## 2024-09-14 NOTE — ASSESSMENT & PLAN NOTE
SIRS in the setting of UTI, Leukocytosis (WBC 12.39) and Tachycardia ()     Resolved, treated with abx targeting suspectibility.     Other contributing factors of delirium, and agitaiton.

## 2024-09-14 NOTE — ASSESSMENT & PLAN NOTE
Pmhx: No significant history of UTI, culture on file 5/12/24: shows contaminant  UA positive for leukocytosis, occasional bacteria, hyaline cast  Urine culture growing e.coli    Plan:  D5/5 of abx, Cephalexin 500 mg bid   Delirium precautions  Fall precautions  Urinary retention protocol  PT/OT evaluation  Trend temperatures, monitor vitals

## 2024-09-14 NOTE — DISCHARGE INSTR - AVS FIRST PAGE
Dear Berta Estrada,     It was our pleasure to care for you here at [facility].  It is our hope that we were always able to exceed the expected standards for your care during your stay.  You were hospitalized due to urinary tract infection, fall and confusion.  For follow up as well as any medication refills, we recommend that you follow up with your primary care physician. However, at this time we provide for you here, the most important instructions / recommendations at discharge:     Notable Medication Adjustments -   Please start taking duloxetine 20 mg capsule daily, this medication that is for improvement of your mood  We made no changes to all your other home medications, please continue to follow-up with your primary care physician regarding these medications if changes need to be made  Testing Required after Discharge -   None  Important follow up information -   Please follow-up with your primary care physician within 1 week after discharge for a discharge follow-up exam, and optimization of your high blood pressure  Other Instructions -   It is important that you follow-up with your doctors in the upcoming weeks  - If you do not hear from our scheduling office or need additional assistance, please call our scheduling [phone]    If you feel like your illness/symptoms are worsening and needs urgent care  wait until your follow-up visit or your primary care physician cannot see you  - You can get same-day medical attention at urgent care  - If you need a more serious immediate care, please return to the ER      Please review this entire after visit summary as additional general instructions including medication list, appointments, activity, diet, any pertinent wound care, and other additional recommendations from your care team that may be provided for you.      Sincerely,     Mel Levi MD

## 2024-09-14 NOTE — PROGRESS NOTES
Progress Note - Hospitalist   Name: Berta Estrada 89 y.o. female I MRN: [id number]  Unit/Bed#: W -01 I Date of Admission: 9/10/2024   Date of Service: 9/14/2024 I Hospital Day: 4    Assessment & Plan  Altered mental status  Pmhx of alzheimer's dementia, with behavior issues. Likely multifactorial, poor po intake, sundowning, back pain, and UTI   - appreciate Geriatric's input  - delirium precautions  - pt/ot  - continue to treat UTI, see problem UTI  UTI (urinary tract infection)  Pmhx: No significant history of UTI, culture on file 5/12/24: shows contaminant  UA positive for leukocytosis, occasional bacteria, hyaline cast  Urine culture growing e.coli    Plan:  D5/5 of abx, Cephalexin 500 mg bid   Delirium precautions  Fall precautions  Urinary retention protocol  PT/OT evaluation  Trend temperatures, monitor vitals   Alzheimer's dementia, late onset (HCC)  Previously on Aricept and Namenda which has been discontinued  Fall/safety precautions  Maintain sleep/wake cycle  Delirium precautions  Fall precautions  Avoid medications such as tramadol, benzodiazepines, anticholinergics, benadryl  Per family member: daughter patient can occasionally became combative usually only oriented to self.  Appreciate Geriatric input  Ensure pain control, tylenol 975 mg TID, lidocaine patch   Normal sleep wake cycle - melatonin 3 mg qHS  Assist with feeding as needed  Zyprexa 2.5 mg IM q8H PRN for acute agitation  Start Cymbalta 20 mg daily   Other hyperlipidemia  Continue patient on Lipitor 20 mg daily  GERD (gastroesophageal reflux disease)  Continue patient on Pepcid 20 mg daily  Constipation  Patient has a history of chronic constipation    Continue Senokot, Colace and MiraLAX daily  Encourage p.o. hydration  History of permanent cardiac pacemaker placement  Follows with cardiology outpatient for symptomatic bradycardia, sick sinus syndrome    Plan  - Continue to monitor clinically.  Suspected malignant neoplasm of  lung  Seen by pulmonology on 2024  Likely malignant in nature but biopsy declined by daughter given age and dementia  Expect natural course of lung cancer to take hold and transition to hospice when appropriate-daughter in agreement  Stage 3a chronic kidney disease (HCC)  Lab Results   Component Value Date    EGFR 63 2024    EGFR 60 2024    EGFR 54 2024    CREATININE 0.82 2024    CREATININE 0.86 2024    CREATININE 0.93 2024     Back pain  C/o of lower back pain    Plan:   Tylenol 975 TID  Lidocaine patch  Ambulatory dysfunction  PT/OT evaluation    SIRS (systemic inflammatory response syndrome) (HCC)  SIRS in the setting of UTI, Leukocytosis (WBC 12.39) and Tachycardia ()     Resolved, treated with abx targeting suspectibility.     Other contributing factors of delirium, and agitaiton.     Current Length of Stay: 4 day(s)  Current Patient Status: Inpatient   Code Status: Level 3 - DNAR and DNI      Discharge Plan:  Expected date of discharge: pending insurance authorization  Dispo destination: [facility]  Indwelling drains/lines: na  DME needs: na  HH needs: na  F/U appts: PCP  Preferred pharmacy: facility  Meds: na  Transportation: facility  Subjective:   Overnight: No acute events over night     Complaints: No complaints.     Aphasic with language comprehension.    Diet: Diet Regular; Regular House   Bowel regimen:   Last BM Date: 24   VTE Pharmacologic Prophylaxis:   High Risk (Score >/= 5) - Pharmacological DVT Prophylaxis Ordered: enoxaparin (Lovenox). Sequential Compression Devices Ordered.    Objective:    Vitals:   Temp (24hrs), Av.7 °F (36.5 °C), Min:97.5 °F (36.4 °C), Max:97.8 °F (36.6 °C)    Temp:  [97.5 °F (36.4 °C)-97.8 °F (36.6 °C)] 97.7 °F (36.5 °C)  HR:  [63-91] 78  Resp:  [16-18] 16  BP: (103-156)/(54-95) 156/95  SpO2:  [95 %-98 %] 95 %  Input and Output Summary (last 24 hours):     Intake/Output Summary (Last 24 hours) at 2024  0927  Last data filed at 9/14/2024 0924  Gross per 24 hour   Intake 840 ml   Output 0 ml   Net 840 ml     Physical Exam:   Physical Exam  Constitutional:       General: She is not in acute distress.     Comments: Alert, no orientation, conversational. More awake compared to yesteraday   HENT:      Head: Normocephalic and atraumatic.   Cardiovascular:      Rate and Rhythm: Normal rate and regular rhythm.      Heart sounds: Normal heart sounds.   Pulmonary:      Effort: Pulmonary effort is normal.   Abdominal:      Palpations: Abdomen is soft.      Tenderness: There is no abdominal tenderness.      Comments: No guarding, point tenderness   Skin:     General: Skin is warm.        Additional Data:   Labs:  Results from last 7 days   Lab Units 09/14/24  0437 09/12/24  1539 09/11/24  0453   WBC Thousand/uL 8.55   < > 8.97   HEMOGLOBIN g/dL 11.6   < > 13.5   HEMATOCRIT % 35.3   < > 40.4   PLATELETS Thousands/uL 102*   < > 106*   SEGS PCT %  --   --  73   LYMPHO PCT %  --   --  17   MONO PCT %  --   --  8   EOS PCT %  --   --  1    < > = values in this interval not displayed.     Results from last 7 days   Lab Units 09/14/24  0437 09/11/24  0453 09/10/24  1913   SODIUM mmol/L 141   < > 140   POTASSIUM mmol/L 3.5   < > 4.2   CHLORIDE mmol/L 108   < > 104   CO2 mmol/L 25   < > 29   BUN mg/dL 32*   < > 23   CREATININE mg/dL 0.82   < > 1.11   ANION GAP mmol/L 8   < > 7   CALCIUM mg/dL 8.3*   < > 8.9   ALBUMIN g/dL  --   --  4.0   TOTAL BILIRUBIN mg/dL  --   --  0.50   ALK PHOS U/L  --   --  79   ALT U/L  --   --  8   AST U/L  --   --  13   GLUCOSE RANDOM mg/dL 115   < > 118    < > = values in this interval not displayed.         Results from last 7 days   Lab Units 09/13/24  1045   POC GLUCOSE mg/dl 160*               Recent Cultures (last 7 days):  I have reviewed the following results: CBC, BMP, and all the lab results till date  Results from last 7 days   Lab Units 09/10/24  2010   URINE CULTURE  >100,000 cfu/ml Escherichia  coli*       Imaging:   Imaging Review: I have reviewed all the imaging on file  Other Studies: I have reviewed all other studies till date  CTA head and neck with and without contrast  Narrative: CTA NECK AND BRAIN WITH AND WITHOUT CONTRAST    INDICATION: vertigo    COMPARISON:   None.    TECHNIQUE:  Routine CT imaging of the Brain without contrast.Post contrast imaging was performed after administration of iodinated contrast through the neck and brain. Post contrast axial 0.625 mm images timed to opacify the arterial system.  3D   rendering was performed on an independent workstation.   MIP reconstructions performed. Coronal and sagittal reconstructions were performed of the non contrast portion of the brain.    Radiation dose length product (DLP) for this visit:  2176 mGy-cm .  This examination, like all CT scans performed in the [facility], was performed utilizing techniques to minimize radiation dose exposure, including the use of iterative   reconstruction and automated exposure control.    IV Contrast:  85 mL of iohexol (OMNIPAQUE)    IMAGE QUALITY:   Diagnostic    FINDINGS:  NONCONTRAST BRAIN  PARENCHYMA:  Cerebral volume loss. Decreased attenuation is noted in periventricular and subcortical white matter demonstrating an appearance that is statistically most likely to represent moderate to severe microangiopathic change.    No CT signs of acute infarction.  No intracranial mass, mass effect or midline shift.  No acute parenchymal hemorrhage.    VENTRICLES AND EXTRA-AXIAL SPACES:Normal for the patient's age.    VISUALIZED ORBITS: Normal.    PARANASAL SINUSES: Normal.    CTA NECK  ARCH AND GREAT VESSELS:  Atherosclerotic changes. Great vessel origins are patent without severe stenosis. There is mild atherosclerotic narrowing at the origin and proximal left subclavian artery.  RIGHT VERTEBRAL ARTERY: Patent origin. Extracranial segments are patent.  LEFT VERTEBRAL ARTERY: Mild atherosclerotic  stenosis at the origin.  Patent extracranial segments.  RIGHT CAROTID: Partially calcified atherosclerotic plaque primarily involving the carotid bifurcation. There is mild (less than 50%) ICA stenosis.  LEFT CAROTID: Partially calcified atherosclerotic plaque primarily involving the carotid bifurcation. There is mild (less than 50%) ICA stenosis.  NASCET criteria was used to determine the degree of internal carotid artery diameter stenosis.    CTA BRAIN:  INTERNAL CAROTID ARTERIES: Atherosclerotic changes involving bilateral intracranial internal carotid arteries. No significant stenosis.  ANTERIOR CIRCULATION:  No significant stenosis.  MIDDLE CEREBRAL ARTERY CIRCULATION: No significant stenosis.  DISTAL VERTEBRAL ARTERIES: No significant stenosis.   Patent right PICA origin. Left PICA origin is not well seen with suggested common origin AICA/PICA.  BASILAR ARTERY:No significant stenosis.  POSTERIOR CEREBRAL ARTERIES: No high-grade stenosis.    Hypoplastic right and small left P1 segments with persistent fetal origin of posterior cerebral arteries (normal anatomic variant.  VENOUS STRUCTURES:  Normal.    NON VASCULAR ANATOMY  BONY STRUCTURES:  No acute osseous abnormality. 0.8 cm lucent lesion with thin trabeculae within the C6 vertebral body seen on series 603 image 64.    SOFT TISSUES OF THE NECK: Multinodular thyroid. Correlate with prior thyroid work-up/ultrasound    THORACIC INLET: Cavitating lesion in the subpleural right upper lobe measuring 2.6 x 1.9 x 2.3 cm is grossly stable from CT 5/17/2024 (series 304 image 20). 0.9 cm right upper lobe nodule is also stable (series 305 image 7)  Impression: CT Brain:    No acute intracranial CT abnormality.  Chronic microangiopathic change.    CT Angiography:    Patent major vessels of the [place] without high-grade stenosis.  No significant stenosis in the cervical carotid or vertebral arteries.    Other:    2.6 cm right upper lobe subpleural cavitating  pulmonary lesion and 0.9 cm right  upper lobe nodule noted in the partially imaged lungs are stable from CT  5/17/2024.    0.8 cm lucent lesion with thin trabeculae within the C6 vertebral body likely  representing hemangioma. Correlate with prior outside imaging for stability.    Workstation performed: [id number]      Mobility:   Basic Mobility Inpatient Raw Score: 12  JH-HLM Goal: 4: Move to chair/commode  JH-HLM Achieved: 6: Walk 10 steps or more  JH-HLM Goal achieved. Continue to encourage appropriate mobility.    Last 24 Hours Medication List:   Current Facility-Administered Medications   Medication Dose Route Frequency Provider Last Rate    acetaminophen  975 mg Oral TID Wanda Hodges MD      aspirin  81 mg Oral Daily Mary Rangel MD      atorvastatin  20 mg Oral Daily Mary Rangel MD      bisacodyl  10 mg Rectal Daily PRN Mary Rangel MD      cephalexin  500 mg Oral Q12H [facility] Mel Levi MD      docusate sodium  100 mg Oral BID Mary Rangel MD      DULoxetine  20 mg Oral Daily Wanda Hodges MD      enoxaparin  40 mg Subcutaneous Daily Mary Rangel MD      famotidine  20 mg Oral Daily Mary Rangel MD      fluticasone  1 spray Each Nare Daily Mary Rangel MD      gabapentin  100 mg Oral BID Mary Rangel MD      lidocaine  1 patch Topical Daily Danyell Ngo MD      melatonin  3 mg Oral HS Wanda Hodges MD      OLANZapine  2.5 mg Intramuscular Q6H PRN Mary Rangel MD      polyethylene glycol  17 g Oral Daily Mary Rangel MD      senna  17.2 mg Oral BID Mary Rangel MD         Lines/Drains:  Invasive Devices       Peripheral Intravenous Line  Duration             Peripheral IV 09/10/24 Proximal;Right;Ventral (anterior) Forearm 3 days                          Patient Centered Rounds: I performed bedside rounds with nursing staff today.  Discussions with Specialists or Other Care Team Provider: Nursing  Education and Discussions with Family / Patient: Will update patient's point of contact if given permission      Today, Patient Was Seen By: Mel Levi MD  Administrative Statements   Today, Patient Was Seen By: Mel Levi MD  I have spent a total time of 35 minutes in caring for this patient on the day of the visit/encounter including Diagnostic results, Patient and family education, Impressions, Counseling / Coordination of care, Documenting in the medical record, Reviewing / ordering tests, medicine, procedures  , Obtaining or reviewing history  , and Communicating with other healthcare professionals .    **Please Note: This note may have been constructed using a voice recognition system.**

## 2024-09-14 NOTE — HOSPITAL COURSE
88 yo f w pmh of  Alzheimer's dementia, hypertension, hyperlipidemia, CKD stage III, GERD, chronic constipation and symptomatic bradycardia with PPM who presents due to AMS and dizziness and fall out of bed. On  initial evaluation, patient was stable hemodynamically, workup including CBC magnesium troponin was noncontributory to patient's presentation.UA suggestive of possible UTI, urine culture grew E. coli pan sensitive. CT chest showed old chronic 2.6 cm right upper lobe lesion and 0.9 cm on the right upper lobe that was stable from May 17, 2024.  Patient was then started Rocephin and transition to Keflex 1 sensitivity was available. During the first night of patient's admission, patient was agitated after midnight, subsequently received Zyprexa 2.5 mg and placed on soft restraint in 1 on 1 observation.  Patient was then evaluated by geriatrics recommended adding Cymbalta and adequate pain control.  Patient was given gabapentin, lidocaine patch and Tylenol 975 mg to optimize her back pain.  Melatonin 3 mg was given at night.  Subsequently patient slept well through the night, remains clinically stable.  Patient finished 5-day total course of antibiotics for her UTI.  Awaiting for placement.     Patient finished 3 days of ceftriaxone.

## 2024-09-14 NOTE — PLAN OF CARE

## 2024-09-14 NOTE — ASSESSMENT & PLAN NOTE
Lab Results   Component Value Date    EGFR 63 09/14/2024    EGFR 60 09/13/2024    EGFR 54 09/12/2024    CREATININE 0.82 09/14/2024    CREATININE 0.86 09/13/2024    CREATININE 0.93 09/12/2024

## 2024-09-14 NOTE — PLAN OF CARE
Problem: Potential for Falls  Goal: Patient will remain free of falls  Description: INTERVENTIONS:  - Educate patient/family on patient safety including physical limitations  - Instruct patient to call for assistance with activity   - Consult OT/PT to assist with strengthening/mobility   - Keep Call bell within reach  - Keep bed low and locked with side rails adjusted as appropriate  - Keep care items and personal belongings within reach  - Initiate and maintain comfort rounds  - Make Fall Risk Sign visible to staff  - Offer Toileting every  Hours, in advance of need  - Initiate/Maintain aarm  - Obtain necessary fall risk management equipment:   - Apply yellow socks and bracelet for high fall risk patients  - Consider moving patient to room near nurses station  Outcome: Progressing     Problem: PAIN - ADULT  Goal: Verbalizes/displays adequate comfort level or baseline comfort level  Description: Interventions:  - Encourage patient to monitor pain and request assistance  - Assess pain using appropriate pain scale  - Administer analgesics based on type and severity of pain and evaluate response  - Implement non-pharmacological measures as appropriate and evaluate response  - Consider cultural and social influences on pain and pain management  - Notify physician/advanced practitioner if interventions unsuccessful or patient reports new pain  Outcome: Progressing     Problem: INFECTION - ADULT  Goal: Absence or prevention of progression during hospitalization  Description: INTERVENTIONS:  - Assess and monitor for signs and symptoms of infection  - Monitor lab/diagnostic results  - Monitor all insertion sites, i.e. indwelling lines, tubes, and drains  - Monitor endotracheal if appropriate and nasal secretions for changes in amount and color  - [place] appropriate cooling/warming therapies per order  - Administer medications as ordered  - Instruct and encourage patient and family to use good hand hygiene technique  -  Identify and instruct in appropriate isolation precautions for identified infection/condition  Outcome: Progressing  Goal: Absence of fever/infection during neutropenic period  Description: INTERVENTIONS:  - Monitor WBC    Outcome: Progressing

## 2024-09-15 PROBLEM — R65.10 SIRS (SYSTEMIC INFLAMMATORY RESPONSE SYNDROME) (HCC): Status: RESOLVED | Noted: 2024-09-14 | Resolved: 2024-09-15

## 2024-09-15 LAB
ANION GAP SERPL CALCULATED.3IONS-SCNC: 8 MMOL/L (ref 4–13)
ATRIAL RATE: 80 BPM
BASOPHILS # BLD AUTO: 0.07 THOUSANDS/ÂΜL (ref 0–0.1)
BASOPHILS NFR BLD AUTO: 1 % (ref 0–1)
BUN SERPL-MCNC: 30 MG/DL (ref 5–25)
CALCIUM SERPL-MCNC: 8.9 MG/DL (ref 8.4–10.2)
CHLORIDE SERPL-SCNC: 104 MMOL/L (ref 96–108)
CO2 SERPL-SCNC: 28 MMOL/L (ref 21–32)
CREAT SERPL-MCNC: 0.8 MG/DL (ref 0.6–1.3)
EOSINOPHIL # BLD AUTO: 0.26 THOUSAND/ÂΜL (ref 0–0.61)
EOSINOPHIL NFR BLD AUTO: 4 % (ref 0–6)
ERYTHROCYTE [DISTWIDTH] IN BLOOD BY AUTOMATED COUNT: 14.7 % (ref 11.6–15.1)
GFR SERPL CREATININE-BSD FRML MDRD: 65 ML/MIN/1.73SQ M
GLUCOSE SERPL-MCNC: 110 MG/DL (ref 65–140)
HCT VFR BLD AUTO: 38.6 % (ref 34.8–46.1)
HGB BLD-MCNC: 12.9 G/DL (ref 11.5–15.4)
IMM GRANULOCYTES # BLD AUTO: 0.02 THOUSAND/UL (ref 0–0.2)
IMM GRANULOCYTES NFR BLD AUTO: 0 % (ref 0–2)
LYMPHOCYTES # BLD AUTO: 2.43 THOUSANDS/ÂΜL (ref 0.6–4.47)
LYMPHOCYTES NFR BLD AUTO: 35 % (ref 14–44)
MAGNESIUM SERPL-MCNC: 2 MG/DL (ref 1.9–2.7)
MCH RBC QN AUTO: 31.9 PG (ref 26.8–34.3)
MCHC RBC AUTO-ENTMCNC: 33.4 G/DL (ref 31.4–37.4)
MCV RBC AUTO: 95 FL (ref 82–98)
MONOCYTES # BLD AUTO: 0.62 THOUSAND/ÂΜL (ref 0.17–1.22)
MONOCYTES NFR BLD AUTO: 9 % (ref 4–12)
NEUTROPHILS # BLD AUTO: 3.48 THOUSANDS/ÂΜL (ref 1.85–7.62)
NEUTS SEG NFR BLD AUTO: 51 % (ref 43–75)
NRBC BLD AUTO-RTO: 0 /100 WBCS
P AXIS: 51 DEGREES
PLATELET # BLD AUTO: 126 THOUSANDS/UL (ref 149–390)
PLATELET BLD QL SMEAR: ADEQUATE
PMV BLD AUTO: 9.9 FL (ref 8.9–12.7)
POTASSIUM SERPL-SCNC: 3.5 MMOL/L (ref 3.5–5.3)
PR INTERVAL: 324 MS
QRS AXIS: 41 DEGREES
QRSD INTERVAL: 88 MS
QT INTERVAL: 398 MS
QTC INTERVAL: 459 MS
RBC # BLD AUTO: 4.05 MILLION/UL (ref 3.81–5.12)
RBC MORPH BLD: NORMAL
SODIUM SERPL-SCNC: 140 MMOL/L (ref 135–147)
T WAVE AXIS: 70 DEGREES
VENTRICULAR RATE: 80 BPM
WBC # BLD AUTO: 6.88 THOUSAND/UL (ref 4.31–10.16)

## 2024-09-15 PROCEDURE — 80048 BASIC METABOLIC PNL TOTAL CA: CPT

## 2024-09-15 PROCEDURE — 83735 ASSAY OF MAGNESIUM: CPT

## 2024-09-15 PROCEDURE — 93005 ELECTROCARDIOGRAM TRACING: CPT

## 2024-09-15 PROCEDURE — 93010 ELECTROCARDIOGRAM REPORT: CPT | Performed by: INTERNAL MEDICINE

## 2024-09-15 PROCEDURE — 99232 SBSQ HOSP IP/OBS MODERATE 35: CPT | Performed by: INTERNAL MEDICINE

## 2024-09-15 PROCEDURE — 85025 COMPLETE CBC W/AUTO DIFF WBC: CPT

## 2024-09-15 RX ORDER — HYDRALAZINE HYDROCHLORIDE 20 MG/ML
5 INJECTION INTRAMUSCULAR; INTRAVENOUS ONCE
Status: COMPLETED | OUTPATIENT
Start: 2024-09-15 | End: 2024-09-15

## 2024-09-15 RX ADMIN — LIDOCAINE 1 PATCH: 700 PATCH TOPICAL at 10:30

## 2024-09-15 RX ADMIN — DOCUSATE SODIUM 100 MG: 100 CAPSULE, LIQUID FILLED ORAL at 10:30

## 2024-09-15 RX ADMIN — ATORVASTATIN CALCIUM 20 MG: 40 TABLET, FILM COATED ORAL at 10:30

## 2024-09-15 RX ADMIN — ACETAMINOPHEN 975 MG: 325 TABLET ORAL at 17:23

## 2024-09-15 RX ADMIN — GABAPENTIN 100 MG: 100 CAPSULE ORAL at 17:23

## 2024-09-15 RX ADMIN — DOCUSATE SODIUM 100 MG: 100 CAPSULE, LIQUID FILLED ORAL at 17:23

## 2024-09-15 RX ADMIN — SENNOSIDES 17.2 MG: 8.6 TABLET, FILM COATED ORAL at 10:30

## 2024-09-15 RX ADMIN — DULOXETINE HYDROCHLORIDE 20 MG: 20 CAPSULE, DELAYED RELEASE ORAL at 10:31

## 2024-09-15 RX ADMIN — POLYETHYLENE GLYCOL 3350 17 G: 17 POWDER, FOR SOLUTION ORAL at 10:29

## 2024-09-15 RX ADMIN — FLUTICASONE PROPIONATE 1 SPRAY: 50 SPRAY, METERED NASAL at 10:31

## 2024-09-15 RX ADMIN — SENNOSIDES 17.2 MG: 8.6 TABLET, FILM COATED ORAL at 17:23

## 2024-09-15 RX ADMIN — ACETAMINOPHEN 975 MG: 325 TABLET ORAL at 21:53

## 2024-09-15 RX ADMIN — ENOXAPARIN SODIUM 40 MG: 40 INJECTION SUBCUTANEOUS at 10:29

## 2024-09-15 RX ADMIN — FAMOTIDINE 20 MG: 20 TABLET ORAL at 10:30

## 2024-09-15 RX ADMIN — HYDRALAZINE HYDROCHLORIDE 5 MG: 20 INJECTION, SOLUTION INTRAMUSCULAR; INTRAVENOUS at 05:59

## 2024-09-15 RX ADMIN — GABAPENTIN 100 MG: 100 CAPSULE ORAL at 10:30

## 2024-09-15 RX ADMIN — Medication 3 MG: at 21:53

## 2024-09-15 RX ADMIN — ASPIRIN 81 MG 81 MG: 81 TABLET ORAL at 10:30

## 2024-09-15 RX ADMIN — ACETAMINOPHEN 975 MG: 325 TABLET ORAL at 10:30

## 2024-09-15 NOTE — ASSESSMENT & PLAN NOTE
Pmhx: No significant history of UTI, culture on file 5/12/24: shows contaminant  UA positive for leukocytosis, occasional bacteria, hyaline cast  Urine culture growing e.coli  Completed 5 days of Cephalexin 500 mg bid      Plan:  Delirium precautions  Fall precautions  Urinary retention protocol  PT/OT evaluation  Trend temperatures, monitor vitals

## 2024-09-15 NOTE — ASSESSMENT & PLAN NOTE
Pmhx of alzheimer's dementia, with behavior issues. Likely multifactorial, poor po intake, sundowning, back pain, and UTI   PT/OT evaluation Level II   Plan  - Follow Geriatric's input  - delirium precautions  - pt/ot  - continue to treat UTI, see problem UTI

## 2024-09-15 NOTE — ASSESSMENT & PLAN NOTE
Previously on Aricept and Namenda which has been discontinued  Plan   Fall/safety precautions  Maintain sleep/wake cycle  Delirium precautions  Fall precautions  Follow geriatric recommendations   Avoid medications such as tramadol, benzodiazepines, anticholinergics, benadryl  Ensure pain control, tylenol 975 mg TID, lidocaine patch   Normal sleep wake cycle - melatonin 3 mg qHS  Assist with feeding as needed  Zyprexa 2.5 mg IM q8H PRN for acute agitation  Continue Cymbalta 20 mg daily   Per family member: daughter patient can occasionally became combative usually only oriented to self.

## 2024-09-15 NOTE — ASSESSMENT & PLAN NOTE
Pmhx: No significant history of UTI, culture on file 5/12/24: shows contaminant  UA positive for leukocytosis, occasional bacteria, hyaline cast  Urine culture growing e.coli  Finished 5 days cephalexin 500 mg BID    Plan:  Delirium precautions  Fall precautions  Urinary retention protocol  Trend temperatures, monitor vitals

## 2024-09-15 NOTE — ASSESSMENT & PLAN NOTE
Pmhx of alzheimer's dementia, with behavior issues. Likely multifactorial, poor po intake, sundowning, back pain, and UTI   PT/OT level II   Plan  - follow Geriatric's input  - delirium precautions  -continue home medication

## 2024-09-15 NOTE — ASSESSMENT & PLAN NOTE
Lab Results   Component Value Date    EGFR 65 09/15/2024    EGFR 63 09/14/2024    EGFR 60 09/13/2024    CREATININE 0.80 09/15/2024    CREATININE 0.82 09/14/2024    CREATININE 0.86 09/13/2024

## 2024-09-15 NOTE — ASSESSMENT & PLAN NOTE
Previously on Aricept and Namenda which has been discontinued  Fall/safety precautions  Delirium precautions  Fall precautions  Per family member: daughter patient can occasionally became combative usually only oriented to self.  Follow Geriatric input  Ensure pain control, tylenol 975 mg TID, lidocaine patch   Normal sleep wake cycle - melatonin 3 mg qHS  Assist with feeding as needed  Zyprexa 2.5 mg IM q8H PRN for acute agitation  Start Cymbalta 20 mg daily   Maintain sleep/wake cycle  Avoid medications such as tramadol, benzodiazepines, anticholinergics, benadryl

## 2024-09-15 NOTE — PLAN OF CARE
Problem: Potential for Falls  Goal: Patient will remain free of falls  Description: INTERVENTIONS:  - Educate patient/family on patient safety including physical limitations  - Instruct patient to call for assistance with activity   - Consult OT/PT to assist with strengthening/mobility   - Keep Call bell within reach  - Keep bed low and locked with side rails adjusted as appropriate  - Keep care items and personal belongings within reach  - Initiate and maintain comfort rounds  - Make Fall Risk Sign visible to staff  - Offer Toileting every two hours, in advance of need  - Initiate/Maintain bed alarm  - Obtain necessary fall risk management equipment:   - Apply yellow socks and bracelet for high fall risk patients  - Consider moving patient to room near nurses station  9/15/2024 0145 by Josemanuel Gutierrez RN  Outcome: Progressing     Problem: PAIN - ADULT  Goal: Verbalizes/displays adequate comfort level or baseline comfort level  Description: Interventions:  - Encourage patient to monitor pain and request assistance  - Assess pain using appropriate pain scale  - Administer analgesics based on type and severity of pain and evaluate response  - Implement non-pharmacological measures as appropriate and evaluate response  - Consider cultural and social influences on pain and pain management  - Notify physician/advanced practitioner if interventions unsuccessful or patient reports new pain  9/15/2024 0145 by Josemanuel Gutierrez RN  Outcome: Progressing        Problem: NEUROSENSORY - ADULT  Goal: Achieves maximal functionality and self care  Description: INTERVENTIONS  - Monitor swallowing and airway patency with patient fatigue and changes in neurological status  - Encourage and assist patient to increase activity and self care.   - Encourage visually impaired, hearing impaired and aphasic patients to use assistive/communication devices  Outcome: Progressing

## 2024-09-16 LAB
ANION GAP SERPL CALCULATED.3IONS-SCNC: 8 MMOL/L (ref 4–13)
BASOPHILS # BLD AUTO: 0.08 THOUSANDS/ÂΜL (ref 0–0.1)
BASOPHILS NFR BLD AUTO: 1 % (ref 0–1)
BUN SERPL-MCNC: 27 MG/DL (ref 5–25)
CALCIUM SERPL-MCNC: 9 MG/DL (ref 8.4–10.2)
CHLORIDE SERPL-SCNC: 106 MMOL/L (ref 96–108)
CO2 SERPL-SCNC: 27 MMOL/L (ref 21–32)
CREAT SERPL-MCNC: 0.79 MG/DL (ref 0.6–1.3)
EOSINOPHIL # BLD AUTO: 0.27 THOUSAND/ÂΜL (ref 0–0.61)
EOSINOPHIL NFR BLD AUTO: 4 % (ref 0–6)
ERYTHROCYTE [DISTWIDTH] IN BLOOD BY AUTOMATED COUNT: 14.6 % (ref 11.6–15.1)
GFR SERPL CREATININE-BSD FRML MDRD: 66 ML/MIN/1.73SQ M
GLUCOSE SERPL-MCNC: 109 MG/DL (ref 65–140)
HCT VFR BLD AUTO: 39.5 % (ref 34.8–46.1)
HGB BLD-MCNC: 12.8 G/DL (ref 11.5–15.4)
IMM GRANULOCYTES # BLD AUTO: 0.02 THOUSAND/UL (ref 0–0.2)
IMM GRANULOCYTES NFR BLD AUTO: 0 % (ref 0–2)
LYMPHOCYTES # BLD AUTO: 2.5 THOUSANDS/ÂΜL (ref 0.6–4.47)
LYMPHOCYTES NFR BLD AUTO: 34 % (ref 14–44)
MCH RBC QN AUTO: 31.1 PG (ref 26.8–34.3)
MCHC RBC AUTO-ENTMCNC: 32.4 G/DL (ref 31.4–37.4)
MCV RBC AUTO: 96 FL (ref 82–98)
MONOCYTES # BLD AUTO: 0.71 THOUSAND/ÂΜL (ref 0.17–1.22)
MONOCYTES NFR BLD AUTO: 10 % (ref 4–12)
NEUTROPHILS # BLD AUTO: 3.81 THOUSANDS/ÂΜL (ref 1.85–7.62)
NEUTS SEG NFR BLD AUTO: 51 % (ref 43–75)
NRBC BLD AUTO-RTO: 0 /100 WBCS
PLATELET # BLD AUTO: 145 THOUSANDS/UL (ref 149–390)
PMV BLD AUTO: 9.7 FL (ref 8.9–12.7)
POTASSIUM SERPL-SCNC: 3.5 MMOL/L (ref 3.5–5.3)
RBC # BLD AUTO: 4.11 MILLION/UL (ref 3.81–5.12)
SODIUM SERPL-SCNC: 141 MMOL/L (ref 135–147)
WBC # BLD AUTO: 7.39 THOUSAND/UL (ref 4.31–10.16)

## 2024-09-16 PROCEDURE — 97535 SELF CARE MNGMENT TRAINING: CPT

## 2024-09-16 PROCEDURE — 80048 BASIC METABOLIC PNL TOTAL CA: CPT

## 2024-09-16 PROCEDURE — 97110 THERAPEUTIC EXERCISES: CPT

## 2024-09-16 PROCEDURE — 85025 COMPLETE CBC W/AUTO DIFF WBC: CPT

## 2024-09-16 PROCEDURE — 99232 SBSQ HOSP IP/OBS MODERATE 35: CPT | Performed by: INTERNAL MEDICINE

## 2024-09-16 PROCEDURE — 97116 GAIT TRAINING THERAPY: CPT

## 2024-09-16 PROCEDURE — 99232 SBSQ HOSP IP/OBS MODERATE 35: CPT | Performed by: FAMILY MEDICINE

## 2024-09-16 RX ORDER — OLANZAPINE 10 MG/2ML
2.5 INJECTION, POWDER, FOR SOLUTION INTRAMUSCULAR ONCE
Status: COMPLETED | OUTPATIENT
Start: 2024-09-16 | End: 2024-09-16

## 2024-09-16 RX ORDER — WATER 10 ML/10ML
INJECTION INTRAMUSCULAR; INTRAVENOUS; SUBCUTANEOUS
Status: COMPLETED
Start: 2024-09-16 | End: 2024-09-16

## 2024-09-16 RX ADMIN — OLANZAPINE 2.5 MG: 10 INJECTION, POWDER, FOR SOLUTION INTRAMUSCULAR at 20:15

## 2024-09-16 RX ADMIN — Medication 3 MG: at 19:32

## 2024-09-16 RX ADMIN — FAMOTIDINE 20 MG: 20 TABLET ORAL at 09:05

## 2024-09-16 RX ADMIN — SENNOSIDES 17.2 MG: 8.6 TABLET, FILM COATED ORAL at 19:00

## 2024-09-16 RX ADMIN — ATORVASTATIN CALCIUM 20 MG: 40 TABLET, FILM COATED ORAL at 09:05

## 2024-09-16 RX ADMIN — ACETAMINOPHEN 975 MG: 325 TABLET ORAL at 20:13

## 2024-09-16 RX ADMIN — LIDOCAINE 1 PATCH: 700 PATCH TOPICAL at 09:04

## 2024-09-16 RX ADMIN — OLANZAPINE 2.5 MG: 10 INJECTION, POWDER, FOR SOLUTION INTRAMUSCULAR at 16:35

## 2024-09-16 RX ADMIN — ACETAMINOPHEN 975 MG: 325 TABLET ORAL at 09:04

## 2024-09-16 RX ADMIN — ENOXAPARIN SODIUM 40 MG: 40 INJECTION SUBCUTANEOUS at 09:06

## 2024-09-16 RX ADMIN — WATER 10 ML: 1 INJECTION INTRAMUSCULAR; INTRAVENOUS; SUBCUTANEOUS at 20:19

## 2024-09-16 RX ADMIN — GABAPENTIN 100 MG: 100 CAPSULE ORAL at 09:05

## 2024-09-16 RX ADMIN — SENNOSIDES 17.2 MG: 8.6 TABLET, FILM COATED ORAL at 09:05

## 2024-09-16 RX ADMIN — POLYETHYLENE GLYCOL 3350 17 G: 17 POWDER, FOR SOLUTION ORAL at 09:04

## 2024-09-16 RX ADMIN — ASPIRIN 81 MG 81 MG: 81 TABLET ORAL at 09:05

## 2024-09-16 RX ADMIN — DOCUSATE SODIUM 100 MG: 100 CAPSULE, LIQUID FILLED ORAL at 09:05

## 2024-09-16 RX ADMIN — DULOXETINE HYDROCHLORIDE 20 MG: 20 CAPSULE, DELAYED RELEASE ORAL at 09:09

## 2024-09-16 RX ADMIN — FLUTICASONE PROPIONATE 1 SPRAY: 50 SPRAY, METERED NASAL at 09:09

## 2024-09-16 RX ADMIN — GABAPENTIN 100 MG: 100 CAPSULE ORAL at 19:00

## 2024-09-16 RX ADMIN — DOCUSATE SODIUM 100 MG: 100 CAPSULE, LIQUID FILLED ORAL at 19:00

## 2024-09-16 NOTE — ASSESSMENT & PLAN NOTE
"Pt has Alzheimer's dementia with behavioral disturbance and is normally alert and oriented x1 to self but not place or time at baseline.  Currently alert and oriented x 1.  CT head shows moderate to severe chronic microangiopathic change.  Patient needs assistance with all ADLs and IADLs.  She developed an acute worsening of confusion about x2 days ago. See \"delirium\"  Continue sleep-wake cycle  Encourage family to visit  [place] as needed and provide supportive care   B12 and TSH WNL  "

## 2024-09-16 NOTE — QUICK NOTE
Nursing reached out that patient is hypertensive 159/101, tachycardic at 114 and is complaining of dizziness. On chart review patient has fist degree AV block at  baseline and is on no medication for hypertension.   One dose of nifedipine was givens, EKG explore the tachycardia and baseline labs to explore any electrolyte abnormalities. Sign out given to the morning team to follow up on EKG and lab work..

## 2024-09-16 NOTE — ASSESSMENT & PLAN NOTE
Pmhx: No significant history of UTI, culture on file 5/12/24: shows contaminant  UA positive for leukocytosis, occasional bacteria, hyaline cast  Urine culture growing e.coli    Resolved, finished course of antibiotics  Plan:  Upon discharge please follow-up with PCP

## 2024-09-16 NOTE — ASSESSMENT & PLAN NOTE
Lab Results   Component Value Date    EGFR 66 09/16/2024    EGFR 65 09/15/2024    EGFR 63 09/14/2024    CREATININE 0.79 09/16/2024    CREATININE 0.80 09/15/2024    CREATININE 0.82 09/14/2024

## 2024-09-16 NOTE — CASE MANAGEMENT
Rec'd VM from Rosa @ [facility]  [phone] stated the case was sent to MD for review  Cm notified  Hannah Gu

## 2024-09-16 NOTE — PROGRESS NOTES
Progress Note - Hospitalist   Name: Berta Estrada 89 y.o. female I MRN: [id number]  Unit/Bed#: W -01 I Date of Admission: 9/10/2024   Date of Service: 9/16/2024 I Hospital Day: 6    Assessment & Plan  Altered mental status  Pmhx of alzheimer's dementia, with behavior issues. Likely multifactorial, poor po intake, sundowning, back pain, and UTI   PT/OT level II   Plan  - follow Geriatric's input  - delirium precautions  - continue home medication  UTI (urinary tract infection)  Pmhx: No significant history of UTI, culture on file 5/12/24: shows contaminant  UA positive for leukocytosis, occasional bacteria, hyaline cast  Urine culture growing e.coli    Plan:  Delirium precautions  Fall precautions  Urinary retention protocol  Trend temperatures, monitor vitals   Alzheimer's dementia, late onset (HCC)  Previously on Aricept and Namenda which has been discontinued  Fall/safety precautions  Delirium precautions  Fall precautions  Per family member: daughter patient can occasionally became combative usually only oriented to self.  Follow Geriatric input  Ensure pain control, tylenol 975 mg TID, lidocaine patch   Normal sleep wake cycle - melatonin 3 mg qHS  Assist with feeding as needed  Zyprexa 2.5 mg IM q8H PRN for acute agitation  Start Cymbalta 20 mg daily   Maintain sleep/wake cycle  Avoid medications such as tramadol, benzodiazepines, anticholinergics, benadryl  Other hyperlipidemia  Continue patient on Lipitor 20 mg daily  GERD (gastroesophageal reflux disease)  Continue patient on Pepcid 20 mg daily  Constipation  Patient has a history of chronic constipation    Continue Senokot, Colace and MiraLAX daily  Encourage p.o. hydration  History of permanent cardiac pacemaker placement  Follows with cardiology outpatient for symptomatic bradycardia, sick sinus syndrome    Plan  - Continue to monitor clinically.  Suspected malignant neoplasm of lung  Seen by pulmonology on 5/28/2024  Likely malignant in nature  but biopsy declined by daughter given age and dementia  Expect natural course of lung cancer to take hold and transition to hospice when appropriate-daughter in agreement  Stage 3a chronic kidney disease (HCC)  Lab Results   Component Value Date    EGFR 65 09/15/2024    EGFR 63 2024    EGFR 60 2024    CREATININE 0.80 09/15/2024    CREATININE 0.82 2024    CREATININE 0.86 2024     Back pain  C/o of lower back pain    Plan:   Tylenol 975 TID  Lidocaine patch  Ambulatory dysfunction  PT/OT evaluation    Current Length of Stay: 6 day(s)  Current Patient Status: Inpatient   Code Status: Level 3 - DNAR and DNI      Discharge Plan: Discharge Plan: Disposition:  Expected date of discharge: OT lvl 2  Dispo destination: [facility], insurance pending   Indwelling drains/lines: na  DME needs: na  HH needs: na  F/U appts: PCP  Preferred pharmacy: facility  Meds: na  Transportation: facility  Subjective:   Overnight: No acute events over night     Discussed with nursing, alternating periods of cognition, but has raised no concern thus far. Remains stable.    Complaints: No complaints.     Aphasic with comprehension.     Diet: Diet Regular; Regular House   Bowel regimen:   Last BM Date: 24   VTE Pharmacologic Prophylaxis:   High Risk (Score >/= 5) - Pharmacological DVT Prophylaxis Ordered: enoxaparin (Lovenox). Sequential Compression Devices Ordered.    Objective:    Vitals:   Temp (24hrs), Av.6 °F (36.4 °C), Min:97.2 °F (36.2 °C), Max:98.3 °F (36.8 °C)    Temp:  [97.2 °F (36.2 °C)-98.3 °F (36.8 °C)] 97.3 °F (36.3 °C)  HR:  [] 78  Resp:  [16-18] 16  BP: (108-176)/(70-98) 144/86  SpO2:  [95 %-98 %] 98 %  Input and Output Summary (last 24 hours):     Intake/Output Summary (Last 24 hours) at 2024 0549  Last data filed at 2024 0101  Gross per 24 hour   Intake 1860 ml   Output 600 ml   Net 1260 ml     Physical Exam:   Physical Exam  Constitutional:       General: She is not in  acute distress.     Comments: Alert, no orientation, conversational. However, exhibit aphaisc with difficulty with language comprehension as conversations prolongs   HENT:      Head: Normocephalic and atraumatic.   Cardiovascular:      Rate and Rhythm: Normal rate and regular rhythm.      Heart sounds: Normal heart sounds.   Pulmonary:      Effort: Pulmonary effort is normal.   Abdominal:      Palpations: Abdomen is soft.      Tenderness: There is no abdominal tenderness.      Comments: No guarding, point tenderness   Skin:     General: Skin is warm.        Additional Data:   Labs:  Results from last 7 days   Lab Units 09/15/24  0558   WBC Thousand/uL 6.88   HEMOGLOBIN g/dL 12.9   HEMATOCRIT % 38.6   PLATELETS Thousands/uL 126*   SEGS PCT % 51   LYMPHO PCT % 35   MONO PCT % 9   EOS PCT % 4     Results from last 7 days   Lab Units 09/15/24  0558 09/11/24  0453 09/10/24  1913   SODIUM mmol/L 140   < > 140   POTASSIUM mmol/L 3.5   < > 4.2   CHLORIDE mmol/L 104   < > 104   CO2 mmol/L 28   < > 29   BUN mg/dL 30*   < > 23   CREATININE mg/dL 0.80   < > 1.11   ANION GAP mmol/L 8   < > 7   CALCIUM mg/dL 8.9   < > 8.9   ALBUMIN g/dL  --   --  4.0   TOTAL BILIRUBIN mg/dL  --   --  0.50   ALK PHOS U/L  --   --  79   ALT U/L  --   --  8   AST U/L  --   --  13   GLUCOSE RANDOM mg/dL 110   < > 118    < > = values in this interval not displayed.         Results from last 7 days   Lab Units 09/13/24  1045   POC GLUCOSE mg/dl 160*               Recent Cultures (last 7 days):  I have reviewed the following results: CBC, BMP, and all the lab results till date  Results from last 7 days   Lab Units 09/10/24  2010   URINE CULTURE  >100,000 cfu/ml Escherichia coli*       Imaging:   Imaging Review: I have reviewed all the imaging on file  Other Studies: I have reviewed all other studies till date  CTA head and neck with and without contrast  Narrative: CTA NECK AND BRAIN WITH AND WITHOUT CONTRAST    INDICATION: vertigo    COMPARISON:    None.    TECHNIQUE:  Routine CT imaging of the Brain without contrast.Post contrast imaging was performed after administration of iodinated contrast through the neck and brain. Post contrast axial 0.625 mm images timed to opacify the arterial system.  3D   rendering was performed on an independent workstation.   MIP reconstructions performed. Coronal and sagittal reconstructions were performed of the non contrast portion of the brain.    Radiation dose length product (DLP) for this visit:  2176 mGy-cm .  This examination, like all CT scans performed in the [facility], was performed utilizing techniques to minimize radiation dose exposure, including the use of iterative   reconstruction and automated exposure control.    IV Contrast:  85 mL of iohexol (OMNIPAQUE)    IMAGE QUALITY:   Diagnostic    FINDINGS:  NONCONTRAST BRAIN  PARENCHYMA:  Cerebral volume loss. Decreased attenuation is noted in periventricular and subcortical white matter demonstrating an appearance that is statistically most likely to represent moderate to severe microangiopathic change.    No CT signs of acute infarction.  No intracranial mass, mass effect or midline shift.  No acute parenchymal hemorrhage.    VENTRICLES AND EXTRA-AXIAL SPACES:Normal for the patient's age.    VISUALIZED ORBITS: Normal.    PARANASAL SINUSES: Normal.    CTA NECK  ARCH AND GREAT VESSELS:  Atherosclerotic changes. Great vessel origins are patent without severe stenosis. There is mild atherosclerotic narrowing at the origin and proximal left subclavian artery.  RIGHT VERTEBRAL ARTERY: Patent origin. Extracranial segments are patent.  LEFT VERTEBRAL ARTERY: Mild atherosclerotic stenosis at the origin.  Patent extracranial segments.  RIGHT CAROTID: Partially calcified atherosclerotic plaque primarily involving the carotid bifurcation. There is mild (less than 50%) ICA stenosis.  LEFT CAROTID: Partially calcified atherosclerotic plaque primarily involving  the carotid bifurcation. There is mild (less than 50%) ICA stenosis.  NASCET criteria was used to determine the degree of internal carotid artery diameter stenosis.    CTA BRAIN:  INTERNAL CAROTID ARTERIES: Atherosclerotic changes involving bilateral intracranial internal carotid arteries. No significant stenosis.  ANTERIOR CIRCULATION:  No significant stenosis.  MIDDLE CEREBRAL ARTERY CIRCULATION: No significant stenosis.  DISTAL VERTEBRAL ARTERIES: No significant stenosis.   Patent right PICA origin. Left PICA origin is not well seen with suggested common origin AICA/PICA.  BASILAR ARTERY:No significant stenosis.  POSTERIOR CEREBRAL ARTERIES: No high-grade stenosis.    Hypoplastic right and small left P1 segments with persistent fetal origin of posterior cerebral arteries (normal anatomic variant.  VENOUS STRUCTURES:  Normal.    NON VASCULAR ANATOMY  BONY STRUCTURES:  No acute osseous abnormality. 0.8 cm lucent lesion with thin trabeculae within the C6 vertebral body seen on series 603 image 64.    SOFT TISSUES OF THE NECK: Multinodular thyroid. Correlate with prior thyroid work-up/ultrasound    THORACIC INLET: Cavitating lesion in the subpleural right upper lobe measuring 2.6 x 1.9 x 2.3 cm is grossly stable from CT 5/17/2024 (series 304 image 20). 0.9 cm right upper lobe nodule is also stable (series 305 image 7)  Impression: CT Brain:    No acute intracranial CT abnormality.  Chronic microangiopathic change.    CT Angiography:    Patent major vessels of the [place] without high-grade stenosis.  No significant stenosis in the cervical carotid or vertebral arteries.    Other:    2.6 cm right upper lobe subpleural cavitating pulmonary lesion and 0.9 cm right  upper lobe nodule noted in the partially imaged lungs are stable from CT  5/17/2024.    0.8 cm lucent lesion with thin trabeculae within the C6 vertebral body likely  representing hemangioma. Correlate with prior outside imaging for  stability.    Workstation performed: [id number]      Mobility:   Basic Mobility Inpatient Raw Score: 12  JH-HLM Goal: 4: Move to chair/commode  JH-HLM Achieved: 4: Move to chair/commode  JH-HLM Goal achieved. Continue to encourage appropriate mobility.    Last 24 Hours Medication List:   Current Facility-Administered Medications   Medication Dose Route Frequency Provider Last Rate    acetaminophen  975 mg Oral TID Wanda Hodges MD      aspirin  81 mg Oral Daily Mary Rangel MD      atorvastatin  20 mg Oral Daily Mary Rangel MD      bisacodyl  10 mg Rectal Daily PRN Mary Rangel MD      docusate sodium  100 mg Oral BID Mary Rangel MD      DULoxetine  20 mg Oral Daily Wanda Hodges MD      enoxaparin  40 mg Subcutaneous Daily Mary Rangel MD      famotidine  20 mg Oral Daily Mary Rangel MD      fluticasone  1 spray Each Nare Daily Mary Rangel MD      gabapentin  100 mg Oral BID Mary Rangel MD      lidocaine  1 patch Topical Daily Danyell Ngo MD      melatonin  3 mg Oral HS Wanda Hodges MD      OLANZapine  2.5 mg Intramuscular Q6H PRN Mary Rangel MD      polyethylene glycol  17 g Oral Daily Mary Rangel MD      senna  17.2 mg Oral BID Mary Rangel MD         Lines/Drains:  Invasive Devices       Peripheral Intravenous Line  Duration             Peripheral IV 09/15/24 Right;Ventral (anterior) Forearm <1 day                          Patient Centered Rounds: I performed bedside rounds with nursing staff today.  Discussions with Specialists or Other Care Team Provider: Nursing  Education and Discussions with Family / Patient: Will update patient's point of contact if given permission     Today, Patient Was Seen By: Mel Levi MD  Administrative Statements   Today, Patient Was Seen By: Mel Levi MD  I have spent a total time of 35 minutes in caring for this patient on the day of the visit/encounter including Diagnostic results, Patient and family education, Impressions, Counseling / Coordination of care,  Documenting in the medical record, Reviewing / ordering tests, medicine, procedures  , Obtaining or reviewing history  , and Communicating with other healthcare professionals .    **Please Note: This note may have been constructed using a voice recognition system.**

## 2024-09-16 NOTE — ASSESSMENT & PLAN NOTE
Lab Results   Component Value Date    EGFR 66 09/16/2024    EGFR 65 09/15/2024    EGFR 63 09/14/2024    CREATININE 0.79 09/16/2024    CREATININE 0.80 09/15/2024    CREATININE 0.82 09/14/2024       Creatinine stable.  Will avoid nephrotoxic medication.  Encourage po hydration.  Will monitor BMP.

## 2024-09-16 NOTE — ASSESSMENT & PLAN NOTE
Pmhx: No significant history of UTI, culture on file 5/12/24: shows contaminant  UA positive for leukocytosis, occasional bacteria, hyaline cast  Urine culture growing e.coli    Plan:  Delirium precautions  Fall precautions  Urinary retention protocol  Trend temperatures, monitor vitals

## 2024-09-16 NOTE — ASSESSMENT & PLAN NOTE
Previously on Aricept and Namenda which has been discontinued  Fall/safety precautions  Delirium precautions  Fall precautions  Per family member: daughter patient can occasionally became combative usually only oriented to self.  Follow Geriatric input  Ensure pain control, tylenol 975 mg TID, lidocaine patch   Normal sleep wake cycle - melatonin 3 mg qHS  Assist with feeding as needed  Zyprexa 2.5 mg IM q8H PRN for acute agitation  Start Cymbalta 20 mg daily   Maintain sleep/wake cycle  Avoid medications such as tramadol, benzodiazepines, anticholinergics, benadryl  Plan   Upon discharge, please ensure patient's safety with falls and ambulations.  Ensure sleep-wake cycle and avoid medications such as tramadol, benzodiazepine, anticholinergics and Benadryl which could contribute delirium

## 2024-09-16 NOTE — OCCUPATIONAL THERAPY NOTE
Occupational Therapy Progress Note     Patient Name: Berta Estrada  Today's Date: 9/16/2024  Problem List  Principal Problem:    Altered mental status  Active Problems:    Other hyperlipidemia    GERD (gastroesophageal reflux disease)    Stage 3a chronic kidney disease (HCC)    Constipation    History of permanent cardiac pacemaker placement    Suspected malignant neoplasm of lung    UTI (urinary tract infection)    Alzheimer's dementia, late onset (HCC)    Back pain    Ambulatory dysfunction        09/16/24 1437   OT Last Visit   OT Visit Date 09/16/24   Note Type   Note Type Treatment for insurance authorization   Pain Assessment   Pain Assessment Tool 0-10   Pain Score No Pain   Restrictions/Precautions   Weight Bearing Precautions Per Order No   Other Precautions Impulsive;Cognitive;Chair Alarm;Bed Alarm;Multiple lines;Fall Risk   Lifestyle   Autonomy PTA pt living with daughter in 2SH with [facility], pt requiring (A) with ADLs and IADLs, (+)falls, (-)drives, use of SPC at baseline   Reciprocal Relationships supportive daughter   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   Intrinsic Gratification unable to recall at this time - per chart review pt used to enjoy talking about her previous job   ADL   Where Assessed Standing at sink  (vs toilet)   Grooming Assistance 4  Minimal Assistance   Grooming Deficit Setup;Steadying;Verbal cueing;Supervision/safety;Increased time to complete;Wash/dry hands   Grooming Comments standing at sink in bathroom, frequent redirection, A to operate soap dispenser   LB Bathing Assistance 4  Minimal Assistance   LB Bathing Deficit Setup;Steadying;Verbal cueing;Supervision/safety;Increased time to complete;Perineal area;Buttocks;Right upper leg;Left upper leg   LB Bathing Comments in stance at toilet, min A steadying, pt able to reach lower legs in stance, fair thoroughness required minimal VC   UB Dressing Assistance 4  Minimal Assistance   UB Dressing Deficit Setup;Verbal  cueing;Supervision/safety;Increased time to complete;Thread RUE;Thread LUE;Pull around back   UB Dressing Comments sitting in recliner, VC for sequencing of task, orientation of gown   LB Dressing Assistance 4  Minimal Assistance   LB Dressing Deficit Setup;Steadying;Verbal cueing;Increased time to complete;Supervision/safety;Thread RLE into underwear;Thread LLE into underwear;Pull up over hips   LB Dressing Comments extensive VC for sequencing/attention to task/safety for completion. Required socks/underwear change due to incontinence, min A steadying to pull underwear over hips, increased time and VC to doff/don over feet   Toileting Assistance  4  Minimal Assistance   Toileting Deficit Setup;Steadying;Verbal cueing;Supervison/safety;Increased time to complete;Perineal hygiene   Toileting Comments incontinent of bladder upon arrival, exensive VC to problem solve/sequence through task.   Functional Standing Tolerance   Time 2-3 minutes   Activity hand hygiene at sink   Comments min A steadying, pt w/ 1 episode LOB laterally   Bed Mobility   Sit to Supine 4  Minimal assistance   Additional items Assist x 1;Increased time required;Verbal cues;LE management   Additional Comments Pt received in stance at recliner, chair alarm sounding. Requesting return to supine at end of session for rest   Transfers   Sit to Stand 4  Minimal assistance   Additional items Assist x 1;Increased time required;Verbal cues   Stand to Sit 4  Minimal assistance   Additional items Assist x 1;Increased time required;Verbal cues   Additional Comments Found in stance at recliner, HR into 130's, returned to seated position, impulsive. Requires extensive cues for hand placement and initiation of remainder of transfers.   Functional Mobility   Functional Mobility 4  Minimal assistance   Additional Comments Household distance to bathroom and back w/ HHA and min A x 1. Extensive VC for direction/attention. Unsteady, reaching for furniture to steady.  "HR into 130's after activity.   Additional items Hand hold assistance   Toilet Transfers   Toilet Transfer From   (standing)   Toilet Transfer Type To and from   Toilet Transfer to Standard toilet   Toilet Transfer Technique Ambulating   Toilet Transfers Minimal assistance;Verbal cues   Toilet Transfers Comments VC for use of grab bar, A to guide hips to target surface   Subjective   Subjective \"You've lost weight haven't you\" \"You'll tell me when it's time to evacuate right?\"   Cognition   Overall Cognitive Status Impaired   Arousal/Participation Alert;Cooperative   Attention Attends with cues to redirect   Orientation Level Oriented to person;Disoriented to place;Disoriented to time;Disoriented to situation   Memory Decreased short term memory;Decreased recall of recent events;Decreased recall of precautions   Following Commands Follows one step commands with increased time or repetition   Comments Pleasantly confused. Hyperverbal, highly tagential, mostly nonsensical thoughts. Believed pt recognized therapist as family member. Asking questions about her . Disoriented to situation, stating she should get back to folding the laundry. States she doesn't want to be a burden on anyone and should go home. Will continue to assess   Vision   Vision Comments Notable depth perception deficits when reaching for faucet during hand hygiene   Activity Tolerance   Activity Tolerance Treatment limited secondary to medical complications (Comment)  (tachycardia, cognition/impulsiveness)   Medical Staff Made Aware Spoke to PT BRODY RN   Assessment   Assessment Pt seen for OT treatment on 9/16 s/p admit to [facility] w/ Altered mental status. Pt agreeable to OT treatment session, upon arrival patient was found  in stance at recliner w/ chair alarm sounding . Patient participated in skilled OT interventions to address ADL tasks, functional transfers, and overall activity tolerance and participation. In comparison to previous session, " Berta demonstrated improvements in performance of ADLs specifically LB dressing and toileting as well as mobility w/o an AD but is continuing to perform below baseline due to significantly impaired cognition/attention, decreased balance, generalized weakness/deconditioning, decreased endurance and performance of ADLs/mobility. Personal factors that continue to impact DC include: advanced age, current habits and behavioral patterns, limited social support, inaccessible home environment, and difficulty completing ADLs. Patient to benefit from continued IPOT treatment to address deficits as defined above and maximize level of functional independence with ADLs and functional mobility. Goals remain appropriate. Continue per POC.   Plan   Treatment Interventions ADL retraining;Functional transfer training;Endurance training;Cognitive reorientation;Patient/family training;Equipment evaluation/education;Compensatory technique education;Continued evaluation;Energy conservation;Activityengagement   Goal Expiration Date 09/23/24   OT Treatment Day 2   OT Frequency 3-5x/wk   Discharge Recommendation   Recommendation Geriatric Consult   Rehab Resource Intensity Level, OT II (Moderate Resource Intensity)   Additional Comments  The patient's raw score on the -PAC Daily Activity Inpatient Short Form is 19. A raw score of greater than or equal to 19 suggests the patient may benefit from discharge to home. Please refer to the recommendation of the Occupational Therapist for safe discharge planning.   AM-PAC Daily Activity Inpatient   Lower Body Dressing 3   Bathing 3   Toileting 3   Upper Body Dressing 3   Grooming 3   Eating 4   Daily Activity Raw Score 19   Daily Activity Standardized Score (Calc for Raw Score >=11) 40.22   AM-PAC Applied Cognition Inpatient   Following a Speech/Presentation 1   Understanding Ordinary Conversation 2   Taking Medications 1   Remembering Where Things Are Placed or Put Away 2   Remembering List of  4-5 Errands 1   Taking Care of Complicated Tasks 1   Applied Cognition Raw Score 8   Applied Cognition Standardized Score 19.32   End of Consult   Patient Position at End of Consult Supine;Bed/Chair alarm activated;All needs within reach   Nurse Communication Nurse aware of consult     GOALS:      -Patient will perform grooming tasks sitting EOB with overall Mod I in order to increase overall independence (PROGRESSING: UPGRADE TO STANDING AT SINK)     -Patient will be Supervision with UB dressing using AE and AD as needed in order to increase (I) with ADLs (PROGRESSING)     -Patient will be Supervision with UB bathing using AE and AD as needed in order to increase (I) with ADLs     -Patient will be Mod A  with LB dressing with use of AE and AD as needed in order to increase (I) with ADLs (MET: UPGRADE TO S)     -Patient will be Mod A  with LB bathing with use of AE and AD as needed in order to increase (I) with ADLs (MET: UPGRADE TO S)     -Patient will complete toileting w/ Mod A  w/ G hygiene/thoroughness in order to reduce caregiver burden (MET: UPGRADE TO S)     -Patient will demonstrate Min A x 1 with bed mobility for ability to manage own comfort and initiate OOB tasks. (MET: UPGRADE TO S)     -Patient will perform functional transfers with Min A x 1 to/from all surfaces using DME as needed in order to increase (I) with functional tasks (MET: UPGRADE TO S)     -Patient will be Min A x 1 with functional mobility to/from BSC for increased independence with toileting tasks (MET: UPGRADE TO S TO BATHROOM)     -Patient will tolerate therapeutic activities for greater than 30 min, in order to increase tolerance for functional activities. (PROGRESSING)     -Patient will engage in ongoing cognitive assessment in order to assist with safe discharge planning/recommendations.     -Patient will follow single-step instructions with no VC in order to safely complete functional tasks (PROGRESSING)     -Patient will attend to  functional task for 5 min without VC for attention/redirection (PROGRESSING)     Chel Pacheco, JASBIR, OTR/L  [place] License [id number]  [place] License [id number]

## 2024-09-16 NOTE — CASE MANAGEMENT
Case Management Progress Note    Patient name Berta Estrada  Location W /W -01 MRN [id number]  : 1935 Date 2024       LOS (days): 6  Geometric Mean LOS (GMLOS) (days): 2.9  Days to GMLOS:-2.8        OBJECTIVE:        Current admission status: Inpatient  Preferred Pharmacy:   [facility]/pharmacy #[id number] [place], [place] - [facility] [address]  Phone: [phone] Fax: [phone]    [place], [place] - [address] [place] [address]  Phone: [phone] Fax: [phone]    Primary Care Provider: Kim Dewey MD    Primary Insurance: [employer] RE[employer]  Secondary Insurance:     PROGRESS NOTE:    Cm advised peer to peer completed and auth was accepted. CM awaiting official response from CM discharge support before setting up dc. Facility and providers made aware

## 2024-09-16 NOTE — CASE MANAGEMENT
Case Management Discharge Planning Note    Patient name Berta Estrada  Location W /W -01 MRN [id number]  : 1935 Date 2024       Current Admission Date: 9/10/2024  Current Admission Diagnosis:Altered mental status   Patient Active Problem List    Diagnosis Date Noted Date Diagnosed    Altered mental status 2024     Back pain 2024     Ambulatory dysfunction 2024     UTI (urinary tract infection) 09/10/2024     Alzheimer's dementia, late onset (HCC) 09/10/2024     Multiple pulmonary nodules 2024     Suspected malignant neoplasm of lung 2024     Urinary retention 2024     Abnormal CT scan 2024     Constipation 2024     History of permanent cardiac pacemaker placement 2024     Liver nodule 2024     Fall 2024     Closed fracture of ramus of right pubis ([facility]) 2024     Mobitz type 1 second degree AV block 2024     Cervical radiculopathy 2022     Lumbar degenerative disc disease 2022     Other hemorrhoids 2021     Gait instability 2021     Vitamin D deficiency 2021     Closed fracture of left foot 2020     Wears hearing aid 2020     Stage 3a chronic kidney disease ([facility]) 2019     Thrombocytopenia ([facility]) 2019     Essential hypertension 2019     GERD (gastroesophageal reflux disease) 2019     Postmenopausal HRT (hormone replacement therapy) 2019     Vitamin B12 deficiency 2019     COPD, mild (HCC) 2019     Pancreatic cyst 2019     S/P AVR (aortic valve replacement) 03/15/2019     Primary osteoarthritis involving multiple joints 03/15/2019     Other hyperlipidemia 03/15/2019     Allergic rhinitis 03/15/2019     Age-related osteoporosis without current pathological fracture 03/15/2019     Skin cancer 03/15/2019     Coronary artery disease involving native coronary artery of native heart 03/15/2019     Depression 2018     Late  onset Alzheimer's disease without behavioral disturbance (HCC) 04/27/2018       LOS (days): 6  Geometric Mean LOS (GMLOS) (days): 2.9  Days to GMLOS:-2.9     OBJECTIVE:  Risk of Unplanned Readmission Score: 22.77         Current admission status: Inpatient   Preferred Pharmacy:   [id number] - [place], [place] - [address] [facility]  [address] [facility] [address]  Phone: [phone] Fax: [phone]    [place], [place] - [address] [facility]  [address] [place] [address]  Phone: [phone] Fax: [phone]    Primary Care Provider: Kim Dewey MD    Primary Insurance: [employer]  Secondary Insurance:     DISCHARGE DETAILS:    Discharge planning discussed with:: Patients daughter  Freedom of Choice: Yes  Comments - Freedom of Choice: Cm spoke with patients daughter to discuss dc planning. Patients daughter in agreement with dc plan. Cm advised her of possible discharge tomorrow if auth is received she is understanding at this time  CM contacted family/caregiver?: Yes  Were Treatment Team discharge recommendations reviewed with patient/caregiver?: Yes  Did patient/caregiver verbalize understanding of patient care needs?: N/A- going to facility  Were patient/caregiver advised of the risks associated with not following Treatment Team discharge recommendations?: Yes    Contacts  Patient Contacts: Hillary Estrada  Relationship to Patient:: Family  Contact Method: Phone  Phone Number: see face sheet  Reason/Outcome: Discharge Planning    Requested Home Health Care         Is the patient interested in HHC at discharge?: No    DME Referral Provided  Referral made for DME?: No    Other Referral/Resources/Interventions Provided:  Interventions: Short Term Rehab  Referral Comments: [facility]    Would you like to participate in our [facility] service program?  : No - Declined    Treatment Team Recommendation: Short Term Rehab  Discharge  Destination Plan:: Short Term Rehab               IMM Given (Date):: 09/16/24  IMM Given to:: Family  Family notified:: Patients daughter Hillary in agreement with discharge plan. Hillary gave CM verbal permission to sign IMM form on her behalf .Copy left in patients room for Hillary       Accepting Facility Name, City & State : [facility]  Receiving Facility/Agency Phone Number: [phone]  Facility/Agency Fax Number: [phone]

## 2024-09-16 NOTE — ASSESSMENT & PLAN NOTE
-Pleasantly confused  -Patient is high risk of delirium due to dementia at baseline, UTI, pain, constipation, change in environment  - delirium precautions  -maintain normal sleep/wake cycle  -minimize overnight interruptions, group overnight vitals/labs/nursing checks as possible  -dim lights, close blinds and turn off tv to minimize stimulation and encourage sleep environment in evenings  -ensure that pain is well controlled continue Tylenol 975mg Q8H scheduled , gabapentin 100 mg 2 times a day, lidocaine patch to lower back  -monitor for fecal and urinary retention which may precipitate delirium  -encourage early mobilization and ambulation  -provide frequent reorientation and redirection  -encourage family and friends at the bedside to help calm patient if anxious  -avoid medications which may precipitate or worsen delirium such as tramadol, benzodiazepine, anticholinergics, and antihistaminics  -encourage hydration and nutrition , assist with feeding if needed  -redirect unwanted behaviors as first line, avoid physical restraints.   -Continue Cymbalta 20 mg daily, may increase to 40 mg daily if patient anxious  -Zyprexa 2.5 mg every 8 hours as needed only for severe agitation, monitor QTc

## 2024-09-16 NOTE — CASE MANAGEMENT
Case Management Progress Note    Patient name Berta Estrada  Location W /W -01 MRN [id number]  : 1935 Date 2024       LOS (days): 6  Geometric Mean LOS (GMLOS) (days): 2.9  Days to GMLOS:-2.6        OBJECTIVE:        Current admission status: Inpatient  Preferred Pharmacy:   [facility]/pharmacy #[id number] [place] [address] [address]  Phone: [phone] Fax: [phone]    [place], [place] - [address] [place] [address]  Phone: [phone] Fax: [phone]    Primary Care Provider: Kim Dewey MD    Primary Insurance: [employer] REP  Secondary Insurance:     PROGRESS NOTE:      Cm sent message to CM discharge support to see if there is an update on Auth for patient. CM awaiting response. Family updated by RN

## 2024-09-16 NOTE — ASSESSMENT & PLAN NOTE
Pmhx of alzheimer's dementia, with behavior issues. Likely multifactorial, poor po intake, sundowning, back pain, and UTI   PT/OT level II     UTI is resolved with antibiotics.  Pain is optimized with geriatrics input.  Clinically, patient is returning to baseline.    Plan  -Upon discharge please follow-up with PCP within 1 to 2 weeks

## 2024-09-16 NOTE — PLAN OF CARE
Problem: PHYSICAL THERAPY ADULT  Goal: Performs mobility at highest level of function for planned discharge setting.  See evaluation for individualized goals.  Description: Treatment/Interventions: Functional transfer training, LE strengthening/ROM, Elevations, Therapeutic exercise, Cognitive reorientation, Patient/family training, Equipment eval/education, Bed mobility, Gait training, Spoke to nursing, OT  Equipment Recommended:  (TBD)       See flowsheet documentation for full assessment, interventions and recommendations.  Outcome: Progressing  Note: Prognosis: Fair  Problem List: Decreased strength, Decreased range of motion, Decreased endurance, Impaired balance, Decreased mobility, Decreased cognition, Impaired judgement, Decreased safety awareness, Decreased skin integrity  Assessment: pt shows improvement in mobility status from previous session w/ increased ambulation tolerance. pt continues to need assistance w/ all phases of mobility including input for mobility technique and task focus. pt remains at risk for falls. continued inpatient PT is needed to improve mobility status and level of independence.        Rehab Resource Intensity Level, PT: II (Moderate Resource Intensity)    See flowsheet documentation for full assessment.

## 2024-09-16 NOTE — PLAN OF CARE
Problem: OCCUPATIONAL THERAPY ADULT  Goal: Performs self-care activities at highest level of function for planned discharge setting.  See evaluation for individualized goals.  Description: Treatment Interventions: ADL retraining, Functional transfer training, Endurance training, Patient/family training, Cognitive reorientation, Equipment evaluation/education, Compensatory technique education, Energy conservation, Activityengagement          See flowsheet documentation for full assessment, interventions and recommendations.   Outcome: Progressing  Note: Limitation: Decreased ADL status, Decreased Safe judgement during ADL, Decreased cognition, Decreased endurance, Decreased self-care trans, Decreased high-level ADLs (impaired balance, fxnl mobility, act micaela, fxnl reach, standing micaela, strength, attention to task, direction following, safety awareness, insight, pacing, problem solving, learning new tasks, initiation of conversation, speech, response time)  Prognosis: Good  Assessment: Pt seen for OT treatment on 9/16 s/p admit to [facility] w/ Altered mental status. Pt agreeable to OT treatment session, upon arrival patient was found  in stance at recliner w/ chair alarm sounding . Patient participated in skilled OT interventions to address ADL tasks, functional transfers, and overall activity tolerance and participation. In comparison to previous session, Berta demonstrated improvements in performance of ADLs specifically LB dressing and toileting as well as mobility w/o an AD but is continuing to perform below baseline due to significantly impaired cognition/attention, decreased balance, generalized weakness/deconditioning, decreased endurance and performance of ADLs/mobility. Personal factors that continue to impact DC include: advanced age, current habits and behavioral patterns, limited social support, inaccessible home environment, and difficulty completing ADLs. Patient to benefit from continued IPOT treatment to  address deficits as defined above and maximize level of functional independence with ADLs and functional mobility. Goals remain appropriate. Continue per POC.  Recommendation: Geriatric Consult  Rehab Resource Intensity Level, OT: II (Moderate Resource Intensity)     JASBIR Montgomery, OTR/L  [place] License [id number]  [place] License [id number]

## 2024-09-16 NOTE — DISCHARGE SUMMARY
Discharge Summary - Hospitalist   Name: Berta Estrada 89 y.o. female I MRN: [id number]  Unit/Bed#: W -01 I Date of Admission: 9/10/2024   Date of Service: 9/17/2024 I Hospital Day: 7     Assessment & Plan  Altered mental status  Pmhx of alzheimer's dementia, with behavior issues. Likely multifactorial, poor po intake, sundowning, back pain, and UTI   PT/OT level II     UTI is resolved with antibiotics.  Pain is optimized with geriatrics input.  Clinically, patient is returning to baseline.    Plan  -Upon discharge please follow-up with PCP within 1 to 2 weeks    UTI (urinary tract infection)  Pmhx: No significant history of UTI, culture on file 5/12/24: shows contaminant  UA positive for leukocytosis, occasional bacteria, hyaline cast  Urine culture growing e.coli    Resolved, finished course of antibiotics  Plan:  Upon discharge please follow-up with PCP  Alzheimer's dementia, late onset (HCC)  Previously on Aricept and Namenda which has been discontinued  Fall/safety precautions  Delirium precautions  Fall precautions  Per family member: daughter patient can occasionally became combative usually only oriented to self.  Follow Geriatric input  Ensure pain control, tylenol 975 mg TID, lidocaine patch   Normal sleep wake cycle - melatonin 3 mg qHS  Assist with feeding as needed  Zyprexa 2.5 mg IM q8H PRN for acute agitation  Start Cymbalta 20 mg daily   Maintain sleep/wake cycle  Avoid medications such as tramadol, benzodiazepines, anticholinergics, benadryl  Plan   Upon discharge, please ensure patient's safety with falls and ambulations.  Ensure sleep-wake cycle and avoid medications such as tramadol, benzodiazepine, anticholinergics and Benadryl which could contribute delirium   Other hyperlipidemia  Continue patient on Lipitor 20 mg daily  GERD (gastroesophageal reflux disease)  Continue patient on Pepcid 20 mg daily  Constipation  Patient has a history of chronic constipation    Continue Senokot, Colace  and MiraLAX daily  Encourage p.o. hydration  History of permanent cardiac pacemaker placement  Follows with cardiology outpatient for symptomatic bradycardia, sick sinus syndrome    Plan  - Continue to monitor clinically.  Suspected malignant neoplasm of lung  Seen by pulmonology on 5/28/2024  Likely malignant in nature but biopsy declined by daughter given age and dementia  Expect natural course of lung cancer to take hold and transition to hospice when appropriate-daughter in agreement  Stage 3a chronic kidney disease (HCC)  Lab Results   Component Value Date    EGFR 66 09/16/2024    EGFR 65 09/15/2024    EGFR 63 09/14/2024    CREATININE 0.79 09/16/2024    CREATININE 0.80 09/15/2024    CREATININE 0.82 09/14/2024     Back pain  C/o of lower back pain    Plan:   Upon discharge please follow-up with PCP  Ambulatory dysfunction  PT/OT evaluation       Medical Problems       Resolved Problems  Date Reviewed: 9/10/2024            Resolved    SIRS (systemic inflammatory response syndrome) ([facility]) 9/15/2024     Resolved by  Maryam Adams MD        Discharging Physician / Practitioner: Mel Levi MD  PCP: Kim Dewey MD  Admission Date:   Admission Orders (From admission, onward)       Ordered        09/10/24 2150  INPATIENT ADMISSION  Once                          Discharge Date: 09/17/24    Consultations During Hospital Stay:  Geriatrics    Procedures Performed:   None    Significant Findings / Test Results:   UA-large leukocytes  Urine culture -  >100,000 E. Coli      Incidental Findings:   None       Test Results Pending at Discharge (will require follow up):   None     Outpatient Tests Requested:  None    Complications:  None    Reason for Admission: Altered mental status, fall, UTI    Hospital Course:   Berta Estrada is a 89 y.o. female patient who originally presented to the hospital on 9/10/2024 due to Altered mental status, fall, UTI    Hospital Course: 89 yo f w pmh of  Alzheimer's dementia,  hypertension, hyperlipidemia, CKD stage III, GERD, chronic constipation and symptomatic bradycardia with PPM who presents due to AMS and dizziness and fall out of bed. On  initial evaluation, patient was stable hemodynamically, workup including CBC magnesium troponin was noncontributory to patient's presentation.UA suggestive of possible UTI, urine culture grew E. coli pan sensitive. CT chest showed old chronic 2.6 cm right upper lobe lesion and 0.9 cm on the right upper lobe that was stable from May 17, 2024.  Patient was then started Rocephin and transition to Keflex 1 sensitivity was available. During the first night of patient's admission, patient was agitated after midnight, subsequently received Zyprexa 2.5 mg and placed on soft restraint in 1 on 1 observation.  Patient was then evaluated by geriatrics recommended adding Cymbalta and adequate pain control.  Patient was given gabapentin, lidocaine patch and Tylenol 975 mg to optimize her back pain.  Melatonin 3 mg was given at night.  Subsequently patient slept well through the night, remains clinically stable.  Patient finished 5-day total course of antibiotics for her UTI.  Patient was prescribed Cymbalta 20 mg daily by geriatrics.  Patient remained stable, and returning to baseline.  Upon discharge, patient will need to follow-up with PCP to optimize her psych medications and cognition exam follow-up.      Please see above list of diagnoses and related plan for additional information.     Condition at Discharge: fair    Discharge Day Visit / Exam:   Vitals and nursing notes reviewed  Constitutional: No acute distress, well-developed  HEENT: Atraumatic, normocephalic, mucous membrane moist, conjunctival normal, normal ocular movements  Cardiovascular: Normal rate and regular rhythm, no peripheral edema  Pulmonary: Effort normal, no respiratory distress, breath sounds normal bilaterally  Abdomen: Soft non tender  Musculocutaneous: No swelling  Skin: Warm and  dry  Neurological: Alert and oriented x 1          Discussion with Family: Will update daughter    Discharge instructions/Information to patient and family:   See after visit summary for information provided to patient and family.      Provisions for Follow-Up Care:  See after visit summary for information related to follow-up care and any pertinent home health orders.      Mobility at time of Discharge:   Basic Mobility Inpatient Raw Score: 12  JH-HLM Goal: 4: Move to chair/commode  JH-HLM Achieved: 6: Walk 10 steps or more  HLM Goal achieved. Continue to encourage appropriate mobility.     Disposition:   Other Skilled Nursing Facility at [facility]    Planned Readmission: none    Discharge Medications:  See after visit summary for reconciled discharge medications provided to patient and/or family.      Administrative Statements   Discharge Statement:  I have spent a total time of 35 minutes in caring for this patient on the day of the visit/encounter. >30 minutes of time was spent on: Communicating with other healthcare professionals  and discharge planning .    **Please Note: This note may have been constructed using a voice recognition system**

## 2024-09-16 NOTE — QUICK NOTE
DATE: 16SEP24  TIME 14:50    PENDING AUTH # [id number]    PHONE NUMBER CALLED [phone]     [facility] CONTACT: Dr. Cayla SOW CONTACTS: Dr. Gianluca Cevallos and Dr. Mel Levi    CONVERSATION:  Dr. Kulkarni was contacted for peer to peer conversation. Dr. Kulkarni was aware the family was considering long term rehab. Dr. Kulkarni wanted to know if the patient was okay to go directly to long term care without SNF.   Dr. Kulkarni was informed that the patient was admitted 2/2 to fall and ambulatory dysfunction. Pt baseline was walking with a cane.  Goal of the SNF was to get the patient back to their baseline ambulatory function prior to long term care.  The benefit of Short term rehab would be to avoid 30 day re admission and to ensure patient safety when they get to long term care.  Dr. Kulkarni kindly agreed that it was in the best interest of the patient to get rehab back to baseline ambulatory status to avoid further all and 30 day re admission.     Dr. Kulkarni was very kind and understanding of the situation, we appreciate his time and input.

## 2024-09-16 NOTE — CASE MANAGEMENT
Case Management Progress Note    Patient name Berta Estrada  Location W /W -01 MRN [id number]  : 1935 Date 2024       LOS (days): 6  Geometric Mean LOS (GMLOS) (days): 2.9  Days to GMLOS:-2.9        OBJECTIVE:        Current admission status: Inpatient  Preferred Pharmacy:   [facility]/pharmacy #[id number] [place] [address] [address] [address]  Phone: [phone] Fax: [phone]    [place], [place] - [address] [place] [address]  Phone: [phone] Fax: [phone]    Primary Care Provider: Kim Dewey MD    Primary Insurance: [employer]  Secondary Insurance:     PROGRESS NOTE:    Cm called patients daughter to provide update on awaiting insurance approval. Cm advised daughter Harvey is anticipating an answer tomorrow morning since it is 5pm now. She is understanding at this time. She will be here tomorrow at 11am incase patient is dc ready to bring her over to the facility.

## 2024-09-16 NOTE — PROGRESS NOTES
Progress Note - Geriatric Medicine   Name: Berta Estrada 89 y.o. female I MRN: [id number]  Unit/Bed#: W -01 I Date of Admission: 9/10/2024   Date of Service: 9/16/2024 I Hospital Day: 6     Assessment & Plan  Altered mental status  -Pleasantly confused  -Patient is high risk of delirium due to dementia at baseline, UTI, pain, constipation, change in environment  - delirium precautions  -maintain normal sleep/wake cycle  -minimize overnight interruptions, group overnight vitals/labs/nursing checks as possible  -dim lights, close blinds and turn off tv to minimize stimulation and encourage sleep environment in evenings  -ensure that pain is well controlled continue Tylenol 975mg Q8H scheduled , gabapentin 100 mg 2 times a day, lidocaine patch to lower back  -monitor for fecal and urinary retention which may precipitate delirium  -encourage early mobilization and ambulation  -provide frequent reorientation and redirection  -encourage family and friends at the bedside to help calm patient if anxious  -avoid medications which may precipitate or worsen delirium such as tramadol, benzodiazepine, anticholinergics, and antihistaminics  -encourage hydration and nutrition , assist with feeding if needed  -redirect unwanted behaviors as first line, avoid physical restraints.   -Continue Cymbalta 20 mg daily, may increase to 40 mg daily if patient anxious  -Zyprexa 2.5 mg every 8 hours as needed only for severe agitation, monitor QTc  Stage 3a chronic kidney disease (HCC)  Lab Results   Component Value Date    EGFR 66 09/16/2024    EGFR 65 09/15/2024    EGFR 63 09/14/2024    CREATININE 0.79 09/16/2024    CREATININE 0.80 09/15/2024    CREATININE 0.82 09/14/2024       Creatinine stable.  Will avoid nephrotoxic medication.  Encourage po hydration.  Will monitor BMP.   Constipation  Daughter states that the patient has not had a bowel movement in about 3 days and has chronic constipation.  She has received senna, MiraLAX,  "Dulcolax suppository, and Colace here. Continue to monitor and give MiraLax as needed.  Suspected malignant neoplasm of lung    UTI (urinary tract infection)  UA on admission showed large leukocytes.  She presented with altered mental status and agitation worse than her baseline state.   Patient is being treated with Rocephin IV.  Managed per primary team.  Alzheimer's dementia, late onset (HCC)  Pt has Alzheimer's dementia with behavioral disturbance and is normally alert and oriented x1 to self but not place or time at baseline.  Currently alert and oriented x 1.  CT head shows moderate to severe chronic microangiopathic change.  Patient needs assistance with all ADLs and IADLs.  She developed an acute worsening of confusion about x2 days ago. See \"delirium\"  Continue sleep-wake cycle  Encourage family to visit  [place] as needed and provide supportive care   B12 and TSH WNL  Back pain  Pt has low back pain secondary to degenerative disc disease and recently hit her lower back on the floor when sliding out of bed. The pain is likely exacerbating agitation and delirium.   Can apply lidocaine patch as needed to affected area.  Continue gabapentin 100 mg twice daily.  Increase Tylenol to 975 mg 3 times daily  Continue PT OT  Ambulatory dysfunction  Patient uses a cane for ambulation at baseline  She presents to the hospital after recent fall out of bed where she hit her lower back on the floor.  Monitor orthostatic vital signs  Encourage p.o. hydration  Avoid hypotension and hypoglycemia   Other hyperlipidemia    GERD (gastroesophageal reflux disease)    History of permanent cardiac pacemaker placement      Subjective:   Patient seen and examined at bedside for geriatric follow-up.  At the time of encounter patient was resting in the recliner her daughter was present at bedside.  Patient continues to be pleasantly confused.  She ate all her breakfast and she was able to participate in physical therapy    Review of " Systems   Unable to perform ROS: Dementia         Objective:     Vitals: Blood pressure 142/85, pulse 105, temperature (!) 97.3 °F (36.3 °C), resp. rate 19, SpO2 97%.,There is no height or weight on file to calculate BMI.      Intake/Output Summary (Last 24 hours) at 9/16/2024 1158  Last data filed at 9/16/2024 0700  Gross per 24 hour   Intake 2040 ml   Output 300 ml   Net 1740 ml       Current Medications: Reviewed    Physical Exam:   Physical Exam  Vitals and nursing note reviewed.   Constitutional:       General: She is not in acute distress.     Appearance: She is well-developed.   HENT:      Head: Normocephalic and atraumatic.      Mouth/Throat:      Mouth: Mucous membranes are moist.   Eyes:      Conjunctiva/sclera: Conjunctivae normal.   Cardiovascular:      Rate and Rhythm: Normal rate and regular rhythm.      Heart sounds: Murmur heard.   Pulmonary:      Effort: Pulmonary effort is normal. No respiratory distress.      Breath sounds: Normal breath sounds.   Abdominal:      Palpations: Abdomen is soft.      Tenderness: There is no abdominal tenderness.   Musculoskeletal:         General: No swelling.      Cervical back: Neck supple.      Right lower leg: Edema (trace) present.      Left lower leg: Edema (trace) present.   Skin:     General: Skin is warm and dry.      Capillary Refill: Capillary refill takes less than 2 seconds.   Neurological:      Mental Status: She is alert. She is disoriented.   Psychiatric:      Comments: restless          Invasive Devices       Peripheral Intravenous Line  Duration             Peripheral IV 09/15/24 Right;Ventral (anterior) Forearm 1 day                    Lab, Imaging and other studies: Reviewed radiology reports from this admission including: CT head.

## 2024-09-16 NOTE — ASSESSMENT & PLAN NOTE
Pmhx of alzheimer's dementia, with behavior issues. Likely multifactorial, poor po intake, sundowning, back pain, and UTI   PT/OT level II   Plan  - follow Geriatric's input  - delirium precautions  - continue home medication

## 2024-09-16 NOTE — PHYSICAL THERAPY NOTE
PHYSICAL THERAPY TREATMENT NOTE    Patient Name: Berta Estrada  Today's Date: 9/16/2024 09/16/24 1120   PT Last Visit   PT Visit Date 09/16/24   Pain Assessment   Pain Assessment Tool 0-10   Pain Score No Pain   Restrictions/Precautions   Other Precautions Cognitive;Chair Alarm;Bed Alarm;Fall Risk  (Masimo)   General   Chart Reviewed Yes   Family/Caregiver Present Yes   Cognition   Arousal/Participation Cooperative   Attention Attends with cues to redirect   Orientation Level Oriented to person;Other (Comment)  (pt was identified w/ full name, ID bracelet)   Following Commands Follows one step commands with increased time or repetition   Subjective   Subjective pt seen sitting out of bed w/ daughter present. pt agreed to participate in PT intervention. denied pain or dizziness. frequent redirection was needed for task focus.   Transfers   Sit to Stand 3  Moderate assistance   Additional items Assist x 1;Increased time required;Verbal cues  (for hand placement)   Stand to Sit 3  Moderate assistance   Additional items Assist x 1;Increased time required;Verbal cues  (for body positioning, hand placement, safety)   Ambulation/Elevation   Gait pattern Foward flexed;Short stride;Excessively slow   Gait Assistance 4  Minimal assist  (to modx1)   Additional items Assist x 1;Verbal cues;Tactile cues  (for walker positioning, full step length, safety)   Assistive Device Rolling walker   Distance 25 feet x2 w/ seated rest break  (additional ambulation not possible due to fatigue)   Stair Management Assistance Not tested  (due to limited ambulation tolerance, safety concern)   Balance   Static Sitting Fair +   Static Standing Poor +  (w/ roller walker)   Ambulatory Poor +  (to poor, w/ roller walker)   Activity Tolerance   Activity Tolerance Patient limited by fatigue   Nurse Made Aware spoke to Olman GARCIA   Equipment Use    Comments seated hip flexion and long arc quads 10 each. seated heel/toe raises 20 each. input was needed to maintain technique and complete all reps.   Assessment   Problem List Decreased strength;Decreased range of motion;Decreased endurance;Impaired balance;Decreased mobility;Decreased cognition;Impaired judgement;Decreased safety awareness;Decreased skin integrity   Assessment pt shows improvement in mobility status from previous session w/ increased ambulation tolerance. pt continues to need assistance w/ all phases of mobility including input for mobility technique and task focus. pt remains at risk for falls. continued inpatient PT is needed to improve mobility status and level of independence.   Goals   Patient Goals pt did not state goals when asked. will continue to assess.   STG Expiration Date 09/25/24   Short Term Goal #1 pt will be able to: 1. Demonstrate ability to perform all aspects of bed mobility with Alexia to improve functional safety.  2. Perform functional transfers with RW and minAx1 to facilitate safe return to previous living environment.  3.  Ambulate at least 50 ft with RW and minAx1 with stable vitals to improve safety with household distances and reduce fall risk.  4. Improve LE strength grades by 1 to increase ease of functional mobility with transfers and gait. 5. Pt will demonstrate improved balance by one grade in order to decrease risk of falls. 6. Climb at least 3 steps with unilateral HR and Alexia to simulate entrance to home.   PT Treatment Day 2   Plan   Treatment/Interventions Functional transfer training;LE strengthening/ROM;Elevations;Therapeutic exercise;Cognitive reorientation;Patient/family training;Equipment eval/education;Bed mobility;Gait training   Progress Progressing toward goals   PT Frequency 3-5x/wk   Discharge Recommendation   Rehab Resource Intensity Level, PT II (Moderate Resource Intensity)   AM-PAC Basic Mobility Inpatient   Turning in Flat Bed Without  Bedrails 3   Lying on Back to Sitting on Edge of Flat Bed Without Bedrails 2   Moving Bed to Chair 2   Standing Up From Chair Using Arms 2   Walk in Room 2   Climb 3-5 Stairs With Railing 1   Basic Mobility Inpatient Raw Score 12   Basic Mobility Standardized Score 32.23   [facility] Highest Level Of Mobility   -[facility] Goal 4: Move to chair/commode   -HLM Achieved 7: Walk 25 feet or more   End of Consult   Patient Position at End of Consult Bedside chair;Bed/Chair alarm activated;All needs within reach     The patient's AM-PAC Basic Mobility Inpatient Short Form Raw Score is 12. A Raw score of less than or equal to 16 suggests the patient may benefit from discharge to post-acute rehabilitation services. Please also refer to the recommendation of the Physical Therapist for safe discharge planning.    Skilled inpatient PT recommended while in hospital to progress pt toward treatment goals.    Vincent Page, PT

## 2024-09-16 NOTE — CASE MANAGEMENT
Support Center has received PEER TO PEER request prior to determination being made.   Received for SNF Authorization.   Insurance: [employer]  Called in by Rep: Rosa  Facility:[facility]   Pending Auth #:[id number]   Peer to Peer Phone#:  [phone]     Deadline:09/17 5:00  CM to task P2P to Attending to complete if desired.  Please notify Discharge Support if P2P will not be completed.     Care Manager notified:Hannah Gu,      Please reach out to CM for updates on any clinical information.

## 2024-09-17 VITALS
HEART RATE: 100 BPM | SYSTOLIC BLOOD PRESSURE: 131 MMHG | TEMPERATURE: 98 F | RESPIRATION RATE: 20 BRPM | DIASTOLIC BLOOD PRESSURE: 80 MMHG | OXYGEN SATURATION: 95 %

## 2024-09-17 PROCEDURE — 99232 SBSQ HOSP IP/OBS MODERATE 35: CPT | Performed by: FAMILY MEDICINE

## 2024-09-17 PROCEDURE — 99239 HOSP IP/OBS DSCHRG MGMT >30: CPT | Performed by: INTERNAL MEDICINE

## 2024-09-17 RX ORDER — HYDROXYZINE HYDROCHLORIDE 25 MG/1
25 TABLET, FILM COATED ORAL ONCE
Status: COMPLETED | OUTPATIENT
Start: 2024-09-17 | End: 2024-09-17

## 2024-09-17 RX ORDER — DULOXETIN HYDROCHLORIDE 20 MG/1
20 CAPSULE, DELAYED RELEASE ORAL DAILY
Qty: 30 CAPSULE | Refills: 0 | Status: SHIPPED | OUTPATIENT
Start: 2024-09-17 | End: 2024-10-17

## 2024-09-17 RX ADMIN — FLUTICASONE PROPIONATE 1 SPRAY: 50 SPRAY, METERED NASAL at 09:16

## 2024-09-17 RX ADMIN — SENNOSIDES 17.2 MG: 8.6 TABLET, FILM COATED ORAL at 09:17

## 2024-09-17 RX ADMIN — ENOXAPARIN SODIUM 40 MG: 40 INJECTION SUBCUTANEOUS at 09:17

## 2024-09-17 RX ADMIN — HYDROXYZINE HYDROCHLORIDE 25 MG: 25 TABLET ORAL at 00:05

## 2024-09-17 RX ADMIN — ASPIRIN 81 MG 81 MG: 81 TABLET ORAL at 09:17

## 2024-09-17 RX ADMIN — POLYETHYLENE GLYCOL 3350 17 G: 17 POWDER, FOR SOLUTION ORAL at 09:16

## 2024-09-17 RX ADMIN — ACETAMINOPHEN 975 MG: 325 TABLET ORAL at 09:17

## 2024-09-17 RX ADMIN — GABAPENTIN 100 MG: 100 CAPSULE ORAL at 09:17

## 2024-09-17 RX ADMIN — ATORVASTATIN CALCIUM 20 MG: 40 TABLET, FILM COATED ORAL at 09:17

## 2024-09-17 RX ADMIN — DULOXETINE HYDROCHLORIDE 20 MG: 20 CAPSULE, DELAYED RELEASE ORAL at 09:18

## 2024-09-17 RX ADMIN — FAMOTIDINE 20 MG: 20 TABLET ORAL at 09:17

## 2024-09-17 RX ADMIN — DOCUSATE SODIUM 100 MG: 100 CAPSULE, LIQUID FILLED ORAL at 09:17

## 2024-09-17 RX ADMIN — LIDOCAINE 1 PATCH: 700 PATCH TOPICAL at 09:18

## 2024-09-17 NOTE — PROGRESS NOTES
Progress Note - Geriatric Medicine   Name: Berta Estrada 89 y.o. female I MRN: [id number]  Unit/Bed#: W -01 I Date of Admission: 9/10/2024   Date of Service: 9/17/2024 I Hospital Day: 7     Assessment & Plan  Altered mental status  -Pleasantly confused  -Patient is high risk of delirium due to dementia at baseline, UTI, pain, constipation, change in environment  - delirium precautions  -maintain normal sleep/wake cycle  -minimize overnight interruptions, group overnight vitals/labs/nursing checks as possible  -dim lights, close blinds and turn off tv to minimize stimulation and encourage sleep environment in evenings  -ensure that pain is well controlled continue Tylenol 975mg Q8H scheduled , gabapentin 100 mg 2 times a day, lidocaine patch to lower back  -monitor for fecal and urinary retention which may precipitate delirium  -encourage early mobilization and ambulation  -provide frequent reorientation and redirection  -encourage family and friends at the bedside to help calm patient if anxious  -avoid medications which may precipitate or worsen delirium such as tramadol, benzodiazepine, anticholinergics, and antihistaminics  -encourage hydration and nutrition , assist with feeding if needed  -redirect unwanted behaviors as first line, avoid physical restraints.   -Continue Cymbalta 20 mg daily, may increase to 40 mg daily if patient anxious  -Zyprexa 2.5 mg every 8 hours as needed only for severe agitation, monitor QTc  Stage 3a chronic kidney disease (HCC)  Lab Results   Component Value Date    EGFR 66 09/16/2024    EGFR 65 09/15/2024    EGFR 63 09/14/2024    CREATININE 0.79 09/16/2024    CREATININE 0.80 09/15/2024    CREATININE 0.82 09/14/2024       Creatinine stable.  Will avoid nephrotoxic medication.  Encourage po hydration.  Will monitor BMP.   Constipation  Daughter states that the patient has not had a bowel movement in about 3 days and has chronic constipation.  She has received senna, MiraLAX,  "Dulcolax suppository, and Colace here. Continue to monitor and give MiraLax as needed.  Suspected malignant neoplasm of lung    UTI (urinary tract infection)  UA on admission showed large leukocytes.  She presented with altered mental status and agitation worse than her baseline state.   Patient is being treated with Rocephin IV.  Managed per primary team.  Alzheimer's dementia, late onset (HCC)  Pt has Alzheimer's dementia with behavioral disturbance and is normally alert and oriented x1 to self but not place or time at baseline.  Currently alert and oriented x 1.  CT head shows moderate to severe chronic microangiopathic change.  Patient needs assistance with all ADLs and IADLs.  She developed an acute worsening of confusion about x2 days ago. See \"delirium\"  Continue sleep-wake cycle  Encourage family to visit  [place] as needed and provide supportive care   B12 and TSH WNL  Back pain  Pt has low back pain secondary to degenerative disc disease and recently hit her lower back on the floor when sliding out of bed. The pain is likely exacerbating agitation and delirium.   Can apply lidocaine patch as needed to affected area.  Continue gabapentin 100 mg twice daily.  Increase Tylenol to 975 mg 3 times daily  Continue PT OT  Ambulatory dysfunction  Patient uses a cane for ambulation at baseline  She presents to the hospital after recent fall out of bed where she hit her lower back on the floor.  Monitor orthostatic vital signs  Encourage p.o. hydration  Avoid hypotension and hypoglycemia   Other hyperlipidemia    GERD (gastroesophageal reflux disease)    History of permanent cardiac pacemaker placement      Subjective:   Patient seen and examined at bedside for geriatric follow-up.  States that she feels well and looking forward to go to rehab.  Complains of chronic back pain.  States she slept well overnight    Review of Systems   Constitutional:  Negative for chills and fever.        Frail looking   HENT:  Negative " for congestion, drooling and rhinorrhea.    Eyes:  Negative for discharge.   Respiratory:  Negative for cough and shortness of breath.    Cardiovascular:  Negative for chest pain, palpitations and leg swelling.   Gastrointestinal:  Negative for abdominal pain and constipation.   Endocrine: Negative for cold intolerance.   Genitourinary:  Negative for dysuria and hematuria.   Musculoskeletal:  Positive for back pain and gait problem.   Skin:  Negative for rash.   Allergic/Immunologic: Negative for environmental allergies.   Neurological:  Negative for dizziness, seizures and headaches.   Hematological:  Bruises/bleeds easily.   Psychiatric/Behavioral:  Negative for behavioral problems and hallucinations.          Objective:     Vitals: Blood pressure 131/80, pulse 100, temperature 98 °F (36.7 °C), resp. rate 20, SpO2 95%.,There is no height or weight on file to calculate BMI.      Intake/Output Summary (Last 24 hours) at 9/17/2024 1051  Last data filed at 9/17/2024 0900  Gross per 24 hour   Intake 360 ml   Output --   Net 360 ml       Current Medications: Reviewed    Physical Exam:   Physical Exam  Vitals and nursing note reviewed.   Constitutional:       General: She is not in acute distress.     Appearance: She is well-developed.   HENT:      Head: Normocephalic and atraumatic.      Mouth/Throat:      Mouth: Mucous membranes are moist.   Eyes:      Conjunctiva/sclera: Conjunctivae normal.   Cardiovascular:      Rate and Rhythm: Normal rate and regular rhythm.      Heart sounds: Murmur heard.   Pulmonary:      Effort: Pulmonary effort is normal. No respiratory distress.      Breath sounds: Normal breath sounds.   Abdominal:      Palpations: Abdomen is soft.      Tenderness: There is no abdominal tenderness.   Musculoskeletal:         General: No swelling.      Cervical back: Neck supple.   Skin:     General: Skin is warm and dry.      Capillary Refill: Capillary refill takes less than 2 seconds.      Findings:  Bruising present.   Neurological:      Mental Status: She is alert.      Comments: AAOx1-2   Psychiatric:      Comments: anxious          Invasive Devices       None                   Lab, Imaging and other studies: No pertinent imaging studies reviewed.

## 2024-09-17 NOTE — ASSESSMENT & PLAN NOTE
"Pt has Alzheimer's dementia with behavioral disturbance and is normally alert and oriented x1 to self but not place or time at baseline.  Currently alert and oriented x 1.  CT head shows moderate to severe chronic microangiopathic change.  Patient needs assistance with all ADLs and IADLs.  She developed an acute worsening of confusion about x2 days ago. See \"delirium\"  Continue sleep-wake cycle  Encourage family to visit  [place] as needed and provide supportive care   B12 and TSH WNL  "

## 2024-09-17 NOTE — PLAN OF CARE
Problem: Potential for Falls  Goal: Patient will remain free of falls  Description: INTERVENTIONS:  - Educate patient/family on patient safety including physical limitations  - Instruct patient to call for assistance with activity   - Consult OT/PT to assist with strengthening/mobility   - Keep Call bell within reach  - Keep bed low and locked with side rails adjusted as appropriate  - Keep care items and personal belongings within reach  - Initiate and maintain comfort rounds  - Make Fall Risk Sign visible to staff  - Apply yellow socks and bracelet for high fall risk patients  - Consider moving patient to room near nurses station  Outcome: Progressing     Problem: PAIN - ADULT  Goal: Verbalizes/displays adequate comfort level or baseline comfort level  Description: Interventions:  - Encourage patient to monitor pain and request assistance  - Assess pain using appropriate pain scale  - Administer analgesics based on type and severity of pain and evaluate response  - Implement non-pharmacological measures as appropriate and evaluate response  - Consider cultural and social influences on pain and pain management  - Notify physician/advanced practitioner if interventions unsuccessful or patient reports new pain  Outcome: Progressing     Problem: INFECTION - ADULT  Goal: Absence or prevention of progression during hospitalization  Description: INTERVENTIONS:  - Assess and monitor for signs and symptoms of infection  - Monitor lab/diagnostic results  - Monitor all insertion sites, i.e. indwelling lines, tubes, and drains  - Monitor endotracheal if appropriate and nasal secretions for changes in amount and color  - [place] appropriate cooling/warming therapies per order  - Administer medications as ordered  - Instruct and encourage patient and family to use good hand hygiene technique  - Identify and instruct in appropriate isolation precautions for identified infection/condition  Outcome: Progressing  Goal: Absence  of fever/infection during neutropenic period  Description: INTERVENTIONS:  - Monitor WBC    Outcome: Progressing     Problem: SAFETY ADULT  Goal: Patient will remain free of falls  Description: INTERVENTIONS:  - Educate patient/family on patient safety including physical limitations  - Instruct patient to call for assistance with activity   - Consult OT/PT to assist with strengthening/mobility   - Keep Call bell within reach  - Keep bed low and locked with side rails adjusted as appropriate  - Keep care items and personal belongings within reach  - Initiate and maintain comfort rounds  - Make Fall Risk Sign visible to staff  - Apply yellow socks and bracelet for high fall risk patients  - Consider moving patient to room near nurses station  Outcome: Progressing  Goal: Maintain or return to baseline ADL function  Description: INTERVENTIONS:  -  Assess patient's ability to carry out ADLs; assess patient's baseline for ADL function and identify physical deficits which impact ability to perform ADLs (bathing, care of mouth/teeth, toileting, grooming, dressing, etc.)  - Assess/evaluate cause of self-care deficits   - Assess range of motion  - Assess patient's mobility; develop plan if impaired  - Assess patient's need for assistive devices and provide as appropriate  - Encourage maximum independence but intervene and supervise when necessary  - Involve family in performance of ADLs  - Assess for home care needs following discharge   - Consider OT consult to assist with ADL evaluation and planning for discharge  - Provide patient education as appropriate  Outcome: Progressing  Goal: Maintains/Returns to pre admission functional level  Description: INTERVENTIONS:  - Perform AM-PAC 6 Click Basic Mobility/ Daily Activity assessment daily.  - Set and communicate daily mobility goal to care team and patient/family/caregiver.   - Collaborate with rehabilitation services on mobility goals if consultedy  - Out of bed for  toileting  - Record patient progress and toleration of activity level   Outcome: Progressing     Problem: DISCHARGE PLANNING  Goal: Discharge to home or other facility with appropriate resources  Description: INTERVENTIONS:  - Identify barriers to discharge w/patient and caregiver  - Arrange for needed discharge resources and transportation as appropriate  - Identify discharge learning needs (meds, wound care, etc.)  - Arrange for interpretive services to assist at discharge as needed  - Refer to Case Management Department for coordinating discharge planning if the patient needs post-hospital services based on physician/advanced practitioner order or complex needs related to functional status, cognitive ability, or social support system  Outcome: Progressing     Problem: Knowledge Deficit  Goal: Patient/family/caregiver demonstrates understanding of disease process, treatment plan, medications, and discharge instructions  Description: Complete learning assessment and assess knowledge base.  Interventions:  - Provide teaching at level of understanding  - Provide teaching via preferred learning methods  Outcome: Progressing     Problem: NEUROSENSORY - ADULT  Goal: Achieves stable or improved neurological status  Description: INTERVENTIONS  - Monitor and report changes in neurological status  - Monitor vital signs such as temperature, blood pressure, glucose, and any other labs ordered   - Initiate measures to prevent increased intracranial pressure  - Monitor for seizure activity and implement precautions if appropriate      Outcome: Progressing  Goal: Remains free of injury related to seizures activity  Description: INTERVENTIONS  - Maintain airway, patient safety  and administer oxygen as ordered  - Monitor patient for seizure activity, document and report duration and description of seizure to physician/advanced practitioner  - If seizure occurs,  ensure patient safety during seizure  - Reorient patient post  seizure  - Seizure pads on all 4 side rails  - Instruct patient/family to notify RN of any seizure activity including if an aura is experienced  - Instruct patient/family to call for assistance with activity based on nursing assessment  - Administer anti-seizure medications if ordered    Outcome: Progressing  Goal: Achieves maximal functionality and self care  Description: INTERVENTIONS  - Monitor swallowing and airway patency with patient fatigue and changes in neurological status  - Encourage and assist patient to increase activity and self care.   - Encourage visually impaired, hearing impaired and aphasic patients to use assistive/communication devices  Outcome: Progressing     Problem: GENITOURINARY - ADULT  Goal: Maintains or returns to baseline urinary function  Description: INTERVENTIONS:  - Assess urinary function  - Encourage oral fluids to ensure adequate hydration if ordered  - Administer IV fluids as ordered to ensure adequate hydration  - Administer ordered medications as needed  - Offer frequent toileting  - Follow urinary retention protocol if ordered  Outcome: Progressing  Goal: Absence of urinary retention  Description: INTERVENTIONS:  - Assess patient’s ability to void and empty bladder  - Monitor I/O  - Bladder scan as needed  - Discuss with physician/AP medications to alleviate retention as needed  - Discuss catheterization for long term situations as appropriate  Outcome: Progressing  Goal: Urinary catheter remains patent  Description: INTERVENTIONS:  - Assess patency of urinary catheter  - If patient has a chronic lerner, consider changing catheter if non-functioning  - Follow guidelines for intermittent irrigation of non-functioning urinary catheter  Outcome: Progressing

## 2024-09-17 NOTE — CASE MANAGEMENT
[facility] has received APPROVED authorization.  Insurance:   [employer]   Called in by Rep: Rosa    Authorization received for: SNF  Facility: [facility]   Authorization #:[id number]   Start of Care:09/16  Next Review Date:09/19  Continued Stay Care Coordinator: Alanna SHERMAN#: [phone]  Submit next review to: [phone]     Care Manager notified:Hannah Gu,     Please reach out to  for updates on any clinical information.

## 2024-09-17 NOTE — CASE MANAGEMENT
Case Management Discharge Planning Note    Patient name Berta Estrada  Location W /W -01 MRN [id number]  : 1935 Date 2024       Current Admission Date: 9/10/2024  Current Admission Diagnosis:Altered mental status   Patient Active Problem List    Diagnosis Date Noted Date Diagnosed    Altered mental status 2024     Back pain 2024     Ambulatory dysfunction 2024     UTI (urinary tract infection) 09/10/2024     Alzheimer's dementia, late onset (HCC) 09/10/2024     Multiple pulmonary nodules 2024     Suspected malignant neoplasm of lung 2024     Urinary retention 2024     Abnormal CT scan 2024     Constipation 2024     History of permanent cardiac pacemaker placement 2024     Liver nodule 2024     Fall 2024     Closed fracture of ramus of right pubis ([facility]) 2024     Mobitz type 1 second degree AV block 2024     Cervical radiculopathy 2022     Lumbar degenerative disc disease 2022     Other hemorrhoids 2021     Gait instability 2021     Vitamin D deficiency 2021     Closed fracture of left foot 2020     Wears hearing aid 2020     Stage 3a chronic kidney disease ([facility]) 2019     Thrombocytopenia ([facility]) 2019     Essential hypertension 2019     GERD (gastroesophageal reflux disease) 2019     Postmenopausal HRT (hormone replacement therapy) 2019     Vitamin B12 deficiency 2019     COPD, mild (HCC) 2019     Pancreatic cyst 2019     S/P AVR (aortic valve replacement) 03/15/2019     Primary osteoarthritis involving multiple joints 03/15/2019     Other hyperlipidemia 03/15/2019     Allergic rhinitis 03/15/2019     Age-related osteoporosis without current pathological fracture 03/15/2019     Skin cancer 03/15/2019     Coronary artery disease involving native coronary artery of native heart 03/15/2019     Depression 2018     Late  onset Alzheimer's disease without behavioral disturbance (HCC) 04/27/2018       LOS (days): 7  Geometric Mean LOS (GMLOS) (days): 2.9  Days to GMLOS:-3.6     OBJECTIVE:  Risk of Unplanned Readmission Score: 23.65         Current admission status: Inpatient   Preferred Pharmacy:   [facility]/pharmacy #[id number] [place] - [facility] [address] [address]  Phone: [phone] Fax: [phone]    [place], [place] - [address] [place] [address]  Phone: [phone] Fax: [phone]    Primary Care Provider: Kim Dewey MD    Primary Insurance: [employer] REP  Secondary Insurance:     DISCHARGE DETAILS:                                                                                                               Facility Insurance Auth Number: [id number]

## 2024-09-18 ENCOUNTER — PATIENT OUTREACH (OUTPATIENT)
Dept: CASE MANAGEMENT | Facility: OTHER | Age: 89
End: 2024-09-18

## 2024-09-18 NOTE — PROGRESS NOTES
Outpatient care management referral via HRR report 9/18/24. Discharged to [facility] 9/17/24. Email sent to facility to inform them I will be following the patient during their skilled stay.  This Admin Coordinator will continue to monitor via chart review.

## 2024-09-18 NOTE — UTILIZATION REVIEW
NOTIFICATION OF ADMISSION DISCHARGE   This is a Notification of Discharge from [facility]. Please be advised that this patient has been discharge from our facility. Below you will find the admission and discharge date and time including the patient’s disposition.   UTILIZATION REVIEW CONTACT:  Nikkie Vasquez  Utilization   Network Utilization Review Department  Phone: [phone] x carefully listen to the prompts. All voicemails are confidential.  Email: [email or web address]     ADMISSION INFORMATION  PRESENTATION DATE: 9/10/2024  6:46 PM  OBERVATION ADMISSION DATE: N/A  INPATIENT ADMISSION DATE: 9/10/24  9:50 PM   DISCHARGE DATE: 9/17/2024  2:06 PM   DISPOSITION:Non [facility] SNF/TCU/SNU    Network Utilization Review Department  ATTENTION: Please call with any questions or concerns to [phone] and carefully listen to the prompts so that you are directed to the right person. All voicemails are confidential.   For Discharge needs, contact Care Management DC Support Team at [phone] opt. 2  Send all requests for admission clinical reviews, approved or denied determinations and any other requests to dedicated fax number below belonging to the campus where the patient is receiving treatment. List of dedicated fax numbers for the Facilities:  FACILITY NAME UR FAX NUMBER   ADMISSION DENIALS (Administrative/Medical Necessity) [phone]   DISCHARGE SUPPORT TEAM ([facility]) [phone]   PARENT CHILD HEALTH (Maternity/NICU/Pediatrics) [phone]   [facility] [phone]   [facility] [phone]   [facility] [phone]   [facility] [phone]   [facility] [phone]   [facility] [phone]   [facility] [phone]   [facility]  [phone]   [facility] [phone]   [facility] [phone]   [facility] [phone]   [facility] [phone]

## 2024-09-26 ENCOUNTER — PATIENT OUTREACH (OUTPATIENT)
Dept: CASE MANAGEMENT | Facility: OTHER | Age: 89
End: 2024-09-26

## 2024-09-26 ENCOUNTER — RA CDI HCC (OUTPATIENT)
Dept: OTHER | Facility: HOSPITAL | Age: 89
End: 2024-09-26

## 2024-09-26 DIAGNOSIS — S32.591D CLOSED FRACTURE OF PUBIC RAMUS, RIGHT, WITH ROUTINE HEALING, SUBSEQUENT ENCOUNTER: Primary | ICD-10-CM

## 2024-09-26 PROBLEM — S92.902A CLOSED FRACTURE OF LEFT FOOT: Status: RESOLVED | Noted: 2020-12-31 | Resolved: 2024-09-26

## 2024-09-26 PROBLEM — I12.9 HYPERTENSIVE CKD (CHRONIC KIDNEY DISEASE): Status: ACTIVE | Noted: 2024-09-26

## 2024-09-26 PROBLEM — I12.9 HYPERTENSIVE CKD (CHRONIC KIDNEY DISEASE): Status: ACTIVE | Noted: 2019-06-05

## 2024-09-26 NOTE — PROGRESS NOTES
HCC coding opportunities          Chart Reviewed number of suggestions sent to Provider: 1  F33.41  I12.9     Patients Insurance     Medicare Insurance: [employer] Medicare Advantage

## 2024-10-03 ENCOUNTER — PATIENT OUTREACH (OUTPATIENT)
Dept: CASE MANAGEMENT | Facility: OTHER | Age: 89
End: 2024-10-03

## 2024-10-03 DIAGNOSIS — F33.41 RECURRENT MAJOR DEPRESSIVE DISORDER, IN PARTIAL REMISSION (HCC): ICD-10-CM

## 2024-10-09 ENCOUNTER — TELEPHONE (OUTPATIENT)
Age: 89
End: 2024-10-09

## 2024-10-09 RX ORDER — DULOXETIN HYDROCHLORIDE 20 MG/1
20 CAPSULE, DELAYED RELEASE ORAL DAILY
Qty: 90 CAPSULE | Refills: 1 | OUTPATIENT
Start: 2024-10-09

## 2024-10-09 NOTE — TELEPHONE ENCOUNTER
.  
Incorrect pod, changed context and sent to appropriate pod for approval.   
LDM on Daughters VM to call back .  
Please call daughter, ask if mother is home from rehab.  Received a refill request for duloxetine (Cymbalta)  
Refill must be reviewed and completed by the office or provider. The refill is unable to be approved or denied by the medication management team.    Last ordered by another provider. Please review.       
03-Jan-2022 18:28

## 2024-10-09 NOTE — TELEPHONE ENCOUNTER
Per daughter, the patient is still in rehab and she will then go to long term care at [facility].

## 2024-10-11 PROBLEM — N39.0 UTI (URINARY TRACT INFECTION): Status: RESOLVED | Noted: 2024-09-10 | Resolved: 2024-10-11

## 2024-10-14 DIAGNOSIS — E78.49 OTHER HYPERLIPIDEMIA: ICD-10-CM

## 2024-10-15 RX ORDER — ATORVASTATIN CALCIUM 20 MG/1
TABLET, FILM COATED ORAL
Qty: 90 TABLET | Refills: 1 | OUTPATIENT
Start: 2024-10-15

## 2024-10-22 ENCOUNTER — PATIENT OUTREACH (OUTPATIENT)
Dept: CASE MANAGEMENT | Facility: OTHER | Age: 89
End: 2024-10-22

## 2024-10-22 NOTE — PROGRESS NOTES
Chart review complete the patient transitioned to LTC at [facility]. I have removed myself from the care team, updated the Care Coordination note, and closed the episode.

## 2024-10-30 DIAGNOSIS — K21.9 GASTROESOPHAGEAL REFLUX DISEASE, UNSPECIFIED WHETHER ESOPHAGITIS PRESENT: ICD-10-CM

## 2024-10-30 RX ORDER — FAMOTIDINE 20 MG/1
20 TABLET, FILM COATED ORAL DAILY
Qty: 90 TABLET | Refills: 1 | Status: SHIPPED | OUTPATIENT
Start: 2024-10-30

## 2024-11-13 ENCOUNTER — OFFICE VISIT (OUTPATIENT)
Dept: CARDIOLOGY CLINIC | Facility: SKILLED NURSING FACILITY | Age: 89
End: 2024-11-13
Payer: COMMERCIAL

## 2024-11-13 DIAGNOSIS — Z95.0 HISTORY OF PERMANENT CARDIAC PACEMAKER PLACEMENT: ICD-10-CM

## 2024-11-13 DIAGNOSIS — Z95.2 S/P AVR (AORTIC VALVE REPLACEMENT): ICD-10-CM

## 2024-11-13 DIAGNOSIS — I44.1 MOBITZ TYPE 1 SECOND DEGREE AV BLOCK: Primary | ICD-10-CM

## 2024-11-13 DIAGNOSIS — I25.10 CORONARY ARTERY DISEASE INVOLVING NATIVE CORONARY ARTERY OF NATIVE HEART WITHOUT ANGINA PECTORIS: ICD-10-CM

## 2024-11-13 PROCEDURE — 99305 1ST NF CARE MODERATE MDM 35: CPT | Performed by: INTERNAL MEDICINE

## 2024-11-13 NOTE — ASSESSMENT & PLAN NOTE
Bioprosthetic AVR. She had an echocardiogram done in May at which time she had normal bioprosthetic valve function. Can be monitored periodically if relevant.

## 2024-11-13 NOTE — PROGRESS NOTES
[facility] Consultation - Cardiology     Berta Estrada 89 y.o. female MRN: [id number]        Assessment & Plan  Mobitz type 1 second degree AV block  Status post pacemaker. Continue with device interrogations. Medtronic device. Her module is at the bedside. I am okay with having only remote interrogations  Coronary artery disease involving native coronary artery of native heart without angina pectoris  History of PCI. Currently without any symptoms. Continue aspirin and atorvastatin.  S/P AVR (aortic valve replacement)  Bioprosthetic AVR. She had an echocardiogram done in May at which time she had normal bioprosthetic valve function. Can be monitored periodically if relevant.  History of permanent cardiac pacemaker placement  Pacemaker placed in May of this year. Normal device function on her last interrogation and this is now planned for December 11. Will be done remotely Medtronic device.    History of Present Illness   Reason for Consult / Principal Problem: Establish cardiac care at nursing facility. Aortic stenosis status post AVR, heart block, status post pacemaker. Coronary artery disease.  HPI: Berta Estrada is a 89 y.o. year old female who was admitted to [facility]. Patient has Alzheimer's dementia which has been progressive. Actually evaluated her in the office about 5 years ago at which time she was seen for routine management.    She has a history of coronary artery disease with remote PCI. She has aortic valve replacement in 2016 bioprosthetic valve. More recently, she was having episodes of fall and had some heart block with 2-1 and Mobitz. Pacemaker was placed single-chamber Medtronic device.    She is now admitted to the long-term care facility since September and is being seen here for chronic care.    She is quite pleasantly demented. She does have labile mood as per the staff here. She had her last device interrogation at the end of September and  does have her module at the bedside.    She was seen in the hallway as she was in her wheelchair scooting herself around but denied any cardiac complaints and was pleasantly confused.      Review of Systems:  Unreliable secondary to dementia.    Historical Information   Past Medical History:   Diagnosis Date    Actinic keratosis     Basal cell carcinoma     Back     Cancer (HCC)     Coronary artery disease     Dementia (HCC)     Depression     Ear problems     HL (hearing loss)     Hyperlipidemia     Migraines     Ovarian cancer (HCC) 2004    s/p surgery. no chemo/RT    Phlebitis     R leg     Past Surgical History:   Procedure Laterality Date    ADENOIDECTOMY      APPENDECTOMY      CARDIAC ELECTROPHYSIOLOGY PROCEDURE N/A 5/15/2024    Procedure: Cardiac pacer implant;  Surgeon: Rufus Raines MD;  [facility];  Service: Cardiology    CATARACT EXTRACTION, BILATERAL      HYSTERECTOMY  2005    ovarian CA    KNEE SURGERY Right     SKIN BIOPSY      TONSILECTOMY AND ADNOIDECTOMY      TONSILLECTOMY       Social History     Substance and Sexual Activity   Alcohol Use Not Currently     Social History     Substance and Sexual Activity   Drug Use Never     Social History     Tobacco Use   Smoking Status Former    Current packs/day: 0.25    Types: Cigarettes   Smokeless Tobacco Never   Tobacco Comments    until age 30     Family History: Family history non-contributory    Meds/Allergies   Medications reviewed in PCC.  Pertinent cardiac medications include:  Aspirin 81 mg daily, atorvastatin 20 mg daily.    Allergies   Allergen Reactions    Codeine Hives    Morphine Other (See Comments)     Redness in face, swelling    Other      Seasonal    Propoxyphene        Objective   Vitals:  Reviewed in PCC and at the bedside with staff    Physical Exam:  GEN: Berta Estrada elderly female, in wheelchair.  HEENT: pupils equal, round, and reactive to light; extraocular muscles intact  NECK: supple, no carotid bruits  "  HEART: Frequent extrasystoles. Bio AVR.  LUNGS: clear to auscultation bilaterally; no wheezes, rales, or rhonchi   ABDOMEN: normal bowel sounds, soft, no tenderness, no distention  EXTREMITIES: peripheral pulses normal; no clubbing, cyanosis, or edema  NEURO: unable to assess.  SKIN: normal without suspicious lesions on exposed skin    Lab Results:                       Invalid input(s): \"LABALBU\"            Imaging: Results Review Statement: No pertinent imaging studies reviewed.  No results found.    "

## 2024-11-13 NOTE — ASSESSMENT & PLAN NOTE
Pacemaker placed in May of this year. Normal device function on her last interrogation and this is now planned for December 11. Will be done remotely Linux Voice device.

## 2024-11-13 NOTE — ASSESSMENT & PLAN NOTE
Status post pacemaker. Continue with device interrogations. Medtronic device. Her module is at the bedside. I am okay with having only remote interrogations

## 2024-11-23 ENCOUNTER — VBI (OUTPATIENT)
Dept: ADMINISTRATIVE | Facility: OTHER | Age: 89
End: 2024-11-23

## 2024-11-23 NOTE — TELEPHONE ENCOUNTER
11/23/24 12:33 PM     Chart reviewed for blood pressure was/were submitted to the patient's insurance.     Bri Alicia   [facility]

## 2024-12-11 ENCOUNTER — REMOTE DEVICE CLINIC VISIT (OUTPATIENT)
Dept: CARDIOLOGY CLINIC | Facility: CLINIC | Age: 89
End: 2024-12-11
Payer: MEDICARE

## 2024-12-11 DIAGNOSIS — Z95.0 CARDIAC PACEMAKER IN SITU: Primary | ICD-10-CM

## 2024-12-11 PROCEDURE — 93294 REM INTERROG EVL PM/LDLS PM: CPT | Performed by: INTERNAL MEDICINE

## 2024-12-11 PROCEDURE — 93296 REM INTERROG EVL PM/IDS: CPT | Performed by: INTERNAL MEDICINE

## 2024-12-11 NOTE — PROGRESS NOTES
"Results for orders placed or performed in visit on 12/11/24   Cardiac EP device report    Narrative    MDT SINGLE PM/ ACTIVE SYSTEM IS MRI CONDITIONAL  CARELINK TRANSMISSION: BATTERY STATUS \"14 YRS.\"  10%. ALL AVAILABLE LEAD PARAMETERS WITHIN NORMAL LIMITS. 1 BRIEF NSVT NOTED; APPROX 7 BEATS@ 207 BPM. NO BBLOCKER NOTED; EF 80% (ECHO 2024). NORMAL DEVICE FUNCTION. NC         "

## 2024-12-14 ENCOUNTER — RESULTS FOLLOW-UP (OUTPATIENT)
Dept: CARDIOLOGY CLINIC | Facility: CLINIC | Age: 89
End: 2024-12-14

## 2025-03-12 ENCOUNTER — REMOTE DEVICE CLINIC VISIT (OUTPATIENT)
Dept: CARDIOLOGY CLINIC | Facility: CLINIC | Age: OVER 89
End: 2025-03-12
Payer: MEDICARE

## 2025-03-12 ENCOUNTER — RESULTS FOLLOW-UP (OUTPATIENT)
Dept: CARDIOLOGY CLINIC | Facility: CLINIC | Age: OVER 89
End: 2025-03-12

## 2025-03-12 DIAGNOSIS — Z95.0 PRESENCE OF PERMANENT CARDIAC PACEMAKER: Primary | ICD-10-CM

## 2025-03-12 PROCEDURE — 93296 REM INTERROG EVL PM/IDS: CPT | Performed by: INTERNAL MEDICINE

## 2025-03-12 PROCEDURE — 93294 REM INTERROG EVL PM/LDLS PM: CPT | Performed by: INTERNAL MEDICINE

## 2025-03-12 NOTE — PROGRESS NOTES
Results for orders placed or performed in visit on 03/12/25   Cardiac EP device report    Narrative    MDT SINGLE PM/ ACTIVE SYSTEM IS MRI CONDITIONAL  CARELINK TRANSMISSION: BATTERY VOLTAGE ADEQUATE. (14.6 YRS)  5%. ALL AVAILABLE LEAD PARAMETERS WITHIN NORMAL LIMITS. NO SIGNIFICANT HIGH RATE EPISODES. NORMAL DEVICE FUNCTION.---LAMAS

## 2025-05-30 ENCOUNTER — TELEPHONE (OUTPATIENT)
Age: OVER 89
End: 2025-05-30

## 2025-05-30 NOTE — TELEPHONE ENCOUNTER
Caller: Hillary Estrada     Doctor: Dr. Adams    Reason for call: Requesting information on operations of Pacemaker. Needs reports. Pacemaker may need to be turned off due to patients condition     Call back#: [phone]  Email: [email or web address]

## 2025-06-02 ENCOUNTER — TELEPHONE (OUTPATIENT)
Dept: CARDIOLOGY CLINIC | Facility: CLINIC | Age: OVER 89
End: 2025-06-02

## 2025-06-11 ENCOUNTER — RESULTS FOLLOW-UP (OUTPATIENT)
Dept: NON INVASIVE DIAGNOSTICS | Facility: HOSPITAL | Age: OVER 89
End: 2025-06-11

## 2025-06-11 ENCOUNTER — REMOTE DEVICE CLINIC VISIT (OUTPATIENT)
Dept: CARDIOLOGY CLINIC | Facility: CLINIC | Age: OVER 89
End: 2025-06-11
Payer: MEDICARE

## 2025-06-11 DIAGNOSIS — Z95.0 HISTORY OF PERMANENT CARDIAC PACEMAKER PLACEMENT: Primary | ICD-10-CM

## 2025-06-11 PROCEDURE — 93296 REM INTERROG EVL PM/IDS: CPT | Performed by: INTERNAL MEDICINE

## 2025-06-11 PROCEDURE — 93294 REM INTERROG EVL PM/LDLS PM: CPT | Performed by: INTERNAL MEDICINE

## 2025-06-11 NOTE — PROGRESS NOTES
MDT SINGLE PM/ ACTIVE SYSTEM IS MRI CONDITIONAL   CARELINK TRANSMISSION: BATTERY VOLTAGE ADEQUATE (14.4 YR).  3.6% (VVI 50 PPM). ALL AVAILABLE LEAD PARAMETERS WITHIN NORMAL LIMITS. SINCE 3/12/25 REMOTE:  10 DEVICE CLASSIFIED VT-MON EPISODES ON 6/6/25  WITH ALL AVAILABLE EGM'S SUGGESTIVE FOR SVT, OCCAS PVC @ -181 BPM, MAX EPISODE DURATION 02:33 MINS. PVC COUNT - SINGLE 15.6/HR : RUNS 6.7/HR. NO AV ANUJ BLOCKER. EF 80% (5/14/24 ECHO). NORMAL DEVICE FUNCTION.  ES

## (undated) DEVICE — INTRO SHEATH PEEL AWAY 7FR

## (undated) DEVICE — CATH GUIDING FIXED SHAPE 43CM

## (undated) DEVICE — DGW .035 FC J3MM 150CM TEF: Brand: EMERALD

## (undated) DEVICE — MICROPUNCTURE INTRODUCER SET SILHOUETTE TRANSITIONLESS PUSH-PLUS DESIGN - STIFFENED CANNULA WITH NITINOL WIRE GUIDE: Brand: MICROPUNCTURE

## (undated) DEVICE — SLITTER ADJUSTABLE